# Patient Record
Sex: MALE | Race: ASIAN | Employment: PART TIME | URBAN - METROPOLITAN AREA
[De-identification: names, ages, dates, MRNs, and addresses within clinical notes are randomized per-mention and may not be internally consistent; named-entity substitution may affect disease eponyms.]

---

## 2018-03-25 ENCOUNTER — HOSPITAL ENCOUNTER (INPATIENT)
Age: 28
LOS: 9 days | Discharge: HOME | DRG: 329 | End: 2018-04-03
Attending: EMERGENCY MEDICINE | Admitting: INTERNAL MEDICINE
Payer: COMMERCIAL

## 2018-03-25 ENCOUNTER — APPOINTMENT (OUTPATIENT)
Dept: CT IMAGING | Age: 28
DRG: 329 | End: 2018-03-25
Attending: PHYSICIAN ASSISTANT
Payer: COMMERCIAL

## 2018-03-25 DIAGNOSIS — R16.0 LIVER MASSES: ICD-10-CM

## 2018-03-25 DIAGNOSIS — C18.9 COLON CANCER METASTASIZED TO LIVER (HCC): ICD-10-CM

## 2018-03-25 DIAGNOSIS — K63.89 COLONIC MASS: ICD-10-CM

## 2018-03-25 DIAGNOSIS — K57.32 DIVERTICULITIS OF LARGE INTESTINE WITHOUT PERFORATION OR ABSCESS WITHOUT BLEEDING: Primary | ICD-10-CM

## 2018-03-25 DIAGNOSIS — R65.10 SIRS (SYSTEMIC INFLAMMATORY RESPONSE SYNDROME) (HCC): ICD-10-CM

## 2018-03-25 DIAGNOSIS — C78.7 COLON CANCER METASTASIZED TO LIVER (HCC): ICD-10-CM

## 2018-03-25 DIAGNOSIS — J18.9 COMMUNITY ACQUIRED PNEUMONIA OF RIGHT LOWER LOBE OF LUNG: ICD-10-CM

## 2018-03-25 PROBLEM — R73.9 HYPERGLYCEMIA: Status: ACTIVE | Noted: 2018-03-25

## 2018-03-25 PROBLEM — K76.9 LESION OF LIVER: Status: ACTIVE | Noted: 2018-03-25

## 2018-03-25 PROBLEM — K57.92 ACUTE DIVERTICULITIS: Status: ACTIVE | Noted: 2018-03-25

## 2018-03-25 PROBLEM — I10 HYPERTENSION: Status: ACTIVE | Noted: 2018-03-25

## 2018-03-25 LAB
ALBUMIN SERPL-MCNC: 3.6 G/DL (ref 3.5–5)
ALBUMIN/GLOB SERPL: 0.7 {RATIO} (ref 1.1–2.2)
ALP SERPL-CCNC: 76 U/L (ref 45–117)
ALT SERPL-CCNC: 26 U/L (ref 12–78)
ANION GAP SERPL CALC-SCNC: 12 MMOL/L (ref 5–15)
APPEARANCE UR: ABNORMAL
AST SERPL-CCNC: 27 U/L (ref 15–37)
BACTERIA URNS QL MICRO: ABNORMAL /HPF
BASOPHILS # BLD: 0.1 K/UL (ref 0–0.1)
BASOPHILS NFR BLD: 0 % (ref 0–1)
BILIRUB SERPL-MCNC: 0.6 MG/DL (ref 0.2–1)
BILIRUB UR QL CFM: NEGATIVE
BUN SERPL-MCNC: 11 MG/DL (ref 6–20)
BUN/CREAT SERPL: 11 (ref 12–20)
CALCIUM SERPL-MCNC: 9.1 MG/DL (ref 8.5–10.1)
CHLORIDE SERPL-SCNC: 101 MMOL/L (ref 97–108)
CO2 SERPL-SCNC: 26 MMOL/L (ref 21–32)
COLOR UR: ABNORMAL
CREAT SERPL-MCNC: 0.97 MG/DL (ref 0.7–1.3)
DIFFERENTIAL METHOD BLD: ABNORMAL
EOSINOPHIL # BLD: 0.1 K/UL (ref 0–0.4)
EOSINOPHIL NFR BLD: 1 % (ref 0–7)
EPITH CASTS URNS QL MICRO: ABNORMAL /LPF
ERYTHROCYTE [DISTWIDTH] IN BLOOD BY AUTOMATED COUNT: 15 % (ref 11.5–14.5)
GLOBULIN SER CALC-MCNC: 5 G/DL (ref 2–4)
GLUCOSE SERPL-MCNC: 122 MG/DL (ref 65–100)
GLUCOSE UR STRIP.AUTO-MCNC: NEGATIVE MG/DL
HCT VFR BLD AUTO: 42.5 % (ref 36.6–50.3)
HGB BLD-MCNC: 13.9 G/DL (ref 12.1–17)
HGB UR QL STRIP: NEGATIVE
IMM GRANULOCYTES # BLD: 0.1 K/UL (ref 0–0.04)
IMM GRANULOCYTES NFR BLD AUTO: 1 % (ref 0–0.5)
KETONES UR QL STRIP.AUTO: ABNORMAL MG/DL
LACTATE SERPL-SCNC: 0.8 MMOL/L (ref 0.4–2)
LEUKOCYTE ESTERASE UR QL STRIP.AUTO: NEGATIVE
LIPASE SERPL-CCNC: 64 U/L (ref 73–393)
LYMPHOCYTES # BLD: 2.1 K/UL (ref 0.8–3.5)
LYMPHOCYTES NFR BLD: 11 % (ref 12–49)
MCH RBC QN AUTO: 23.1 PG (ref 26–34)
MCHC RBC AUTO-ENTMCNC: 32.7 G/DL (ref 30–36.5)
MCV RBC AUTO: 70.5 FL (ref 80–99)
MONOCYTES # BLD: 1.4 K/UL (ref 0–1)
MONOCYTES NFR BLD: 7 % (ref 5–13)
MUCOUS THREADS URNS QL MICRO: ABNORMAL /LPF
NEUTS SEG # BLD: 15.8 K/UL (ref 1.8–8)
NEUTS SEG NFR BLD: 81 % (ref 32–75)
NITRITE UR QL STRIP.AUTO: NEGATIVE
NRBC # BLD: 0 K/UL (ref 0–0.01)
NRBC BLD-RTO: 0 PER 100 WBC
PH UR STRIP: 5.5 [PH] (ref 5–8)
PLATELET # BLD AUTO: 401 K/UL (ref 150–400)
PMV BLD AUTO: 9.1 FL (ref 8.9–12.9)
POTASSIUM SERPL-SCNC: 4 MMOL/L (ref 3.5–5.1)
PROT SERPL-MCNC: 8.6 G/DL (ref 6.4–8.2)
PROT UR STRIP-MCNC: 30 MG/DL
RBC # BLD AUTO: 6.03 M/UL (ref 4.1–5.7)
RBC #/AREA URNS HPF: ABNORMAL /HPF (ref 0–5)
SODIUM SERPL-SCNC: 139 MMOL/L (ref 136–145)
SP GR UR REFRACTOMETRY: >1.03 (ref 1–1.03)
UROBILINOGEN UR QL STRIP.AUTO: 0.2 EU/DL (ref 0.2–1)
WBC # BLD AUTO: 19.5 K/UL (ref 4.1–11.1)
WBC URNS QL MICRO: ABNORMAL /HPF (ref 0–4)

## 2018-03-25 PROCEDURE — 96374 THER/PROPH/DIAG INJ IV PUSH: CPT

## 2018-03-25 PROCEDURE — 65270000029 HC RM PRIVATE

## 2018-03-25 PROCEDURE — 80053 COMPREHEN METABOLIC PANEL: CPT | Performed by: PHYSICIAN ASSISTANT

## 2018-03-25 PROCEDURE — 36415 COLL VENOUS BLD VENIPUNCTURE: CPT | Performed by: PHYSICIAN ASSISTANT

## 2018-03-25 PROCEDURE — 74011250637 HC RX REV CODE- 250/637: Performed by: INTERNAL MEDICINE

## 2018-03-25 PROCEDURE — 96375 TX/PRO/DX INJ NEW DRUG ADDON: CPT

## 2018-03-25 PROCEDURE — 74011636320 HC RX REV CODE- 636/320: Performed by: RADIOLOGY

## 2018-03-25 PROCEDURE — 81001 URINALYSIS AUTO W/SCOPE: CPT | Performed by: PHYSICIAN ASSISTANT

## 2018-03-25 PROCEDURE — 74011250636 HC RX REV CODE- 250/636: Performed by: PHYSICIAN ASSISTANT

## 2018-03-25 PROCEDURE — 83690 ASSAY OF LIPASE: CPT | Performed by: PHYSICIAN ASSISTANT

## 2018-03-25 PROCEDURE — 96361 HYDRATE IV INFUSION ADD-ON: CPT

## 2018-03-25 PROCEDURE — 99284 EMERGENCY DEPT VISIT MOD MDM: CPT

## 2018-03-25 PROCEDURE — 85025 COMPLETE CBC W/AUTO DIFF WBC: CPT | Performed by: PHYSICIAN ASSISTANT

## 2018-03-25 PROCEDURE — 74177 CT ABD & PELVIS W/CONTRAST: CPT

## 2018-03-25 PROCEDURE — 74011250636 HC RX REV CODE- 250/636: Performed by: INTERNAL MEDICINE

## 2018-03-25 PROCEDURE — 87633 RESP VIRUS 12-25 TARGETS: CPT | Performed by: INTERNAL MEDICINE

## 2018-03-25 PROCEDURE — 83605 ASSAY OF LACTIC ACID: CPT | Performed by: INTERNAL MEDICINE

## 2018-03-25 PROCEDURE — 74011000250 HC RX REV CODE- 250: Performed by: PHYSICIAN ASSISTANT

## 2018-03-25 PROCEDURE — 87040 BLOOD CULTURE FOR BACTERIA: CPT | Performed by: PHYSICIAN ASSISTANT

## 2018-03-25 RX ORDER — IPRATROPIUM BROMIDE AND ALBUTEROL SULFATE 2.5; .5 MG/3ML; MG/3ML
3 SOLUTION RESPIRATORY (INHALATION)
Status: DISCONTINUED | OUTPATIENT
Start: 2018-03-25 | End: 2018-04-03 | Stop reason: HOSPADM

## 2018-03-25 RX ORDER — BENZONATATE 100 MG/1
100 CAPSULE ORAL
Status: DISCONTINUED | OUTPATIENT
Start: 2018-03-25 | End: 2018-04-03 | Stop reason: HOSPADM

## 2018-03-25 RX ORDER — HYDROMORPHONE HYDROCHLORIDE 1 MG/ML
1 INJECTION, SOLUTION INTRAMUSCULAR; INTRAVENOUS; SUBCUTANEOUS
Status: DISCONTINUED | OUTPATIENT
Start: 2018-03-25 | End: 2018-03-29

## 2018-03-25 RX ORDER — ONDANSETRON 2 MG/ML
4 INJECTION INTRAMUSCULAR; INTRAVENOUS
Status: COMPLETED | OUTPATIENT
Start: 2018-03-25 | End: 2018-03-25

## 2018-03-25 RX ORDER — DOCUSATE SODIUM 100 MG/1
100 CAPSULE, LIQUID FILLED ORAL 2 TIMES DAILY
Status: DISCONTINUED | OUTPATIENT
Start: 2018-03-25 | End: 2018-04-03 | Stop reason: HOSPADM

## 2018-03-25 RX ORDER — DIPHENHYDRAMINE HYDROCHLORIDE 50 MG/ML
12.5 INJECTION, SOLUTION INTRAMUSCULAR; INTRAVENOUS
Status: DISCONTINUED | OUTPATIENT
Start: 2018-03-25 | End: 2018-04-03 | Stop reason: HOSPADM

## 2018-03-25 RX ORDER — METRONIDAZOLE 500 MG/100ML
500 INJECTION, SOLUTION INTRAVENOUS EVERY 12 HOURS
Status: DISCONTINUED | OUTPATIENT
Start: 2018-03-26 | End: 2018-03-27

## 2018-03-25 RX ORDER — LEVOFLOXACIN 5 MG/ML
750 INJECTION, SOLUTION INTRAVENOUS EVERY 24 HOURS
Status: DISCONTINUED | OUTPATIENT
Start: 2018-03-26 | End: 2018-03-27

## 2018-03-25 RX ORDER — LEVOFLOXACIN 5 MG/ML
750 INJECTION, SOLUTION INTRAVENOUS
Status: DISPENSED | OUTPATIENT
Start: 2018-03-25 | End: 2018-03-25

## 2018-03-25 RX ORDER — NALOXONE HYDROCHLORIDE 0.4 MG/ML
0.4 INJECTION, SOLUTION INTRAMUSCULAR; INTRAVENOUS; SUBCUTANEOUS AS NEEDED
Status: DISCONTINUED | OUTPATIENT
Start: 2018-03-25 | End: 2018-04-03 | Stop reason: HOSPADM

## 2018-03-25 RX ORDER — FENTANYL CITRATE 50 UG/ML
50 INJECTION, SOLUTION INTRAMUSCULAR; INTRAVENOUS
Status: COMPLETED | OUTPATIENT
Start: 2018-03-25 | End: 2018-03-25

## 2018-03-25 RX ORDER — IBUPROFEN 200 MG
200 TABLET ORAL
COMMUNITY
End: 2018-04-03

## 2018-03-25 RX ORDER — HYDROMORPHONE HYDROCHLORIDE 2 MG/ML
1 INJECTION, SOLUTION INTRAMUSCULAR; INTRAVENOUS; SUBCUTANEOUS
Status: COMPLETED | OUTPATIENT
Start: 2018-03-25 | End: 2018-03-25

## 2018-03-25 RX ORDER — SODIUM CHLORIDE 0.9 % (FLUSH) 0.9 %
5-10 SYRINGE (ML) INJECTION AS NEEDED
Status: DISCONTINUED | OUTPATIENT
Start: 2018-03-25 | End: 2018-04-03 | Stop reason: HOSPADM

## 2018-03-25 RX ORDER — GUAIFENESIN 600 MG/1
600 TABLET, EXTENDED RELEASE ORAL EVERY 12 HOURS
Status: DISCONTINUED | OUTPATIENT
Start: 2018-03-25 | End: 2018-04-03 | Stop reason: HOSPADM

## 2018-03-25 RX ORDER — SODIUM CHLORIDE 0.9 % (FLUSH) 0.9 %
5-10 SYRINGE (ML) INJECTION EVERY 8 HOURS
Status: DISCONTINUED | OUTPATIENT
Start: 2018-03-25 | End: 2018-04-03 | Stop reason: HOSPADM

## 2018-03-25 RX ORDER — HYDROCODONE BITARTRATE AND ACETAMINOPHEN 5; 325 MG/1; MG/1
1 TABLET ORAL
Status: DISCONTINUED | OUTPATIENT
Start: 2018-03-25 | End: 2018-03-29

## 2018-03-25 RX ORDER — ONDANSETRON 2 MG/ML
4 INJECTION INTRAMUSCULAR; INTRAVENOUS
Status: DISCONTINUED | OUTPATIENT
Start: 2018-03-25 | End: 2018-04-03 | Stop reason: HOSPADM

## 2018-03-25 RX ORDER — ACETAMINOPHEN 325 MG/1
650 TABLET ORAL
Status: DISCONTINUED | OUTPATIENT
Start: 2018-03-25 | End: 2018-04-03 | Stop reason: HOSPADM

## 2018-03-25 RX ORDER — ENOXAPARIN SODIUM 100 MG/ML
40 INJECTION SUBCUTANEOUS EVERY 24 HOURS
Status: DISCONTINUED | OUTPATIENT
Start: 2018-03-25 | End: 2018-03-27

## 2018-03-25 RX ORDER — SODIUM CHLORIDE 9 MG/ML
50 INJECTION, SOLUTION INTRAVENOUS CONTINUOUS
Status: DISCONTINUED | OUTPATIENT
Start: 2018-03-25 | End: 2018-04-01

## 2018-03-25 RX ORDER — METRONIDAZOLE 500 MG/100ML
500 INJECTION, SOLUTION INTRAVENOUS
Status: DISPENSED | OUTPATIENT
Start: 2018-03-25 | End: 2018-03-25

## 2018-03-25 RX ADMIN — ENOXAPARIN SODIUM 40 MG: 40 INJECTION SUBCUTANEOUS at 20:42

## 2018-03-25 RX ADMIN — ONDANSETRON 4 MG: 2 INJECTION INTRAMUSCULAR; INTRAVENOUS at 11:47

## 2018-03-25 RX ADMIN — HYDROMORPHONE HYDROCHLORIDE 1 MG: 1 INJECTION, SOLUTION INTRAMUSCULAR; INTRAVENOUS; SUBCUTANEOUS at 20:39

## 2018-03-25 RX ADMIN — HYDROMORPHONE HYDROCHLORIDE 1 MG: 1 INJECTION, SOLUTION INTRAMUSCULAR; INTRAVENOUS; SUBCUTANEOUS at 16:24

## 2018-03-25 RX ADMIN — SODIUM CHLORIDE 1000 ML: 900 INJECTION, SOLUTION INTRAVENOUS at 10:01

## 2018-03-25 RX ADMIN — SODIUM CHLORIDE 125 ML/HR: 900 INJECTION, SOLUTION INTRAVENOUS at 22:56

## 2018-03-25 RX ADMIN — HYDROMORPHONE HYDROCHLORIDE 1 MG: 2 INJECTION, SOLUTION INTRAMUSCULAR; INTRAVENOUS; SUBCUTANEOUS at 11:49

## 2018-03-25 RX ADMIN — GUAIFENESIN 600 MG: 600 TABLET, EXTENDED RELEASE ORAL at 20:43

## 2018-03-25 RX ADMIN — GUAIFENESIN 600 MG: 600 TABLET, EXTENDED RELEASE ORAL at 13:45

## 2018-03-25 RX ADMIN — FENTANYL CITRATE 50 MCG: 50 INJECTION, SOLUTION INTRAMUSCULAR; INTRAVENOUS at 09:56

## 2018-03-25 RX ADMIN — ONDANSETRON 4 MG: 2 INJECTION INTRAMUSCULAR; INTRAVENOUS at 09:57

## 2018-03-25 RX ADMIN — IOPAMIDOL 100 ML: 755 INJECTION, SOLUTION INTRAVENOUS at 10:32

## 2018-03-25 RX ADMIN — SODIUM CHLORIDE 125 ML/HR: 900 INJECTION, SOLUTION INTRAVENOUS at 13:45

## 2018-03-25 RX ADMIN — Medication 10 ML: at 20:43

## 2018-03-25 RX ADMIN — DOCUSATE SODIUM 100 MG: 100 CAPSULE, LIQUID FILLED ORAL at 18:45

## 2018-03-25 NOTE — H&P
Manuel Frazier [facility] [address], [place], [address] [facility]  [phone]    Admission History and Physical      NAME:  Curt Mas   :   1990   MRN:  [id number]     PCP:  None     Date/Time:  3/25/2018         Subjective:     CHIEF COMPLAINT: abd pain     HISTORY OF PRESENT ILLNESS:     The patient is a 31 yo hx of morbid obesity, presented w/ abd pain, acute diverticulitis, pneumonia. The patient stated that he has had URI type symptoms for one week, then developed acute LLQ pain 2 days ago. The pain is sharp, 10/10, but denied fevers, chills, nausea, vomiting, diarrhea. In the ED, temp was 98.5, WBC 19.5, Abd CT showed acute diverticulitis with RLL pneumonia. No Known Allergies    Prior to Admission medications    Medication Sig Start Date End Date Taking? Authorizing Provider   ibuprofen (MOTRIN) 200 mg tablet Take 200 mg by mouth every six (6) hours as needed for Pain. Yes Historical Provider       Past Medical History:   Diagnosis Date    Hypertension         No past surgical history on file.     Social History   Substance Use Topics    Smoking status: Former Smoker    Smokeless tobacco: Not on file    Alcohol use Yes        Family History   Problem Relation Age of Onset    Stroke Mother     Diabetes Mother    [facility] Stroke Father     Hypertension Father         Review of Systems:  (bold if positive, if negative)    Gen:  Eyes:  ENT:  CVS:  Pulm:  GI:  Abdominal pain  :    MS:  Skin:  Psych:  Endo:    Hem:  Renal:    Neuro:          Objective:      VITALS:    Vital signs reviewed; most recent are:    Visit Vitals    /88    Pulse (!) 134    Temp 98.5 °F (36.9 °C)    Resp 18    Ht 5' 11\" (1.803 m)    Wt 127 kg (280 lb)    SpO2 96%    BMI 39.05 kg/m2     SpO2 Readings from Last 6 Encounters:   18 96%   09/15/16 98%        No intake or output data in the 24 hours ending 18 1206     Exam:     Physical Exam:    Gen:  Well-developed, well-nourished, morbidly obese, mod distress  HEENT:  Pink conjunctivae, PERRL, hearing intact to voice, moist mucous membranes  Neck:  Supple, without masses, thyroid non-tender  Resp:  No accessory muscle use, clear breath sounds without wheezes rales or rhonchi  Card:  No murmurs, normal S1, S2 without thrills, bruits or peripheral edema  Abd:  Soft, severe LLQ pain, non-distended, normoactive bowel sounds are present, no palpable organomegaly and no detectable hernias  Lymph:  No cervical adenopathy  Musc:  No cyanosis or clubbing  Skin:  No rashes  Neuro:  Cranial nerves 3-12 are grossly intact, follows commands appropriately  Psych:  Alert with good insight. Oriented to person, place, and time    Labs:    Recent Labs      03/25/18   0907   WBC  19.5*   HGB  13.9   HCT  42.5   PLT  401*     Recent Labs      03/25/18   0907   NA  139   K  4.0   CL  101   CO2  26   GLU  122*   BUN  11   CREA  0.97   CA  9.1   ALB  3.6   TBILI  0.6   SGOT  27   ALT  26     No results found for: GLUCPOC  No results for input(s): PH, PCO2, PO2, HCO3, FIO2 in the last 72 hours. No results for input(s): INR in the last 72 hours. No lab exists for component: INREXT    Radiology and EKG reviewed:   abd CT reviewed    **Old Records reviewed in [facility]**       Assessment/Plan:       Principal Problem:     33 yo hx of morbid obesity, presented w/ abd pain, acute diverticulitis, pneumonia    1) Acute abd pain/Acute diverticulitis: seen on CT. No evidence of abscess. Will monitor blood Cx. Start NPO, IVF, IV Levaquin/flagyl. Consult GI    2) RLL Pneumonia: incidental findings on CT. Likely viral.  Will send sputum Cx, viral panel. Start O2, incentive spirometry, nebs prn. Already on IV Abx    3) SIRS (systemic inflammatory response syndrome): due to above issues, not septic. Will check Lactate    4) Lesion of liver: 2x1cm lesion seen on CT scan. Unclear significant.   Will defer to GI team    5) Hypertension: likely has underlying essential HTN, in combination of abd pain. Will monitor closely. Start norvasc if needed    6) Hyperglycemia: no hx of diabetes.   Will check A1C, lipids panel    Code: Full    Risk of deterioration: high      Total time spent with patient: [address] discussed with: Patient, family, nursing    Discussed:  Care Plan    Prophylaxis:  Lovenox    Probable Disposition:  Home w/Family           ___________________________________________________    Attending Physician: Jose Alberto Tran MD

## 2018-03-25 NOTE — IP AVS SNAPSHOT
[address] [facility] 
 
 
 [address] [place] [address] [address] [place] [address] 
[phone] Patient: Arcelia Wells 
MRN: [id number] :1990 About your hospitalization You were admitted on:  2018 You last received care in the:  [facility] 5M1 MED SURG 1 You were discharged on:  2018 Why you were hospitalized Your primary diagnosis was:  Acute Diverticulitis Your diagnoses also included:  Pneumonia, Sirs (Systemic Inflammatory Response Syndrome) (Hcc), Liver Masses, Hypertension, Hyperglycemia, Colonic Mass, Colon Cancer Metastasized To Liver (Hcc) Follow-up Information Follow up With Details Comments Contact Info Jaswinder Houston MD Go on 2018 appt at 2 pm to review test results and treatment plan [address] [place] [address] 
[phone] None   None (395) Patient stated that they have no PCP Dawna Crockett MD Go on 2018 hospital NEW pcp f/u appointment on  @ 2:05 p.m. Shima Buchanan Rodriguez [address] [place] [address] 
[phone] Your Scheduled Appointments 2018  2:00 PM EDT New Patient with Jaswinder Houston MD  
[facility] [address] [place] [address]) [address] [facility], [address] [facility] [address] [facility]  
[phone]   2:05 PM EDT New Patient with Dawna Crockett MD  
[address] [place] [address] [address] [place] [address])  
 Shima Buchanan Rodriguez 906. [address] [facility]  
[phone] Discharge Orders None A check susan indicates which time of day the medication should be taken. My Medications START taking these medications Instructions Each Dose to Equal  
 Morning Noon Evening Bedtime  
 levoFLOXacin 750 mg tablet Commonly known as:  Romayne Potters Your last dose was: Your next dose is: Take 1 Tab by mouth daily.   
 750 mg  
    
   
 metoprolol tartrate 25 mg tablet Commonly known as:  LOPRESSOR Your last dose was: Your next dose is: Take 1 Tab by mouth every twelve (12) hours. 25 mg  
    
   
   
   
  
 metroNIDAZOLE 500 mg tablet Commonly known as:  FLAGYL Your last dose was: Your next dose is: Take 1 Tab by mouth three (3) times daily for 5 days. 500 mg  
    
   
   
   
  
  
STOP taking these medications   
 ibuprofen 200 mg tablet Commonly known as:  MOTRIN Where to Get Your Medications Information on where to get these meds will be given to you by the nurse or doctor. ! Ask your nurse or doctor about these medications  
  levoFLOXacin 750 mg tablet  
 metoprolol tartrate 25 mg tablet  
 metroNIDAZOLE 500 mg tablet Discharge Instructions ACUTE DIAGNOSES: 
Acute diverticulitis 
anemia Colon cancer COLON CANCER 
 
CHRONIC MEDICAL DIAGNOSES: 
Problem List as of 4/2/2018  Date Reviewed: 4/2/2018 Codes Class Noted - Resolved Colon cancer metastasized to liver [facility]) ICD-10-CM: C18.9, C78.7 ICD-9-CM: 153.9, 197.7  3/28/2018 - Present Colonic mass ICD-10-CM: K63.9 ICD-9-CM: 569.89  3/27/2018 - Present * (Principal)Acute diverticulitis ICD-10-CM: P57.96 
ICD-9-CM: 562.11  3/25/2018 - Present Pneumonia ICD-10-CM: J18.9 ICD-9-CM: 397  3/25/2018 - Present SIRS (systemic inflammatory response syndrome) (HCC) ICD-10-CM: R65.10 ICD-9-CM: 995.90  3/25/2018 - Present Liver masses ICD-10-CM: R16.0 ICD-9-CM: 573.9  3/25/2018 - Present Hypertension ICD-10-CM: I10 
ICD-9-CM: 401.9  3/25/2018 - Present RESOLVED: Hyperglycemia ICD-10-CM: R73.9 ICD-9-CM: 790.29  3/25/2018 - 4/2/2018 DISCHARGE MEDICATIONS:  
  
 
 
· It is important that you take the medication exactly as they are prescribed. · Keep your medication in the bottles provided by the pharmacist and keep a list of the medication names, dosages, and times to be taken in your wallet. · Do not take other medications without consulting your doctor. DIET:  Regular Diet ACTIVITY: Activity as tolerated ADDITIONAL INFORMATION: If you experience any of the following symptoms then please call your primary care physician or return to the emergency room if you cannot get hold of your doctor: Fever, chills, nausea, vomiting, diarrhea, change in mentation, falling, bleeding, shortness of breath. FOLLOW UP CARE: 
Dr. Estrada  you are to call and set up an appointment to see them in 2 weeks. Follow-up with oncology  in 1 week Follow up with general surgery in one week Information obtained by : 
I understand that if any problems occur once I am at home I am to contact my physician. I understand and acknowledge receipt of the instructions indicated above. Physician's or R.N.'s Signature                                                                  Date/Time Patient or Representative Signature                                                          Date/Time [employer] Announcement We are excited to announce that we are making your provider's discharge notes available to you in [employer]. You will see these notes when they are completed and signed by the physician that discharged you from your recent hospital stay.   If you have any questions or concerns about any information you see in [employer], please call the Health Information Department where you were seen or reach out to your Primary Care Provider for more information about your plan of care. Introducing [facility]! [facility] introduces NXE patient portal. Now you can access parts of your medical record, email your doctor's office, and request medication refills online. 1. In your internet browser, go to [email or web address] 2. Click on the First Time User? Click Here link in the Sign In box. You will see the New Member Sign Up page. 3. Enter your NXE Access Code exactly as it appears below. You will not need to use this code after youve completed the sign-up process. If you do not sign up before the expiration date, you must request a new code. · NXE Access Code: [email or web address] Expires: 6/23/2018  9:24 AM 
 
4. Enter the last four digits of your Social Security Number (xxxx) and Date of Birth (mm/dd/yyyy) as indicated and click Submit. You will be taken to the next sign-up page. 5. Create a NXE ID. This will be your NXE login ID and cannot be changed, so think of one that is secure and easy to remember. 6. Create a NXE password. You can change your password at any time. 7. Enter your Password Reset Question and Answer. This can be used at a later time if you forget your password. 8. Enter your e-mail address. You will receive e-mail notification when new information is available in [address] 9. Click Sign Up. You can now view and download portions of your medical record. 10. Click the Download Summary menu link to download a portable copy of your medical information. If you have questions, please visit the Frequently Asked Questions section of the NXE website. Remember, NXE is NOT to be used for urgent needs. For medical emergencies, dial 911. Now available from your iPhone and Android! Introducing Cesar Castaneda As a [facility] patient, I wanted to make you aware of our electronic visit tool called Cesar Castaneda. [employer] allows you to connect within minutes with a medical provider 24 hours a day, seven days a week via a mobile device or tablet or logging into a secure website from your computer. You can access ishBowl from anywhere in the [place]. A virtual visit might be right for you when you have a simple condition and feel like you just dont want to get out of bed, or cant get away from work for an appointment, when your regular [employer] provider is not available (evenings, weekends or holidays), or when youre out of town and need minor care. Electronic visits cost only $49 and if the [employer] provider determines a prescription is needed to treat your condition, one can be electronically transmitted to a nearby pharmacy*. Please take a moment to enroll today if you have not already done so. The enrollment process is free and takes just a few minutes. To enroll, please download the AzureBooker norma to your tablet or phone, or visit [email or web address] to enroll on your computer. And, as an [address] [place] [address] patient with a [employer] account, the results of your visits will be scanned into your electronic medical record and your primary care provider will be able to view the scanned results. We urge you to continue to see your regular [employer] provider for your ongoing medical care. And while your primary care provider may not be the one available when you seek a [employer] virtual visit, the peace of mind you get from getting a real diagnosis real time can be priceless. For more information on [employer], view our Frequently Asked Questions (FAQs) at [email or web address]. Sincerely, 
 
Rajeev Bell MD 
Chief Medical Officer [employer] *:  certain medications cannot be prescribed via [employer] Providers Seen During Your Hospitalization Provider Specialty Primary office phone Lawrence Pineda MD Emergency Medicine [phone] Jasmina Rosado MD Internal Medicine [phone] Adonis Vincent MD Hospitalist [phone] Your Primary Care Physician (PCP) Primary Care Physician Office Phone Office Fax NONE ** None ** ** None ** You are allergic to the following No active allergies Recent Documentation Height Weight BMI Smoking Status 1.803 m 127 kg 39.05 kg/m2 Never Smoker Emergency Contacts Name Discharge Info Relation Home Work Mobile Yaritza Gonsales N/A  AT THIS TIME [6] Sister [23] [phone] Patient Belongings The following personal items are in your possession at time of discharge: 
  Dental Appliances: None  Visual Aid: None      Home Medications: None   Jewelry: None  Clothing: At bedside    Other Valuables: Cell Phone Please provide this summary of care documentation to your next provider. Signatures-by signing, you are acknowledging that this After Visit Summary has been reviewed with you and you have received a copy. Patient Signature:  ____________________________________________________________ Date:  ____________________________________________________________  
  
Laverne Brown Provider Signature:  ____________________________________________________________ Date:  ____________________________________________________________

## 2018-03-25 NOTE — ED PROVIDER NOTES
HPI Comments: 32 y.o. male with no significant past medical history who presents from home with chief complaint of abdominal pain. Per pt, he experienced onset of body aches, cough and nasal congestion on Wednesday (3/21/2018). The pt reports that these symptoms continued and on Friday he experienced new onset of LLQ pain. Since onset of the LLQ pain, the pt states that it has appeared to progressively worsen. Per pt, he has been taking ibuprofen and using Tiger Balm, without any significant relief. The pt makes it known that he has no hx of prior abd operations. Pt states he is unsure of fever at this time. Per pt, he last had a BM yesterday, which appeared normal. The pt denies fever, N/V/D, CP, blood or black stool, SOB, back pain, dizziness, headache and urinary symptoms. There are no other acute medical concerns at this time. PCP: None    Social Hx: Tobacco: denies  EtOH: social Illicit drug use: previous cocaine, last 9/2017      Note written by Blanca Heard, as dictated by Lori Pastrana PA-C 9:00 AM       The history is provided by the patient. No  was used. Past Medical History:   Diagnosis Date    Hypertension        No past surgical history on file. Family History:   Problem Relation Age of Onset    Stroke Mother     Diabetes Mother     Stroke Father     Hypertension Father        Social History     Social History    Marital status: SINGLE     Spouse name: N/A    Number of children: N/A    Years of education: N/A     Occupational History    Not on file. Social History Main Topics    Smoking status: Former Smoker    Smokeless tobacco: Not on file    Alcohol use Yes    Drug use: No    Sexual activity: Not on file     Other Topics Concern    Not on file     Social History Narrative         ALLERGIES: Review of patient's allergies indicates no known allergies. Review of Systems   Constitutional: Positive for chills. Negative for fever.    HENT: Positive for congestion. Negative for rhinorrhea, sneezing and sore throat. Eyes: Negative for redness and visual disturbance. Respiratory: Positive for cough. Negative for shortness of breath. Cardiovascular: Negative for chest pain and leg swelling. Gastrointestinal: Positive for abdominal pain (LLQ) and blood in stool. Negative for constipation, diarrhea, nausea and vomiting. Genitourinary: Negative for difficulty urinating, dysuria, flank pain, frequency and hematuria. Musculoskeletal: Negative for back pain, myalgias and neck stiffness. Skin: Negative for rash. Neurological: Negative for dizziness, syncope, weakness and headaches. Hematological: Negative for adenopathy. Vitals:    03/25/18 0839 03/25/18 0913 03/25/18 1045   BP: (!) 161/98  157/88   Pulse: (!) 134     Resp: 18     Temp: 98.5 °F (36.9 °C)     SpO2: 97% 99% 96%   Weight: 127 kg (280 lb)     Height: 5' 11\" (1.803 m)              Physical Exam   Constitutional: He is oriented to person, place, and time. He appears well-developed and well-nourished. No distress. Above average bmi male   HENT:   Head: Normocephalic and atraumatic. Right Ear: External ear normal.   Left Ear: External ear normal.   Mouth/Throat: Oropharynx is clear and moist.   Pale boggy nares with clear rhinorrhea + PND   Eyes: EOM are normal. Pupils are equal, round, and reactive to light. Neck: Neck supple. No JVD present. No tracheal deviation present. Cardiovascular: Regular rhythm, normal heart sounds and intact distal pulses. Exam reveals no gallop and no friction rub. No murmur heard. tachycardia   Pulmonary/Chest: Effort normal and breath sounds normal. No stridor. No respiratory distress. He has no wheezes. He has no rales. He exhibits no tenderness. Abdominal: Soft. Bowel sounds are normal. He exhibits no distension and no mass. There is tenderness. There is no rebound and no guarding.    Focal LLQ ttp without rebounding + guarding no CVAT   Musculoskeletal: Normal range of motion. He exhibits no edema, tenderness or deformity. Lymphadenopathy:     He has no cervical adenopathy. Neurological: He is alert and oriented to person, place, and time. No cranial nerve deficit. Coordination normal.   Skin: No rash noted. No erythema. No pallor. Psychiatric: He has a normal mood and affect. His behavior is normal.   Nursing note and vitals reviewed. MDM  Number of Diagnoses or Management Options  Community acquired pneumonia of right lower lobe of lung ([address]):   Diverticulitis of large intestine without perforation or abscess without bleeding:   SIRS (systemic inflammatory response syndrome) ([address]):      Amount and/or Complexity of Data Reviewed  Clinical lab tests: ordered and reviewed  Tests in the radiology section of CPT®: ordered and reviewed  Tests in the medicine section of CPT®: reviewed and ordered  Review and summarize past medical records: yes  Independent visualization of images, tracings, or specimens: yes    Patient Progress  Patient progress: stable        ED Course       Procedures  9:08 AM  Discussed with the patient the medical risks of cocaine usage and advised the patient of the benefits of the cessation of cocaine usage The patient verbalized their understanding. MICHELLE Dawson    9:08 AM  Discussed pt, sx, hx and current findings with Dr Elayne Rosales. He is in agreement with plan. Will get abd labs and ct abd pelvs  Cesia Ornelas PA-C    11:14 AM   Pt still with significant pain to LLQ, Diverticulitis on ct, tachycardia, and RLL pna. Will admit pt  Toyin Tracy. TEAGAN Ornelas    11:15 AM  Toyin Tracy. TEAGAN Ornelas spoke with Dr. Shanae Castro, Consult for Hospitalist. Discussed available diagnostic tests and clinical findings. He is in agreement with care plans as outlined.  He will admit pt  MICHELLE Dawson      LABS COMPLETED AND REVIEWED:  Recent Results (from the past 12 hour(s))   CBC WITH AUTOMATED DIFF    Collection Time: 03/25/18 9: 07 AM   Result Value Ref Range    WBC 19.5 (H) 4.1 - 11.1 K/uL    RBC 6.03 (H) 4.10 - 5.70 M/uL    HGB 13.9 12.1 - 17.0 g/dL    HCT 42.5 36.6 - 50.3 %    MCV 70.5 (L) 80.0 - 99.0 FL    MCH 23.1 (L) 26.0 - 34.0 PG    MCHC 32.7 30.0 - 36.5 g/dL    RDW 15.0 (H) 11.5 - 14.5 %    PLATELET 324 (H) 784 - 400 K/uL    MPV 9.1 8.9 - 12.9 FL    NRBC 0.0 0  WBC    ABSOLUTE NRBC 0.00 0.00 - 0.01 K/uL    NEUTROPHILS 81 (H) 32 - 75 %    LYMPHOCYTES 11 (L) 12 - 49 %    MONOCYTES 7 5 - 13 %    EOSINOPHILS 1 0 - 7 %    BASOPHILS 0 0 - 1 %    IMMATURE GRANULOCYTES 1 (H) 0.0 - 0.5 %    ABS. NEUTROPHILS 15.8 (H) 1.8 - 8.0 K/UL    ABS. LYMPHOCYTES 2.1 0.8 - 3.5 K/UL    ABS. MONOCYTES 1.4 (H) 0.0 - 1.0 K/UL    ABS. EOSINOPHILS 0.1 0.0 - 0.4 K/UL    ABS. BASOPHILS 0.1 0.0 - 0.1 K/UL    ABS. IMM. GRANS. 0.1 (H) 0.00 - 0.04 K/UL    DF AUTOMATED     METABOLIC PANEL, COMPREHENSIVE    Collection Time: 03/25/18  9:07 AM   Result Value Ref Range    Sodium 139 136 - 145 mmol/L    Potassium 4.0 3.5 - 5.1 mmol/L    Chloride 101 97 - 108 mmol/L    CO2 26 21 - 32 mmol/L    Anion gap 12 5 - 15 mmol/L    Glucose 122 (H) 65 - 100 mg/dL    BUN 11 6 - 20 MG/DL    Creatinine 0.97 0.70 - 1.30 MG/DL    BUN/Creatinine ratio 11 (L) 12 - 20      GFR est AA >60 >60 ml/min/1.73m2    GFR est non-AA >60 >60 ml/min/1.73m2    Calcium 9.1 8.5 - 10.1 MG/DL    Bilirubin, total 0.6 0.2 - 1.0 MG/DL    ALT (SGPT) 26 12 - 78 U/L    AST (SGOT) 27 15 - 37 U/L    Alk.  phosphatase 76 45 - 117 U/L    Protein, total 8.6 (H) 6.4 - 8.2 g/dL    Albumin 3.6 3.5 - 5.0 g/dL    Globulin 5.0 (H) 2.0 - 4.0 g/dL    A-G Ratio 0.7 (L) 1.1 - 2.2     LIPASE    Collection Time: 03/25/18  9:07 AM   Result Value Ref Range    Lipase 64 (L) 73 - 393 U/L   URINALYSIS W/MICROSCOPIC    Collection Time: 03/25/18  9:07 AM   Result Value Ref Range    Color DARK YELLOW      Appearance CLOUDY (A) CLEAR      Specific gravity >1.030 (H) 1.003 - 1.030    pH (UA) 5.5 5.0 - 8.0      Protein 30 (A) NEG mg/dL    Glucose NEGATIVE  NEG mg/dL    Ketone TRACE (A) NEG mg/dL    Blood NEGATIVE  NEG      Urobilinogen 0.2 0.2 - 1.0 EU/dL    Nitrites NEGATIVE  NEG      Leukocyte Esterase NEGATIVE  NEG      WBC 0-4 0 - 4 /hpf    RBC 0-5 0 - 5 /hpf    Epithelial cells FEW FEW /lpf    Bacteria 1+ (A) NEG /hpf    Mucus 3+ (A) NEG /lpf   BILIRUBIN, CONFIRM    Collection Time: 03/25/18  9:07 AM   Result Value Ref Range    Bilirubin UA, confirm NEGATIVE  NEG     LACTIC ACID    Collection Time: 03/25/18 11:23 AM   Result Value Ref Range    Lactic acid 0.8 0.4 - 2.0 MMOL/L       IMAGING COMPLETED AND REVIEWED:  The following have been ordered and reviewed:    Ct Abd Pelv W Cont    Result Date: 3/25/2018  EXAM:  CT ABD PELV W CONT INDICATION: llq pain COMPARISON: None CONTRAST:  100 mL of Isovue-370. TECHNIQUE: Following the uneventful intravenous administration of contrast, thin axial images were obtained through the abdomen and pelvis. Coronal and sagittal reconstructions were generated. Oral contrast was not administered. CT dose reduction was achieved through use of a standardized protocol tailored for this examination and automatic exposure control for dose modulation. FINDINGS: LUNG BASES: There is mild patchy right lower lobe airspace disease. INCIDENTALLY IMAGED HEART AND MEDIASTINUM: Unremarkable. LIVER: There is an oval hypodense lesion in the anterior right lobe of the liver, measuring 2 x 1 cm, nonspecific. There is no evidence of cirrhosis. GALLBLADDER: Unremarkable. SPLEEN: No mass. PANCREAS: No mass or ductal dilatation. ADRENALS: Unremarkable. KIDNEYS: No mass, calculus, or hydronephrosis. STOMACH: Unremarkable. SMALL BOWEL: No dilatation or wall thickening. COLON: There is acute colitis of the distal descending colon, likely due to diverticulitis. There is no peridiverticular fluid collection, and no evidence of bowel obstruction. APPENDIX: Unremarkable. PERITONEUM: No ascites or pneumoperitoneum.  RETROPERITONEUM: No lymphadenopathy or aortic aneurysm. REPRODUCTIVE ORGANS: The prostate is noted. There is trace fluid in the pelvis. URINARY BLADDER: No mass or calculus. BONES: No destructive bone lesion. ADDITIONAL COMMENTS: N/A     IMPRESSION: 1. Acute diverticulitis of the distal descending colon. No evidence of peridiverticular abscess or bowel obstruction. 2. Mild right lower lobe airspace disease. 3. Indeterminate 2 x 1 cm low-density liver lesion. MEDICATIONS GIVEN:  Medications   sodium chloride 0.9 % bolus infusion 1,000 mL (1,000 mL IntraVENous Continued On Admission 3/25/18 1059)   levoFLOXacin (LEVAQUIN) 750 mg in D5W IVPB (750 mg IntraVENous Continued On Admission 3/25/18 1210)   metroNIDAZOLE (FLAGYL) IVPB premix 500 mg (500 mg IntraVENous Continued On Admission 3/25/18 1140)   levoFLOXacin (LEVAQUIN) 750 mg in D5W IVPB (not administered)   metroNIDAZOLE (FLAGYL) IVPB premix 500 mg (not administered)   albuterol-ipratropium (DUO-NEB) 2.5 MG-0.5 MG/3 ML (not administered)   guaiFENesin ER (MUCINEX) tablet 600 mg (not administered)   benzonatate (TESSALON) capsule 100 mg (not administered)   fentaNYL citrate (PF) injection 50 mcg (50 mcg IntraVENous Given 3/25/18 0956)   ondansetron (ZOFRAN) injection 4 mg (4 mg IntraVENous Given 3/25/18 0957)   sodium chloride 0.9 % bolus infusion 1,000 mL (0 mL IntraVENous IV Completed 3/25/18 1210)   iopamidol (ISOVUE-370) 76 % injection 100 mL (100 mL IntraVENous Given 3/25/18 1032)   HYDROmorphone (PF) (DILAUDID) injection 1 mg (1 mg IntraVENous Given 3/25/18 1149)   ondansetron (ZOFRAN) injection 4 mg (4 mg IntraVENous Given 3/25/18 1147)         CLINICAL IMPRESSION:  1. Diverticulitis of large intestine without perforation or abscess without bleeding    2. Community acquired pneumonia of right lower lobe of lung ([address]    3. SIRS (systemic inflammatory response syndrome) (H[facility])          Plan  1.  Admission per Hospitalist      11:15 AM  The patient is being admitted to the hospital.  The results of their tests and reasons for their admission have been discussed with them and/or available family. The patient/family has conveyed agreement and understanding for the need to be admitted and for their admission diagnosis. Consultation has been made with the inpatient physician specialist for hospitalization.

## 2018-03-25 NOTE — ED NOTES
TRANSFER - OUT REPORT:    Verbal report given to Kelly (name) on [place]  being transferred to 5th floor (unit) for routine progression of care       Report consisted of patients Situation, Background, Assessment and   Recommendations(SBAR). Information from the following report(s) SBAR, ED Summary, [facility] and Recent Results was reviewed with the receiving nurse. Lines:   Peripheral IV 03/25/18 Left Antecubital (Active)   Site Assessment Clean, dry, & intact 3/25/2018  9:10 AM   Phlebitis Assessment 0 3/25/2018  9:10 AM   Infiltration Assessment 0 3/25/2018  9:10 AM   Dressing Status Clean, dry, & intact 3/25/2018  9:10 AM   Dressing Type Transparent 3/25/2018  9:10 AM   Hub Color/Line Status Pink;Flushed;Patent 3/25/2018  9:10 AM   Action Taken Blood drawn 3/25/2018  9:10 AM       Peripheral IV 03/25/18 Right Antecubital (Active)   Site Assessment Clean, dry, & intact 3/25/2018 11:38 AM   Phlebitis Assessment 0 3/25/2018 11:38 AM   Infiltration Assessment 0 3/25/2018 11:38 AM   Dressing Status Clean, dry, & intact 3/25/2018 11:38 AM   Dressing Type Transparent 3/25/2018 11:38 AM   Hub Color/Line Status Pink;Patent; Flushed 3/25/2018 11:38 AM   Action Taken Blood drawn 3/25/2018 11:38 AM        Opportunity for questions and clarification was provided.       Patient transported with:   [place]

## 2018-03-25 NOTE — ED TRIAGE NOTES
\"I had body aches and cough on Wednesday. On Friday I was feeling a little better but I got a sharp pain after I ate something. Saturday something triggered it and I couldn't barely walk. It's like a sharp twisting pain on my left side. \" Patient reports feeling like he needs to have a bowel movement but cannot. Denies fever.

## 2018-03-25 NOTE — IP AVS SNAPSHOT
[address] [id number] Patient: Saad Vasquez 
MRN: [id number] :1990 A check susan indicates which time of day the medication should be taken. My Medications START taking these medications Instructions Each Dose to Equal  
 Morning Noon Evening Bedtime  
 levoFLOXacin 750 mg tablet Commonly known as:  [place] Your last dose was: Your next dose is: Take 1 Tab by mouth daily. 750 mg  
    
   
   
   
  
 metoprolol tartrate 25 mg tablet Commonly known as:  LOPRESSOR Your last dose was: Your next dose is: Take 1 Tab by mouth every twelve (12) hours. 25 mg  
    
   
   
   
  
 metroNIDAZOLE 500 mg tablet Commonly known as:  FLAGYL Your last dose was: Your next dose is: Take 1 Tab by mouth three (3) times daily for 5 days. 500 mg  
    
   
   
   
  
  
STOP taking these medications   
 ibuprofen 200 mg tablet Commonly known as:  MOTRIN Where to Get Your Medications Information on where to get these meds will be given to you by the nurse or doctor. ! Ask your nurse or doctor about these medications  
  levoFLOXacin 750 mg tablet  
 metoprolol tartrate 25 mg tablet  
 metroNIDAZOLE 500 mg tablet

## 2018-03-25 NOTE — CONSULTS
Gastroenterology Consult     Referring Physician: CÉSAR Jung    Consult Date: 3/25/2018     Subjective:pain, nausea     Chief Complaint: pain, nausea    History of Present Illness: Laura Martinez is a 32 y.o. male who is seen in consultation for abd pain, nausea, abn CT. Acute onset with nausea; no fever, bleeding, hematemesis. Has leukocytosis, abn CT scan    Past Medical History:   Diagnosis Date    Hypertension      No past surgical history on file.    Family History   Problem Relation Age of Onset    Stroke Mother     Diabetes Mother     Stroke Father     Hypertension Father      Social History   Substance Use Topics    Smoking status: Former Smoker    Smokeless tobacco: Not on file    Alcohol use Yes      No Known Allergies  Current Facility-Administered Medications   Medication Dose Route Frequency    sodium chloride 0.9 % bolus infusion 1,000 mL  1,000 mL IntraVENous NOW    levoFLOXacin (LEVAQUIN) 750 mg in D5W IVPB  750 mg IntraVENous NOW    metroNIDAZOLE (FLAGYL) IVPB premix 500 mg  500 mg IntraVENous NOW    [START ON 3/26/2018] levoFLOXacin (LEVAQUIN) 750 mg in D5W IVPB  750 mg IntraVENous Q24H    [START ON 3/26/2018] metroNIDAZOLE (FLAGYL) IVPB premix 500 mg  500 mg IntraVENous Q12H    albuterol-ipratropium (DUO-NEB) 2.5 MG-0.5 MG/3 ML  3 mL Nebulization Q4H PRN    guaiFENesin ER (MUCINEX) tablet 600 mg  600 mg Oral Q12H    benzonatate (TESSALON) capsule 100 mg  100 mg Oral TID PRN    0.9% sodium chloride infusion  125 mL/hr IntraVENous CONTINUOUS    sodium chloride (NS) flush 5-10 mL  5-10 mL IntraVENous Q8H    sodium chloride (NS) flush 5-10 mL  5-10 mL IntraVENous PRN    acetaminophen (TYLENOL) tablet 650 mg  650 mg Oral Q4H PRN    HYDROcodone-acetaminophen (NORCO) 5-325 mg per tablet 1 Tab  1 Tab Oral Q4H PRN    HYDROmorphone (PF) (DILAUDID) injection 1 mg  1 mg IntraVENous Q4H PRN    naloxone (NARCAN) injection 0.4 mg  0.4 mg IntraVENous PRN    diphenhydrAMINE (BENADRYL) injection 12.5 mg  12.5 mg IntraVENous Q4H PRN    ondansetron (ZOFRAN) injection 4 mg  4 mg IntraVENous Q6H PRN    docusate sodium (COLACE) capsule 100 mg  100 mg Oral BID    enoxaparin (LOVENOX) injection 40 mg  40 mg SubCUTAneous Q24H        Review of Systems:  A detailed 10 organ review of systems is obtained with pertinent positives as listed in the History of Present Illness and Past Medical History. All others are negative. Objective:     Physical Exam:  Visit Vitals    /89 (BP 1 Location: Left arm, BP Patient Position: At rest)    Pulse (!) 115    Temp 98.5 °F (36.9 °C)    Resp 18    Ht 5' 11\" (1.803 m)    Wt 127 kg (280 lb)    SpO2 96%    BMI 39.05 kg/m2        Skin:  Extremities and face reveal no rashes. No beckwith erythema. No telangiectasias on the chest wall. HEENT: Sclerae anicteric. Extra-occular muscles are intact. No oral ulcers. No abnormal pigmentation of the lips. The neck is supple. Cardiovascular: Regular rate and rhythm. No murmurs, gallops, or rubs. Respiratory:  Comfortable breathing with no accessory muscle use. Clear breath sounds with no wheezes, rales, or rhonchi. GI:  Abdomen nondistended, soft, and focal marked LLQ tender. Normal active bowel sounds. No enlargement of the liver or spleen. No masses palpable. Musculoskeletal:  No pitting edema of the lower legs. Extremities have good range of motion. Neurological:  Gross memory appears intact. Patient is alert and oriented. Psychiatric:  Mood appears appropriate with judgement intact. Lymphatic:  No cervical or supraclavicular adenopathy.     Lab/Data Review:  CMP:   Lab Results   Component Value Date/Time     03/25/2018 09:07 AM    K 4.0 03/25/2018 09:07 AM     03/25/2018 09:07 AM    CO2 26 03/25/2018 09:07 AM    AGAP 12 03/25/2018 09:07 AM     (H) 03/25/2018 09:07 AM    BUN 11 03/25/2018 09:07 AM    CREA 0.97 03/25/2018 09:07 AM    GFRAA >60 03/25/2018 09:07 AM    GFRNA >60 03/25/2018 09:07 AM CA 9.1 03/25/2018 09:07 AM    ALB 3.6 03/25/2018 09:07 AM    TP 8.6 (H) 03/25/2018 09:07 AM    GLOB 5.0 (H) 03/25/2018 09:07 AM    AGRAT 0.7 (L) 03/25/2018 09:07 AM    SGOT 27 03/25/2018 09:07 AM    ALT 26 03/25/2018 09:07 AM     CBC:   Lab Results   Component Value Date/Time    WBC 19.5 (H) 03/25/2018 09:07 AM    HGB 13.9 03/25/2018 09:07 AM    HCT 42.5 03/25/2018 09:07 AM     (H) 03/25/2018 09:07 AM     CT scan:  LIVER: There is an oval hypodense lesion in the anterior right lobe of the  liver, measuring 2 x 1 cm, nonspecific. There is no evidence of cirrhosis. GALLBLADDER: Unremarkable. SPLEEN: No mass. PANCREAS: No mass or ductal dilatation. ADRENALS: Unremarkable. KIDNEYS: No mass, calculus, or hydronephrosis. STOMACH: Unremarkable. SMALL BOWEL: No dilatation or wall thickening. COLON: There is acute colitis of the distal descending colon, likely due to  diverticulitis. There is no peridiverticular fluid collection, and no evidence  of bowel obstruction. APPENDIX: Unremarkable. PERITONEUM: No ascites or pneumoperitoneum. RETROPERITONEUM: No lymphadenopathy or aortic aneurysm.       Assessment/Plan:     Principal Problem:    Acute diverticulitis (3/25/2018)    Active Problems:    Pneumonia (3/25/2018)      SIRS (systemic inflammatory response syndrome) ([address] (3/25/2018)      Lesion of liver (3/25/2018)      Hypertension (3/25/2018)      Hyperglycemia (3/25/2018)         Recommend: IV antibiotics until tenderness subsides with an additional two weeks oral therapy after discharge  Thanks

## 2018-03-25 NOTE — PROGRESS NOTES
[facility]: MED RECONCILIATION    Comments/Recommendations:   Patient stated that he does not take medications on a regular basis. He has tried OTC ibuprofen for abdominal pain, but it has not helped. Medications added:     · Ibuprofen    Medications removed:    · None    Medications adjusted:    · None    Information obtained from:  Patient    Significant PMH/Disease States:   No past medical history on file. Chief Complaint for this Admission:   Chief Complaint   Patient presents with    Abdominal Pain     Allergies: Review of patient's allergies indicates no known allergies. Prior to Admission Medications:   Prior to Admission Medications   Prescriptions Last Dose Informant Patient Reported? Taking?   ibuprofen (MOTRIN) 200 mg tablet   Yes Yes   Sig: Take 200 mg by mouth every six (6) hours as needed for Pain.       Facility-Administered Medications: None     Negrita Simon, PharmD, BCPS  Contact:

## 2018-03-26 ENCOUNTER — APPOINTMENT (OUTPATIENT)
Dept: ULTRASOUND IMAGING | Age: 28
DRG: 329 | End: 2018-03-26
Attending: INTERNAL MEDICINE
Payer: COMMERCIAL

## 2018-03-26 LAB
ALBUMIN SERPL-MCNC: 3 G/DL (ref 3.5–5)
ALBUMIN/GLOB SERPL: 0.8 {RATIO} (ref 1.1–2.2)
ALP SERPL-CCNC: 70 U/L (ref 45–117)
ALT SERPL-CCNC: 20 U/L (ref 12–78)
ANION GAP SERPL CALC-SCNC: 11 MMOL/L (ref 5–15)
AST SERPL-CCNC: 10 U/L (ref 15–37)
B PERT DNA SPEC QL NAA+PROBE: NOT DETECTED
BILIRUB DIRECT SERPL-MCNC: 0.2 MG/DL (ref 0–0.2)
BILIRUB SERPL-MCNC: 0.7 MG/DL (ref 0.2–1)
BUN SERPL-MCNC: 8 MG/DL (ref 6–20)
BUN/CREAT SERPL: 11 (ref 12–20)
C PNEUM DNA SPEC QL NAA+PROBE: NOT DETECTED
CALCIUM SERPL-MCNC: 8.4 MG/DL (ref 8.5–10.1)
CHLORIDE SERPL-SCNC: 102 MMOL/L (ref 97–108)
CHOLEST SERPL-MCNC: 167 MG/DL
CO2 SERPL-SCNC: 24 MMOL/L (ref 21–32)
CREAT SERPL-MCNC: 0.72 MG/DL (ref 0.7–1.3)
ERYTHROCYTE [DISTWIDTH] IN BLOOD BY AUTOMATED COUNT: 14.8 % (ref 11.5–14.5)
EST. AVERAGE GLUCOSE BLD GHB EST-MCNC: 108 MG/DL
FLUAV H1 2009 PAND RNA SPEC QL NAA+PROBE: NOT DETECTED
FLUAV H1 RNA SPEC QL NAA+PROBE: NOT DETECTED
FLUAV H3 RNA SPEC QL NAA+PROBE: NOT DETECTED
FLUAV SUBTYP SPEC NAA+PROBE: NOT DETECTED
FLUBV RNA SPEC QL NAA+PROBE: NOT DETECTED
GLOBULIN SER CALC-MCNC: 3.9 G/DL (ref 2–4)
GLUCOSE SERPL-MCNC: 99 MG/DL (ref 65–100)
HADV DNA SPEC QL NAA+PROBE: NOT DETECTED
HBA1C MFR BLD: 5.4 % (ref 4.2–6.3)
HCOV 229E RNA SPEC QL NAA+PROBE: NOT DETECTED
HCOV HKU1 RNA SPEC QL NAA+PROBE: NOT DETECTED
HCOV NL63 RNA SPEC QL NAA+PROBE: NOT DETECTED
HCOV OC43 RNA SPEC QL NAA+PROBE: NOT DETECTED
HCT VFR BLD AUTO: 37.4 % (ref 36.6–50.3)
HDLC SERPL-MCNC: 30 MG/DL
HDLC SERPL: 5.6 {RATIO} (ref 0–5)
HGB BLD-MCNC: 12 G/DL (ref 12.1–17)
HMPV RNA SPEC QL NAA+PROBE: NOT DETECTED
HPIV1 RNA SPEC QL NAA+PROBE: NOT DETECTED
HPIV2 RNA SPEC QL NAA+PROBE: NOT DETECTED
HPIV3 RNA SPEC QL NAA+PROBE: NOT DETECTED
HPIV4 RNA SPEC QL NAA+PROBE: NOT DETECTED
LACTATE SERPL-SCNC: 0.8 MMOL/L (ref 0.4–2)
LDLC SERPL CALC-MCNC: 115.8 MG/DL (ref 0–100)
LIPASE SERPL-CCNC: 60 U/L (ref 73–393)
LIPID PROFILE,FLP: ABNORMAL
M PNEUMO DNA SPEC QL NAA+PROBE: NOT DETECTED
MAGNESIUM SERPL-MCNC: 1.9 MG/DL (ref 1.6–2.4)
MCH RBC QN AUTO: 22.9 PG (ref 26–34)
MCHC RBC AUTO-ENTMCNC: 32.1 G/DL (ref 30–36.5)
MCV RBC AUTO: 71.2 FL (ref 80–99)
NRBC # BLD: 0 K/UL (ref 0–0.01)
NRBC BLD-RTO: 0 PER 100 WBC
PHOSPHATE SERPL-MCNC: 2.5 MG/DL (ref 2.6–4.7)
PLATELET # BLD AUTO: 309 K/UL (ref 150–400)
PMV BLD AUTO: 8.7 FL (ref 8.9–12.9)
POTASSIUM SERPL-SCNC: 3.9 MMOL/L (ref 3.5–5.1)
PROT SERPL-MCNC: 6.9 G/DL (ref 6.4–8.2)
RBC # BLD AUTO: 5.25 M/UL (ref 4.1–5.7)
RSV RNA SPEC QL NAA+PROBE: NOT DETECTED
RV+EV RNA SPEC QL NAA+PROBE: NOT DETECTED
SODIUM SERPL-SCNC: 137 MMOL/L (ref 136–145)
TRIGL SERPL-MCNC: 106 MG/DL (ref ?–150)
VLDLC SERPL CALC-MCNC: 21.2 MG/DL
WBC # BLD AUTO: 20.4 K/UL (ref 4.1–11.1)

## 2018-03-26 PROCEDURE — 74011000250 HC RX REV CODE- 250: Performed by: INTERNAL MEDICINE

## 2018-03-26 PROCEDURE — 74011250637 HC RX REV CODE- 250/637: Performed by: INTERNAL MEDICINE

## 2018-03-26 PROCEDURE — 74011250636 HC RX REV CODE- 250/636: Performed by: INTERNAL MEDICINE

## 2018-03-26 PROCEDURE — 85027 COMPLETE CBC AUTOMATED: CPT | Performed by: INTERNAL MEDICINE

## 2018-03-26 PROCEDURE — 83605 ASSAY OF LACTIC ACID: CPT | Performed by: INTERNAL MEDICINE

## 2018-03-26 PROCEDURE — 83036 HEMOGLOBIN GLYCOSYLATED A1C: CPT | Performed by: INTERNAL MEDICINE

## 2018-03-26 PROCEDURE — 65270000029 HC RM PRIVATE

## 2018-03-26 PROCEDURE — 80048 BASIC METABOLIC PNL TOTAL CA: CPT | Performed by: INTERNAL MEDICINE

## 2018-03-26 PROCEDURE — 36415 COLL VENOUS BLD VENIPUNCTURE: CPT | Performed by: INTERNAL MEDICINE

## 2018-03-26 PROCEDURE — 84100 ASSAY OF PHOSPHORUS: CPT | Performed by: INTERNAL MEDICINE

## 2018-03-26 PROCEDURE — 83690 ASSAY OF LIPASE: CPT | Performed by: INTERNAL MEDICINE

## 2018-03-26 PROCEDURE — 76700 US EXAM ABDOM COMPLETE: CPT

## 2018-03-26 PROCEDURE — 80076 HEPATIC FUNCTION PANEL: CPT | Performed by: INTERNAL MEDICINE

## 2018-03-26 PROCEDURE — 83735 ASSAY OF MAGNESIUM: CPT | Performed by: INTERNAL MEDICINE

## 2018-03-26 PROCEDURE — 80061 LIPID PANEL: CPT | Performed by: INTERNAL MEDICINE

## 2018-03-26 RX ORDER — MAGNESIUM CITRATE
296 SOLUTION, ORAL ORAL 2 TIMES DAILY
Status: COMPLETED | OUTPATIENT
Start: 2018-03-26 | End: 2018-03-26

## 2018-03-26 RX ADMIN — HYDROCODONE BITARTRATE AND ACETAMINOPHEN 1 TABLET: 5; 325 TABLET ORAL at 20:56

## 2018-03-26 RX ADMIN — SODIUM CHLORIDE: 900 INJECTION, SOLUTION INTRAVENOUS at 10:35

## 2018-03-26 RX ADMIN — POLYETHYLENE GLYCOL-3350 AND ELECTROLYTES 4000 ML: 236; 6.74; 5.86; 2.97; 22.74 POWDER, FOR SOLUTION ORAL at 12:46

## 2018-03-26 RX ADMIN — Medication 10 ML: at 06:18

## 2018-03-26 RX ADMIN — METRONIDAZOLE 500 MG: 500 INJECTION, SOLUTION INTRAVENOUS at 12:08

## 2018-03-26 RX ADMIN — LEVOFLOXACIN 750 MG: 5 INJECTION, SOLUTION INTRAVENOUS at 12:08

## 2018-03-26 RX ADMIN — DOCUSATE SODIUM 100 MG: 100 CAPSULE, LIQUID FILLED ORAL at 08:02

## 2018-03-26 RX ADMIN — SODIUM CHLORIDE 125 ML/HR: 900 INJECTION, SOLUTION INTRAVENOUS at 22:01

## 2018-03-26 RX ADMIN — GUAIFENESIN 600 MG: 600 TABLET, EXTENDED RELEASE ORAL at 08:02

## 2018-03-26 RX ADMIN — HYDROCODONE BITARTRATE AND ACETAMINOPHEN 1 TABLET: 5; 325 TABLET ORAL at 10:31

## 2018-03-26 RX ADMIN — HYDROMORPHONE HYDROCHLORIDE 1 MG: 1 INJECTION, SOLUTION INTRAMUSCULAR; INTRAVENOUS; SUBCUTANEOUS at 14:27

## 2018-03-26 RX ADMIN — MAGESIUM CITRATE 296 ML: 1.75 LIQUID ORAL at 12:46

## 2018-03-26 RX ADMIN — HYDROCODONE BITARTRATE AND ACETAMINOPHEN 1 TABLET: 5; 325 TABLET ORAL at 00:32

## 2018-03-26 RX ADMIN — METRONIDAZOLE 500 MG: 500 INJECTION, SOLUTION INTRAVENOUS at 00:22

## 2018-03-26 RX ADMIN — MAGESIUM CITRATE 296 ML: 1.75 LIQUID ORAL at 17:43

## 2018-03-26 RX ADMIN — DOCUSATE SODIUM 100 MG: 100 CAPSULE, LIQUID FILLED ORAL at 17:43

## 2018-03-26 RX ADMIN — HYDROMORPHONE HYDROCHLORIDE 1 MG: 1 INJECTION, SOLUTION INTRAMUSCULAR; INTRAVENOUS; SUBCUTANEOUS at 06:17

## 2018-03-26 RX ADMIN — SODIUM CHLORIDE 125 ML/HR: 900 INJECTION, SOLUTION INTRAVENOUS at 08:02

## 2018-03-26 RX ADMIN — GUAIFENESIN 600 MG: 600 TABLET, EXTENDED RELEASE ORAL at 20:56

## 2018-03-26 RX ADMIN — Medication 10 ML: at 20:58

## 2018-03-26 NOTE — PROGRESS NOTES
Bedside and Verbal shift change report given to ANKUSH Hunt (oncoming nurse) by Rody Gordon RN (offgoing nurse). Report included the following information SBAR, Kardex, Intake/Output, MAR and Recent Results.

## 2018-03-26 NOTE — PROGRESS NOTES
Bedside and Verbal shift change report given to Jyaleen Navarrete RN (oncoming nurse) by Mayo Sharpe RN (offgoing nurse). Report included the following information SBAR, Kardex, ED Summary, Intake/Output, MAR, Accordion and Recent Results.

## 2018-03-26 NOTE — PROGRESS NOTES
CM Note:  Met with pt for d/c planning. PTA pt was independent with ADL's, was driving and walked unaided. No DME at home; he has never had home health. His emergency contact is his sister, Nidhi Babcock ([phone]), who will drive him home at d/c or his cousin, ([phone]). Pt will apply for the Care Card. His employer is in the process of activating his insurance. No Rx coverage at this time. He gets his medications from [facility] on [address]. Will continue to follow. Pt was given a list of [employer].   ANKUSH Shelton    Care Management Interventions  PCP Verified by CM: No (Pt was given a list of [employer] providers.)  Palliative Care Criteria Met (RRAT>21 & CHF Dx)?: No  MyChart Signup: No  Discharge Durable Medical Equipment: No  Physical Therapy Consult: No  Occupational Therapy Consult: No  Speech Therapy Consult: No  Current Support Network: Relative's Home (Pt lives with his sister and family in a 1 story house with 4 entry steps.)  Confirm Follow Up Transport: Family (Pt's sister to drive him home at d/c.)  Plan discussed with Pt/Family/Caregiver: Yes  Discharge Location  Discharge Placement: Home with one level

## 2018-03-26 NOTE — PROGRESS NOTES
Gastrointestinal Progress Note    3/26/2018    Admit Date: 3/25/2018    Subjective:pain     New Complaints Today:  No: no vomiting, bleeding    Pain: Patient complains of abdominal pain yes. The pain is located in the LLQ. Current Facility-Administered Medications   Medication Dose Route Frequency    potassium phosphate 20 mmol in 0.9% sodium chloride 250 mL infusion   IntraVENous ONCE    magnesium citrate solution 296 mL  296 mL Oral BID    levoFLOXacin (LEVAQUIN) 750 mg in D5W IVPB  750 mg IntraVENous Q24H    metroNIDAZOLE (FLAGYL) IVPB premix 500 mg  500 mg IntraVENous Q12H    albuterol-ipratropium (DUO-NEB) 2.5 MG-0.5 MG/3 ML  3 mL Nebulization Q4H PRN    guaiFENesin ER (MUCINEX) tablet 600 mg  600 mg Oral Q12H    benzonatate (TESSALON) capsule 100 mg  100 mg Oral TID PRN    0.9% sodium chloride infusion  125 mL/hr IntraVENous CONTINUOUS    sodium chloride (NS) flush 5-10 mL  5-10 mL IntraVENous Q8H    sodium chloride (NS) flush 5-10 mL  5-10 mL IntraVENous PRN    acetaminophen (TYLENOL) tablet 650 mg  650 mg Oral Q4H PRN    HYDROcodone-acetaminophen (NORCO) 5-325 mg per tablet 1 Tab  1 Tab Oral Q4H PRN    HYDROmorphone (PF) (DILAUDID) injection 1 mg  1 mg IntraVENous Q4H PRN    naloxone (NARCAN) injection 0.4 mg  0.4 mg IntraVENous PRN    diphenhydrAMINE (BENADRYL) injection 12.5 mg  12.5 mg IntraVENous Q4H PRN    ondansetron (ZOFRAN) injection 4 mg  4 mg IntraVENous Q6H PRN    docusate sodium (COLACE) capsule 100 mg  100 mg Oral BID    enoxaparin (LOVENOX) injection 40 mg  40 mg SubCUTAneous Q24H        Objective:     Blood pressure 125/71, pulse (!) 102, temperature 98 °F (36.7 °C), resp. rate 18, height 5' 11\" (1.803 m), weight 127 kg (280 lb), SpO2 99 %.               EXAM:  GENERAL: alert, cooperative, no distress, HEART: regular rate and rhythm, LUNGS: chest clear, no wheezing, rales, normal symmetric air entry, ABDOMEN:  Bowel sounds are normal, liver is not enlarged, spleen is not enlarged and EXTREMITY: no edema      Data Review    Recent Results (from the past 24 hour(s))   RESPIRATORY PANEL,PCR,NASOPHARYNGEAL    Collection Time: 03/25/18  6:51 PM   Result Value Ref Range    Adenovirus NOT DETECTED NOTD      Coronavirus 229E NOT DETECTED NOTD      Coronavirus HKU1 NOT DETECTED NOTD      Coronavirus CVNL63 NOT DETECTED NOTD      Coronavirus OC43 NOT DETECTED NOTD      Metapneumovirus NOT DETECTED NOTD      Rhinovirus and Enterovirus NOT DETECTED NOTD      Influenza A NOT DETECTED NOTD      Influenza A, subtype H1 NOT DETECTED NOTD      Influenza A, subtype H3 NOT DETECTED NOTD      INFLUENZA A H1N1 PCR NOT DETECTED NOTD      Influenza B NOT DETECTED NOTD      Parainfluenza 1 NOT DETECTED NOTD      Parainfluenza 2 NOT DETECTED NOTD      Parainfluenza 3 NOT DETECTED NOTD      Parainfluenza virus 4 NOT DETECTED NOTD      RSV by PCR NOT DETECTED NOTD      Bordetella pertussis - PCR NOT DETECTED NOTD      Chlamydophila pneumoniae DNA, QL, PCR NOT DETECTED NOTD      Mycoplasma pneumoniae DNA, QL, PCR NOT DETECTED NOTD     METABOLIC PANEL, BASIC    Collection Time: 03/26/18  6:00 AM   Result Value Ref Range    Sodium 137 136 - 145 mmol/L    Potassium 3.9 3.5 - 5.1 mmol/L    Chloride 102 97 - 108 mmol/L    CO2 24 21 - 32 mmol/L    Anion gap 11 5 - 15 mmol/L    Glucose 99 65 - 100 mg/dL    BUN 8 6 - 20 MG/DL    Creatinine 0.72 0.70 - 1.30 MG/DL    BUN/Creatinine ratio 11 (L) 12 - 20      GFR est AA >60 >60 ml/min/1.73m2    GFR est non-AA >60 >60 ml/min/1.73m2    Calcium 8.4 (L) 8.5 - 10.1 MG/DL   LIPID PANEL    Collection Time: 03/26/18  6:00 AM   Result Value Ref Range    LIPID PROFILE          Cholesterol, total 167 <200 MG/DL    Triglyceride 106 <150 MG/DL    HDL Cholesterol 30 MG/DL    LDL, calculated 115.8 (H) 0 - 100 MG/DL    VLDL, calculated 21.2 MG/DL    CHOL/HDL Ratio 5.6 (H) 0 - 5.0     CBC W/O DIFF    Collection Time: 03/26/18  6:00 AM   Result Value Ref Range    WBC 20.4 (H) 4.1 - 11.1 K/uL    RBC 5.25 4. 10 - 5.70 M/uL    HGB 12.0 (L) 12.1 - 17.0 g/dL    HCT 37.4 36.6 - 50.3 %    MCV 71.2 (L) 80.0 - 99.0 FL    MCH 22.9 (L) 26.0 - 34.0 PG    MCHC 32.1 30.0 - 36.5 g/dL    RDW 14.8 (H) 11.5 - 14.5 %    PLATELET 452 540 - 881 K/uL    MPV 8.7 (L) 8.9 - 12.9 FL    NRBC 0.0 0  WBC    ABSOLUTE NRBC 0.00 0.00 - 0.01 K/uL   HEMOGLOBIN A1C WITH EAG    Collection Time: 03/26/18  6:00 AM   Result Value Ref Range    Hemoglobin A1c 5.4 4.2 - 6.3 %    Est. average glucose 108 mg/dL   MAGNESIUM    Collection Time: 03/26/18  6:00 AM   Result Value Ref Range    Magnesium 1.9 1.6 - 2.4 mg/dL   PHOSPHORUS    Collection Time: 03/26/18  6:00 AM   Result Value Ref Range    Phosphorus 2.5 (L) 2.6 - 4.7 MG/DL   HEPATIC FUNCTION PANEL    Collection Time: 03/26/18  6:00 AM   Result Value Ref Range    Protein, total 6.9 6.4 - 8.2 g/dL    Albumin 3.0 (L) 3.5 - 5.0 g/dL    Globulin 3.9 2.0 - 4.0 g/dL    A-G Ratio 0.8 (L) 1.1 - 2.2      Bilirubin, total 0.7 0.2 - 1.0 MG/DL    Bilirubin, direct 0.2 0.0 - 0.2 MG/DL    Alk. phosphatase 70 45 - 117 U/L    AST (SGOT) 10 (L) 15 - 37 U/L    ALT (SGPT) 20 12 - 78 U/L   LIPASE    Collection Time: 03/26/18  6:00 AM   Result Value Ref Range    Lipase 60 (L) 73 - 393 U/L   LACTIC ACID    Collection Time: 03/26/18  6:00 AM   Result Value Ref Range    Lactic acid 0.8 0.4 - 2.0 MMOL/L       Assessment:     Principal Problem:    Acute diverticulitis (3/25/2018)    Active Problems:    Pneumonia (3/25/2018)      SIRS (systemic inflammatory response syndrome) ([facility] [address] (3/25/2018)      Lesion of liver (3/25/2018)      Hypertension (3/25/2018)      Hyperglycemia (3/25/2018)        Plan:     1. I've discussed colonoscopy possible biopsy, polypectomy, cautery, injection, alternatives, complications including but not limited to pain, cardiopulmonary event, bleeding, perforation requiring additional blood transfusion or operative repair; all questions answered.

## 2018-03-26 NOTE — PROGRESS NOTES
Manuel Frazier [facility] [address]  [address] [facility], [address] [place] [address]  [phone]      Medical Progress Note      NAME: Curt Mas   :  1990  MRM:  [id number]    Date/Time: 3/26/2018         Subjective:     Chief Complaint:  Patient was seen and examined by me. Chart reviewed. Still with LLQ pain. No fevers       Objective:       Vitals:       Last 24hrs VS reviewed since prior progress note. Most recent are:    Visit Vitals    /71 (BP 1 Location: Left arm, BP Patient Position: At rest)    Pulse (!) 102    Temp 98 °F (36.7 °C)    Resp 18    Ht 5' 11\" (1.803 m)    Wt 127 kg (280 lb)    SpO2 99%    BMI 39.05 kg/m2     SpO2 Readings from Last 6 Encounters:   18 99%   09/15/16 98%        No intake or output data in the 24 hours ending 18 1021     Exam:     Physical Exam:    Gen:  Well-developed, well-nourished, morbidly obese, mild distress  HEENT:  Pink conjunctivae, PERRL, hearing intact to voice, moist mucous membranes  Neck:  Supple, without masses, thyroid non-tender  Resp:  No accessory muscle use, clear breath sounds without wheezes rales or rhonchi  Card:  No murmurs, normal S1, S2 without thrills, bruits or peripheral edema  Abd:  Soft, severe LLQ pain, non-distended, normoactive bowel sounds are present  Lymph:  No cervical adenopathy  Musc:  No cyanosis or clubbing  Skin:  No rashes  Neuro:  Cranial nerves 3-12 are grossly intact, follows commands appropriately  Psych:  Alert with good insight.   Oriented to person, place, and time    Medications Reviewed: (see below)    Lab Data Reviewed: (see below)    ______________________________________________________________________    Medications:     Current Facility-Administered Medications   Medication Dose Route Frequency    potassium phosphate 20 mmol in 0.9% sodium chloride 250 mL infusion   IntraVENous ONCE    levoFLOXacin (LEVAQUIN) 750 mg in D5W IVPB  750 mg IntraVENous Q24H    metroNIDAZOLE (FLAGYL) IVPB premix 500 mg  500 mg IntraVENous Q12H    albuterol-ipratropium (DUO-NEB) 2.5 MG-0.5 MG/3 ML  3 mL Nebulization Q4H PRN    guaiFENesin ER (MUCINEX) tablet 600 mg  600 mg Oral Q12H    benzonatate (TESSALON) capsule 100 mg  100 mg Oral TID PRN    0.9% sodium chloride infusion  125 mL/hr IntraVENous CONTINUOUS    sodium chloride (NS) flush 5-10 mL  5-10 mL IntraVENous Q8H    sodium chloride (NS) flush 5-10 mL  5-10 mL IntraVENous PRN    acetaminophen (TYLENOL) tablet 650 mg  650 mg Oral Q4H PRN    HYDROcodone-acetaminophen (NORCO) 5-325 mg per tablet 1 Tab  1 Tab Oral Q4H PRN    HYDROmorphone (PF) (DILAUDID) injection 1 mg  1 mg IntraVENous Q4H PRN    naloxone (NARCAN) injection 0.4 mg  0.4 mg IntraVENous PRN    diphenhydrAMINE (BENADRYL) injection 12.5 mg  12.5 mg IntraVENous Q4H PRN    ondansetron (ZOFRAN) injection 4 mg  4 mg IntraVENous Q6H PRN    docusate sodium (COLACE) capsule 100 mg  100 mg Oral BID    enoxaparin (LOVENOX) injection 40 mg  40 mg SubCUTAneous Q24H          Lab Review:     Recent Labs      03/26/18   0600  03/25/18   0907   WBC  20.4*  19.5*   HGB  12.0*  13.9   HCT  37.4  42.5   PLT  309  401*     Recent Labs      03/26/18   0600  03/25/18   0907   NA  137  139   K  3.9  4.0   CL  102  101   CO2  24  26   GLU  99  122*   BUN  8  11   CREA  0.72  0.97   CA  8.4*  9.1   MG  1.9   --    PHOS  2.5*   --    ALB  3.0*  3.6   TBILI  0.7  0.6   SGOT  10*  27   ALT  20  26     No results found for: GLUCPOC       Assessment / Plan:     33 yo hx of morbid obesity, presented w/ abd pain, acute diverticulitis, pneumonia     1) Acute abd pain/Acute diverticulitis: still with abd pain. Diverticulitis seen on CT. No evidence of abscess. Will monitor blood Cx. Cont NPO, IVF, IV Levaquin/flagyl. GI following     2) RLL Pneumonia: incidental findings on CT. Viral panel neg. Sputum Cx pending. Cont O2, incentive spirometry, nebs prn.   Already on IV Abx     3) SIRS (systemic inflammatory response syndrome): due to above issues, not septic. Improving     4) Lesion of liver: 2x1cm lesion seen on CT scan. Unclear significant. Will obtain a RUQ U/S to eval     5) Hypertension: likely has underlying essential HTN, in combination of abd pain. Will monitor closely     6) Hyperglycemia: no hx of diabetes.   A1C was 5.4%     Code: Full    Total time spent with patient: 30 [address] discussed with: Patient, nursing    Discussed:  Care Plan    Prophylaxis:  Lovenox    Disposition:  Home w/Family           ___________________________________________________    Attending Physician: Jasmina Rosado MD

## 2018-03-26 NOTE — PROGRESS NOTES
Nutrition Assessment:    RECOMMENDATIONS/INTERVENTION(S):   Advance diet when able to low fiber - monitor colonoscopy results for any additional needs  Monitor weight, PO tolerance, ab pain. Large stature may require double portions or added snacks. ASSESSMENT:   3/26: 32 yr old male admitted with abdominal pain. NPO now. Pt in considerable pain during visit, sharp stabbing, short lasting pain. Planned for scope tomorrow to determine diverticulitis or otherwise? Pt had hooter wings Friday night (last real meal) prior to pain starting. No weight changes recently. No n/v. No BM since 3/24. BG mildly elevated 122 mg/dL. Phos low, 2.5. Will continue to follow and make recs after colonoscopy. SUBJECTIVE/OBJECTIVE:   Diet Order: NPO  % Eaten:  No data found. Pertinent Medications: [x] Reviewed    Past Medical History:   Diagnosis Date    Hypertension         Chemistries:  Lab Results   Component Value Date/Time    Sodium 137 03/26/2018 06:00 AM    Potassium 3.9 03/26/2018 06:00 AM    Chloride 102 03/26/2018 06:00 AM    CO2 24 03/26/2018 06:00 AM    Anion gap 11 03/26/2018 06:00 AM    Glucose 99 03/26/2018 06:00 AM    BUN 8 03/26/2018 06:00 AM    Creatinine 0.72 03/26/2018 06:00 AM    BUN/Creatinine ratio 11 (L) 03/26/2018 06:00 AM    GFR est AA >60 03/26/2018 06:00 AM    GFR est non-AA >60 03/26/2018 06:00 AM    Calcium 8.4 (L) 03/26/2018 06:00 AM    AST (SGOT) 10 (L) 03/26/2018 06:00 AM    Alk. phosphatase 70 03/26/2018 06:00 AM    Protein, total 6.9 03/26/2018 06:00 AM    Albumin 3.0 (L) 03/26/2018 06:00 AM    Globulin 3.9 03/26/2018 06:00 AM    A-G Ratio 0.8 (L) 03/26/2018 06:00 AM    ALT (SGPT) 20 03/26/2018 06:00 AM      Anthropometrics: Height: 5' 11\" (180.3 cm) Weight: 127 kg (280 lb)    IBW (%IBW):   ( ) UBW (%UBW):   (  %)    BMI: Body mass index is 39.05 kg/(m^2).     This BMI is indicative of:     [] Underweight    [] Normal    [] Overweight    [x]  Obesity    []  Extreme Obesity (BMI>40)    Estimated Nutrition Needs (Based on): 2934 Kcals/day (BMR(2257x1.3)) , 102 g (-127g/day (0.8-1.0g/kg)) Protein  Carbohydrate: At Least 130 g/day  Fluids: 2900 mL/day    Last BM: 3/24   [x]Active     []Hyperactive  []Hypoactive       [] Absent   BS  Skin:    [x] Intact   [] Incision  [] Breakdown   [] DTI   [] Tears/Excoriation/Abrasion  []Edema [] Other: Wt Readings from Last 30 Encounters:   03/25/18 127 kg (280 lb)   09/15/16 131.5 kg (290 lb)      NUTRITION DIAGNOSES:   Problem:  Inadequate energy intake     Etiology: related to decreased ability to consume sufficient energy     Signs/Symptoms: as evidenced by NPO status, diverticulitis?  Scope planned 3/27      NUTRITION INTERVENTIONS:  Meals/Snacks: General/healthful diet                  fGOAL:   Diet will advance, pt will tolerate PO within 1-3 days    Cultural, Islam, or Ethnic Dietary Needs: None     LEARNING NEEDS (Diet, Food/Nutrient-Drug Interaction):    [x] None Identified   [] Identified and Education Provided/Documented   [] Identified and Pt declined/was not appropriate      [x] Interdisciplinary Care Plan Reviewed/Documented    [x] Discharge Needs:    TBD   [] No Nutrition Related Discharge Needs    NUTRITION RISK:   Pt Is At Nutrition Risk  [x]     No Nutrition Risk Identified  []       PT SEEN FOR:    [x]  MD Consult: []Calorie Count      []Diabetic Diet Education        []Diet Education     []Electrolyte Management     [x]General Nutrition Management and Supplements     []Management of Tube Feeding     []TPN Recommendations    []  RN Referral:  []MST score >=2     []Enteral/Parenteral Nutrition PTA     []Pregnant: Gestational DM or Multigestation                 [] Pressure Ulcer      []  Low BMI      []  Length of Stay       [] Dysphagia Diet     [] Ventilator      [] Follow-Up        Previous Recommendations:   [] Implemented          [] Not Implemented          [x] Not Applicable    Previous Goal:   [] Met              [] Progressing Towards Goal              [] Not Progressing Towards Goal   [x] Not Applicable              Marie Thomas RD  Pager: [phone]  Office: [phone]

## 2018-03-27 ENCOUNTER — ANESTHESIA EVENT (OUTPATIENT)
Dept: ENDOSCOPY | Age: 28
DRG: 329 | End: 2018-03-27
Payer: COMMERCIAL

## 2018-03-27 ENCOUNTER — APPOINTMENT (OUTPATIENT)
Dept: CT IMAGING | Age: 28
DRG: 329 | End: 2018-03-27
Attending: INTERNAL MEDICINE
Payer: COMMERCIAL

## 2018-03-27 ENCOUNTER — APPOINTMENT (OUTPATIENT)
Dept: ULTRASOUND IMAGING | Age: 28
DRG: 329 | End: 2018-03-27
Attending: INTERNAL MEDICINE
Payer: COMMERCIAL

## 2018-03-27 ENCOUNTER — ANESTHESIA (OUTPATIENT)
Dept: ENDOSCOPY | Age: 28
DRG: 329 | End: 2018-03-27
Payer: COMMERCIAL

## 2018-03-27 PROBLEM — K63.89 COLONIC MASS: Status: ACTIVE | Noted: 2018-03-27

## 2018-03-27 PROBLEM — R16.0 LIVER MASSES: Status: ACTIVE | Noted: 2018-03-25

## 2018-03-27 LAB
ANION GAP SERPL CALC-SCNC: 10 MMOL/L (ref 5–15)
BUN SERPL-MCNC: 6 MG/DL (ref 6–20)
BUN/CREAT SERPL: 8 (ref 12–20)
CALCIUM SERPL-MCNC: 8.6 MG/DL (ref 8.5–10.1)
CHLORIDE SERPL-SCNC: 103 MMOL/L (ref 97–108)
CO2 SERPL-SCNC: 25 MMOL/L (ref 21–32)
CREAT SERPL-MCNC: 0.79 MG/DL (ref 0.7–1.3)
ERYTHROCYTE [DISTWIDTH] IN BLOOD BY AUTOMATED COUNT: 14.9 % (ref 11.5–14.5)
GLUCOSE SERPL-MCNC: 86 MG/DL (ref 65–100)
HCT VFR BLD AUTO: 36.1 % (ref 36.6–50.3)
HGB BLD-MCNC: 11.6 G/DL (ref 12.1–17)
INR BLD: 1.2 (ref 0.9–1.2)
MAGNESIUM SERPL-MCNC: 2.3 MG/DL (ref 1.6–2.4)
MCH RBC QN AUTO: 22.9 PG (ref 26–34)
MCHC RBC AUTO-ENTMCNC: 32.1 G/DL (ref 30–36.5)
MCV RBC AUTO: 71.3 FL (ref 80–99)
NRBC # BLD: 0 K/UL (ref 0–0.01)
NRBC BLD-RTO: 0 PER 100 WBC
PHOSPHATE SERPL-MCNC: 2.4 MG/DL (ref 2.6–4.7)
PLATELET # BLD AUTO: 341 K/UL (ref 150–400)
PMV BLD AUTO: 9.2 FL (ref 8.9–12.9)
POTASSIUM SERPL-SCNC: 3.7 MMOL/L (ref 3.5–5.1)
RBC # BLD AUTO: 5.06 M/UL (ref 4.1–5.7)
SODIUM SERPL-SCNC: 138 MMOL/L (ref 136–145)
WBC # BLD AUTO: 21 K/UL (ref 4.1–11.1)

## 2018-03-27 PROCEDURE — 84100 ASSAY OF PHOSPHORUS: CPT | Performed by: INTERNAL MEDICINE

## 2018-03-27 PROCEDURE — 74011000250 HC RX REV CODE- 250: Performed by: RADIOLOGY

## 2018-03-27 PROCEDURE — 74011636320 HC RX REV CODE- 636/320: Performed by: RADIOLOGY

## 2018-03-27 PROCEDURE — 76060000031 HC ANESTHESIA FIRST 0.5 HR: Performed by: INTERNAL MEDICINE

## 2018-03-27 PROCEDURE — 76705 ECHO EXAM OF ABDOMEN: CPT

## 2018-03-27 PROCEDURE — 88341 IMHCHEM/IMCYTCHM EA ADD ANTB: CPT | Performed by: INTERNAL MEDICINE

## 2018-03-27 PROCEDURE — 77030009426 HC FCPS BIOP ENDOSC BSC -B: Performed by: INTERNAL MEDICINE

## 2018-03-27 PROCEDURE — 74011000250 HC RX REV CODE- 250

## 2018-03-27 PROCEDURE — 74011000258 HC RX REV CODE- 258: Performed by: INTERNAL MEDICINE

## 2018-03-27 PROCEDURE — 85027 COMPLETE CBC AUTOMATED: CPT | Performed by: INTERNAL MEDICINE

## 2018-03-27 PROCEDURE — 74011636320 HC RX REV CODE- 636/320

## 2018-03-27 PROCEDURE — 85610 PROTHROMBIN TIME: CPT

## 2018-03-27 PROCEDURE — 80048 BASIC METABOLIC PNL TOTAL CA: CPT | Performed by: INTERNAL MEDICINE

## 2018-03-27 PROCEDURE — 74170 CT ABD WO CNTRST FLWD CNTRST: CPT

## 2018-03-27 PROCEDURE — 74011250636 HC RX REV CODE- 250/636

## 2018-03-27 PROCEDURE — 74011250636 HC RX REV CODE- 250/636: Performed by: INTERNAL MEDICINE

## 2018-03-27 PROCEDURE — 36415 COLL VENOUS BLD VENIPUNCTURE: CPT | Performed by: INTERNAL MEDICINE

## 2018-03-27 PROCEDURE — 65270000029 HC RM PRIVATE

## 2018-03-27 PROCEDURE — 0FB03ZX EXCISION OF LIVER, PERCUTANEOUS APPROACH, DIAGNOSTIC: ICD-10-PCS | Performed by: RADIOLOGY

## 2018-03-27 PROCEDURE — 88173 CYTOPATH EVAL FNA REPORT: CPT | Performed by: INTERNAL MEDICINE

## 2018-03-27 PROCEDURE — 88307 TISSUE EXAM BY PATHOLOGIST: CPT | Performed by: INTERNAL MEDICINE

## 2018-03-27 PROCEDURE — 88342 IMHCHEM/IMCYTCHM 1ST ANTB: CPT | Performed by: INTERNAL MEDICINE

## 2018-03-27 PROCEDURE — 0DBM8ZX EXCISION OF DESCENDING COLON, VIA NATURAL OR ARTIFICIAL OPENING ENDOSCOPIC, DIAGNOSTIC: ICD-10-PCS | Performed by: INTERNAL MEDICINE

## 2018-03-27 PROCEDURE — 74011000272 HC RX REV CODE- 272: Performed by: RADIOLOGY

## 2018-03-27 PROCEDURE — 74011250636 HC RX REV CODE- 250/636: Performed by: RADIOLOGY

## 2018-03-27 PROCEDURE — 77030003503 HC NDL BIOP TISS BD -B

## 2018-03-27 PROCEDURE — 74011000250 HC RX REV CODE- 250: Performed by: INTERNAL MEDICINE

## 2018-03-27 PROCEDURE — 71260 CT THORAX DX C+: CPT

## 2018-03-27 PROCEDURE — 83735 ASSAY OF MAGNESIUM: CPT | Performed by: INTERNAL MEDICINE

## 2018-03-27 PROCEDURE — 76040000019: Performed by: INTERNAL MEDICINE

## 2018-03-27 PROCEDURE — 99152 MOD SED SAME PHYS/QHP 5/>YRS: CPT

## 2018-03-27 PROCEDURE — 74011250637 HC RX REV CODE- 250/637: Performed by: INTERNAL MEDICINE

## 2018-03-27 PROCEDURE — 77012 CT SCAN FOR NEEDLE BIOPSY: CPT

## 2018-03-27 PROCEDURE — 88305 TISSUE EXAM BY PATHOLOGIST: CPT | Performed by: INTERNAL MEDICINE

## 2018-03-27 PROCEDURE — 88333 PATH CONSLTJ SURG CYTO XM 1: CPT | Performed by: INTERNAL MEDICINE

## 2018-03-27 PROCEDURE — 99153 MOD SED SAME PHYS/QHP EA: CPT

## 2018-03-27 RX ORDER — LIDOCAINE HYDROCHLORIDE 10 MG/ML
INJECTION, SOLUTION EPIDURAL; INFILTRATION; INTRACAUDAL; PERINEURAL
Status: DISPENSED
Start: 2018-03-27 | End: 2018-03-28

## 2018-03-27 RX ORDER — MIDAZOLAM HYDROCHLORIDE 1 MG/ML
.25-5 INJECTION, SOLUTION INTRAMUSCULAR; INTRAVENOUS
Status: DISCONTINUED | OUTPATIENT
Start: 2018-03-27 | End: 2018-03-27 | Stop reason: HOSPADM

## 2018-03-27 RX ORDER — FENTANYL CITRATE 50 UG/ML
100 INJECTION, SOLUTION INTRAMUSCULAR; INTRAVENOUS
Status: DISPENSED | OUTPATIENT
Start: 2018-03-27 | End: 2018-03-27

## 2018-03-27 RX ORDER — LIDOCAINE HYDROCHLORIDE 10 MG/ML
10 INJECTION, SOLUTION EPIDURAL; INFILTRATION; INTRACAUDAL; PERINEURAL
Status: COMPLETED | OUTPATIENT
Start: 2018-03-27 | End: 2018-03-27

## 2018-03-27 RX ORDER — LIDOCAINE HYDROCHLORIDE 10 MG/ML
10 INJECTION INFILTRATION; PERINEURAL
Status: CANCELLED | OUTPATIENT
Start: 2018-03-27 | End: 2018-03-27

## 2018-03-27 RX ORDER — LIDOCAINE HYDROCHLORIDE 20 MG/ML
INJECTION, SOLUTION EPIDURAL; INFILTRATION; INTRACAUDAL; PERINEURAL AS NEEDED
Status: DISCONTINUED | OUTPATIENT
Start: 2018-03-27 | End: 2018-03-27 | Stop reason: HOSPADM

## 2018-03-27 RX ORDER — PROPOFOL 10 MG/ML
INJECTION, EMULSION INTRAVENOUS AS NEEDED
Status: DISCONTINUED | OUTPATIENT
Start: 2018-03-27 | End: 2018-03-27 | Stop reason: HOSPADM

## 2018-03-27 RX ORDER — FLUMAZENIL 0.1 MG/ML
0.2 INJECTION INTRAVENOUS
Status: DISCONTINUED | OUTPATIENT
Start: 2018-03-27 | End: 2018-03-27 | Stop reason: HOSPADM

## 2018-03-27 RX ORDER — MIDAZOLAM HYDROCHLORIDE 1 MG/ML
5 INJECTION, SOLUTION INTRAMUSCULAR; INTRAVENOUS
Status: DISPENSED | OUTPATIENT
Start: 2018-03-27 | End: 2018-03-27

## 2018-03-27 RX ORDER — DEXTROMETHORPHAN/PSEUDOEPHED 2.5-7.5/.8
1.2 DROPS ORAL
Status: DISCONTINUED | OUTPATIENT
Start: 2018-03-27 | End: 2018-03-27 | Stop reason: HOSPADM

## 2018-03-27 RX ORDER — ZOLPIDEM TARTRATE 5 MG/1
5 TABLET ORAL
Status: DISCONTINUED | OUTPATIENT
Start: 2018-03-27 | End: 2018-04-03 | Stop reason: HOSPADM

## 2018-03-27 RX ORDER — MIDAZOLAM HYDROCHLORIDE 1 MG/ML
5 INJECTION, SOLUTION INTRAMUSCULAR; INTRAVENOUS
Status: ACTIVE | OUTPATIENT
Start: 2018-03-27 | End: 2018-03-27

## 2018-03-27 RX ORDER — PROPOFOL 10 MG/ML
INJECTION, EMULSION INTRAVENOUS
Status: DISCONTINUED | OUTPATIENT
Start: 2018-03-27 | End: 2018-03-27 | Stop reason: HOSPADM

## 2018-03-27 RX ORDER — LIDOCAINE HYDROCHLORIDE 10 MG/ML
INJECTION, SOLUTION EPIDURAL; INFILTRATION; INTRACAUDAL; PERINEURAL
Status: COMPLETED
Start: 2018-03-27 | End: 2018-03-27

## 2018-03-27 RX ORDER — SODIUM CHLORIDE 9 MG/ML
50 INJECTION, SOLUTION INTRAVENOUS CONTINUOUS
Status: DISCONTINUED | OUTPATIENT
Start: 2018-03-27 | End: 2018-03-27

## 2018-03-27 RX ORDER — EPINEPHRINE 0.1 MG/ML
1 INJECTION INTRACARDIAC; INTRAVENOUS
Status: DISCONTINUED | OUTPATIENT
Start: 2018-03-27 | End: 2018-03-27 | Stop reason: HOSPADM

## 2018-03-27 RX ORDER — ATROPINE SULFATE 0.1 MG/ML
0.5 INJECTION INTRAVENOUS
Status: DISCONTINUED | OUTPATIENT
Start: 2018-03-27 | End: 2018-03-27 | Stop reason: HOSPADM

## 2018-03-27 RX ORDER — NALOXONE HYDROCHLORIDE 0.4 MG/ML
0.4 INJECTION, SOLUTION INTRAMUSCULAR; INTRAVENOUS; SUBCUTANEOUS
Status: DISCONTINUED | OUTPATIENT
Start: 2018-03-27 | End: 2018-03-27 | Stop reason: HOSPADM

## 2018-03-27 RX ORDER — FENTANYL CITRATE 50 UG/ML
100 INJECTION, SOLUTION INTRAMUSCULAR; INTRAVENOUS
Status: DISCONTINUED | OUTPATIENT
Start: 2018-03-27 | End: 2018-03-27 | Stop reason: HOSPADM

## 2018-03-27 RX ADMIN — Medication 10 ML: at 21:32

## 2018-03-27 RX ADMIN — MIDAZOLAM 1 MG: 1 INJECTION INTRAMUSCULAR; INTRAVENOUS at 13:22

## 2018-03-27 RX ADMIN — FENTANYL CITRATE 25 MCG: 50 INJECTION, SOLUTION INTRAMUSCULAR; INTRAVENOUS at 11:44

## 2018-03-27 RX ADMIN — MIDAZOLAM 1 MG: 1 INJECTION INTRAMUSCULAR; INTRAVENOUS at 11:44

## 2018-03-27 RX ADMIN — MIDAZOLAM HYDROCHLORIDE 1 MG: 1 INJECTION, SOLUTION INTRAMUSCULAR; INTRAVENOUS at 13:49

## 2018-03-27 RX ADMIN — IOPAMIDOL 44 ML: 755 INJECTION, SOLUTION INTRAVENOUS at 14:00

## 2018-03-27 RX ADMIN — LIDOCAINE HYDROCHLORIDE 10 ML: 10 INJECTION, SOLUTION EPIDURAL; INFILTRATION; INTRACAUDAL; PERINEURAL at 11:47

## 2018-03-27 RX ADMIN — FENTANYL CITRATE 25 MCG: 50 INJECTION, SOLUTION INTRAMUSCULAR; INTRAVENOUS at 11:34

## 2018-03-27 RX ADMIN — MIDAZOLAM 1 MG: 1 INJECTION INTRAMUSCULAR; INTRAVENOUS at 12:28

## 2018-03-27 RX ADMIN — SODIUM CHLORIDE 3.38 G: 900 INJECTION, SOLUTION INTRAVENOUS at 19:37

## 2018-03-27 RX ADMIN — ZOLPIDEM TARTRATE 5 MG: 5 TABLET ORAL at 23:34

## 2018-03-27 RX ADMIN — LIDOCAINE HYDROCHLORIDE 5 ML: 10 INJECTION, SOLUTION EPIDURAL; INFILTRATION; INTRACAUDAL; PERINEURAL at 14:00

## 2018-03-27 RX ADMIN — MIDAZOLAM 1 MG: 1 INJECTION INTRAMUSCULAR; INTRAVENOUS at 13:10

## 2018-03-27 RX ADMIN — GELATIN ABSORBABLE SPONGE 12-7 MM 1 EACH: 12-7 MISC at 13:47

## 2018-03-27 RX ADMIN — PROPOFOL 200 MG: 10 INJECTION, EMULSION INTRAVENOUS at 07:35

## 2018-03-27 RX ADMIN — FENTANYL CITRATE 25 MCG: 50 INJECTION, SOLUTION INTRAMUSCULAR; INTRAVENOUS at 13:22

## 2018-03-27 RX ADMIN — IOPAMIDOL 100 ML: 755 INJECTION, SOLUTION INTRAVENOUS at 14:00

## 2018-03-27 RX ADMIN — FENTANYL CITRATE 25 MCG: 50 INJECTION, SOLUTION INTRAMUSCULAR; INTRAVENOUS at 12:07

## 2018-03-27 RX ADMIN — LIDOCAINE HYDROCHLORIDE 100 MG: 20 INJECTION, SOLUTION EPIDURAL; INFILTRATION; INTRACAUDAL; PERINEURAL at 07:35

## 2018-03-27 RX ADMIN — MIDAZOLAM 1 MG: 1 INJECTION INTRAMUSCULAR; INTRAVENOUS at 13:05

## 2018-03-27 RX ADMIN — FENTANYL CITRATE 25 MCG: 50 INJECTION, SOLUTION INTRAMUSCULAR; INTRAVENOUS at 12:25

## 2018-03-27 RX ADMIN — DOCUSATE SODIUM 100 MG: 100 CAPSULE, LIQUID FILLED ORAL at 17:17

## 2018-03-27 RX ADMIN — PROPOFOL 150 MCG/KG/MIN: 10 INJECTION, EMULSION INTRAVENOUS at 07:34

## 2018-03-27 RX ADMIN — MIDAZOLAM 1 MG: 1 INJECTION INTRAMUSCULAR; INTRAVENOUS at 11:34

## 2018-03-27 RX ADMIN — MIDAZOLAM 1 MG: 1 INJECTION INTRAMUSCULAR; INTRAVENOUS at 13:42

## 2018-03-27 RX ADMIN — MIDAZOLAM HYDROCHLORIDE 1 MG: 1 INJECTION, SOLUTION INTRAMUSCULAR; INTRAVENOUS at 13:45

## 2018-03-27 RX ADMIN — METRONIDAZOLE 500 MG: 500 INJECTION, SOLUTION INTRAVENOUS at 00:09

## 2018-03-27 RX ADMIN — HYDROCODONE BITARTRATE AND ACETAMINOPHEN 1 TABLET: 5; 325 TABLET ORAL at 17:17

## 2018-03-27 RX ADMIN — GUAIFENESIN 600 MG: 600 TABLET, EXTENDED RELEASE ORAL at 23:34

## 2018-03-27 RX ADMIN — DIATRIZOATE MEGLUMINE AND DIATRIZOATE SODIUM 30 ML: 660; 100 LIQUID ORAL; RECTAL at 21:32

## 2018-03-27 RX ADMIN — FENTANYL CITRATE 25 MCG: 50 INJECTION, SOLUTION INTRAMUSCULAR; INTRAVENOUS at 13:10

## 2018-03-27 RX ADMIN — IOPAMIDOL 95 ML: 755 INJECTION, SOLUTION INTRAVENOUS at 23:19

## 2018-03-27 RX ADMIN — HYDROCODONE BITARTRATE AND ACETAMINOPHEN 1 TABLET: 5; 325 TABLET ORAL at 00:09

## 2018-03-27 RX ADMIN — SODIUM CHLORIDE 50 ML/HR: 900 INJECTION, SOLUTION INTRAVENOUS at 06:55

## 2018-03-27 RX ADMIN — FENTANYL CITRATE 25 MCG: 50 INJECTION, SOLUTION INTRAMUSCULAR; INTRAVENOUS at 13:05

## 2018-03-27 RX ADMIN — FENTANYL CITRATE 25 MCG: 50 INJECTION, SOLUTION INTRAMUSCULAR; INTRAVENOUS at 12:01

## 2018-03-27 NOTE — ANESTHESIA POSTPROCEDURE EVALUATION
Post-Anesthesia Evaluation and Assessment    Patient: Keely Wiggins MRN: [id number]  SSN: [id number]    YOB: 1990  Age: 32 y.o. Sex: male       Cardiovascular Function/Vital Signs  Visit Vitals    /73    Pulse (!) 108    Temp 36.8 °C (98.2 °F)    Resp 13    Ht 5' 11\" (1.803 m)    Wt 127 kg (280 lb)    SpO2 98%    BMI 39.05 kg/m2       Patient is status post MAC anesthesia for Procedure(s):  COLONOSCOPY  COLON BIOPSY. Nausea/Vomiting: None    Postoperative hydration reviewed and adequate. Pain:  Pain Scale 1: Numeric (0 - 10) (03/27/18 1832)  Pain Intensity 1: 2 (03/27/18 2984)   Managed    Neurological Status: At baseline    Mental Status and Level of Consciousness: Arousable    Pulmonary Status:   O2 Device: Room air (03/27/18 7883)   Adequate oxygenation and airway patent    Complications related to anesthesia: None    Post-anesthesia assessment completed.  No concerns    Signed By: Juan Phillips MD     March 27, 2018

## 2018-03-27 NOTE — PERIOP NOTES
Per patient's request his sister was called and informed of the results of his colonoscopy. Patient requested that she come to the hospital and that was relayed to her and she would be here within the hour.  Janki-patient's 5th floor nurse was called and updated of his request.

## 2018-03-27 NOTE — PROCEDURES
[address]  [phone]      2018    Colonoscopy Procedure Note  Susie Francois  :  1990  [facility] Medical Record Number: [id number]    Indications:     abnormal CT  PCP:  None  Anesthesia/Sedation: Conscious Sedation/Moderate Sedation  Endoscopist:  Dr. Bailee Aaron  Complications:  None  Estimate Blood Loss:  None    Permit:  The indications, risks, benefits and alternatives were reviewed with the patient or their decision maker who was provided an opportunity to ask questions and all questions were answered. The specific risks of colonoscopy with conscious sedation were reviewed, including but not limited to anesthetic complication, bleeding, adverse drug reaction, missed lesion, infection, IV site reactions, and intestinal perforation which would lead to the need for surgical repair. Alternatives to colonoscopy including radiographic imaging, observation without testing, or laboratory testing were reviewed including the limitations of those alternatives. After considering the options and having all their questions answered, the patient or their decision maker provided both verbal and written consent to proceed. Procedure in Detail:  After obtaining informed consent, positioning of the patient in the left lateral decubitus position, and conduction of a pre-procedure pause or \"time out\" the endoscope was introduced into the anus and advanced to the cecum, which was identified by the ileocecal valve and appendiceal orifice. The quality of the colonic preparation was satisfactory. A careful inspection was made as the colonoscope was withdrawn, findings and interventions are described below. Appendiceal orifice photographed    Findings:   Circumferential mass 40 cm, friable    Specimens:    biopsy mass lesion    Complications:   None; patient tolerated the procedure well.   Estimated blood loss: none    Impression:  colon cancer    Recommendations:      - CT guided liver biopsy    Thank you for entrusting me with this patient's care. Please do not hesitate to contact me with any questions or if I can be of assistance with any of your other patients' GI needs.     Signed By: Mc Deras MD                        March 27, 2018

## 2018-03-27 NOTE — PROGRESS NOTES
GI        Colon exam shows circumferential mass at 40 cm classic for cancer      Needs Ct guided liver biopsy to exclude metastasis

## 2018-03-27 NOTE — PROGRESS NOTES
1530: Patient up to floor. Requesting diet. Notified Dr. Kedar Tyson. Orders to defer to GI. Family requesting to speak to Dr. Kedar Tyson. Will be up to see them. Notified of blood pressure and high HR. Continue to monitor. 1600: Dr Machado January in to see patient. Patient can have clear liquids. Advance to full as tolerated. 1720: Patient requesting a shower and questions about fluids. Orders that patient can shower. Orders to continue fluids at 75mL/hr. Notified Dr. Kedar Tyson. Patient has no antibiotics. Orders to follow. 1800: Patient requesting something to sleep. Orders for ambien PRN. 1815: Spoke to CT. Patient needs to be NPO for 2 hours and then drink the contrast. Notify CT when patient drinks CT. Patient wants to eat dinner. Will notify RN when finish. Bedside shift change report given to [address] [facility],[address] (oncoming nurse) by Kevin Sue RN (offgoing nurse). Report included the following information SBAR, Kardex, Intake/Output, MAR and Recent Results.

## 2018-03-27 NOTE — ANESTHESIA PREPROCEDURE EVALUATION
Anesthetic History   No history of anesthetic complications            Review of Systems / Medical History  Patient summary reviewed, nursing notes reviewed and pertinent labs reviewed    Pulmonary  Within defined limits                 Neuro/Psych   Within defined limits           Cardiovascular  Within defined limits  Hypertension                   GI/Hepatic/Renal  Within defined limits              Endo/Other  Within defined limits      Obesity     Other Findings              Physical Exam    Airway  Mallampati: II  TM Distance: 4 - 6 cm  Neck ROM: normal range of motion   Mouth opening: Normal     Cardiovascular    Rhythm: regular  Rate: normal         Dental  No notable dental hx       Pulmonary  Breath sounds clear to auscultation               Abdominal         Other Findings            Anesthetic Plan    ASA: 2  Anesthesia type: MAC            Anesthetic plan and risks discussed with: Patient

## 2018-03-27 NOTE — PROGRESS NOTES
RAD Note:   0900Chart reviewed for U/S liver biopsy evaluation. Areas reviewed include, but are not limited to, patient's current and past H&P, Lab and Procedure Results, all notes, media records and medications. Spoke  to [facility] and patient to remain NPO for liver biopsy today 1100.   0945 Patient entered for transport to [facility] for assessment prior to liver biopsy. 1010 Patient arrived for procedure. INT POC taken per ED Sharon Ortez RN. Lovenox 40 mg 3/25/18. Baseline taken and entered. Complaints of left side lower abdominal pain 3/10 with movement only. S1S2, lungs clear diminished throughout. NPO verified, patient declines family at bedside. Consent obtained and Dr. Juan F Liao notified of readiness. Dr. Juan F Liao over to consent patient with review of risks and benefits. 1130 Patient taken to US and connected to the monitor and scanned per Bryan Linton, [address] Dr. Rogel Frame over to perform procedure. Time out 1146 Start time 1147 End time 1242. Patient placed on left side and the prone. Unable to obtain biopsy with ultrasound and modality changed to CT guidance. 1245 Patient taken to CT and placed on the table and connected back to the monitor. Patient scanned with contrast. Time 1309 Start time 1310 End time 1350. Total of Versed 9 mg and Fentanyl 200 mcg given with both procedures. Dressing to right abdomen C/D/I.  1400 Patient taken back to [facility] for recovery. Resting quietly and denies pain. Dressing C/D/I to right abdomen. Connected back to monitor and fluids provided. 1440 Report called to Jose Thurman with questions asked and answered. Patient taken to the floor via transport.

## 2018-03-27 NOTE — PERIOP NOTES
TRANSFER - OUT REPORT:    Verbal report given to [facility] on [address]  being transferred to  for routine post - op       Report consisted of patients Situation, Background, Assessment and   Recommendations(SBAR). Information from the following report(s) Procedure Summary, Intake/Output, MAR and Recent Results was reviewed with the receiving nurse. Lines:   Peripheral IV 03/25/18 Left Antecubital (Active)   Site Assessment Clean, dry, & intact 3/27/2018  6:00 AM   Phlebitis Assessment 0 3/27/2018  6:00 AM   Infiltration Assessment 0 3/27/2018  6:00 AM   Dressing Status Clean, dry, & intact 3/27/2018  6:00 AM   Dressing Type Transparent;Tape 3/27/2018  6:00 AM   Hub Color/Line Status Pink; Infusing; End cap changed; Flushed 3/27/2018  6:00 AM   Action Taken Open ports on tubing capped 3/27/2018  6:00 AM   Alcohol Cap Used Yes 3/27/2018  6:00 AM       Peripheral IV 03/25/18 Right Antecubital (Active)   Site Assessment Clean, dry, & intact 3/27/2018  4:01 AM   Phlebitis Assessment 0 3/27/2018  4:01 AM   Infiltration Assessment 0 3/27/2018  4:01 AM   Dressing Status Clean, dry, & intact 3/27/2018  4:01 AM   Dressing Type Transparent 3/27/2018  4:01 AM   Hub Color/Line Status Pink 3/27/2018  4:01 AM   Action Taken Open ports on tubing capped 3/26/2018  3:36 AM   Alcohol Cap Used Yes 3/27/2018  4:01 AM        Opportunity for questions and clarification was provided.       Patient transported with:  Transport Tech and chart

## 2018-03-27 NOTE — CONSULTS
Surgery Consult    Subjective:      Serg Fine is a 32 y.o. male admitted 3/25 for presumed diverticulitis as seen on CT. However he underwent colonoscopy yesterday and was found to have circumferential mass of the descending colon concerning for neoplasm. Additionally there is a hepatic lesion concerning for met. Pt started feeling poorly about a week ago with URI symptoms and then developed lower abdominal pain about 2 days prior to admission. At time of consult final pathology is pending on colonoscopy biopsy as well as liver biopsy performed today. Past Medical History:   Diagnosis Date    Hypertension      History reviewed. No pertinent surgical history.    Family History   Problem Relation Age of Onset    Stroke Mother     Diabetes Mother     Stroke Father     Hypertension Father      Social History     Social History    Marital status: SINGLE     Spouse name: N/A    Number of children: N/A    Years of education: N/A     Social History Main Topics    Smoking status: Never Smoker    Smokeless tobacco: Never Used    Alcohol use Yes      Comment: socially    Drug use: Yes     Special: Marijuana    Sexual activity: Not Asked     Other Topics Concern    None     Social History Narrative      Current Facility-Administered Medications   Medication Dose Route Frequency    lidocaine (PF) (XYLOCAINE) 10 mg/mL (1 %) injection        albuterol-ipratropium (DUO-NEB) 2.5 MG-0.5 MG/3 ML  3 mL Nebulization Q4H PRN    guaiFENesin ER (MUCINEX) tablet 600 mg  600 mg Oral Q12H    benzonatate (TESSALON) capsule 100 mg  100 mg Oral TID PRN    0.9% sodium chloride infusion  125 mL/hr IntraVENous CONTINUOUS    sodium chloride (NS) flush 5-10 mL  5-10 mL IntraVENous Q8H    sodium chloride (NS) flush 5-10 mL  5-10 mL IntraVENous PRN    acetaminophen (TYLENOL) tablet 650 mg  650 mg Oral Q4H PRN    HYDROcodone-acetaminophen (NORCO) 5-325 mg per tablet 1 Tab  1 Tab Oral Q4H PRN    HYDROmorphone (PF) (DILAUDID) injection 1 mg  1 mg IntraVENous Q4H PRN    naloxone (NARCAN) injection 0.4 mg  0.4 mg IntraVENous PRN    diphenhydrAMINE (BENADRYL) injection 12.5 mg  12.5 mg IntraVENous Q4H PRN    ondansetron (ZOFRAN) injection 4 mg  4 mg IntraVENous Q6H PRN    docusate sodium (COLACE) capsule 100 mg  100 mg Oral BID      No Known Allergies    Review of Systems:REVIEW OF SYSTEMS:     []     Unable to obtain  ROS due to  []    mental status change  []    sedated   []    intubated   []    Total of 12 systems reviewed as follows:    Constitutional: neg for fevers, chills, weight loss, malaise  Eyes: negative for blurry vision  Ears, nose, mouth, throat, and face: negative for sore throat  Respiratory: negative for SOB  Cardiovascular: negative for CP  Gastrointestinal: +Abdominal pain, negative for nausea, vomiting, diarrhea, constipation, melena, hematochezia  Genitourinary: negative for dysuria  Integument/breast: neg for skin rash  Hematologic/lymphatic: neg for bruising  Musculoskeletal: negative for muscle aches  Neurological: no dizziness or h/a    Objective:      Patient Vitals for the past 8 hrs:   BP Temp Pulse Resp SpO2   03/27/18 1502 (!) 167/105 98.6 °F (37 °C) (!) 108 18 98 %   03/27/18 1430 (!) 155/96 - (!) 104 19 96 %   03/27/18 1415 (!) 152/104 - (!) 102 21 97 %   03/27/18 1405 158/85 - (!) 102 19 95 %   03/27/18 1351 (!) 155/108 - (!) 106 21 98 %   03/27/18 1348 (!) 154/93 - (!) 107 - 98 %   03/27/18 1343 (!) 158/99 - (!) 101 (!) 31 98 %   03/27/18 1339 (!) 144/92 - (!) 102 14 98 %   03/27/18 1335 (!) 153/98 - (!) 103 - 97 %   03/27/18 1330 (!) 150/94 - (!) 105 - 98 %   03/27/18 1326 (!) 141/101 - (!) 102 19 96 %   03/27/18 1321 (!) 152/93 - (!) 102 20 98 %   03/27/18 1317 (!) 172/138 - 100 14 99 %   03/27/18 1311 (!) 151/95 - (!) 106 - 97 %   03/27/18 1306 150/75 - (!) 105 22 100 %   03/27/18 1302 159/88 - (!) 106 - -   03/27/18 1257 143/83 - (!) 105 - 100 %   03/27/18 1253 142/90 - (!) 107 24 98 %   03/27/18 1240 (!) 164/94 - (!) 115 26 98 %   18 1236 (!) 159/100 - (!) 113 23 92 %   18 1231 (!) 136/95 - 98 22 93 %   18 1225 (!) 154/95 - (!) 101 21 98 %   18 1222 (!) 149/108 - (!) 109 21 97 %   18 1219 (!) 149/108 - (!) 103 21 97 %   18 1215 (!) 146/97 - (!) 106 23 97 %   18 1210 124/69 - 96 22 96 %   18 1205 (!) 147/105 - 83 22 98 %   18 1201 (!) 146/98 - (!) 106 23 98 %   18 1157 139/90 - (!) 108 25 98 %   18 1154 (!) 144/96 - (!) 109 22 98 %   18 1150 (!) 152/96 - (!) 103 23 97 %   18 1145 147/81 - (!) 109 23 96 %   18 1140 (!) 149/107 - (!) 108 18 98 %   18 1135 (!) 144/99 - (!) 107 15 97 %   18 1132 (!) 145/96 - (!) 114 15 98 %   18 1059 (!) 138/100 - (!) 107 21 96 %       Temp (24hrs), Av.3 °F (36.8 °C), Min:97.8 °F (36.6 °C), Max:98.9 °F (37.2 °C)      Physical Exam:  General:  Alert, cooperative, no distress, appears stated age. Eyes:  Conjunctivae/corneas clear. Nose: Nares normal. Septum midline   Mouth/Throat: Lips, mucosa, and tongue normal.    Neck: Supple, symmetrical, trachea midline   Lungs:   Clear to auscultation bilaterally. Heart:  Regular rate and rhythm   Abdomen:   Soft, LLQ tender, non-distended. Extremities: Extremities normal, atraumatic, no cyanosis or edema.    Skin: Skin color, texture, turgor normal. No rashes or lesions   Neuro: Alert, oriented, speech clear     Labs: Recent Labs      18   0233   WBC  21.0*   HGB  11.6*   HCT  36.1*   PLT  341     Recent Labs      18   0233  18   0600   NA  138  137   K  3.7  3.9   CL  103  102   CO2  25  24   GLU  86  99   BUN  6  8   CREA  0.79  0.72   CA  8.6  8.4*   MG  2.3  1.9   PHOS  2.4*  2.5*   ALB   --   3.0*   TBILI   --   0.7   SGOT   --   10*   ALT   --   20     Recent Labs      18   1044   INR  1.2*         Assessment and Plan:     Descending colon mass    Patient with abdominal pain, found to have colon mass consistent with neoplasm as well as suspicious hepatic lesion on CT and multiple lesions on US concerning for mets. We will await final biopsy results but anticipate colon resection this week, likely Thursday. OK for clear liquids today and may advance to full liquids as tolerated. Obtain CEA and chest CT. Patient was evaluated with Dr. Graceann Jeans and plan discussed with him and his family.       Signed By: MICHELLE Anton     March 27, 2018

## 2018-03-27 NOTE — PERIOP NOTES
Patient tolerated procedure without problems. Abdomen soft and patient arousable and voices no complaints Report received from CRNA, see anesthesia note. Patient transported to endoscopy recovery area and verbal bedside report given to [address] [place] [address]

## 2018-03-27 NOTE — CONSULTS
[facility] [place] [address] [place]: [phone]  F: [phone]      Reason for Visit:   Leonidas Ellis is a 32 y.o. male who is seen in consultation at the request of Dr. Corinne Leys for evaluation of colon mass with liver mets. History of Present Illness:     Mr Gabby Jackson was admitted on 3/25/2018 from the ED when he presented with c/o left sided abd pain described as twisting x 2 days and URI symptoms x 1 week. .  CT scan suggestive of acute diverticulitis. WBC 19.5 Therefore he was admitted for further eval and management. Mr Gabby Jackson reports had \"flu\" last week associated cold symptoms with chills; missed 3 days of work; then Saturday began with  stomach pain; had difficulty passing stool and gas; described as \"twisting\" pain. Denies cough, SOB or N/V. Smoking hx: no cigarettes; smokes marijuana twice weekly  ETOH use: social; 1 beer a month. Denies family hx of cancer  Father passed at age 29 from stroke and PNA; had hx DM  Mother passed at age 52 from stroke, aneurysm, and had HTN  Sister alive an healthy  Single, No children  Currently works 2 jobs: [employer] full time and part time at [employer], aunt, girlfriend and numerous friends at bedside. Past Medical History:   Diagnosis Date    Hypertension       History reviewed. No pertinent surgical history.    Social History   Substance Use Topics    Smoking status: Never Smoker    Smokeless tobacco: Never Used    Alcohol use Yes      Comment: socially      Family History   Problem Relation Age of Onset    Stroke Mother     Diabetes Mother     Stroke Father     Hypertension Father      Current Facility-Administered Medications   Medication Dose Route Frequency    lidocaine (PF) (XYLOCAINE) 10 mg/mL (1 %) injection        zolpidem (AMBIEN) tablet 5 mg  5 mg Oral QHS PRN    diatrizoate meglumine-d.sodium (MD-GASTROVIEW,GASTROGRAFIN) 66-10 % contrast solution 30 mL  30 mL Oral RAD ONCE    albuterol-ipratropium (DUO-NEB) 2.5 MG-0.5 MG/3 ML  3 mL Nebulization Q4H PRN    guaiFENesin ER (MUCINEX) tablet 600 mg  600 mg Oral Q12H    benzonatate (TESSALON) capsule 100 mg  100 mg Oral TID PRN    0.9% sodium chloride infusion  75 mL/hr IntraVENous CONTINUOUS    sodium chloride (NS) flush 5-10 mL  5-10 mL IntraVENous Q8H    sodium chloride (NS) flush 5-10 mL  5-10 mL IntraVENous PRN    acetaminophen (TYLENOL) tablet 650 mg  650 mg Oral Q4H PRN    HYDROcodone-acetaminophen (NORCO) 5-325 mg per tablet 1 Tab  1 Tab Oral Q4H PRN    HYDROmorphone (PF) (DILAUDID) injection 1 mg  1 mg IntraVENous Q4H PRN    naloxone (NARCAN) injection 0.4 mg  0.4 mg IntraVENous PRN    diphenhydrAMINE (BENADRYL) injection 12.5 mg  12.5 mg IntraVENous Q4H PRN    ondansetron (ZOFRAN) injection 4 mg  4 mg IntraVENous Q6H PRN    docusate sodium (COLACE) capsule 100 mg  100 mg Oral BID      No Known Allergies     Review of Systems: A complete review of systems was obtained, negative except as described above. Physical Exam:     Visit Vitals    BP (!) 167/105 (BP 1 Location: Left arm, BP Patient Position: At rest)    Pulse (!) 108    Temp 98.6 °F (37 °C)    Resp 18    Ht 5' 11\" (1.803 m)    Wt 280 lb (127 kg)    SpO2 98%    BMI 39.05 kg/m2     ECOG PS: 0  General: obese; No acute distress  Eyes: PERRLA, anicteric sclerae  HENT: Atraumatic with normal appearance of ears and nose; OP clear  Neck: Supple; no thyromegaly   Lymphatic: No cervical, supraclavicular, or axillary adenopathy  Respiratory: CTAB, normal respiratory effort  CV: tachy rate, regular rhythm, no murmurs, no peripheral edema  GI: Soft, nontender, nondistended, no masses, no hepatomegaly, no splenomegaly  MS:  Digits without clubbing or cyanosis. Skin: No rashes, ecchymoses, or petechiae. Normal temperature, turgor, and texture.   Neuro/Psych: Alert, oriented, appropriate affect, normal judgment/insight      Results:     Lab Results Component Value Date/Time    WBC 21.0 (H) 03/27/2018 02:33 AM    HGB 11.6 (L) 03/27/2018 02:33 AM    HCT 36.1 (L) 03/27/2018 02:33 AM    PLATELET 635 58/88/0606 02:33 AM    MCV 71.3 (L) 03/27/2018 02:33 AM    ABS. NEUTROPHILS 15.8 (H) 03/25/2018 09:07 AM     Lab Results   Component Value Date/Time    Sodium 138 03/27/2018 02:33 AM    Potassium 3.7 03/27/2018 02:33 AM    Chloride 103 03/27/2018 02:33 AM    CO2 25 03/27/2018 02:33 AM    Glucose 86 03/27/2018 02:33 AM    BUN 6 03/27/2018 02:33 AM    Creatinine 0.79 03/27/2018 02:33 AM    GFR est AA >60 03/27/2018 02:33 AM    GFR est non-AA >60 03/27/2018 02:33 AM    Calcium 8.6 03/27/2018 02:33 AM     Lab Results   Component Value Date/Time    Bilirubin, total 0.7 03/26/2018 06:00 AM    ALT (SGPT) 20 03/26/2018 06:00 AM    AST (SGOT) 10 (L) 03/26/2018 06:00 AM    Alk. phosphatase 70 03/26/2018 06:00 AM    Protein, total 6.9 03/26/2018 06:00 AM    Albumin 3.0 (L) 03/26/2018 06:00 AM    Globulin 3.9 03/26/2018 06:00 AM       Lab Results   Component Value Date/Time    Lipase 60 (L) 03/26/2018 06:00 AM     Lab Results   Component Value Date/Time    INR (POC) 1.2 (H) 03/27/2018 10:44 AM     3/25/2018 CT ABD PELV W CONT  IMPRESSION:     1. Acute diverticulitis of the distal descending colon. No evidence of  peridiverticular abscess or bowel obstruction. 2. Mild right lower lobe airspace disease. 3. Indeterminate 2 x 1 cm low-density liver lesion. 3/26/2018 US ABD     IMPRESSION:     Scattered hypoechoic foci in the hepatic parenchyma. These are not characterized  as hemangiomas. Delineation likely will require tissue sampling. Tissue sampling should only be pursued utilizing ultrasonographic guidance.     Findings consistent with hepatic steatosis.     Otherwise unremarkable study. 3/27/2018 US ABD  Intrahepatic hypoechoic foci are consistent with metastatic disease.     3/27/2018 CT BX LIVER: path pending    3/27/2018 Colonoscopy /Dr Vernon Camera  Findings: Circumferential mass 40 cm, friable  Specimens:    biopsy mass lesion      Assessment and Recommendations:   1. Colon and liver mass   Concerning for colon cancer with possible liver metastases. CT with a single liver lesion, but ultrasound concerning for multiple lesions. I will get a CT Chest and triphase CT liver to further evaluate. Check CEA. Await pathology results from colon biopsy and liver biopsy (3/27). Surgery following. 2. Anemia, microcytosis  Check iron studies. 3. PNA  Noted on CT scan  Resp  Panel negative  Received abx, but no longer ordered? Defer to hospitalist.    4. Leukocytosis  Secondary to infection vs inflammation    5. Pain  Currently controlled  Continue Prn medications      Patient seen in conjunction with Marcos Gillespie NP.       Signed By: Lina Lopez MD

## 2018-03-27 NOTE — PROGRESS NOTES
GI Note    Pt requested update for his family and girlfriend. Both are at bedside with patient. Discussed findings on colonoscopy and strong suspicion for colon cancer. Waiting on biopsy results  Need to obtain liver biopsy for staging    Pending results can determine next steps. All questions answered. Patient's sister would like to receive updates if she is not in room when biopsy results given.     Leanne العراقي PA-C  03/27/18  9:42 AM

## 2018-03-27 NOTE — PERIOP NOTES
Marely Dwyer  1990  [id number]    Situation:    Scheduled Procedure: Procedure(s):  COLONOSCOPY  Verbal report received from: Leroy Beltre RN  Preoperative diagnosis: abnormal CT    Background:    Procedure: Procedure(s):  COLONOSCOPY  Physician performing procedure; Dr. Perla Johns RN    NPO Status/Last PO Intake: 12 midnight    Pregnancy Test:Not applicable If yes, result: none    Is the patient taking Blood Thinners: NO If yes, list:  and last taken   Is the patient diabetic:no       If yes, what was the last BS:    Time taken? Anything given? no           Does the patient have a Pacemaker/Defibrillator in place?: no   Does the patient need antibiotics before/during/after procedure: no   If the patient is having a colon, How much prep was drank? all   What were the Colon prep results? Liquid clear   Does the patient have SCD in place:no   Is patient on CONTACT precautions:no        If yes, what kind of CONTACT precautions:     Assessment:  Are the vital signs stable prior to patient coming to ENDO?  yes  Is the patient alert/oriented and able to sign consent for the procedures:yes  How does the patient's abdomen feel prior to coming to ENDO? round and soft yes   Does the patient have a patient IV in place?  yes     Recommendation:  Family or Friend present no not at this time, family to be in this am    Permission to share finding with Family or Friend yes

## 2018-03-27 NOTE — PROGRESS NOTES
[facility]  [address] [facility], [address] [place] [address]  [phone]      Medical Progress Note      NAME: Lea Nguyễn   :  1990  MRM:  [id number]    Date/Time: 3/27/2018         Subjective:     Chief Complaint:  Patient was seen and examined by me. Chart reviewed. Patient is depressed/tearful. Spoke with family at bedside       Objective:       Vitals:       Last 24hrs VS reviewed since prior progress note. Most recent are:    Visit Vitals    /83 (BP 1 Location: Right arm, BP Patient Position: Supine)    Pulse (!) 105    Temp 98.2 °F (36.8 °C)    Resp 24    Ht 5' 11\" (1.803 m)    Wt 127 kg (280 lb)    SpO2 100%    BMI 39.05 kg/m2     SpO2 Readings from Last 6 Encounters:   18 100%   09/15/16 98%    O2 Flow Rate (L/min): 2 l/min       Intake/Output Summary (Last 24 hours) at 18 1301  Last data filed at 18 0752   Gross per 24 hour   Intake              250 ml   Output                0 ml   Net              250 ml        Exam:     Physical Exam:    Gen:  Well-developed, well-nourished, morbidly obese, NAD  HEENT:  Pink conjunctivae, PERRL, hearing intact to voice, moist mucous membranes  Neck:  Supple, without masses, thyroid non-tender  Resp:  No accessory muscle use, clear breath sounds without wheezes rales or rhonchi  Card:  No murmurs, normal S1, S2 without thrills, bruits or peripheral edema  Abd:  Soft, severe LLQ pain, non-distended, normoactive bowel sounds are present  Lymph:  No cervical adenopathy  Musc:  No cyanosis or clubbing  Skin:  No rashes  Neuro:  Cranial nerves 3-12 are grossly intact, follows commands appropriately  Psych:  Alert with good insight.   Oriented to person, place, and time    Medications Reviewed: (see below)    Lab Data Reviewed: (see below)    ______________________________________________________________________    Medications:     Current Facility-Administered Medications   Medication Dose Route Frequency    gelatin adsorbable (GELFOAM) 12-7 mm sponge 1 Each  1 Each Topical RAD ONCE    iopamidol (ISOVUE-370) 76 % injection 100 mL  100 mL IntraVENous RAD ONCE    lidocaine (XYLOCAINE) 10 mg/mL (1 %) injection 10 mL  10 mL IntraDERMal RAD ONCE    albuterol-ipratropium (DUO-NEB) 2.5 MG-0.5 MG/3 ML  3 mL Nebulization Q4H PRN    guaiFENesin ER (MUCINEX) tablet 600 mg  600 mg Oral Q12H    benzonatate (TESSALON) capsule 100 mg  100 mg Oral TID PRN    0.9% sodium chloride infusion  125 mL/hr IntraVENous CONTINUOUS    sodium chloride (NS) flush 5-10 mL  5-10 mL IntraVENous Q8H    sodium chloride (NS) flush 5-10 mL  5-10 mL IntraVENous PRN    acetaminophen (TYLENOL) tablet 650 mg  650 mg Oral Q4H PRN    HYDROcodone-acetaminophen (NORCO) 5-325 mg per tablet 1 Tab  1 Tab Oral Q4H PRN    HYDROmorphone (PF) (DILAUDID) injection 1 mg  1 mg IntraVENous Q4H PRN    naloxone (NARCAN) injection 0.4 mg  0.4 mg IntraVENous PRN    diphenhydrAMINE (BENADRYL) injection 12.5 mg  12.5 mg IntraVENous Q4H PRN    ondansetron (ZOFRAN) injection 4 mg  4 mg IntraVENous Q6H PRN    docusate sodium (COLACE) capsule 100 mg  100 mg Oral BID          Lab Review:     Recent Labs      03/27/18   0233  03/26/18   0600  03/25/18   0907   WBC  21.0*  20.4*  19.5*   HGB  11.6*  12.0*  13.9   HCT  36.1*  37.4  42.5   PLT  341  309  401*     Recent Labs      03/27/18   1044  03/27/18   0233  03/26/18   0600  03/25/18   0907   NA   --   138  137  139   K   --   3.7  3.9  4.0   CL   --   103  102  101   CO2   --   25  24  26   GLU   --   86  99  122*   BUN   --   6  8  11   CREA   --   0.79  0.72  0.97   CA   --   8.6  8.4*  9.1   MG   --   2.3  1.9   --    PHOS   --   2.4*  2.5*   --    ALB   --    --   3.0*  3.6   TBILI   --    --   0.7  0.6   SGOT   --    --   10*  27   ALT   --    --   20  26   INR  1.2*   --    --    --      No results found for: GLUCPOC       Assessment / Plan:     33 yo hx of morbid obesity, presented w/ abd pain, acute diverticulitis, pneumonia. Found to have new colonic mass     1) Colonic mass: seen on colonoscopy, likely malignant. Biopsy results pending. GI following. Will also consult Gen surg, Oncology    2) Lesion of liver: 2x1cm lesion seen on CT scan. Concerning for metastatic dz. Awaiting CT-guided bx    3) Acute abd pain/Acute diverticulitis: still with abd pain. Diverticulitis seen on CT likely related to colonic mass. No evidence of abscess. Will monitor blood Cx. Cont NPO, IVF, IV Levaquin/flagyl     4) RLL Pneumonia: incidental findings on CT. Viral panel neg. Sputum Cx pending. Cont O2, incentive spirometry, nebs prn. Already on IV Abx     5) SIRS (systemic inflammatory response syndrome): due to above issues, not septic. Improving     6) Hypertension: likely has underlying essential HTN, in combination of abd pain. Will monitor closely     7) Hyperglycemia: no hx of diabetes.   A1C was 5.4%     Code: Full    Total time spent with patient: [address] [facility] discussed with: Patient, nursing, family, oncology    Discussed:  Care Plan    Prophylaxis:  Lovenox    Disposition:  Home w/Family           ___________________________________________________    Attending Physician: Rigoberto Ramirez MD

## 2018-03-28 ENCOUNTER — ANESTHESIA EVENT (OUTPATIENT)
Dept: SURGERY | Age: 28
DRG: 329 | End: 2018-03-28
Payer: COMMERCIAL

## 2018-03-28 PROBLEM — C18.9 COLON CANCER METASTASIZED TO LIVER (HCC): Status: ACTIVE | Noted: 2018-03-28

## 2018-03-28 PROBLEM — C78.7 COLON CANCER METASTASIZED TO LIVER (HCC): Status: ACTIVE | Noted: 2018-03-28

## 2018-03-28 LAB
ANION GAP SERPL CALC-SCNC: 10 MMOL/L (ref 5–15)
BUN SERPL-MCNC: 6 MG/DL (ref 6–20)
BUN/CREAT SERPL: 8 (ref 12–20)
CALCIUM SERPL-MCNC: 8.9 MG/DL (ref 8.5–10.1)
CEA SERPL-MCNC: 10.4 NG/ML
CHLORIDE SERPL-SCNC: 104 MMOL/L (ref 97–108)
CO2 SERPL-SCNC: 26 MMOL/L (ref 21–32)
CREAT SERPL-MCNC: 0.73 MG/DL (ref 0.7–1.3)
ERYTHROCYTE [DISTWIDTH] IN BLOOD BY AUTOMATED COUNT: 15 % (ref 11.5–14.5)
FERRITIN SERPL-MCNC: 191 NG/ML (ref 26–388)
GLUCOSE SERPL-MCNC: 91 MG/DL (ref 65–100)
HCT VFR BLD AUTO: 37.3 % (ref 36.6–50.3)
HGB BLD-MCNC: 12.1 G/DL (ref 12.1–17)
IRON SATN MFR SERPL: 13 % (ref 20–50)
IRON SERPL-MCNC: 28 UG/DL (ref 35–150)
MAGNESIUM SERPL-MCNC: 2.3 MG/DL (ref 1.6–2.4)
MCH RBC QN AUTO: 23 PG (ref 26–34)
MCHC RBC AUTO-ENTMCNC: 32.4 G/DL (ref 30–36.5)
MCV RBC AUTO: 70.9 FL (ref 80–99)
NRBC # BLD: 0 K/UL (ref 0–0.01)
NRBC BLD-RTO: 0 PER 100 WBC
PHOSPHATE SERPL-MCNC: 3.7 MG/DL (ref 2.6–4.7)
PLATELET # BLD AUTO: 386 K/UL (ref 150–400)
PMV BLD AUTO: 8.9 FL (ref 8.9–12.9)
POTASSIUM SERPL-SCNC: 3.9 MMOL/L (ref 3.5–5.1)
RBC # BLD AUTO: 5.26 M/UL (ref 4.1–5.7)
SODIUM SERPL-SCNC: 140 MMOL/L (ref 136–145)
TIBC SERPL-MCNC: 208 UG/DL (ref 250–450)
WBC # BLD AUTO: 14.7 K/UL (ref 4.1–11.1)

## 2018-03-28 PROCEDURE — 85027 COMPLETE CBC AUTOMATED: CPT | Performed by: INTERNAL MEDICINE

## 2018-03-28 PROCEDURE — 36415 COLL VENOUS BLD VENIPUNCTURE: CPT | Performed by: INTERNAL MEDICINE

## 2018-03-28 PROCEDURE — 80048 BASIC METABOLIC PNL TOTAL CA: CPT | Performed by: INTERNAL MEDICINE

## 2018-03-28 PROCEDURE — 82378 CARCINOEMBRYONIC ANTIGEN: CPT | Performed by: NURSE PRACTITIONER

## 2018-03-28 PROCEDURE — 74011250637 HC RX REV CODE- 250/637: Performed by: INTERNAL MEDICINE

## 2018-03-28 PROCEDURE — 83735 ASSAY OF MAGNESIUM: CPT | Performed by: INTERNAL MEDICINE

## 2018-03-28 PROCEDURE — 65270000029 HC RM PRIVATE

## 2018-03-28 PROCEDURE — 74011250636 HC RX REV CODE- 250/636: Performed by: INTERNAL MEDICINE

## 2018-03-28 PROCEDURE — 83540 ASSAY OF IRON: CPT | Performed by: NURSE PRACTITIONER

## 2018-03-28 PROCEDURE — 84100 ASSAY OF PHOSPHORUS: CPT | Performed by: INTERNAL MEDICINE

## 2018-03-28 PROCEDURE — 85025 COMPLETE CBC W/AUTO DIFF WBC: CPT | Performed by: INTERNAL MEDICINE

## 2018-03-28 PROCEDURE — 82728 ASSAY OF FERRITIN: CPT | Performed by: NURSE PRACTITIONER

## 2018-03-28 PROCEDURE — 74011000258 HC RX REV CODE- 258: Performed by: INTERNAL MEDICINE

## 2018-03-28 RX ORDER — SIMETHICONE 80 MG
80 TABLET,CHEWABLE ORAL
Status: DISCONTINUED | OUTPATIENT
Start: 2018-03-28 | End: 2018-04-03 | Stop reason: HOSPADM

## 2018-03-28 RX ADMIN — ZOLPIDEM TARTRATE 5 MG: 5 TABLET ORAL at 23:50

## 2018-03-28 RX ADMIN — DOCUSATE SODIUM 100 MG: 100 CAPSULE, LIQUID FILLED ORAL at 09:44

## 2018-03-28 RX ADMIN — Medication 10 ML: at 03:30

## 2018-03-28 RX ADMIN — ACETAMINOPHEN 650 MG: 325 TABLET ORAL at 21:28

## 2018-03-28 RX ADMIN — GUAIFENESIN 600 MG: 600 TABLET, EXTENDED RELEASE ORAL at 21:29

## 2018-03-28 RX ADMIN — SIMETHICONE CHEW TAB 80 MG 80 MG: 80 TABLET ORAL at 21:28

## 2018-03-28 RX ADMIN — SODIUM CHLORIDE 3.38 G: 900 INJECTION, SOLUTION INTRAVENOUS at 03:30

## 2018-03-28 RX ADMIN — SODIUM CHLORIDE 3.38 G: 900 INJECTION, SOLUTION INTRAVENOUS at 09:44

## 2018-03-28 RX ADMIN — SODIUM CHLORIDE 3.38 G: 900 INJECTION, SOLUTION INTRAVENOUS at 17:34

## 2018-03-28 RX ADMIN — GUAIFENESIN 600 MG: 600 TABLET, EXTENDED RELEASE ORAL at 09:44

## 2018-03-28 NOTE — PROGRESS NOTES
Cancer [place] at [facility]  [address] [facility], [address] [facility] [address] [facility] [place]: [phone]  F: [phone]      Reason for Visit:   Curt Mas is a 32 y.o. male who is seen in consultation at the request of Dr. Mai Duarte for evaluation of colon mass with liver mets. History of Present Illness:     Mr Severiano Salazar was admitted on 3/25/2018 from the ED when he presented with c/o left sided abd pain described as twisting x 2 days and URI symptoms x 1 week. .  CT scan suggestive of acute diverticulitis. WBC 19.5 Therefore he was admitted for further eval and management. Mr Severiano Salazar reports had \"flu\" last week associated cold symptoms with chills; missed 3 days of work; then Saturday began with  stomach pain; had difficulty passing stool and gas; described as \"twisting\" pain. Denies cough, SOB or N/V. Smoking hx: no cigarettes; smokes marijuana twice weekly  ETOH use: social; 1 beer a month. Denies family hx of cancer  Father passed at age 29 from stroke and PNA; had hx DM  Mother passed at age 52 from stroke, aneurysm, and had HTN  Sister alive an healthy  Single, No children  Currently works 2 jobs: [employer] full time and part time at [employer], aunt, girlfriend and numerous friends at bedside. Interval History:     Waiting to get in the shower; teary at points during conversation; would like to see his nieces and nephews and his dog. Denies any pain. Denies any SOB. Numerous friends and extended family at bedside. Called and spoke with Francis Treviño and Dr Betzy Falcon after oking with patient to do so. Reviewed current path results and plan for surgery tomorrow.        Past Medical History:   Diagnosis Date    Hypertension       Past Surgical History:   Procedure Laterality Date    COLONOSCOPY N/A 3/27/2018    COLONOSCOPY performed by Hugo Abbasi MD at [facility] ENDOSCOPY      Social History   Substance Use Topics    Smoking status: Never Smoker    Smokeless tobacco: Never Used   [address] [facility] [place] Alcohol use Yes      Comment: socially      Family History   Problem Relation Age of Onset    Stroke Mother     Diabetes Mother     Stroke Father     Hypertension Father      Current Facility-Administered Medications   Medication Dose Route Frequency    zolpidem (AMBIEN) tablet 5 mg  5 mg Oral QHS PRN    piperacillin-tazobactam (ZOSYN) 3.375 g in 0.9% sodium chloride (MBP/ADV) 100 mL ADV  3.375 g IntraVENous Q8H    albuterol-ipratropium (DUO-NEB) 2.5 MG-0.5 MG/3 ML  3 mL Nebulization Q4H PRN    guaiFENesin ER (MUCINEX) tablet 600 mg  600 mg Oral Q12H    benzonatate (TESSALON) capsule 100 mg  100 mg Oral TID PRN    0.9% sodium chloride infusion  75 mL/hr IntraVENous CONTINUOUS    sodium chloride (NS) flush 5-10 mL  5-10 mL IntraVENous Q8H    sodium chloride (NS) flush 5-10 mL  5-10 mL IntraVENous PRN    acetaminophen (TYLENOL) tablet 650 mg  650 mg Oral Q4H PRN    HYDROcodone-acetaminophen (NORCO) 5-325 mg per tablet 1 Tab  1 Tab Oral Q4H PRN    HYDROmorphone (PF) (DILAUDID) injection 1 mg  1 mg IntraVENous Q4H PRN    naloxone (NARCAN) injection 0.4 mg  0.4 mg IntraVENous PRN    diphenhydrAMINE (BENADRYL) injection 12.5 mg  12.5 mg IntraVENous Q4H PRN    ondansetron (ZOFRAN) injection 4 mg  4 mg IntraVENous Q6H PRN    docusate sodium (COLACE) capsule 100 mg  100 mg Oral BID      No Known Allergies     Review of Systems: A complete review of systems was obtained, negative except as described above. Physical Exam:     Visit Vitals    /80 (BP 1 Location: Right arm, BP Patient Position: At rest)    Pulse 97    Temp 98.8 °F (37.1 °C)    Resp 18    Ht 5' 11\" (1.803 m)    Wt 127 kg (280 lb)    SpO2 97%    BMI 39.05 kg/m2     ECOG PS: 0  General: obese;   No acute distress  Eyes: PERRLA, anicteric sclerae  HENT: Atraumatic with normal appearance of ears and nose; OP clear  Neck: Supple; no thyromegaly   Lymphatic: No cervical, supraclavicular, or axillary adenopathy  Respiratory: CTAB, normal respiratory effort  CV: tachy rate, regular rhythm, no murmurs, no peripheral edema  GI: Soft, nontender, nondistended, no masses, no hepatomegaly, no splenomegaly  MS:  Digits without clubbing or cyanosis. Skin: No rashes, ecchymoses, or petechiae. Normal temperature, turgor, and texture. Neuro/Psych: Alert, oriented, appropriate affect, normal judgment/insight      Results:     Lab Results   Component Value Date/Time    WBC 14.7 (H) 03/28/2018 05:49 AM    HGB 12.1 03/28/2018 05:49 AM    HCT 37.3 03/28/2018 05:49 AM    PLATELET 633 27/95/7616 05:49 AM    MCV 70.9 (L) 03/28/2018 05:49 AM    ABS. NEUTROPHILS 15.8 (H) 03/25/2018 09:07 AM     Lab Results   Component Value Date/Time    Sodium 140 03/28/2018 05:49 AM    Potassium 3.9 03/28/2018 05:49 AM    Chloride 104 03/28/2018 05:49 AM    CO2 26 03/28/2018 05:49 AM    Glucose 91 03/28/2018 05:49 AM    BUN 6 03/28/2018 05:49 AM    Creatinine 0.73 03/28/2018 05:49 AM    GFR est AA >60 03/28/2018 05:49 AM    GFR est non-AA >60 03/28/2018 05:49 AM    Calcium 8.9 03/28/2018 05:49 AM     Lab Results   Component Value Date/Time    Bilirubin, total 0.7 03/26/2018 06:00 AM    ALT (SGPT) 20 03/26/2018 06:00 AM    AST (SGOT) 10 (L) 03/26/2018 06:00 AM    Alk. phosphatase 70 03/26/2018 06:00 AM    Protein, total 6.9 03/26/2018 06:00 AM    Albumin 3.0 (L) 03/26/2018 06:00 AM    Globulin 3.9 03/26/2018 06:00 AM       Lab Results   Component Value Date/Time    Iron % saturation 13 (L) 03/28/2018 05:49 AM    TIBC 208 (L) 03/28/2018 05:49 AM    Ferritin 191 03/28/2018 05:49 AM    Lipase 60 (L) 03/26/2018 06:00 AM     Lab Results   Component Value Date/Time    INR (POC) 1.2 (H) 03/27/2018 10:44 AM     3/28/2018 CEA  10.4    3/27/2018 Surgical path  lg bowel descending bx; adenocarcinoma      3/37/2018 liver bx Path  1: Liver, Fine Needle Aspiration   CYTOLOGIC INTERPRETATION:   Liver lesion, biopsy:   Metastatic adenocarcinoma consistent with colorectal primary (see Comment). General Categorization   Positive for malignancy. 3/25/2018 CT ABD PELV W CONT  IMPRESSION:     1. Acute diverticulitis of the distal descending colon. No evidence of  peridiverticular abscess or bowel obstruction. 2. Mild right lower lobe airspace disease. 3. Indeterminate 2 x 1 cm low-density liver lesion. 3/26/2018 US ABD     IMPRESSION:     Scattered hypoechoic foci in the hepatic parenchyma. These are not characterized  as hemangiomas. Delineation likely will require tissue sampling. Tissue sampling should only be pursued utilizing ultrasonographic guidance.     Findings consistent with hepatic steatosis.     Otherwise unremarkable study. 3/27/2018 US ABD  Intrahepatic hypoechoic foci are consistent with metastatic disease. 3/27/2018 CT BX LIVER: path pending    3/27/2018 Colonoscopy /Dr Lizzy Cordoba  Findings:   Circumferential mass 40 cm, friable  Specimens:    biopsy mass lesion      33/27/2018 CT CHEST ABD   IMPRESSION:       LIVER: Within segment 8 of the liver there is a 1.9 x 11 mm lesion. It  demonstrates no arterial enhancement and measures 30 Hounsfield units. Its  margins are indistinct. Possible second lesion within segment 7 of the liver  measuring 20 x 15 mm possible 6 mm lesion segment 6 of the liver as well as 16  mm lesion in segment 5 of the liver. 1. No evidence of metastatic disease to the chest  2. Multiple ill-defined hepatic lesions. These do not represent simple cyst.  Previous biopsy has been performed pathology is pending     Assessment and Recommendations:     1. Colon cancer Stage IV  With metastatic disease to the liver   CEA. 10.5  General surgery following: plan for surgery tomorrow (3/29)  for lap left hemicolectomy to be followed by chemotherapy and then eval by surgery regarding possible liver surgery  Will need port placed at some point for chemo.  ]    2. Anemia, microcytosis  May benefit from iron at some point; will defer until after surgery.      3. PNA  Noted on CT scan  Resp  Panel negative  Abx per hospitalist.     4. Leukocytosis  Secondary to infection vs inflammation    5.  Pain  Currently controlled  Continue Prn medications      Plan reviewed with Dr Jaylin Vang By: Yumi Ortiz NP

## 2018-03-28 NOTE — PROGRESS NOTES
Spiritual Care Assessment/Progress Note  [address]      NAME: Tony Mancilla      MRN: [id number]  AGE: 32 y.o. SEX: male  Religion Affiliation: No preference   Language: English     3/28/2018     Total Time (in minutes): 13     Spiritual Assessment begun in [facility] 5M1 MED SURG 1 through conversation with:         [x]Patient        [x] Family    [] Friend(s)        Reason for Consult: Request by staff     Spiritual beliefs: (Please include comment if needed)     [] Involved in a deanna tradition/spiritual practice:     [] Supported by a deanna community:      [] Claims no spiritual orientation:      [] Seeking spiritual identity:           [x] Adheres to an individual form of spirituality:      [] Not able to assess:                     Identified resources for coping:      [] Prayer                  [] Devotional reading               [] Music                  [] Guided Imagery     [x] Family/friends                 [] Pet visits     [] Other:        Interventions offered during this visit: (See comments for more details)    Patient Interventions: Affirmation of emotions/emotional suffering, Iconic (affirming the presence of God/Higher Power), Initial/Spiritual assessment, patient floor, Normalization of emotional/spiritual concerns     Family/Friend(s):  Affirmation of emotions/emotional suffering, Affirmation of deanna, Iconic (affirming the presence of God/Higher Power)     Plan of Care:     [] Discuss Spiritual/Cultural needs    [] Support AMD and/or advance care planning process      [] Support grieving process   [] Coordinate Rites/Rituals    [] Coordination with community clergy   [] No spiritual needs identified at this time   [] Detailed Plan of Care below (See Comments)  [] Make referral to Music Therapy  [] Make referral to Pet Therapy     [] Make referral to Addiction services  [] Make referral to [facility]  [] Make referral to Spiritual Care Partner  [] No future visits requested Comments: responded to spiritual care consult order for a  visit. Mr. Melissa Porter girlfriend and his sister were in the room. They all shared there would be many more coming this evening as they have contacted their family to come by. Mr. Heike Grady shared later, he would talk with his mom who has . He believes in a higher power though was not specific on his beliefs. His sister also indicate many would be praying for him. Provided supportive spiritual presence and advised of  availability.   Visited by: Sarika Domingo [address] [facility] [address] ([id number])

## 2018-03-28 NOTE — PROGRESS NOTES
Gastrointestinal Progress Note    3/28/2018    Admit Date: 3/25/2018    Subjective:     New Complaints Today: No new complaints. Experiences abdominal pain with cough or movement only. Denies nausea or vomiting. Loose bowel movement this morning. Tolerating clear liquids. Bleeding:  None      CT chest with contrast  1. No evidence of metastatic disease to the chest  2. Multiple ill-defined hepatic lesions. These do not represent simple cyst. Previous biopsy has been performed pathology is pending. Current Facility-Administered Medications   Medication Dose Route Frequency    zolpidem (AMBIEN) tablet 5 mg  5 mg Oral QHS PRN    piperacillin-tazobactam (ZOSYN) 3.375 g in 0.9% sodium chloride (MBP/ADV) 100 mL ADV  3.375 g IntraVENous Q8H    albuterol-ipratropium (DUO-NEB) 2.5 MG-0.5 MG/3 ML  3 mL Nebulization Q4H PRN    guaiFENesin ER (MUCINEX) tablet 600 mg  600 mg Oral Q12H    benzonatate (TESSALON) capsule 100 mg  100 mg Oral TID PRN    0.9% sodium chloride infusion  75 mL/hr IntraVENous CONTINUOUS    sodium chloride (NS) flush 5-10 mL  5-10 mL IntraVENous Q8H    sodium chloride (NS) flush 5-10 mL  5-10 mL IntraVENous PRN    acetaminophen (TYLENOL) tablet 650 mg  650 mg Oral Q4H PRN    HYDROcodone-acetaminophen (NORCO) 5-325 mg per tablet 1 Tab  1 Tab Oral Q4H PRN    HYDROmorphone (PF) (DILAUDID) injection 1 mg  1 mg IntraVENous Q4H PRN    naloxone (NARCAN) injection 0.4 mg  0.4 mg IntraVENous PRN    diphenhydrAMINE (BENADRYL) injection 12.5 mg  12.5 mg IntraVENous Q4H PRN    ondansetron (ZOFRAN) injection 4 mg  4 mg IntraVENous Q6H PRN    docusate sodium (COLACE) capsule 100 mg  100 mg Oral BID        Objective:     Blood pressure 121/71, pulse 95, temperature 98.4 °F (36.9 °C), resp.  rate 18, height 5' 11\" (1.803 m), weight 127 kg (280 lb), SpO2 95 %.         03/26 1901 - 03/28 0700  In: 250 [I.V.:250]  Out: -     EXAM:  GENERAL: alert, cooperative, no distress, HEART: regular rate and rhythm, S1, S2 normal, no murmur, click, rub or gallop, LUNGS: chest clear, no wheezing, rales, normal symmetric air entry, ABDOMEN:  Soft, nondistended, tender to light palpation in LLQ EXTREMITY: extremities normal, atraumatic, no cyanosis or edema      Data Review    Recent Results (from the past 24 hour(s))   POC INR    Collection Time: 03/27/18 10:44 AM   Result Value Ref Range    INR (POC) 1.2 (H) <1.2     CBC W/O DIFF    Collection Time: 03/28/18  5:49 AM   Result Value Ref Range    WBC 14.7 (H) 4.1 - 11.1 K/uL    RBC 5.26 4.10 - 5.70 M/uL    HGB 12.1 12.1 - 17.0 g/dL    HCT 37.3 36.6 - 50.3 %    MCV 70.9 (L) 80.0 - 99.0 FL    MCH 23.0 (L) 26.0 - 34.0 PG    MCHC 32.4 30.0 - 36.5 g/dL    RDW 15.0 (H) 11.5 - 14.5 %    PLATELET 566 379 - 675 K/uL    MPV 8.9 8.9 - 12.9 FL    NRBC 0.0 0  WBC    ABSOLUTE NRBC 0.00 0.00 - 1.70 K/uL   METABOLIC PANEL, BASIC    Collection Time: 03/28/18  5:49 AM   Result Value Ref Range    Sodium 140 136 - 145 mmol/L    Potassium 3.9 3.5 - 5.1 mmol/L    Chloride 104 97 - 108 mmol/L    CO2 26 21 - 32 mmol/L    Anion gap 10 5 - 15 mmol/L    Glucose 91 65 - 100 mg/dL    BUN 6 6 - 20 MG/DL    Creatinine 0.73 0.70 - 1.30 MG/DL    BUN/Creatinine ratio 8 (L) 12 - 20      GFR est AA >60 >60 ml/min/1.73m2    GFR est non-AA >60 >60 ml/min/1.73m2    Calcium 8.9 8.5 - 10.1 MG/DL   PHOSPHORUS    Collection Time: 03/28/18  5:49 AM   Result Value Ref Range    Phosphorus 3.7 2.6 - 4.7 MG/DL   MAGNESIUM    Collection Time: 03/28/18  5:49 AM   Result Value Ref Range    Magnesium 2.3 1.6 - 2.4 mg/dL   IRON PROFILE    Collection Time: 03/28/18  5:49 AM   Result Value Ref Range    Iron 28 (L) 35 - 150 ug/dL    TIBC 208 (L) 250 - 450 ug/dL    Iron % saturation 13 (L) 20 - 50 %   FERRITIN    Collection Time: 03/28/18  5:49 AM   Result Value Ref Range    Ferritin 191 26 - 388 NG/ML       Assessment:     Principal Problem:    Acute diverticulitis (3/25/2018)    Active Problems:    Pneumonia (3/25/2018) SIRS (systemic inflammatory response syndrome) (HCC) (3/25/2018)      Liver masses (3/25/2018)      Hypertension (3/25/2018)      Hyperglycemia (3/25/2018)      Colonic mass (3/27/2018)        Plan:     1. Awaiting biopsy results  2. Continue IV antibiotics  3.   Surgery and oncology on board    Richardson Kehr  03/28/18  8:39 AM    I have interviewed and examined patient with addendum to note above and formulation care plan    Enio Leyva M.D.

## 2018-03-28 NOTE — PROGRESS NOTES
Bedside and Verbal shift change report given to [employer] (oncoming nurse) by Tristan Palacios (offgoing nurse). Report included the following information SBAR, Kardex, Intake/Output, MAR and Recent Results.

## 2018-03-28 NOTE — PROGRESS NOTES
[address] [place] [address], [address] [place] [address]  [phone]      Medical Progress Note      NAME: Sharmila Magaña   :  1990  MRM:  [id number]    Date/Time: 3/28/2018  8:02 AM       Assessment and Plan:   1. Colonic mass/ lesion of liver: s/o colonoscopy. likely malignant. Biopsy of the mass and liver results are pending. Evaluated by gen surgery and oncology      2. Acute diverticulitis/ abdominal pain: No evidence of abscess. Continue zosyn.       3. RLL Pneumonia: incidental findings on CT.  Viral panel neg. Already on IV Abx      4. SIRS (systemic inflammatory response syndrome): due to above issues, not septic. Improving      5. Hypertension: likely has underlying essential HTN, in combination of abd pain.  Will monitor closely                     Subjective:     Chief Complaint:  Follow up of pt who was admitted with diverticulitis and found to have colonic mass. Denies abdominal pain     ROS:  (bold if positive, if negative)      Tolerating PT  Tolerating Diet        Objective:     Last 24hrs VS reviewed since prior progress note.  Most recent are:    Visit Vitals    /71 (BP 1 Location: Right arm, BP Patient Position: At rest)    Pulse 95    Temp 98.4 °F (36.9 °C)    Resp 18    Ht 5' 11\" (1.803 m)    Wt 127 kg (280 lb)    SpO2 95%    BMI 39.05 kg/m2     SpO2 Readings from Last 6 Encounters:   18 95%   09/15/16 98%    O2 Flow Rate (L/min): 2 l/min   No intake or output data in the 24 hours ending 18 0802     Physical Exam:    Gen:  Well-developed, well-nourished, in no acute distress  HEENT:  Pink conjunctivae, PERRL, hearing intact to voice, moist mucous membranes  Neck:  Supple, without masses, thyroid non-tender  Resp:  No accessory muscle use, clear breath sounds without wheezes rales or rhonchi  Card:  No murmurs, normal S1, S2 without thrills, bruits or peripheral edema  Abd:   Tenderness on lower abdominal area, non-distended, normoactive bowel sounds are present, no palpable organomegaly and no detectable hernias  Lymph:  No cervical or inguinal adenopathy  Musc:  No cyanosis or clubbing  Skin:  No rashes or ulcers, skin turgor is good  Neuro:  Cranial nerves are grossly intact, no focal motor weakness, follows commands appropriately  Psych:  Good insight, oriented to person, place and time, alert  __________________________________________________________________  Medications Reviewed: (see below)  Medications:     Current Facility-Administered Medications   Medication Dose Route Frequency    zolpidem (AMBIEN) tablet 5 mg  5 mg Oral QHS PRN    piperacillin-tazobactam (ZOSYN) 3.375 g in 0.9% sodium chloride (MBP/ADV) 100 mL ADV  3.375 g IntraVENous Q8H    albuterol-ipratropium (DUO-NEB) 2.5 MG-0.5 MG/3 ML  3 mL Nebulization Q4H PRN    guaiFENesin ER (MUCINEX) tablet 600 mg  600 mg Oral Q12H    benzonatate (TESSALON) capsule 100 mg  100 mg Oral TID PRN    0.9% sodium chloride infusion  75 mL/hr IntraVENous CONTINUOUS    sodium chloride (NS) flush 5-10 mL  5-10 mL IntraVENous Q8H    sodium chloride (NS) flush 5-10 mL  5-10 mL IntraVENous PRN    acetaminophen (TYLENOL) tablet 650 mg  650 mg Oral Q4H PRN    HYDROcodone-acetaminophen (NORCO) 5-325 mg per tablet 1 Tab  1 Tab Oral Q4H PRN    HYDROmorphone (PF) (DILAUDID) injection 1 mg  1 mg IntraVENous Q4H PRN    naloxone (NARCAN) injection 0.4 mg  0.4 mg IntraVENous PRN    diphenhydrAMINE (BENADRYL) injection 12.5 mg  12.5 mg IntraVENous Q4H PRN    ondansetron (ZOFRAN) injection 4 mg  4 mg IntraVENous Q6H PRN    docusate sodium (COLACE) capsule 100 mg  100 mg Oral BID        Lab Data Reviewed: (see below)  Lab Review:     Recent Labs      03/28/18   0549  03/27/18   0233  03/26/18   0600   WBC  14.7*  21.0*  20.4*   HGB  12.1  11.6*  12.0*   HCT  37.3  36.1*  37.4   PLT  386  341  309     Recent Labs      03/28/18   0549  03/27/18   1044  03/27/18   0233  03/26/18   0600 03/25/18   0907   NA  140   --   138  137  139   K  3.9   --   3.7  3.9  4.0   CL  104   --   103  102  101   CO2  26   --   25  24  26   GLU  91   --   86  99  122*   BUN  6   --   6  8  11   CREA  0.73   --   0.79  0.72  0.97   CA  8.9   --   8.6  8.4*  9.1   MG  2.3   --   2.3  1.9   --    PHOS  3.7   --   2.4*  2.5*   --    ALB   --    --    --   3.0*  3.6   TBILI   --    --    --   0.7  0.6   SGOT   --    --    --   10*  27   ALT   --    --    --   20  26   INR   --   1.2*   --    --    --      No results found for: GLUCPOC  No results for input(s): PH, PCO2, PO2, HCO3, FIO2 in the last 72 hours.   Recent Labs      03/27/18   1044   INR  1.2*     All Micro Results     Procedure Component Value Units Date/Time    CULTURE, BLOOD [[id number]] Collected:  03/25/18 1123    Order Status:  Completed Specimen:  Blood from Blood Updated:  03/28/18 0755     Special Requests: NO SPECIAL REQUESTS        Culture result: NO GROWTH 3 DAYS       CULTURE, BLOOD [[id number]] Collected:  03/25/18 1123    Order Status:  Completed Specimen:  Blood from Blood Updated:  03/28/18 0755     Special Requests: NO SPECIAL REQUESTS        Culture result: NO GROWTH 3 DAYS       RESPIRATORY PANEL,PCR,NASOPHARYNGEAL [[id number]] Collected:  03/25/18 2661    Order Status:  Completed Specimen:  Nasopharyngeal Updated:  03/26/18 0048     Adenovirus NOT DETECTED        Coronavirus 229E NOT DETECTED        Coronavirus HKU1 NOT DETECTED        Coronavirus CVNL63 NOT DETECTED        Coronavirus OC43 NOT DETECTED        Metapneumovirus NOT DETECTED        Rhinovirus and Enterovirus NOT DETECTED        Influenza A NOT DETECTED        Influenza A, subtype H1 NOT DETECTED        Influenza A, subtype H3 NOT DETECTED        INFLUENZA A H1N1 PCR NOT DETECTED        Influenza B NOT DETECTED        Parainfluenza 1 NOT DETECTED        Parainfluenza 2 NOT DETECTED        Parainfluenza 3 NOT DETECTED        Parainfluenza virus 4 NOT DETECTED        RSV by PCR NOT DETECTED        Bordetella pertussis - PCR NOT DETECTED        Chlamydophila pneumoniae DNA, QL, PCR NOT DETECTED        Mycoplasma pneumoniae DNA, QL, PCR NOT DETECTED       CULTURE, RESPIRATORY/SPUTUM/BRONCH Linde Hamman [[id number]] Collected:  03/25/18 1145    Order Status:  Canceled Specimen:  Sputum from Sputum           I have reviewed notes of prior 24hr. Other pertinent lab:       Total time spent with patient: [place] [address] discussed with: Patient, Nursing Staff and >50% of time spent in counseling and coordination of care    Discussed:  Care Plan    Prophylaxis:  SCD's    Disposition:  Home w/Family           ___________________________________________________    Attending Physician: Susan Wong MD

## 2018-03-28 NOTE — PROGRESS NOTES
General Surgery Daily Progress Note    Patient: Leatha Rivera MRN: [id number]  SSN: [id number]    YOB: 1990  Age: 32 y.o. Sex: male      Admit Date: 3/25/2018    Subjective:   Denies abdominal pain, tolerating clear liquids.      Current Facility-Administered Medications   Medication Dose Route Frequency    zolpidem (AMBIEN) tablet 5 mg  5 mg Oral QHS PRN    piperacillin-tazobactam (ZOSYN) 3.375 g in 0.9% sodium chloride (MBP/ADV) 100 mL ADV  3.375 g IntraVENous Q8H    albuterol-ipratropium (DUO-NEB) 2.5 MG-0.5 MG/3 ML  3 mL Nebulization Q4H PRN    guaiFENesin ER (MUCINEX) tablet 600 mg  600 mg Oral Q12H    benzonatate (TESSALON) capsule 100 mg  100 mg Oral TID PRN    0.9% sodium chloride infusion  75 mL/hr IntraVENous CONTINUOUS    sodium chloride (NS) flush 5-10 mL  5-10 mL IntraVENous Q8H    sodium chloride (NS) flush 5-10 mL  5-10 mL IntraVENous PRN    acetaminophen (TYLENOL) tablet 650 mg  650 mg Oral Q4H PRN    HYDROcodone-acetaminophen (NORCO) 5-325 mg per tablet 1 Tab  1 Tab Oral Q4H PRN    HYDROmorphone (PF) (DILAUDID) injection 1 mg  1 mg IntraVENous Q4H PRN    naloxone (NARCAN) injection 0.4 mg  0.4 mg IntraVENous PRN    diphenhydrAMINE (BENADRYL) injection 12.5 mg  12.5 mg IntraVENous Q4H PRN    ondansetron (ZOFRAN) injection 4 mg  4 mg IntraVENous Q6H PRN    docusate sodium (COLACE) capsule 100 mg  100 mg Oral BID        Objective:      03/26 1901 - 03/28 0700  In: 250 [I.V.:250]  Out: -   Patient Vitals for the past 8 hrs:   BP Temp Pulse Resp SpO2   03/28/18 0727 121/71 98.4 °F (36.9 °C) 95 18 95 %   03/28/18 0653 (!) 147/93 98.4 °F (36.9 °C) 94 18 96 %       Physical Exam:  General: Alert, cooperative, NAD  Lungs: Unlabored  Heart:  Regular rate and  rhythm  Abdomen: Soft, mild LLQ tenderness, non-distended  Extremities: Warm, moves all, no edema  Skin:  Warm and dry, no rash    Labs: Recent Labs      03/28/18   0549   WBC  14.7*   HGB  12.1   HCT  37.3   PLT  386     Recent Labs      03/28/18   0549   03/26/18   0600   NA  140   < >  137   K  3.9   < >  3.9   CL  104   < >  102   CO2  26   < >  24   GLU  91   < >  99   BUN  6   < >  8   CREA  0.73   < >  0.72   CA  8.9   < >  8.4*   MG  2.3   < >  1.9   PHOS  3.7   < >  2.5*   ALB   --    --   3.0*   TBILI   --    --   0.7   SGOT   --    --   10*   ALT   --    --   20    < > = values in this interval not displayed. Assessment / Plan:   · Descending colon mass  · Liver lesions  · Presumed metastatic colon adenocarcinoma. Final pathology pending  · Imaging shows multiple liver lesions making liver resection not feasible at this time. Tentative plan for lap left hemicolectomy tomorrow. · Full liquid diet today. NPO after midnight.

## 2018-03-28 NOTE — PROGRESS NOTES
1646: Pt off floor to 4th floor [place]ona w/ visitors. Has been cleared by Angie Mac MD.  6719: Bedside and Verbal shift change report given to [address] [place] (oncoming nurse) by Mare Galindo RN (offgoing nurse). Report included the following information SBAR, Kardex, Intake/Output and Recent Results.

## 2018-03-28 NOTE — PROGRESS NOTES
Problem: Falls - Risk of  Goal: *Absence of Falls  Document Rhett Fall Risk and appropriate interventions in the flowsheet.    Outcome: Progressing Towards Goal  Fall Risk Interventions:            Medication Interventions: Patient to call before getting OOB, Teach patient to arise slowly

## 2018-03-29 ENCOUNTER — ANESTHESIA (OUTPATIENT)
Dept: SURGERY | Age: 28
DRG: 329 | End: 2018-03-29
Payer: COMMERCIAL

## 2018-03-29 LAB
BASOPHILS # BLD: 0.1 K/UL (ref 0–0.1)
BASOPHILS NFR BLD: 1 % (ref 0–1)
DIFFERENTIAL METHOD BLD: ABNORMAL
EOSINOPHIL # BLD: 0.5 K/UL (ref 0–0.4)
EOSINOPHIL NFR BLD: 5 % (ref 0–7)
ERYTHROCYTE [DISTWIDTH] IN BLOOD BY AUTOMATED COUNT: 14.8 % (ref 11.5–14.5)
HCT VFR BLD AUTO: 35.4 % (ref 36.6–50.3)
HGB BLD-MCNC: 11.1 G/DL (ref 12.1–17)
IMM GRANULOCYTES # BLD: 0.1 K/UL (ref 0–0.04)
IMM GRANULOCYTES NFR BLD AUTO: 1 % (ref 0–0.5)
LYMPHOCYTES # BLD: 1.9 K/UL (ref 0.8–3.5)
LYMPHOCYTES NFR BLD: 18 % (ref 12–49)
MCH RBC QN AUTO: 22.7 PG (ref 26–34)
MCHC RBC AUTO-ENTMCNC: 31.4 G/DL (ref 30–36.5)
MCV RBC AUTO: 72.4 FL (ref 80–99)
MONOCYTES # BLD: 0.6 K/UL (ref 0–1)
MONOCYTES NFR BLD: 6 % (ref 5–13)
NEUTS SEG # BLD: 7.5 K/UL (ref 1.8–8)
NEUTS SEG NFR BLD: 69 % (ref 32–75)
NRBC # BLD: 0 K/UL (ref 0–0.01)
NRBC BLD-RTO: 0 PER 100 WBC
PLATELET # BLD AUTO: 316 K/UL (ref 150–400)
RBC # BLD AUTO: 4.89 M/UL (ref 4.1–5.7)
RBC MORPH BLD: ABNORMAL
WBC # BLD AUTO: 10.7 K/UL (ref 4.1–11.1)

## 2018-03-29 PROCEDURE — 74011250637 HC RX REV CODE- 250/637: Performed by: INTERNAL MEDICINE

## 2018-03-29 PROCEDURE — 77030035051: Performed by: SURGERY

## 2018-03-29 PROCEDURE — 77030018673: Performed by: SURGERY

## 2018-03-29 PROCEDURE — 74011000258 HC RX REV CODE- 258: Performed by: INTERNAL MEDICINE

## 2018-03-29 PROCEDURE — 77030034850: Performed by: SURGERY

## 2018-03-29 PROCEDURE — 77030012405 HC DRN WND ADLR -A: Performed by: SURGERY

## 2018-03-29 PROCEDURE — 77030035048 HC TRCR ENDOSC OPTCL COVD -B: Performed by: SURGERY

## 2018-03-29 PROCEDURE — 88309 TISSUE EXAM BY PATHOLOGIST: CPT | Performed by: SURGERY

## 2018-03-29 PROCEDURE — 77030002986 HC SUT PROL J&J -A: Performed by: SURGERY

## 2018-03-29 PROCEDURE — 77030018836 HC SOL IRR NACL ICUM -A: Performed by: SURGERY

## 2018-03-29 PROCEDURE — 74011250636 HC RX REV CODE- 250/636: Performed by: INTERNAL MEDICINE

## 2018-03-29 PROCEDURE — 77030032490 HC SLV COMPR SCD KNE COVD -B: Performed by: SURGERY

## 2018-03-29 PROCEDURE — C9290 INJ, BUPIVACAINE LIPOSOME: HCPCS | Performed by: SURGERY

## 2018-03-29 PROCEDURE — 74011000250 HC RX REV CODE- 250: Performed by: ANESTHESIOLOGY

## 2018-03-29 PROCEDURE — 77030027138 HC INCENT SPIROMETER -A

## 2018-03-29 PROCEDURE — 76210000017 HC OR PH I REC 1.5 TO 2 HR: Performed by: SURGERY

## 2018-03-29 PROCEDURE — 74011250636 HC RX REV CODE- 250/636: Performed by: SURGERY

## 2018-03-29 PROCEDURE — 77030008771 HC TU NG SALEM SUMP -A: Performed by: ANESTHESIOLOGY

## 2018-03-29 PROCEDURE — 65270000029 HC RM PRIVATE

## 2018-03-29 PROCEDURE — 77030020747 HC TU INSUF ENDOSC TELE -A: Performed by: SURGERY

## 2018-03-29 PROCEDURE — 77030002933 HC SUT MCRYL J&J -A: Performed by: SURGERY

## 2018-03-29 PROCEDURE — 88342 IMHCHEM/IMCYTCHM 1ST ANTB: CPT | Performed by: SURGERY

## 2018-03-29 PROCEDURE — 74011250636 HC RX REV CODE- 250/636

## 2018-03-29 PROCEDURE — 77030020061 HC IV BLD WRMR ADMIN SET 3M -B: Performed by: ANESTHESIOLOGY

## 2018-03-29 PROCEDURE — 77030028402 HC SYS LAPSC TISS RETRV AMR -B: Performed by: SURGERY

## 2018-03-29 PROCEDURE — 74011000258 HC RX REV CODE- 258: Performed by: SURGERY

## 2018-03-29 PROCEDURE — 77030011640 HC PAD GRND REM COVD -A: Performed by: SURGERY

## 2018-03-29 PROCEDURE — 74011250636 HC RX REV CODE- 250/636: Performed by: PHYSICIAN ASSISTANT

## 2018-03-29 PROCEDURE — 76060000038 HC ANESTHESIA 3.5 TO 4 HR: Performed by: SURGERY

## 2018-03-29 PROCEDURE — 77030002966 HC SUT PDS J&J -A: Performed by: SURGERY

## 2018-03-29 PROCEDURE — 77030002996 HC SUT SLK J&J -A: Performed by: SURGERY

## 2018-03-29 PROCEDURE — 77030031139 HC SUT VCRL2 J&J -A: Performed by: SURGERY

## 2018-03-29 PROCEDURE — 77030008756 HC TU IRR SUC STRY -B: Performed by: SURGERY

## 2018-03-29 PROCEDURE — 74011000250 HC RX REV CODE- 250: Performed by: SURGERY

## 2018-03-29 PROCEDURE — 77030022703 HC LIGASURE  BLNT LAPSCP SEAL COVD -E: Performed by: SURGERY

## 2018-03-29 PROCEDURE — 77030009527 HC GEL PRT SYS AMR -E: Performed by: SURGERY

## 2018-03-29 PROCEDURE — 0DTG4ZZ RESECTION OF LEFT LARGE INTESTINE, PERCUTANEOUS ENDOSCOPIC APPROACH: ICD-10-PCS | Performed by: SURGERY

## 2018-03-29 PROCEDURE — 77030037366 HC STPLR ENDO TRI-STPLR COVD -C: Performed by: SURGERY

## 2018-03-29 PROCEDURE — 74011000250 HC RX REV CODE- 250

## 2018-03-29 PROCEDURE — 77030016151 HC PROTCTR LNS DFOG COVD -B: Performed by: SURGERY

## 2018-03-29 PROCEDURE — 77030035045 HC TRCR ENDOSC VRSPRT BLDLSS COVD -B: Performed by: SURGERY

## 2018-03-29 PROCEDURE — 77030010286 HC STPLR ENDOSC COVD -D: Performed by: SURGERY

## 2018-03-29 PROCEDURE — 77030020782 HC GWN BAIR PAWS FLX 3M -B

## 2018-03-29 PROCEDURE — 76010000134 HC OR TIME 3.5 TO 4 HR: Performed by: SURGERY

## 2018-03-29 PROCEDURE — 77030026438 HC STYL ET INTUB CARD -A: Performed by: ANESTHESIOLOGY

## 2018-03-29 PROCEDURE — 77030019908 HC STETH ESOPH SIMS -A: Performed by: ANESTHESIOLOGY

## 2018-03-29 PROCEDURE — 77030013079 HC BLNKT BAIR HGGR 3M -A: Performed by: ANESTHESIOLOGY

## 2018-03-29 PROCEDURE — 77030008684 HC TU ET CUF COVD -B: Performed by: ANESTHESIOLOGY

## 2018-03-29 RX ORDER — HYDROMORPHONE HCL IN 0.9% NACL 15 MG/30ML
PATIENT CONTROLLED ANALGESIA VIAL INTRAVENOUS
Status: DISCONTINUED | OUTPATIENT
Start: 2018-03-29 | End: 2018-03-30

## 2018-03-29 RX ORDER — ENOXAPARIN SODIUM 100 MG/ML
40 INJECTION SUBCUTANEOUS EVERY 24 HOURS
Status: DISCONTINUED | OUTPATIENT
Start: 2018-03-30 | End: 2018-04-03 | Stop reason: HOSPADM

## 2018-03-29 RX ORDER — MIDAZOLAM HYDROCHLORIDE 1 MG/ML
INJECTION, SOLUTION INTRAMUSCULAR; INTRAVENOUS AS NEEDED
Status: DISCONTINUED | OUTPATIENT
Start: 2018-03-29 | End: 2018-03-29 | Stop reason: HOSPADM

## 2018-03-29 RX ORDER — SODIUM CHLORIDE, SODIUM LACTATE, POTASSIUM CHLORIDE, CALCIUM CHLORIDE 600; 310; 30; 20 MG/100ML; MG/100ML; MG/100ML; MG/100ML
INJECTION, SOLUTION INTRAVENOUS
Status: DISCONTINUED | OUTPATIENT
Start: 2018-03-29 | End: 2018-03-29 | Stop reason: HOSPADM

## 2018-03-29 RX ORDER — SUCCINYLCHOLINE CHLORIDE 20 MG/ML
INJECTION INTRAMUSCULAR; INTRAVENOUS AS NEEDED
Status: DISCONTINUED | OUTPATIENT
Start: 2018-03-29 | End: 2018-03-29 | Stop reason: HOSPADM

## 2018-03-29 RX ORDER — PROPOFOL 10 MG/ML
INJECTION, EMULSION INTRAVENOUS AS NEEDED
Status: DISCONTINUED | OUTPATIENT
Start: 2018-03-29 | End: 2018-03-29 | Stop reason: HOSPADM

## 2018-03-29 RX ORDER — GLYCOPYRROLATE 0.2 MG/ML
INJECTION INTRAMUSCULAR; INTRAVENOUS AS NEEDED
Status: DISCONTINUED | OUTPATIENT
Start: 2018-03-29 | End: 2018-03-29 | Stop reason: HOSPADM

## 2018-03-29 RX ORDER — HYDROMORPHONE HYDROCHLORIDE 2 MG/ML
INJECTION, SOLUTION INTRAMUSCULAR; INTRAVENOUS; SUBCUTANEOUS AS NEEDED
Status: DISCONTINUED | OUTPATIENT
Start: 2018-03-29 | End: 2018-03-29 | Stop reason: HOSPADM

## 2018-03-29 RX ORDER — LABETALOL HYDROCHLORIDE 5 MG/ML
10 INJECTION, SOLUTION INTRAVENOUS
Status: DISCONTINUED | OUTPATIENT
Start: 2018-03-29 | End: 2018-03-29 | Stop reason: HOSPADM

## 2018-03-29 RX ORDER — LIDOCAINE HYDROCHLORIDE 20 MG/ML
INJECTION, SOLUTION EPIDURAL; INFILTRATION; INTRACAUDAL; PERINEURAL AS NEEDED
Status: DISCONTINUED | OUTPATIENT
Start: 2018-03-29 | End: 2018-03-29 | Stop reason: HOSPADM

## 2018-03-29 RX ORDER — HYDROMORPHONE HYDROCHLORIDE 2 MG/ML
0.5 INJECTION, SOLUTION INTRAMUSCULAR; INTRAVENOUS; SUBCUTANEOUS
Status: DISCONTINUED | OUTPATIENT
Start: 2018-03-29 | End: 2018-04-01

## 2018-03-29 RX ORDER — NEOSTIGMINE METHYLSULFATE 1 MG/ML
INJECTION INTRAVENOUS AS NEEDED
Status: DISCONTINUED | OUTPATIENT
Start: 2018-03-29 | End: 2018-03-29 | Stop reason: HOSPADM

## 2018-03-29 RX ORDER — FENTANYL CITRATE 50 UG/ML
INJECTION, SOLUTION INTRAMUSCULAR; INTRAVENOUS AS NEEDED
Status: DISCONTINUED | OUTPATIENT
Start: 2018-03-29 | End: 2018-03-29 | Stop reason: HOSPADM

## 2018-03-29 RX ORDER — DEXAMETHASONE SODIUM PHOSPHATE 4 MG/ML
INJECTION, SOLUTION INTRA-ARTICULAR; INTRALESIONAL; INTRAMUSCULAR; INTRAVENOUS; SOFT TISSUE AS NEEDED
Status: DISCONTINUED | OUTPATIENT
Start: 2018-03-29 | End: 2018-03-29 | Stop reason: HOSPADM

## 2018-03-29 RX ORDER — ROCURONIUM BROMIDE 10 MG/ML
INJECTION, SOLUTION INTRAVENOUS AS NEEDED
Status: DISCONTINUED | OUTPATIENT
Start: 2018-03-29 | End: 2018-03-29 | Stop reason: HOSPADM

## 2018-03-29 RX ORDER — KETOROLAC TROMETHAMINE 30 MG/ML
INJECTION, SOLUTION INTRAMUSCULAR; INTRAVENOUS AS NEEDED
Status: DISCONTINUED | OUTPATIENT
Start: 2018-03-29 | End: 2018-03-29 | Stop reason: HOSPADM

## 2018-03-29 RX ORDER — ONDANSETRON 2 MG/ML
INJECTION INTRAMUSCULAR; INTRAVENOUS AS NEEDED
Status: DISCONTINUED | OUTPATIENT
Start: 2018-03-29 | End: 2018-03-29 | Stop reason: HOSPADM

## 2018-03-29 RX ADMIN — NEOSTIGMINE METHYLSULFATE 3 MG: 1 INJECTION INTRAVENOUS at 13:50

## 2018-03-29 RX ADMIN — SUCCINYLCHOLINE CHLORIDE 160 MG: 20 INJECTION INTRAMUSCULAR; INTRAVENOUS at 10:45

## 2018-03-29 RX ADMIN — FENTANYL CITRATE 200 MCG: 50 INJECTION, SOLUTION INTRAMUSCULAR; INTRAVENOUS at 10:45

## 2018-03-29 RX ADMIN — FENTANYL CITRATE 50 MCG: 50 INJECTION, SOLUTION INTRAMUSCULAR; INTRAVENOUS at 10:37

## 2018-03-29 RX ADMIN — ROCURONIUM BROMIDE 45 MG: 10 INJECTION, SOLUTION INTRAVENOUS at 10:57

## 2018-03-29 RX ADMIN — SODIUM CHLORIDE, SODIUM LACTATE, POTASSIUM CHLORIDE, CALCIUM CHLORIDE: 600; 310; 30; 20 INJECTION, SOLUTION INTRAVENOUS at 10:50

## 2018-03-29 RX ADMIN — KETOROLAC TROMETHAMINE 30 MG: 30 INJECTION, SOLUTION INTRAMUSCULAR; INTRAVENOUS at 13:47

## 2018-03-29 RX ADMIN — GUAIFENESIN 600 MG: 600 TABLET, EXTENDED RELEASE ORAL at 21:12

## 2018-03-29 RX ADMIN — SODIUM CHLORIDE 3.38 G: 900 INJECTION, SOLUTION INTRAVENOUS at 02:05

## 2018-03-29 RX ADMIN — SODIUM CHLORIDE 75 ML/HR: 900 INJECTION, SOLUTION INTRAVENOUS at 05:12

## 2018-03-29 RX ADMIN — LABETALOL HYDROCHLORIDE 10 MG: 5 INJECTION INTRAVENOUS at 15:50

## 2018-03-29 RX ADMIN — LABETALOL HYDROCHLORIDE 10 MG: 5 INJECTION INTRAVENOUS at 15:22

## 2018-03-29 RX ADMIN — PROPOFOL 200 MG: 10 INJECTION, EMULSION INTRAVENOUS at 10:45

## 2018-03-29 RX ADMIN — ROCURONIUM BROMIDE 5 MG: 10 INJECTION, SOLUTION INTRAVENOUS at 10:45

## 2018-03-29 RX ADMIN — SODIUM CHLORIDE, SODIUM LACTATE, POTASSIUM CHLORIDE, CALCIUM CHLORIDE: 600; 310; 30; 20 INJECTION, SOLUTION INTRAVENOUS at 10:37

## 2018-03-29 RX ADMIN — Medication: at 15:44

## 2018-03-29 RX ADMIN — SODIUM CHLORIDE 3.38 G: 900 INJECTION, SOLUTION INTRAVENOUS at 09:10

## 2018-03-29 RX ADMIN — DEXAMETHASONE SODIUM PHOSPHATE 8 MG: 4 INJECTION, SOLUTION INTRA-ARTICULAR; INTRALESIONAL; INTRAMUSCULAR; INTRAVENOUS; SOFT TISSUE at 11:31

## 2018-03-29 RX ADMIN — LIDOCAINE HYDROCHLORIDE 40 MG: 20 INJECTION, SOLUTION EPIDURAL; INFILTRATION; INTRACAUDAL; PERINEURAL at 10:45

## 2018-03-29 RX ADMIN — SODIUM CHLORIDE 75 ML/HR: 900 INJECTION, SOLUTION INTRAVENOUS at 15:30

## 2018-03-29 RX ADMIN — ONDANSETRON 4 MG: 2 INJECTION INTRAMUSCULAR; INTRAVENOUS at 13:29

## 2018-03-29 RX ADMIN — ROCURONIUM BROMIDE 20 MG: 10 INJECTION, SOLUTION INTRAVENOUS at 11:37

## 2018-03-29 RX ADMIN — ROCURONIUM BROMIDE 20 MG: 10 INJECTION, SOLUTION INTRAVENOUS at 12:00

## 2018-03-29 RX ADMIN — SODIUM CHLORIDE 3.38 G: 900 INJECTION, SOLUTION INTRAVENOUS at 17:35

## 2018-03-29 RX ADMIN — Medication 5 ML: at 22:00

## 2018-03-29 RX ADMIN — DOCUSATE SODIUM 100 MG: 100 CAPSULE, LIQUID FILLED ORAL at 18:00

## 2018-03-29 RX ADMIN — ROCURONIUM BROMIDE 10 MG: 10 INJECTION, SOLUTION INTRAVENOUS at 11:51

## 2018-03-29 RX ADMIN — Medication 10 ML: at 16:40

## 2018-03-29 RX ADMIN — ACETAMINOPHEN 650 MG: 325 TABLET ORAL at 21:12

## 2018-03-29 RX ADMIN — GLYCOPYRROLATE 0.3 MG: 0.2 INJECTION INTRAMUSCULAR; INTRAVENOUS at 13:50

## 2018-03-29 RX ADMIN — FENTANYL CITRATE 100 MCG: 50 INJECTION, SOLUTION INTRAMUSCULAR; INTRAVENOUS at 12:18

## 2018-03-29 RX ADMIN — HYDROMORPHONE HYDROCHLORIDE 2 MG: 2 INJECTION, SOLUTION INTRAMUSCULAR; INTRAVENOUS; SUBCUTANEOUS at 11:42

## 2018-03-29 RX ADMIN — ROCURONIUM BROMIDE 20 MG: 10 INJECTION, SOLUTION INTRAVENOUS at 12:45

## 2018-03-29 RX ADMIN — Medication 10 ML: at 05:12

## 2018-03-29 RX ADMIN — MIDAZOLAM HYDROCHLORIDE 2 MG: 1 INJECTION, SOLUTION INTRAMUSCULAR; INTRAVENOUS at 10:37

## 2018-03-29 NOTE — PERIOP NOTES
TRANSFER - OUT REPORT:    Verbal report given to Dina Mendez on [place]  being transferred to  for routine post - op       Report consisted of patients Situation, Background, Assessment and   Recommendations(SBAR). Information from the following report(s) SBAR and MAR was reviewed with the receiving nurse. Opportunity for questions and clarification was provided.       Patient transported with:   Registered Nurse

## 2018-03-29 NOTE — PROGRESS NOTES
Nutrition Assessment:    RECOMMENDATIONS/INTERVENTION(S):   Advance diet per GI - depending on surgical results- ostomy? Monitor weight, PO tolerance, ab pain. Add ONS when able/appropriate for wound healing while diet doesn't meet needs. ASSESSMENT:   3/29: F/U. Scope 3/27 findings - colonic mass, liver mets. Surgical removal today - 3/29- bx removal. Pt in surgery for resection, mass removal. Monitor if ostomy done. GI lite diet eventually as able. Add ONS as able. Last BM 3/28. Hypoactive. 3/26: 32 yr old male admitted with abdominal pain. NPO now. Pt in considerable pain during visit, sharp stabbing, short lasting pain. Planned for scope tomorrow to determine diverticulitis or otherwise? Pt had hooter wings Friday night (last real meal) prior to pain starting. No weight changes recently. No n/v. No BM since 3/24. BG mildly elevated 122 mg/dL. Phos low, 2.5. Will continue to follow and make recs after colonoscopy. SUBJECTIVE/OBJECTIVE:   Diet Order: NPO  % Eaten:  No data found. Pertinent Medications: [x] Reviewed    Past Medical History:   Diagnosis Date    Hypertension         Chemistries:  Lab Results   Component Value Date/Time    Sodium 140 03/28/2018 05:49 AM    Potassium 3.9 03/28/2018 05:49 AM    Chloride 104 03/28/2018 05:49 AM    CO2 26 03/28/2018 05:49 AM    Anion gap 10 03/28/2018 05:49 AM    Glucose 91 03/28/2018 05:49 AM    BUN 6 03/28/2018 05:49 AM    Creatinine 0.73 03/28/2018 05:49 AM    BUN/Creatinine ratio 8 (L) 03/28/2018 05:49 AM    GFR est AA >60 03/28/2018 05:49 AM    GFR est non-AA >60 03/28/2018 05:49 AM    Calcium 8.9 03/28/2018 05:49 AM    AST (SGOT) 10 (L) 03/26/2018 06:00 AM    Alk.  phosphatase 70 03/26/2018 06:00 AM    Protein, total 6.9 03/26/2018 06:00 AM    Albumin 3.0 (L) 03/26/2018 06:00 AM    Globulin 3.9 03/26/2018 06:00 AM    A-G Ratio 0.8 (L) 03/26/2018 06:00 AM    ALT (SGPT) 20 03/26/2018 06:00 AM      Anthropometrics: Height: 5' 11\" (180.3 cm) Weight: 127 kg (279 lb 15.8 oz)    IBW (%IBW):   ( ) UBW (%UBW):   (  %)    BMI: Body mass index is 39.05 kg/(m^2). This BMI is indicative of:     [] Underweight    [] Normal    [] Overweight    [x]  Obesity    []  Extreme Obesity (BMI>40)    Estimated Nutrition Needs (Based on): 2934 Kcals/day (BMR(2257x1.3)) , 102 g (-127g/day (0.8-1.0g/kg)) Protein  Carbohydrate: At Least 130 g/day  Fluids: 2900 mL/day    Last BM: 3/28   []Active     []Hyperactive  [x]Hypoactive       [] Absent   BS  Skin:    [x] Intact   [] Incision  [] Breakdown   [] DTI   [] Tears/Excoriation/Abrasion  []Edema [] Other:    Wt Readings from Last 30 Encounters:   03/29/18 127 kg (279 lb 15.8 oz)   09/15/16 131.5 kg (290 lb)      NUTRITION DIAGNOSES:   Problem:  Altered GI function     Etiology: related to alteration in GI structure/function     Signs/Symptoms: as evidenced by GI surgery, resection- intestinal mass      NUTRITION INTERVENTIONS:  Meals/Snacks: General/healthful diet                  fGOAL:   Pt will tolerate Clear liquid diet within 2-4 days    Cultural, Quaker, or Ethnic Dietary Needs: None     LEARNING NEEDS (Diet, Food/Nutrient-Drug Interaction):    [x] None Identified   [] Identified and Education Provided/Documented   [] Identified and Pt declined/was not appropriate      [x] Interdisciplinary Care Plan Reviewed/Documented    [x] Discharge Needs:    TBD   [] No Nutrition Related Discharge Needs    NUTRITION RISK:   Pt Is At Nutrition Risk  [x]     No Nutrition Risk Identified  []       PT SEEN FOR:    []  MD Consult: []Calorie Count      []Diabetic Diet Education        []Diet Education     []Electrolyte Management     []General Nutrition Management and Supplements     []Management of Tube Feeding     []TPN Recommendations    []  RN Referral:  []MST score >=2     []Enteral/Parenteral Nutrition PTA     []Pregnant: Gestational DM or Multigestation                 [] Pressure Ulcer      []  Low BMI      []  Length of Stay       [] Dysphagia Diet     [] Ventilator      [x] Follow-Up        Previous Recommendations:   [] Implemented          [] Not Implemented          [] Not Applicable    Previous Goal:   [] Met              [] Progressing Towards Goal              [] Not Progressing Towards Goal   [] Not Applicable              Ely Schulz, [address] [place] [address]  Pager: [phone]  Office: [phone]

## 2018-03-29 NOTE — ANESTHESIA PREPROCEDURE EVALUATION
Anesthetic History   No history of anesthetic complications            Review of Systems / Medical History  Patient summary reviewed, nursing notes reviewed and pertinent labs reviewed    Pulmonary      Recent URI  Sleep apnea: No treatment  Pneumonia (during this admission)         Neuro/Psych   Within defined limits           Cardiovascular    Hypertension (elevated this admission, not on meds)              Exercise tolerance: >4 METS     GI/Hepatic/Renal  Within defined limits              Endo/Other        Obesity     Other Findings   Comments: Left colon mass with spots on liver           Physical Exam    Airway  Mallampati: II  TM Distance: 4 - 6 cm  Neck ROM: normal range of motion   Mouth opening: Normal     Cardiovascular    Rhythm: regular  Rate: normal         Dental  No notable dental hx       Pulmonary  Breath sounds clear to auscultation               Abdominal         Other Findings            Anesthetic Plan    ASA: 2  Anesthesia type: general          Induction: Intravenous  Anesthetic plan and risks discussed with: Patient

## 2018-03-29 NOTE — ANESTHESIA POSTPROCEDURE EVALUATION
Post-Anesthesia Evaluation and Assessment    Patient: Rhea Mcarthur MRN: [id number]  SSN: [id number]    YOB: 1990  Age: 32 y.o. Sex: male       Cardiovascular Function/Vital Signs  Visit Vitals    BP (!) 165/97    Pulse 94    Temp 37 °C (98.6 °F)    Resp 17    Ht 5' 11\" (1.803 m)    Wt 127 kg (279 lb 15.8 oz)    SpO2 98%    BMI 39.05 kg/m2       Patient is status post general anesthesia for Procedure(s):  LAPAROSCOPIC LEFT ARIEL-COLECTOMY;MOBILIZATION OF SPLENIC FLEXURE. Nausea/Vomiting: None    Postoperative hydration reviewed and adequate. Pain:  Pain Scale 1: Numeric (0 - 10) (03/29/18 1550)  Pain Intensity 1: 4 (03/29/18 1550)   Managed    Neurological Status:   Neuro (WDL): Exceptions to WDL (03/29/18 1445)  Neuro  Neurologic State: Drowsy; Eyes open to stimulus (03/29/18 1445)  Orientation Level: Oriented to person (03/29/18 1445)  Cognition: Follows commands (03/29/18 1445)  Speech: Clear (03/29/18 1445)  LUE Motor Response: Purposeful;Spontaneous  (03/29/18 1445)  LLE Motor Response: Purposeful;Spontaneous  (03/29/18 1445)  RUE Motor Response: Purposeful;Spontaneous  (03/29/18 1445)  RLE Motor Response: Purposeful;Spontaneous  (03/29/18 1445)   At baseline    Mental Status and Level of Consciousness: Alert and oriented     Pulmonary Status:   O2 Device: Nasal cannula (03/29/18 1505)   Adequate oxygenation and airway patent    Complications related to anesthesia: None    Post-anesthesia assessment completed.  No concerns    Signed By: Bernie Hu DO     March 29, 2018

## 2018-03-29 NOTE — PROGRESS NOTES
[facility]  [address] [facility], [place], [address] [place] [address]  [phone]      Medical Progress Note      NAME: Luis F Bell   :  1990  MRM:  [id number]    Date/Time: 3/29/2018  8:02 AM       Assessment and Plan:   1. Colon cancer stage IV mets to the liver: Biopsy of the mass and liver showed adenocarcinoma. S/p left hemicolectomy. Appreciated gen surgery and oncology evaluation.      2. Acute diverticulitis/ abdominal pain: No evidence of abscess. On zosyn.       3. RLL Pneumonia: incidental findings on CT.  Viral panel neg. Already on IV Abx      4. SIRS (systemic inflammatory response syndrome): due to above issues, not septic. Improving      5. Hypertension: likely has underlying essential HTN, in combination of abd pain.  Will monitor closely       discussed with family               Subjective:     Chief Complaint:  Follow up of pt who was admitted with diverticulitis and found to have colonic mass. Just came out of OR. No events. ROS:  (bold if positive, if negative)      Tolerating PT  Tolerating Diet        Objective:     Last 24hrs VS reviewed since prior progress note.  Most recent are:    Visit Vitals    BP (!) 153/96 (BP 1 Location: Right arm, BP Patient Position: At rest)    Pulse (!) 102    Temp 98.6 °F (37 °C)    Resp 20    Ht 5' 11\" (1.803 m)    Wt 127 kg (279 lb 15.8 oz)    SpO2 97%    BMI 39.05 kg/m2     SpO2 Readings from Last 6 Encounters:   18 97%   09/15/16 98%    O2 Flow Rate (L/min): 2 l/min   No intake or output data in the 24 hours ending 18 1339     Physical Exam:    Gen:  Well-developed, well-nourished, in no acute distress  HEENT:  Pink conjunctivae, PERRL, hearing intact to voice, moist mucous membranes  Neck:  Supple, without masses, thyroid non-tender  Resp:  No accessory muscle use, clear breath sounds without wheezes rales or rhonchi  Card:  No murmurs, normal S1, S2 without thrills, bruits or peripheral edema  Abd: Tenderness on lower abdominal area, non-distended, normoactive bowel sounds are present, no palpable organomegaly and no detectable hernias  Lymph:  No cervical or inguinal adenopathy  Musc:  No cyanosis or clubbing  Skin:  No rashes or ulcers, skin turgor is good  Neuro:  Cranial nerves are grossly intact, no focal motor weakness, follows commands appropriately  Psych:  Good insight, oriented to person, place and time, alert  __________________________________________________________________  Medications Reviewed: (see below)  Medications:     Current Facility-Administered Medications   Medication Dose Route Frequency    bupivacaine (PF) 0.5 % (5 mg/mL) 30 mL, bupivacaine liposome (PF) susp 266 mg in 0.9% sodium chloride 20 mL solution    PRN    simethicone (MYLICON) tablet 80 mg  80 mg Oral QID PRN    zolpidem (AMBIEN) tablet 5 mg  5 mg Oral QHS PRN    piperacillin-tazobactam (ZOSYN) 3.375 g in 0.9% sodium chloride (MBP/ADV) 100 mL ADV  3.375 g IntraVENous Q8H    albuterol-ipratropium (DUO-NEB) 2.5 MG-0.5 MG/3 ML  3 mL Nebulization Q4H PRN    guaiFENesin ER (MUCINEX) tablet 600 mg  600 mg Oral Q12H    benzonatate (TESSALON) capsule 100 mg  100 mg Oral TID PRN    0.9% sodium chloride infusion  75 mL/hr IntraVENous CONTINUOUS    sodium chloride (NS) flush 5-10 mL  5-10 mL IntraVENous Q8H    sodium chloride (NS) flush 5-10 mL  5-10 mL IntraVENous PRN    acetaminophen (TYLENOL) tablet 650 mg  650 mg Oral Q4H PRN    HYDROcodone-acetaminophen (NORCO) 5-325 mg per tablet 1 Tab  1 Tab Oral Q4H PRN    HYDROmorphone (PF) (DILAUDID) injection 1 mg  1 mg IntraVENous Q4H PRN    naloxone (NARCAN) injection 0.4 mg  0.4 mg IntraVENous PRN    diphenhydrAMINE (BENADRYL) injection 12.5 mg  12.5 mg IntraVENous Q4H PRN    ondansetron (ZOFRAN) injection 4 mg  4 mg IntraVENous Q6H PRN    docusate sodium (COLACE) capsule 100 mg  100 mg Oral BID     Facility-Administered Medications Ordered in Other Encounters Medication Dose Route Frequency    rocuronium (ZEMURON) injection   IntraVENous PRN    midazolam (VERSED) injection   IntraVENous PRN    fentaNYL citrate (PF) injection    PRN    lidocaine (PF) (XYLOCAINE) 20 mg/mL (2 %) injection   IntraVENous PRN    propofol (DIPRIVAN) 10 mg/mL injection   IntraVENous PRN    succinylcholine (ANECTINE) injection   IntraVENous PRN    dexamethasone (DECADRON) 4 mg/mL injection   IntraVENous PRN    lactated Ringers infusion   IntraVENous CONTINUOUS    lactated Ringers infusion   IntraVENous CONTINUOUS    HYDROmorphone (PF) (DILAUDID) injection    PRN    ondansetron (ZOFRAN) injection    PRN        Lab Data Reviewed: (see below)  Lab Review:     Recent Labs      03/28/18   2355  03/28/18   0549  03/27/18   0233   WBC  10.7  14.7*  21.0*   HGB  11.1*  12.1  11.6*   HCT  35.4*  37.3  36.1*   PLT  316  386  341     Recent Labs      03/28/18   0549  03/27/18   1044  03/27/18   0233   NA  140   --   138   K  3.9   --   3.7   CL  104   --   103   CO2  26   --   25   GLU  91   --   86   BUN  6   --   6   CREA  0.73   --   0.79   CA  8.9   --   8.6   MG  2.3   --   2.3   PHOS  3.7   --   2.4*   INR   --   1.2*   --      No results found for: GLUCPOC  No results for input(s): PH, PCO2, PO2, HCO3, FIO2 in the last 72 hours.   Recent Labs      03/27/18   1044   INR  1.2*     All Micro Results     Procedure Component Value Units Date/Time    CULTURE, BLOOD [[id number]] Collected:  03/25/18 1123    Order Status:  Completed Specimen:  Blood from Blood Updated:  03/29/18 0718     Special Requests: NO SPECIAL REQUESTS        Culture result: NO GROWTH 4 DAYS       CULTURE, BLOOD [[id number]] Collected:  03/25/18 1123    Order Status:  Completed Specimen:  Blood from Blood Updated:  03/29/18 0718     Special Requests: NO SPECIAL REQUESTS        Culture result: NO GROWTH 4 DAYS       RESPIRATORY Bea Killings [[id number]] Collected:  03/25/18 3411    Order Status:  Completed Specimen: Nasopharyngeal Updated:  03/26/18 0048     Adenovirus NOT DETECTED        Coronavirus 229E NOT DETECTED        Coronavirus HKU1 NOT DETECTED        Coronavirus CVNL63 NOT DETECTED        Coronavirus OC43 NOT DETECTED        Metapneumovirus NOT DETECTED        Rhinovirus and Enterovirus NOT DETECTED        Influenza A NOT DETECTED        Influenza A, subtype H1 NOT DETECTED        Influenza A, subtype H3 NOT DETECTED        INFLUENZA A H1N1 PCR NOT DETECTED        Influenza B NOT DETECTED        Parainfluenza 1 NOT DETECTED        Parainfluenza 2 NOT DETECTED        Parainfluenza 3 NOT DETECTED        Parainfluenza virus 4 NOT DETECTED        RSV by PCR NOT DETECTED        Bordetella pertussis - PCR NOT DETECTED        Chlamydophila pneumoniae DNA, QL, PCR NOT DETECTED        Mycoplasma pneumoniae DNA, QL, PCR NOT DETECTED       CULTURE, RESPIRATORY/SPUTUM/BRONCH [facility] [[id number]] Collected:  03/25/18 1145    Order Status:  Canceled Specimen:  Sputum from Sputum           I have reviewed notes of prior 24hr. Other pertinent lab:       Total time spent with patient: [place] [address] discussed with: Patient, Nursing Staff and >50% of time spent in counseling and coordination of care    Discussed:  Care Plan    Prophylaxis:  SCD's    Disposition:  Home w/Family           ___________________________________________________    Attending Physician: Marilyn Johnson MD

## 2018-03-29 NOTE — PROGRESS NOTES
Bedside shift change report given to Tigist Vines (oncoming nurse) by Lety Pak RN (offgoing nurse). Report included the following information SBAR, Kardex, Intake/Output, MAR and Recent Results.

## 2018-03-29 NOTE — PROGRESS NOTES
Bedside and Verbal shift change report given to Jose Alberto Swain RN (oncoming nurse) by Ronny Dean RN (offgoing nurse). Report included the following information SBAR, Kardex, MAR, Accordion and Recent Results.

## 2018-03-29 NOTE — PROGRESS NOTES
[facility] [place] [facility] [place] [address] [facility] [address] [facility]: [phone]  F: [phone]      Reason for Visit:   Susie Francois is a 32 y.o. male who is seen in consultation at the request of Dr. Jason Wylie for evaluation of colon mass with liver mets. History of Present Illness:     Mr Nanci Marquis was admitted on 3/25/2018 from the ED when he presented with c/o left sided abd pain described as twisting x 2 days and URI symptoms x 1 week. .  CT scan suggestive of acute diverticulitis. WBC 19.5 Therefore he was admitted for further eval and management. Mr Nanci Marquis reports had \"flu\" last week associated cold symptoms with chills; missed 3 days of work; then Saturday began with  stomach pain; had difficulty passing stool and gas; described as \"twisting\" pain. Denies cough, SOB or N/V. Smoking hx: no cigarettes; smokes marijuana twice weekly  ETOH use: social; 1 beer a month. Denies family hx of cancer  Father passed at age 29 from stroke and PNA; had hx DM  Mother passed at age 52 from stroke, aneurysm, and had HTN  Sister alive an healthy  Single, No children  Currently works 2 jobs: [employer] full time and part time at [employer], aunt, girlfriend and numerous friends at bedside. Interval History: On his way to surgery; teary. Numerous friends and extended family present.          Current Facility-Administered Medications   Medication Dose Route Frequency    bupivacaine (PF) 0.5 % (5 mg/mL) 30 mL, bupivacaine liposome (PF) susp 266 mg in 0.9% sodium chloride 20 mL solution    PRN    simethicone (MYLICON) tablet 80 mg  80 mg Oral QID PRN    zolpidem (AMBIEN) tablet 5 mg  5 mg Oral QHS PRN    piperacillin-tazobactam (ZOSYN) 3.375 g in 0.9% sodium chloride (MBP/ADV) 100 mL ADV  3.375 g IntraVENous Q8H    albuterol-ipratropium (DUO-NEB) 2.5 MG-0.5 MG/3 ML  3 mL Nebulization Q4H PRN    guaiFENesin ER (MUCINEX) tablet 600 mg  600 mg Oral Q12H    benzonatate (TESSALON) capsule 100 mg  100 mg Oral TID PRN    0.9% sodium chloride infusion  75 mL/hr IntraVENous CONTINUOUS    sodium chloride (NS) flush 5-10 mL  5-10 mL IntraVENous Q8H    sodium chloride (NS) flush 5-10 mL  5-10 mL IntraVENous PRN    acetaminophen (TYLENOL) tablet 650 mg  650 mg Oral Q4H PRN    HYDROcodone-acetaminophen (NORCO) 5-325 mg per tablet 1 Tab  1 Tab Oral Q4H PRN    HYDROmorphone (PF) (DILAUDID) injection 1 mg  1 mg IntraVENous Q4H PRN    naloxone (NARCAN) injection 0.4 mg  0.4 mg IntraVENous PRN    diphenhydrAMINE (BENADRYL) injection 12.5 mg  12.5 mg IntraVENous Q4H PRN    ondansetron (ZOFRAN) injection 4 mg  4 mg IntraVENous Q6H PRN    docusate sodium (COLACE) capsule 100 mg  100 mg Oral BID     Facility-Administered Medications Ordered in Other Encounters   Medication Dose Route Frequency    rocuronium (ZEMURON) injection   IntraVENous PRN    midazolam (VERSED) injection   IntraVENous PRN    fentaNYL citrate (PF) injection    PRN    lidocaine (PF) (XYLOCAINE) 20 mg/mL (2 %) injection   IntraVENous PRN    propofol (DIPRIVAN) 10 mg/mL injection   IntraVENous PRN    succinylcholine (ANECTINE) injection   IntraVENous PRN    dexamethasone (DECADRON) 4 mg/mL injection   IntraVENous PRN    lactated Ringers infusion   IntraVENous CONTINUOUS    lactated Ringers infusion   IntraVENous CONTINUOUS    HYDROmorphone (PF) (DILAUDID) injection    PRN      No Known Allergies     Review of Systems: A complete review of systems was obtained, negative except as described above. Physical Exam:     Visit Vitals    BP (!) 153/96 (BP 1 Location: Right arm, BP Patient Position: At rest)    Pulse (!) 102    Temp 98.6 °F (37 °C)    Resp 20    Ht 5' 11\" (1.803 m)    Wt 127 kg (279 lb 15.8 oz)    SpO2 97%    BMI 39.05 kg/m2     ECOG PS: 0  General: obese;   No acute distress  Eyes: anicteric sclerae  HENT: Atraumatic with normal appearance of ears and nose; OP clear  Neck: Supple; no thyromegaly   Respiratory:  normal respiratory effort  CV: tachy rate, regular rhythm, no murmurs, no peripheral edema  GI: Soft, nontender, nondistended, no masses, no hepatomegaly, no splenomegaly  MS:  Digits without clubbing or cyanosis. Skin: No rashes, ecchymoses, or petechiae. Normal temperature, turgor, and texture. Neuro/Psych: Alert, oriented, appropriate affect, normal judgment/insight      Results:     Lab Results   Component Value Date/Time    WBC 10.7 03/28/2018 11:55 PM    HGB 11.1 (L) 03/28/2018 11:55 PM    HCT 35.4 (L) 03/28/2018 11:55 PM    PLATELET 556 54/83/7940 11:55 PM    MCV 72.4 (L) 03/28/2018 11:55 PM    ABS. NEUTROPHILS 7.5 03/28/2018 11:55 PM     Lab Results   Component Value Date/Time    Sodium 140 03/28/2018 05:49 AM    Potassium 3.9 03/28/2018 05:49 AM    Chloride 104 03/28/2018 05:49 AM    CO2 26 03/28/2018 05:49 AM    Glucose 91 03/28/2018 05:49 AM    BUN 6 03/28/2018 05:49 AM    Creatinine 0.73 03/28/2018 05:49 AM    GFR est AA >60 03/28/2018 05:49 AM    GFR est non-AA >60 03/28/2018 05:49 AM    Calcium 8.9 03/28/2018 05:49 AM     Lab Results   Component Value Date/Time    Bilirubin, total 0.7 03/26/2018 06:00 AM    ALT (SGPT) 20 03/26/2018 06:00 AM    AST (SGOT) 10 (L) 03/26/2018 06:00 AM    Alk.  phosphatase 70 03/26/2018 06:00 AM    Protein, total 6.9 03/26/2018 06:00 AM    Albumin 3.0 (L) 03/26/2018 06:00 AM    Globulin 3.9 03/26/2018 06:00 AM       Lab Results   Component Value Date/Time    Iron % saturation 13 (L) 03/28/2018 05:49 AM    TIBC 208 (L) 03/28/2018 05:49 AM    Ferritin 191 03/28/2018 05:49 AM    Lipase 60 (L) 03/26/2018 06:00 AM     Lab Results   Component Value Date/Time    INR (POC) 1.2 (H) 03/27/2018 10:44 AM     3/28/2018 CEA  10.4    3/27/2018 Surgical path  lg bowel descending bx; adenocarcinoma      3/37/2018 liver bx Path  1: Liver, Fine Needle Aspiration   CYTOLOGIC INTERPRETATION:   Liver lesion, biopsy:   Metastatic adenocarcinoma consistent with colorectal primary (see Comment). General Categorization   Positive for malignancy. 3/25/2018 CT ABD PELV W CONT  IMPRESSION:     1. Acute diverticulitis of the distal descending colon. No evidence of  peridiverticular abscess or bowel obstruction. 2. Mild right lower lobe airspace disease. 3. Indeterminate 2 x 1 cm low-density liver lesion. 3/26/2018 US ABD     IMPRESSION:     Scattered hypoechoic foci in the hepatic parenchyma. These are not characterized  as hemangiomas. Delineation likely will require tissue sampling. Tissue sampling should only be pursued utilizing ultrasonographic guidance.     Findings consistent with hepatic steatosis.     Otherwise unremarkable study. 3/27/2018 US ABD  Intrahepatic hypoechoic foci are consistent with metastatic disease. 3/27/2018 CT BX LIVER: path pending    3/27/2018 Colonoscopy /Dr Rhodes  Findings:   Circumferential mass 40 cm, friable  Specimens:    biopsy mass lesion      33/27/2018 CT CHEST ABD   IMPRESSION:       LIVER: Within segment 8 of the liver there is a 1.9 x 11 mm lesion. It  demonstrates no arterial enhancement and measures 30 Hounsfield units. Its  margins are indistinct. Possible second lesion within segment 7 of the liver  measuring 20 x 15 mm possible 6 mm lesion segment 6 of the liver as well as 16  mm lesion in segment 5 of the liver. 1. No evidence of metastatic disease to the chest  2. Multiple ill-defined hepatic lesions. These do not represent simple cyst.  Previous biopsy has been performed pathology is pending     Assessment and Recommendations:     1. Colon cancer Stage IV  With metastatic disease to the liver   CEA. 10.5  General surgery following:  (3/29)  lap left hemicolectomy to be followed by chemotherapy and then eval by surgery regarding possible liver surgery  Will need port placed at some point for chemo. Follow up appt made to review all path and treatment plan. Placed in discharge summary.      2. Anemia, microcytosis  May benefit from iron at some point; will defer until after surgery. 3. PNA  Noted on CT scan  Resp  Panel negative  Abx per hospitalist.     4. Leukocytosis  Secondary to infection vs inflammation    5. Pain  Currently controlled  Continue Prn medications    6. Sperm banking and genetic testing  Will discuss with patient         Plan reviewed with Dr Parekh      Signed By: Alphonsus Prader, NP

## 2018-03-29 NOTE — PROGRESS NOTES
Problem: Falls - Risk of  Goal: *Absence of Falls  Document Rhett Fall Risk and appropriate interventions in the flowsheet.    Outcome: Progressing Towards Goal  Fall Risk Interventions:            Medication Interventions: Patient to call before getting OOB, Teach patient to arise slowly, Assess postural VS orthostatic hypotension

## 2018-03-30 LAB
ANION GAP SERPL CALC-SCNC: 8 MMOL/L (ref 5–15)
BASOPHILS # BLD: 0 K/UL (ref 0–0.1)
BASOPHILS NFR BLD: 0 % (ref 0–1)
BUN SERPL-MCNC: 7 MG/DL (ref 6–20)
BUN/CREAT SERPL: 10 (ref 12–20)
CALCIUM SERPL-MCNC: 8.6 MG/DL (ref 8.5–10.1)
CHLORIDE SERPL-SCNC: 105 MMOL/L (ref 97–108)
CO2 SERPL-SCNC: 26 MMOL/L (ref 21–32)
CREAT SERPL-MCNC: 0.71 MG/DL (ref 0.7–1.3)
DIFFERENTIAL METHOD BLD: ABNORMAL
EOSINOPHIL # BLD: 0 K/UL (ref 0–0.4)
EOSINOPHIL NFR BLD: 0 % (ref 0–7)
ERYTHROCYTE [DISTWIDTH] IN BLOOD BY AUTOMATED COUNT: 14.9 % (ref 11.5–14.5)
GLUCOSE SERPL-MCNC: 92 MG/DL (ref 65–100)
HCT VFR BLD AUTO: 37.2 % (ref 36.6–50.3)
HGB BLD-MCNC: 12 G/DL (ref 12.1–17)
IMM GRANULOCYTES # BLD: 0.2 K/UL (ref 0–0.04)
IMM GRANULOCYTES NFR BLD AUTO: 1 % (ref 0–0.5)
LYMPHOCYTES # BLD: 1.6 K/UL (ref 0.8–3.5)
LYMPHOCYTES NFR BLD: 9 % (ref 12–49)
MAGNESIUM SERPL-MCNC: 2.1 MG/DL (ref 1.6–2.4)
MCH RBC QN AUTO: 23 PG (ref 26–34)
MCHC RBC AUTO-ENTMCNC: 32.3 G/DL (ref 30–36.5)
MCV RBC AUTO: 71.3 FL (ref 80–99)
MONOCYTES # BLD: 0.9 K/UL (ref 0–1)
MONOCYTES NFR BLD: 5 % (ref 5–13)
NEUTS SEG # BLD: 15.5 K/UL (ref 1.8–8)
NEUTS SEG NFR BLD: 85 % (ref 32–75)
NRBC # BLD: 0 K/UL (ref 0–0.01)
NRBC BLD-RTO: 0 PER 100 WBC
PLATELET # BLD AUTO: 434 K/UL (ref 150–400)
PMV BLD AUTO: 8.8 FL (ref 8.9–12.9)
POTASSIUM SERPL-SCNC: 3.9 MMOL/L (ref 3.5–5.1)
RBC # BLD AUTO: 5.22 M/UL (ref 4.1–5.7)
SODIUM SERPL-SCNC: 139 MMOL/L (ref 136–145)
WBC # BLD AUTO: 18.3 K/UL (ref 4.1–11.1)

## 2018-03-30 PROCEDURE — C1788 PORT, INDWELLING, IMP: HCPCS

## 2018-03-30 PROCEDURE — 74011250637 HC RX REV CODE- 250/637: Performed by: INTERNAL MEDICINE

## 2018-03-30 PROCEDURE — 85025 COMPLETE CBC W/AUTO DIFF WBC: CPT | Performed by: PHYSICIAN ASSISTANT

## 2018-03-30 PROCEDURE — 65270000029 HC RM PRIVATE

## 2018-03-30 PROCEDURE — 74011000258 HC RX REV CODE- 258: Performed by: INTERNAL MEDICINE

## 2018-03-30 PROCEDURE — 74011250636 HC RX REV CODE- 250/636: Performed by: PHYSICIAN ASSISTANT

## 2018-03-30 PROCEDURE — 36415 COLL VENOUS BLD VENIPUNCTURE: CPT | Performed by: PHYSICIAN ASSISTANT

## 2018-03-30 PROCEDURE — 74011250636 HC RX REV CODE- 250/636: Performed by: SURGERY

## 2018-03-30 PROCEDURE — 83735 ASSAY OF MAGNESIUM: CPT | Performed by: PHYSICIAN ASSISTANT

## 2018-03-30 PROCEDURE — 74011250636 HC RX REV CODE- 250/636: Performed by: INTERNAL MEDICINE

## 2018-03-30 PROCEDURE — 80048 BASIC METABOLIC PNL TOTAL CA: CPT | Performed by: PHYSICIAN ASSISTANT

## 2018-03-30 PROCEDURE — 77010033678 HC OXYGEN DAILY

## 2018-03-30 RX ORDER — HYDROMORPHONE HCL IN 0.9% NACL 15 MG/30ML
PATIENT CONTROLLED ANALGESIA VIAL INTRAVENOUS CONTINUOUS
Status: DISCONTINUED | OUTPATIENT
Start: 2018-03-30 | End: 2018-04-01

## 2018-03-30 RX ORDER — KETOROLAC TROMETHAMINE 30 MG/ML
30 INJECTION, SOLUTION INTRAMUSCULAR; INTRAVENOUS EVERY 6 HOURS
Status: DISCONTINUED | OUTPATIENT
Start: 2018-03-30 | End: 2018-04-03 | Stop reason: HOSPADM

## 2018-03-30 RX ORDER — METOPROLOL TARTRATE 25 MG/1
12.5 TABLET, FILM COATED ORAL EVERY 12 HOURS
Status: DISCONTINUED | OUTPATIENT
Start: 2018-03-30 | End: 2018-04-01

## 2018-03-30 RX ADMIN — KETOROLAC TROMETHAMINE 30 MG: 30 INJECTION, SOLUTION INTRAMUSCULAR; INTRAVENOUS at 13:45

## 2018-03-30 RX ADMIN — KETOROLAC TROMETHAMINE 30 MG: 30 INJECTION, SOLUTION INTRAMUSCULAR; INTRAVENOUS at 07:58

## 2018-03-30 RX ADMIN — Medication 5 ML: at 22:00

## 2018-03-30 RX ADMIN — GUAIFENESIN 600 MG: 600 TABLET, EXTENDED RELEASE ORAL at 08:00

## 2018-03-30 RX ADMIN — ENOXAPARIN SODIUM 40 MG: 40 INJECTION SUBCUTANEOUS at 07:58

## 2018-03-30 RX ADMIN — DOCUSATE SODIUM 100 MG: 100 CAPSULE, LIQUID FILLED ORAL at 08:00

## 2018-03-30 RX ADMIN — METOPROLOL TARTRATE 12.5 MG: 25 TABLET ORAL at 22:06

## 2018-03-30 RX ADMIN — Medication 5 ML: at 06:00

## 2018-03-30 RX ADMIN — SODIUM CHLORIDE 3.38 G: 900 INJECTION, SOLUTION INTRAVENOUS at 17:31

## 2018-03-30 RX ADMIN — SODIUM CHLORIDE 50 ML/HR: 900 INJECTION, SOLUTION INTRAVENOUS at 22:17

## 2018-03-30 RX ADMIN — Medication: at 19:41

## 2018-03-30 RX ADMIN — SODIUM CHLORIDE 3.38 G: 900 INJECTION, SOLUTION INTRAVENOUS at 09:16

## 2018-03-30 RX ADMIN — SODIUM CHLORIDE 3.38 G: 900 INJECTION, SOLUTION INTRAVENOUS at 02:46

## 2018-03-30 RX ADMIN — DOCUSATE SODIUM 100 MG: 100 CAPSULE, LIQUID FILLED ORAL at 17:30

## 2018-03-30 RX ADMIN — Medication 10 ML: at 13:45

## 2018-03-30 RX ADMIN — KETOROLAC TROMETHAMINE 30 MG: 30 INJECTION, SOLUTION INTRAMUSCULAR; INTRAVENOUS at 22:06

## 2018-03-30 RX ADMIN — SODIUM CHLORIDE 75 ML/HR: 900 INJECTION, SOLUTION INTRAVENOUS at 05:47

## 2018-03-30 RX ADMIN — GUAIFENESIN 600 MG: 600 TABLET, EXTENDED RELEASE ORAL at 22:06

## 2018-03-30 RX ADMIN — METOPROLOL TARTRATE 12.5 MG: 25 TABLET ORAL at 14:42

## 2018-03-30 NOTE — PROGRESS NOTES
Bedside shift change report given to Samy Marcano 67 (oncoming nurse) by Lety Pak RN (offgoing nurse). Report included the following information SBAR, Kardex, Intake/Output, MAR and Recent Results.

## 2018-03-30 NOTE — PROGRESS NOTES
[facility] [place] at [place] [address]  [address] [facility], [address] [facility] [address] [facility]  [place]: [phone]  F: [phone]      Reason for Visit:   Meera Lay is a 32 y.o. male who is seen in consultation at the request of Dr. Aviva Higgins for evaluation of colon mass with liver mets. History of Present Illness:     Mr Jacklyn Zhang was admitted on 3/25/2018 from the ED when he presented with c/o left sided abd pain described as twisting x 2 days and URI symptoms x 1 week. .  CT scan suggestive of acute diverticulitis. WBC 19.5 Therefore he was admitted for further eval and management. Mr Jacklyn Zhang reports had \"flu\" last week associated cold symptoms with chills; missed 3 days of work; then Saturday began with  stomach pain; had difficulty passing stool and gas; described as \"twisting\" pain. Denies cough, SOB or N/V. Smoking hx: no cigarettes; smokes marijuana twice weekly  ETOH use: social; 1 beer a month. Denies family hx of cancer  Father passed at age 29 from stroke and PNA; had hx DM  Mother passed at age 52 from stroke, aneurysm, and had HTN  Sister alive an healthy  Single, No children  Currently works 2 jobs: [employer] full time and part time at [employer], aunt, girlfriend and numerous friends at bedside. Interval History:     No complaints; Has been up in the hallway walking; tolerating liquid diet. No gas yet. Denies N/V. Denies SOB. Girlfriend and sister at bedside.           Current Facility-Administered Medications   Medication Dose Route Frequency    ketorolac (TORADOL) injection 30 mg  30 mg IntraVENous Q6H    HYDROmorphone (PF) 15 mg/30 ml (DILAUDID) PCA   IntraVENous CONTINUOUS    metoprolol tartrate (LOPRESSOR) tablet 12.5 mg  12.5 mg Oral Q12H    HYDROmorphone (PF) (DILAUDID) injection 0.5 mg  0.5 mg IntraVENous Q4H PRN    enoxaparin (LOVENOX) injection 40 mg  40 mg SubCUTAneous Q24H    simethicone (MYLICON) tablet 80 mg  80 mg Oral QID PRN    zolpidem (AMBIEN) tablet 5 mg  5 mg Oral QHS PRN    piperacillin-tazobactam (ZOSYN) 3.375 g in 0.9% sodium chloride (MBP/ADV) 100 mL ADV  3.375 g IntraVENous Q8H    albuterol-ipratropium (DUO-NEB) 2.5 MG-0.5 MG/3 ML  3 mL Nebulization Q4H PRN    guaiFENesin ER (MUCINEX) tablet 600 mg  600 mg Oral Q12H    benzonatate (TESSALON) capsule 100 mg  100 mg Oral TID PRN    0.9% sodium chloride infusion  50 mL/hr IntraVENous CONTINUOUS    sodium chloride (NS) flush 5-10 mL  5-10 mL IntraVENous Q8H    sodium chloride (NS) flush 5-10 mL  5-10 mL IntraVENous PRN    acetaminophen (TYLENOL) tablet 650 mg  650 mg Oral Q4H PRN    naloxone (NARCAN) injection 0.4 mg  0.4 mg IntraVENous PRN    diphenhydrAMINE (BENADRYL) injection 12.5 mg  12.5 mg IntraVENous Q4H PRN    ondansetron (ZOFRAN) injection 4 mg  4 mg IntraVENous Q6H PRN    docusate sodium (COLACE) capsule 100 mg  100 mg Oral BID      No Known Allergies     Review of Systems: A complete review of systems was obtained, negative except as described above. Physical Exam:     Visit Vitals    BP (!) 137/91 (BP 1 Location: Right arm, BP Patient Position: At rest)    Pulse (!) 105    Temp 97.9 °F (36.6 °C)    Resp 20    Ht 5' 11\" (1.803 m)    Wt 127 kg (279 lb 15.8 oz)    SpO2 96%    BMI 39.05 kg/m2     ECOG PS: 0  General: obese; No acute distress  Eyes: anicteric sclerae  HENT: Atraumatic with normal appearance of ears and nose; OP clear  Neck: Supple; no thyromegaly   Respiratory:  normal respiratory effort  CV: tachy rate, regular rhythm, no murmurs, no peripheral edema  GI: PENNY drain; Soft, nontender, nondistended, no masses, no hepatomegaly, no splenomegaly  MS:  Digits without clubbing or cyanosis. Skin: No rashes, ecchymoses, or petechiae. Normal temperature, turgor, and texture.   Neuro/Psych: Alert, oriented, appropriate affect, normal judgment/insight      Results:     Lab Results   Component Value Date/Time    WBC 18.3 (H) 03/30/2018 04:04 AM    HGB 12.0 (L) 03/30/2018 04:04 AM    HCT 37.2 03/30/2018 04:04 AM    PLATELET 587 (H) 93/36/7677 04:04 AM    MCV 71.3 (L) 03/30/2018 04:04 AM    ABS. NEUTROPHILS 15.5 (H) 03/30/2018 04:04 AM     Lab Results   Component Value Date/Time    Sodium 139 03/30/2018 04:04 AM    Potassium 3.9 03/30/2018 04:04 AM    Chloride 105 03/30/2018 04:04 AM    CO2 26 03/30/2018 04:04 AM    Glucose 92 03/30/2018 04:04 AM    BUN 7 03/30/2018 04:04 AM    Creatinine 0.71 03/30/2018 04:04 AM    GFR est AA >60 03/30/2018 04:04 AM    GFR est non-AA >60 03/30/2018 04:04 AM    Calcium 8.6 03/30/2018 04:04 AM     Lab Results   Component Value Date/Time    Bilirubin, total 0.7 03/26/2018 06:00 AM    ALT (SGPT) 20 03/26/2018 06:00 AM    AST (SGOT) 10 (L) 03/26/2018 06:00 AM    Alk. phosphatase 70 03/26/2018 06:00 AM    Protein, total 6.9 03/26/2018 06:00 AM    Albumin 3.0 (L) 03/26/2018 06:00 AM    Globulin 3.9 03/26/2018 06:00 AM       Lab Results   Component Value Date/Time    Iron % saturation 13 (L) 03/28/2018 05:49 AM    TIBC 208 (L) 03/28/2018 05:49 AM    Ferritin 191 03/28/2018 05:49 AM    Lipase 60 (L) 03/26/2018 06:00 AM     Lab Results   Component Value Date/Time    INR (POC) 1.2 (H) 03/27/2018 10:44 AM     3/28/2018 CEA  10.4    3/27/2018 Surgical path  lg bowel descending bx; adenocarcinoma      3/37/2018 liver bx Path  1: Liver, Fine Needle Aspiration   CYTOLOGIC INTERPRETATION:   Liver lesion, biopsy:   Metastatic adenocarcinoma consistent with colorectal primary (see Comment). General Categorization   Positive for malignancy. 3/25/2018 CT ABD PELV W CONT  IMPRESSION:     1. Acute diverticulitis of the distal descending colon. No evidence of  peridiverticular abscess or bowel obstruction. 2. Mild right lower lobe airspace disease. 3. Indeterminate 2 x 1 cm low-density liver lesion. 3/26/2018 US ABD     IMPRESSION:     Scattered hypoechoic foci in the hepatic parenchyma. These are not characterized  as hemangiomas.  Delineation likely will require tissue sampling. Tissue sampling should only be pursued utilizing ultrasonographic guidance.     Findings consistent with hepatic steatosis.     Otherwise unremarkable study. 3/27/2018 US ABD  Intrahepatic hypoechoic foci are consistent with metastatic disease. 3/27/2018 CT BX LIVER: path pending    3/27/2018 Colonoscopy /Dr Kai Lucas  Findings:   Circumferential mass 40 cm, friable  Specimens:    biopsy mass lesion      33/27/2018 CT CHEST ABD   IMPRESSION:       LIVER: Within segment 8 of the liver there is a 1.9 x 11 mm lesion. It  demonstrates no arterial enhancement and measures 30 Hounsfield units. Its  margins are indistinct. Possible second lesion within segment 7 of the liver  measuring 20 x 15 mm possible 6 mm lesion segment 6 of the liver as well as 16  mm lesion in segment 5 of the liver. 1. No evidence of metastatic disease to the chest  2. Multiple ill-defined hepatic lesions. These do not represent simple cyst.  Previous biopsy has been performed pathology is pending     Assessment and Recommendations:     1. Colon cancer Stage IV  With metastatic disease to the liver   CEA. 10.5  General surgery following: (3/29) lap left hemicolectomy; mobilization of splenic flexure; and drainage of pericolonic abscess;  to be followed by chemotherapy and then eval by surgery regarding possible liver surgery    Planning on port placement on Monday; port education done with pt, sister and significant other. Follow up appt made to review all path and treatment plan. Placed in discharge summary. 2. Anemia, microcytosis  Hgb stable  May benefit from iron at some point; will defer until after surgery. 3. PNA  Noted on CT scan  Resp  Panel negative  Abx per hospitalist.     4. Leukocytosis  Secondary to infection vs inflammation    5. Pain  Currently controlled: PCA in use    6. Sperm banking and genetic testing  Will discuss with patient         Plan reviewed with Dr Mozella Sandifer By: Angela Burk Schoeneweis, NP

## 2018-03-30 NOTE — PROGRESS NOTES
CM Note:  Met with pt who was concerned about finances and hospital bill. He is to apply for the Care Card. I spoke with Lewis Blizzard at [facility]. She will meet with family to see if he has started receiving insurance benefits through work. He thinks he may be d/c'd in the next few days.    ANKUSH Shelton

## 2018-03-30 NOTE — PROGRESS NOTES
General Surgery Daily Progress Note    Patient: Hunter Robert MRN: [id number]  SSN: [id number]    YOB: 1990  Age: 32 y.o. Sex: male      Admit Date: 3/25/2018    Subjective:   Pain controlled, tolerating clears. No BM or flatus.      Current Facility-Administered Medications   Medication Dose Route Frequency    ketorolac (TORADOL) injection 30 mg  30 mg IntraVENous Q6H    HYDROmorphone (PF) 15 mg/30 ml (DILAUDID) PCA   IntraVENous CONTINUOUS    HYDROmorphone (PF) (DILAUDID) injection 0.5 mg  0.5 mg IntraVENous Q4H PRN    enoxaparin (LOVENOX) injection 40 mg  40 mg SubCUTAneous Q24H    simethicone (MYLICON) tablet 80 mg  80 mg Oral QID PRN    zolpidem (AMBIEN) tablet 5 mg  5 mg Oral QHS PRN    piperacillin-tazobactam (ZOSYN) 3.375 g in 0.9% sodium chloride (MBP/ADV) 100 mL ADV  3.375 g IntraVENous Q8H    albuterol-ipratropium (DUO-NEB) 2.5 MG-0.5 MG/3 ML  3 mL Nebulization Q4H PRN    guaiFENesin ER (MUCINEX) tablet 600 mg  600 mg Oral Q12H    benzonatate (TESSALON) capsule 100 mg  100 mg Oral TID PRN    0.9% sodium chloride infusion  50 mL/hr IntraVENous CONTINUOUS    sodium chloride (NS) flush 5-10 mL  5-10 mL IntraVENous Q8H    sodium chloride (NS) flush 5-10 mL  5-10 mL IntraVENous PRN    acetaminophen (TYLENOL) tablet 650 mg  650 mg Oral Q4H PRN    naloxone (NARCAN) injection 0.4 mg  0.4 mg IntraVENous PRN    diphenhydrAMINE (BENADRYL) injection 12.5 mg  12.5 mg IntraVENous Q4H PRN    ondansetron (ZOFRAN) injection 4 mg  4 mg IntraVENous Q6H PRN    docusate sodium (COLACE) capsule 100 mg  100 mg Oral BID        Objective:   03/30 0701 - 03/30 1900  In: -   Out: 20 [Drains:20]  03/28 1901 - 03/30 0700  In: 3965 [P.O.:390; I.V.:2000]  Out: 4353 [Urine:2419; Drains:95]  Patient Vitals for the past 8 hrs:   BP Temp Pulse Resp SpO2   03/30/18 0729 (!) 146/98 98.3 °F (36.8 °C) 94 18 94 %   03/30/18 0331 (!) 144/95 98.4 °F (36.9 °C) 93 17 96 %   03/30/18 0245 - - 87 - -       Physical Exam:  General: Alert, cooperative, NAD  Lungs: Unlabored  Heart:  Regular rate and  rhythm  Abdomen: Soft, + BS, mild distention, appropriately tender, incisions c/d/i, PENNY SS  Extremities: Warm, moves all, no edema  Skin:  Warm and dry, no rash    Labs:   Recent Labs      03/30/18   0404   WBC  18.3*   HGB  12.0*   HCT  37.2   PLT  434*     Recent Labs      03/30/18   0404  03/28/18   0549   NA  139  140   K  3.9  3.9   CL  105  104   CO2  26  26   GLU  92  91   BUN  7  6   CREA  0.71  0.73   CA  8.6  8.9   MG  2.1  2.3   PHOS   --   3.7       Assessment / Plan:   · Colon cancer with hepatic metastasis  · POD#1 lap left hemicolectomy, mobilization splenic flexure  · PCA  · CLD  · Continue ABX to POD4  · Ambulate in halls  · Encourage IS  · Will plan for port placement before discharge

## 2018-03-30 NOTE — OP NOTES
OPERATIVE NOTE    Date of Procedure: 3/29/2018   Preoperative Diagnosis: Descending colon adenocarcinoma with perforation, metastatic to liver  Postoperative Diagnosis: Same  Procedure(s):  LAPAROSCOPIC MOBILIZATION OF SPLENIC FLEXURE  LAPAROSCOPIC DRAINAGE PERICOLONIC ABSCESS  LAPAROSCOPIC LEFT ARIEL-COLECTOMY WITH EN BLOC RESECTION OF ABDOMINAL WALL  Surgeon(s) and Role:     * Garald Lesches, MD - Primary         Assistant Staff: None      Surgical Staff:  Circ-1: Kevin Doe RN  Circ-Relief: Laurel Fernando RN  Scrub Tech-1: Madai Sieving  Surg Asst-1: Jose Casper  Event Time In   Incision Start 1123   Incision Close 1401     Anesthesia: General   Estimated Blood Loss: Min  Specimens:   ID Type Source Tests Collected by Time Destination   1 : decending colon and anastomosis rings Preservative Colon, Descending  Garald Lesches, MD 3/29/2018 1244 Pathology      Findings: Perforated descending colon cancer, eroding into left abdominal peritoneal sidewall  Complications: none  Implants: * No implants in log *    INDICATION:  Perforated descending colon cancer with metastasis. Procedure:  After consent was obtained, the patient was  brought to the OR and placed supine on the operating table. After undergoing successful general endotracheal anesthesia, ureteral stents and petersen was placed by Dr. María Perdomo. Please see his dictation for full details. The abdomen was then prepped and draped in sterile fashion with chlorhexidine. The patients perineum was prepped with Betadine. 0.5% marcaine plain and exparel was used for pre-incision analgesia. Abdomen was entered via 5mm vertical supra-umbilical incision under direct endoscopic vision. Subsequent ports were placed as follows:  12mm port in right lower abdomen in mid-clavicular line, 5mm port in right upper abdomen in mid-clavicular line and 5mm subxiphoid midline port.     Initial evaluation of the peritoneal surfaces reveal no gross evidence of carcinomatosis. Liver surface was grossly unremarkable. After rotation of Mr. Gonsales into trendelenburg position airplaned to the right, his colon mass was found to be adherant to the left pelvic sidewaill. Gentle sweeps of the nezhat catheter against adjacent colon epiploica revealed pus that was quickly evacuated. Descending colon was tightly affixed to the pelvic sidewall. No specimen was taken for microbiology. An enbloc circular 4 cm resection of peritoneum and transversalis was taken back to uninvolved internal abdominal oblique. Splenic flexure was mobilized first for tension-free anastomosis. The midpoint of the gastrocolic ligament was transected, allowing entry into the lesser sac. The stomach was reflected cephalad so as to expose the posterior gastric wall and body of pancreas. Transverse colon was safely retracted caudally without tearing of the splenic capsule. The spleen and colon were no longer attached and  transverse colon was easily mobilized to the midline of the peritoneal cavity. Marginal artery was clearly preserved prior to left colic. Left colic was left intact. I was able to bring the upper portion of descending colon in end-to-end fashion to the upper rectum without tensio    Next, I opened the white line of toldt at pelvic brim heading towards the flexure, gently rotating descending colon and mesentery towards midline away from Gerota's fascia. The left ureter was identified at its usual position across the pelvic brim and protected. High ligation of ROMELIA was performed and taken with 45mm tan load stapler, distal to the bifurcation to avoid disturbing hypogastrics. Intervening mesentery was taken working proximally with Ligasure energy. I stapled across a soft portion of proximal rectum with the KRYSTA-60mm purple loadstapler, approximately 6 cm distal from colon mass. Anastomotic site was chosen 6 cm proximal of colon mass where colotomy was made with ligasure. Pneumoperitoneum was paused and extraction site was prepared through a separate left lower quadrant transverse incision and wound protector. Anvil was placed intra-abdominally and [place] hat placed on wound protector to resume pneumoperitoneum. Anvil was placed across colotomy and the plastic introducer pierced midpoint of taenia at anastomosis site. 60mm purple load stapler was used to exclude colotomy distal to the anvil. Specimen was bagged and passed off for pathology. Pneumoperitoneum was re-established. End-to-end stapled anastomosis was created unremarkably. Underwater insufflation air test was satisfactory. The abdomen was irrigated with several aliquots of saline and omentum was brought to midline over bowel. 15F PENNY was left adjacent to anastomosis, exiting skin at left upper abdomen port site. 12mm port site was closed with transfascial 0 vicryl suture. Closing tray was brought in and all gowns and gloves were exchanged. Left lower quadrant fascia was closed with 0 PDS suture in two layers. Skin was closed at all sites using 4-0 interrupted sutures. Dressings were placed. The patient was awakened and transferred to PACU in satisfactory condition. Owusu catheter will stay until the morning. The patient tolerated the procedure well. I went to discuss the case with his family in the waiting area.      Bruce Katz MD

## 2018-03-30 NOTE — BRIEF OP NOTE
BRIEF OPERATIVE NOTE    Date of Procedure: 3/29/2018   Preoperative Diagnosis: Descending colon adenocarcinoma with perforation, metastatic to liver  Postoperative Diagnosis: Same  Procedure(s):  LAPAROSCOPIC MOBILIZATION OF SPLENIC FLEXURE  LAPAROSCOPIC DRAINAGE PERICOLONIC ABSCESS  LAPAROSCOPIC LEFT ARIEL-COLECTOMY WITH EN BLOC RESECTION OF ABDOMINAL WALL  Surgeon(s) and Role:     * Manford Krabbe, MD - Primary         Assistant Staff: None      Surgical Staff:  Circ-1: Almer Canavan, RN  Circ-Relief: Ray Espinoza RN  Scrub Tech-1: Kayden Almaraz  Surg Asst-1: Jose Jessica  Event Time In   Incision Start 1123   Incision Close 1401     Anesthesia: General   Estimated Blood Loss: Min  Specimens:   ID Type Source Tests Collected by Time Destination   1 : decending colon and anastomosis rings Preservative Colon, Descending  Manford Krabbe, MD 3/29/2018 1244 Pathology      Findings: Perforated descending colon cancer, eroding into left abdominal peritoneal sidewall  Complications: none  Implants: * No implants in log *

## 2018-03-30 NOTE — PROGRESS NOTES
[facility]  [address]  [phone]      Medical Progress Note      NAME: Jeanette Zavala   :  1990  MRM:  [id number]    Date/Time: 3/30/2018  8:02 AM       Assessment and Plan:   1. Colon cancer stage IV mets to the liver: Biopsy of the mass and liver showed adenocarcinoma. S/p left hemicolectomy on 3/29. Appreciated gen surgery and oncology evaluation.      2. Acute diverticulitis/ abdominal pain: s/p percutaneous drainage of pericolonic abscess. On zosyn.       3. RLL Pneumonia: incidental findings on CT.  Viral panel neg. Already on IV Abx      4. SIRS (systemic inflammatory response syndrome): due to above issues, not septic. Improving      5. Hypertension: likely has underlying essential HTN, in combination of abd pain.  Will monitor closely                     Subjective:     Chief Complaint:  Follow up of pt who was admitted with diverticulitis and found to have colonic mass. Abdominal pain after colectomy. ROS:  (bold if positive, if negative)      Tolerating PT  Tolerating Diet        Objective:     Last 24hrs VS reviewed since prior progress note.  Most recent are:    Visit Vitals    BP (!) 146/98 (BP 1 Location: Right arm, BP Patient Position: At rest)    Pulse 94    Temp 98.3 °F (36.8 °C)    Resp 18    Ht 5' 11\" (1.803 m)    Wt 127 kg (279 lb 15.8 oz)    SpO2 94%    BMI 39.05 kg/m2     SpO2 Readings from Last 6 Encounters:   18 94%   09/15/16 98%    O2 Flow Rate (L/min): 2 l/min       Intake/Output Summary (Last 24 hours) at 18 0914  Last data filed at 18 0550   Gross per 24 hour   Intake             2390 ml   Output             2614 ml   Net             -224 ml        Physical Exam:    Gen:  Well-developed, well-nourished, in no acute distress  HEENT:  Pink conjunctivae, PERRL, hearing intact to voice, moist mucous membranes  Neck:  Supple, without masses, thyroid non-tender  Resp:  No accessory muscle use, clear breath sounds without wheezes rales or rhonchi  Card:  No murmurs, normal S1, S2 without thrills, bruits or peripheral edema  Abd:   Tenderness on lower abdominal area, non-distended, normoactive bowel sounds are present, no palpable organomegaly and no detectable hernias  Lymph:  No cervical or inguinal adenopathy  Musc:  No cyanosis or clubbing  Skin:  No rashes or ulcers, skin turgor is good  Neuro:  Cranial nerves are grossly intact, no focal motor weakness, follows commands appropriately  Psych:  Good insight, oriented to person, place and time, alert  __________________________________________________________________  Medications Reviewed: (see below)  Medications:     Current Facility-Administered Medications   Medication Dose Route Frequency    ketorolac (TORADOL) injection 30 mg  30 mg IntraVENous Q6H    HYDROmorphone (PF) 15 mg/30 ml (DILAUDID) PCA   IntraVENous CONTINUOUS    HYDROmorphone (PF) (DILAUDID) injection 0.5 mg  0.5 mg IntraVENous Q4H PRN    enoxaparin (LOVENOX) injection 40 mg  40 mg SubCUTAneous Q24H    simethicone (MYLICON) tablet 80 mg  80 mg Oral QID PRN    zolpidem (AMBIEN) tablet 5 mg  5 mg Oral QHS PRN    piperacillin-tazobactam (ZOSYN) 3.375 g in 0.9% sodium chloride (MBP/ADV) 100 mL ADV  3.375 g IntraVENous Q8H    albuterol-ipratropium (DUO-NEB) 2.5 MG-0.5 MG/3 ML  3 mL Nebulization Q4H PRN    guaiFENesin ER (MUCINEX) tablet 600 mg  600 mg Oral Q12H    benzonatate (TESSALON) capsule 100 mg  100 mg Oral TID PRN    0.9% sodium chloride infusion  50 mL/hr IntraVENous CONTINUOUS    sodium chloride (NS) flush 5-10 mL  5-10 mL IntraVENous Q8H    sodium chloride (NS) flush 5-10 mL  5-10 mL IntraVENous PRN    acetaminophen (TYLENOL) tablet 650 mg  650 mg Oral Q4H PRN    naloxone (NARCAN) injection 0.4 mg  0.4 mg IntraVENous PRN    diphenhydrAMINE (BENADRYL) injection 12.5 mg  12.5 mg IntraVENous Q4H PRN    ondansetron (ZOFRAN) injection 4 mg  4 mg IntraVENous Q6H PRN    docusate sodium (COLACE) capsule 100 mg  100 mg Oral BID        Lab Data Reviewed: (see below)  Lab Review:     Recent Labs      03/30/18   0404  03/28/18   2355  03/28/18   0549   WBC  18.3*  10.7  14.7*   HGB  12.0*  11.1*  12.1   HCT  37.2  35.4*  37.3   PLT  434*  316  386     Recent Labs      03/30/18   0404  03/28/18   0549  03/27/18   1044   NA  139  140   --    K  3.9  3.9   --    CL  105  104   --    CO2  26  26   --    GLU  92  91   --    BUN  7  6   --    CREA  0.71  0.73   --    CA  8.6  8.9   --    MG  2.1  2.3   --    PHOS   --   3.7   --    INR   --    --   1.2*     No results found for: GLUCPOC  No results for input(s): PH, PCO2, PO2, HCO3, FIO2 in the last 72 hours.   Recent Labs      03/27/18   1044   INR  1.2*     All Micro Results     Procedure Component Value Units Date/Time    CULTURE, BLOOD [[id number]] Collected:  03/25/18 1123    Order Status:  Completed Specimen:  Blood from Blood Updated:  03/30/18 0832     Special Requests: NO SPECIAL REQUESTS        Culture result: NO GROWTH 5 DAYS       CULTURE, BLOOD [[id number]] Collected:  03/25/18 1123    Order Status:  Completed Specimen:  Blood from Blood Updated:  03/30/18 0832     Special Requests: NO SPECIAL REQUESTS        Culture result: NO GROWTH 5 DAYS       RESPIRATORY PANEL,PCR,NASOPHARYNGEAL [[id number]] Collected:  03/25/18 1851    Order Status:  Completed Specimen:  Nasopharyngeal Updated:  03/26/18 0048     Adenovirus NOT DETECTED        Coronavirus 229E NOT DETECTED        Coronavirus HKU1 NOT DETECTED        Coronavirus CVNL63 NOT DETECTED        Coronavirus OC43 NOT DETECTED        Metapneumovirus NOT DETECTED        Rhinovirus and Enterovirus NOT DETECTED        Influenza A NOT DETECTED        Influenza A, subtype H1 NOT DETECTED        Influenza A, subtype H3 NOT DETECTED        INFLUENZA A H1N1 PCR NOT DETECTED        Influenza B NOT DETECTED        Parainfluenza 1 NOT DETECTED        Parainfluenza 2 NOT DETECTED Parainfluenza 3 NOT DETECTED        Parainfluenza virus 4 NOT DETECTED        RSV by PCR NOT DETECTED        Bordetella pertussis - PCR NOT DETECTED        Chlamydophila pneumoniae DNA, QL, PCR NOT DETECTED        Mycoplasma pneumoniae DNA, QL, PCR NOT DETECTED       CULTURE, RESPIRATORY/SPUTUM/BRONCH Becca Fus [[id number]] Collected:  03/25/18 1145    Order Status:  Canceled Specimen:  Sputum from Sputum           I have reviewed notes of prior 24hr. Other pertinent lab:       Total time spent with patient: [place] 59 discussed with: Patient, Nursing Staff and >50% of time spent in counseling and coordination of care    Discussed:  Care Plan    Prophylaxis:  SCD's    Disposition:  Home w/Family           ___________________________________________________    Attending Physician: Rosalia Guevara MD

## 2018-03-30 NOTE — PROGRESS NOTES
Problem: Falls - Risk of  Goal: *Absence of Falls  Document Rhett Fall Risk and appropriate interventions in the flowsheet.     Outcome: Progressing Towards Goal  Fall Risk Interventions:            Medication Interventions: Evaluate medications/consider consulting pharmacy, Patient to call before getting OOB, Teach patient to arise slowly

## 2018-03-30 NOTE — PROGRESS NOTES
Problem: Falls - Risk of  Goal: *Absence of Falls  Document Rhett Fall Risk and appropriate interventions in the flowsheet.     Outcome: Progressing Towards Goal  Fall Risk Interventions:            Medication Interventions: Patient to call before getting OOB, Teach patient to arise slowly    Elimination Interventions: Call light in reach, Patient to call for help with toileting needs, Toileting schedule/hourly rounds

## 2018-03-31 LAB
ANION GAP SERPL CALC-SCNC: 7 MMOL/L (ref 5–15)
BACTERIA SPEC CULT: NORMAL
BACTERIA SPEC CULT: NORMAL
BASOPHILS # BLD: 0.1 K/UL (ref 0–0.1)
BASOPHILS NFR BLD: 0 % (ref 0–1)
BUN SERPL-MCNC: 9 MG/DL (ref 6–20)
BUN/CREAT SERPL: 11 (ref 12–20)
CALCIUM SERPL-MCNC: 8.4 MG/DL (ref 8.5–10.1)
CHLORIDE SERPL-SCNC: 103 MMOL/L (ref 97–108)
CO2 SERPL-SCNC: 27 MMOL/L (ref 21–32)
CREAT SERPL-MCNC: 0.79 MG/DL (ref 0.7–1.3)
DIFFERENTIAL METHOD BLD: ABNORMAL
EOSINOPHIL # BLD: 0.7 K/UL (ref 0–0.4)
EOSINOPHIL NFR BLD: 4 % (ref 0–7)
ERYTHROCYTE [DISTWIDTH] IN BLOOD BY AUTOMATED COUNT: 15 % (ref 11.5–14.5)
GLUCOSE SERPL-MCNC: 83 MG/DL (ref 65–100)
HCT VFR BLD AUTO: 37.1 % (ref 36.6–50.3)
HGB BLD-MCNC: 11.8 G/DL (ref 12.1–17)
IMM GRANULOCYTES # BLD: 0.2 K/UL (ref 0–0.04)
IMM GRANULOCYTES NFR BLD AUTO: 1 % (ref 0–0.5)
LYMPHOCYTES # BLD: 2.3 K/UL (ref 0.8–3.5)
LYMPHOCYTES NFR BLD: 13 % (ref 12–49)
MAGNESIUM SERPL-MCNC: 2 MG/DL (ref 1.6–2.4)
MCH RBC QN AUTO: 22.7 PG (ref 26–34)
MCHC RBC AUTO-ENTMCNC: 31.8 G/DL (ref 30–36.5)
MCV RBC AUTO: 71.3 FL (ref 80–99)
MONOCYTES # BLD: 0.8 K/UL (ref 0–1)
MONOCYTES NFR BLD: 4 % (ref 5–13)
NEUTS SEG # BLD: 13.6 K/UL (ref 1.8–8)
NEUTS SEG NFR BLD: 77 % (ref 32–75)
NRBC # BLD: 0 K/UL (ref 0–0.01)
NRBC BLD-RTO: 0 PER 100 WBC
PLATELET # BLD AUTO: 442 K/UL (ref 150–400)
PMV BLD AUTO: 8.7 FL (ref 8.9–12.9)
POTASSIUM SERPL-SCNC: 3.9 MMOL/L (ref 3.5–5.1)
RBC # BLD AUTO: 5.2 M/UL (ref 4.1–5.7)
SERVICE CMNT-IMP: NORMAL
SERVICE CMNT-IMP: NORMAL
SODIUM SERPL-SCNC: 137 MMOL/L (ref 136–145)
WBC # BLD AUTO: 17.5 K/UL (ref 4.1–11.1)

## 2018-03-31 PROCEDURE — 74011250636 HC RX REV CODE- 250/636: Performed by: INTERNAL MEDICINE

## 2018-03-31 PROCEDURE — 80048 BASIC METABOLIC PNL TOTAL CA: CPT | Performed by: PHYSICIAN ASSISTANT

## 2018-03-31 PROCEDURE — 65270000029 HC RM PRIVATE

## 2018-03-31 PROCEDURE — 74011250637 HC RX REV CODE- 250/637: Performed by: INTERNAL MEDICINE

## 2018-03-31 PROCEDURE — 74011000258 HC RX REV CODE- 258: Performed by: INTERNAL MEDICINE

## 2018-03-31 PROCEDURE — 85025 COMPLETE CBC W/AUTO DIFF WBC: CPT | Performed by: INTERNAL MEDICINE

## 2018-03-31 PROCEDURE — 74011250636 HC RX REV CODE- 250/636: Performed by: PHYSICIAN ASSISTANT

## 2018-03-31 PROCEDURE — 74011250636 HC RX REV CODE- 250/636: Performed by: SURGERY

## 2018-03-31 PROCEDURE — 36415 COLL VENOUS BLD VENIPUNCTURE: CPT | Performed by: INTERNAL MEDICINE

## 2018-03-31 PROCEDURE — 83735 ASSAY OF MAGNESIUM: CPT | Performed by: PHYSICIAN ASSISTANT

## 2018-03-31 RX ADMIN — KETOROLAC TROMETHAMINE 30 MG: 30 INJECTION, SOLUTION INTRAMUSCULAR; INTRAVENOUS at 17:16

## 2018-03-31 RX ADMIN — Medication 5 ML: at 06:00

## 2018-03-31 RX ADMIN — SODIUM CHLORIDE 3.38 G: 900 INJECTION, SOLUTION INTRAVENOUS at 02:30

## 2018-03-31 RX ADMIN — KETOROLAC TROMETHAMINE 30 MG: 30 INJECTION, SOLUTION INTRAMUSCULAR; INTRAVENOUS at 09:54

## 2018-03-31 RX ADMIN — SIMETHICONE CHEW TAB 80 MG 80 MG: 80 TABLET ORAL at 12:40

## 2018-03-31 RX ADMIN — DOCUSATE SODIUM 100 MG: 100 CAPSULE, LIQUID FILLED ORAL at 08:45

## 2018-03-31 RX ADMIN — SODIUM CHLORIDE 3.38 G: 900 INJECTION, SOLUTION INTRAVENOUS at 09:55

## 2018-03-31 RX ADMIN — SODIUM CHLORIDE 50 ML/HR: 900 INJECTION, SOLUTION INTRAVENOUS at 20:52

## 2018-03-31 RX ADMIN — SODIUM CHLORIDE 3.38 G: 900 INJECTION, SOLUTION INTRAVENOUS at 17:16

## 2018-03-31 RX ADMIN — Medication: at 23:28

## 2018-03-31 RX ADMIN — KETOROLAC TROMETHAMINE 30 MG: 30 INJECTION, SOLUTION INTRAMUSCULAR; INTRAVENOUS at 03:59

## 2018-03-31 RX ADMIN — DOCUSATE SODIUM 100 MG: 100 CAPSULE, LIQUID FILLED ORAL at 17:16

## 2018-03-31 RX ADMIN — ENOXAPARIN SODIUM 40 MG: 40 INJECTION SUBCUTANEOUS at 08:45

## 2018-03-31 RX ADMIN — Medication 5 ML: at 22:00

## 2018-03-31 RX ADMIN — SODIUM CHLORIDE 50 ML/HR: 900 INJECTION, SOLUTION INTRAVENOUS at 23:14

## 2018-03-31 RX ADMIN — GUAIFENESIN 600 MG: 600 TABLET, EXTENDED RELEASE ORAL at 08:45

## 2018-03-31 RX ADMIN — METOPROLOL TARTRATE 12.5 MG: 25 TABLET ORAL at 08:45

## 2018-03-31 RX ADMIN — METOPROLOL TARTRATE 12.5 MG: 25 TABLET ORAL at 20:53

## 2018-03-31 RX ADMIN — GUAIFENESIN 600 MG: 600 TABLET, EXTENDED RELEASE ORAL at 20:53

## 2018-03-31 RX ADMIN — Medication: at 08:01

## 2018-03-31 RX ADMIN — KETOROLAC TROMETHAMINE 30 MG: 30 INJECTION, SOLUTION INTRAMUSCULAR; INTRAVENOUS at 22:56

## 2018-03-31 NOTE — PROGRESS NOTES
Surgery    Doing well, no flatus or BM yet. Tolerated clears. Scheduled for port Monday. AF, VSS  Abdomen soft, appropriately ttp  Incisions dressed. Plan: await further return of bowel function before advancing diet. Port placement Monday. Continue other management.

## 2018-03-31 NOTE — PROGRESS NOTES
Problem: Falls - Risk of  Goal: *Absence of Falls  Document Rhett Fall Risk and appropriate interventions in the flowsheet.     Outcome: Progressing Towards Goal  Fall Risk Interventions:            Medication Interventions: Teach patient to arise slowly    Elimination Interventions: Call light in reach, Patient to call for help with toileting needs, Toileting schedule/hourly rounds

## 2018-03-31 NOTE — PROGRESS NOTES
Problem: Falls - Risk of  Goal: *Absence of Falls  Document Rhett Fall Risk and appropriate interventions in the flowsheet.     Outcome: Progressing Towards Goal  Fall Risk Interventions:            Medication Interventions: Patient to call before getting OOB, Teach patient to arise slowly, Utilize gait belt for transfers/ambulation    Elimination Interventions: Call light in reach, Urinal in reach

## 2018-03-31 NOTE — PROGRESS NOTES
[facility]  [address], [place], [address] [facility]  [phone]      Medical Progress Note      NAME: Mac Murphy   :  1990  MRM:  [id number]    Date/Time: 3/31/2018  8:02 AM       Assessment and Plan:   1. Colon cancer stage IV mets to the liver: Biopsy of the mass and liver showed adenocarcinoma. S/p left hemicolectomy on 3/29. Appreciated gen surgery and oncology evaluation. Plan for chemo therapy as outpatient. Port to be placed on Monday.       2. Acute diverticulitis/ abdominal pain: s/p percutaneous drainage of pericolonic abscess. On zosyn.       3. RLL Pneumonia: incidental findings on CT.  Viral panel neg. Already on IV Abx      4. SIRS (systemic inflammatory response syndrome): due to above issues, not septic. Improving      5. Hypertension: likely has underlying essential HTN, in combination of abd pain. Started on metoprolol    6.  ? FAVIO/ obesity. Pt might have undiagnosed FAVIO ( has snoring and his SAO2 went into the 80s during sleep).               Subjective:     Chief Complaint:  Follow up of pt who was admitted with diverticulitis and found to have colonic mass. Feels better      ROS:  (bold if positive, if negative)      Tolerating PT  Tolerating Diet        Objective:     Last 24hrs VS reviewed since prior progress note.  Most recent are:    Visit Vitals    /89 (BP 1 Location: Right arm, BP Patient Position: At rest)    Pulse (!) 103    Temp 98.8 °F (37.1 °C)    Resp 18    Ht 5' 11\" (1.803 m)    Wt 127 kg (279 lb 15.8 oz)    SpO2 98%    BMI 39.05 kg/m2     SpO2 Readings from Last 6 Encounters:   18 98%   09/15/16 98%    O2 Flow Rate (L/min): 2 l/min       Intake/Output Summary (Last 24 hours) at 18 0816  Last data filed at 18   Gross per 24 hour   Intake              240 ml   Output              560 ml   Net             -320 ml        Physical Exam:    Gen:  Well-developed, well-nourished, in no acute distress  HEENT:  Pink conjunctivae, PERRL, hearing intact to voice, moist mucous membranes  Neck:  Supple, without masses, thyroid non-tender  Resp:  No accessory muscle use, clear breath sounds without wheezes rales or rhonchi  Card:  No murmurs, normal S1, S2 without thrills, bruits or peripheral edema  Abd:   Tenderness on lower abdominal area, non-distended, normoactive bowel sounds are present, no palpable organomegaly and no detectable hernias  Lymph:  No cervical or inguinal adenopathy  Musc:  No cyanosis or clubbing  Skin:  No rashes or ulcers, skin turgor is good  Neuro:  Cranial nerves are grossly intact, no focal motor weakness, follows commands appropriately  Psych:  Good insight, oriented to person, place and time, alert  __________________________________________________________________  Medications Reviewed: (see below)  Medications:     Current Facility-Administered Medications   Medication Dose Route Frequency    ketorolac (TORADOL) injection 30 mg  30 mg IntraVENous Q6H    HYDROmorphone (PF) 15 mg/30 ml (DILAUDID) PCA   IntraVENous CONTINUOUS    metoprolol tartrate (LOPRESSOR) tablet 12.5 mg  12.5 mg Oral Q12H    HYDROmorphone (PF) (DILAUDID) injection 0.5 mg  0.5 mg IntraVENous Q4H PRN    enoxaparin (LOVENOX) injection 40 mg  40 mg SubCUTAneous Q24H    simethicone (MYLICON) tablet 80 mg  80 mg Oral QID PRN    zolpidem (AMBIEN) tablet 5 mg  5 mg Oral QHS PRN    piperacillin-tazobactam (ZOSYN) 3.375 g in 0.9% sodium chloride (MBP/ADV) 100 mL ADV  3.375 g IntraVENous Q8H    albuterol-ipratropium (DUO-NEB) 2.5 MG-0.5 MG/3 ML  3 mL Nebulization Q4H PRN    guaiFENesin ER (MUCINEX) tablet 600 mg  600 mg Oral Q12H    benzonatate (TESSALON) capsule 100 mg  100 mg Oral TID PRN    0.9% sodium chloride infusion  50 mL/hr IntraVENous CONTINUOUS    sodium chloride (NS) flush 5-10 mL  5-10 mL IntraVENous Q8H    sodium chloride (NS) flush 5-10 mL  5-10 mL IntraVENous PRN    acetaminophen (TYLENOL) tablet 650 mg  650 mg Oral Q4H PRN    naloxone (NARCAN) injection 0.4 mg  0.4 mg IntraVENous PRN    diphenhydrAMINE (BENADRYL) injection 12.5 mg  12.5 mg IntraVENous Q4H PRN    ondansetron (ZOFRAN) injection 4 mg  4 mg IntraVENous Q6H PRN    docusate sodium (COLACE) capsule 100 mg  100 mg Oral BID        Lab Data Reviewed: (see below)  Lab Review:     Recent Labs      03/31/18   0232  03/30/18   0404  03/28/18   2355   WBC  17.5*  18.3*  10.7   HGB  11.8*  12.0*  11.1*   HCT  37.1  37.2  35.4*   PLT  442*  434*  316     Recent Labs      03/31/18   0232 03/30/18   0404   NA  137  139   K  3.9  3.9   CL  103  105   CO2  27  26   GLU  83  92   BUN  9  7   CREA  0.79  0.71   CA  8.4*  8.6   MG  2.0  2.1     No results found for: GLUCPOC  No results for input(s): PH, PCO2, PO2, HCO3, FIO2 in the last 72 hours. No results for input(s): INR in the last 72 hours.     No lab exists for component: Nelsy RADER  All Micro Results     Procedure Component Value Units Date/Time    CULTURE, BLOOD [[id number]] Collected:  03/25/18 1123    Order Status:  Completed Specimen:  Blood from Blood Updated:  03/30/18 0832     Special Requests: NO SPECIAL REQUESTS        Culture result: NO GROWTH 5 DAYS       CULTURE, BLOOD [[id number]] Collected:  03/25/18 1123    Order Status:  Completed Specimen:  Blood from Blood Updated:  03/30/18 0832     Special Requests: NO SPECIAL REQUESTS        Culture result: NO GROWTH 5 DAYS       RESPIRATORY PANEL,PCR,NASOPHARYNGEAL [[id number]] Collected:  03/25/18 1851    Order Status:  Completed Specimen:  Nasopharyngeal Updated:  03/26/18 0048     Adenovirus NOT DETECTED        Coronavirus 229E NOT DETECTED        Coronavirus HKU1 NOT DETECTED        Coronavirus CVNL63 NOT DETECTED        Coronavirus OC43 NOT DETECTED        Metapneumovirus NOT DETECTED        Rhinovirus and Enterovirus NOT DETECTED        Influenza A NOT DETECTED        Influenza A, subtype H1 NOT DETECTED        Influenza A, subtype H3 NOT DETECTED        INFLUENZA A H1N1 PCR NOT DETECTED        Influenza B NOT DETECTED        Parainfluenza 1 NOT DETECTED        Parainfluenza 2 NOT DETECTED        Parainfluenza 3 NOT DETECTED        Parainfluenza virus 4 NOT DETECTED        RSV by PCR NOT DETECTED        Bordetella pertussis - PCR NOT DETECTED        Chlamydophila pneumoniae DNA, QL, PCR NOT DETECTED        Mycoplasma pneumoniae DNA, QL, PCR NOT DETECTED       CULTURE, RESPIRATORY/SPUTUM/BRONCH Jerod Bertrand [[id number]] Collected:  03/25/18 1145    Order Status:  Canceled Specimen:  Sputum from Sputum           I have reviewed notes of prior 24hr. Other pertinent lab:       Total time spent with patient: [place] 59 discussed with: Patient, Nursing Staff and >50% of time spent in counseling and coordination of care    Discussed:  Care Plan    Prophylaxis:  SCD's    Disposition:  Home w/Family           ___________________________________________________    Attending Physician: Nery Kaur MD

## 2018-04-01 ENCOUNTER — ANESTHESIA EVENT (OUTPATIENT)
Dept: SURGERY | Age: 28
DRG: 329 | End: 2018-04-01
Payer: COMMERCIAL

## 2018-04-01 LAB
BASOPHILS # BLD: 0.1 K/UL (ref 0–0.1)
BASOPHILS NFR BLD: 0 % (ref 0–1)
DIFFERENTIAL METHOD BLD: ABNORMAL
EOSINOPHIL # BLD: 0.8 K/UL (ref 0–0.4)
EOSINOPHIL NFR BLD: 5 % (ref 0–7)
ERYTHROCYTE [DISTWIDTH] IN BLOOD BY AUTOMATED COUNT: 14.9 % (ref 11.5–14.5)
HCT VFR BLD AUTO: 34.9 % (ref 36.6–50.3)
HGB BLD-MCNC: 11.2 G/DL (ref 12.1–17)
IMM GRANULOCYTES # BLD: 0.3 K/UL (ref 0–0.04)
IMM GRANULOCYTES NFR BLD AUTO: 2 % (ref 0–0.5)
LYMPHOCYTES # BLD: 2.1 K/UL (ref 0.8–3.5)
LYMPHOCYTES NFR BLD: 13 % (ref 12–49)
MCH RBC QN AUTO: 22.9 PG (ref 26–34)
MCHC RBC AUTO-ENTMCNC: 32.1 G/DL (ref 30–36.5)
MCV RBC AUTO: 71.2 FL (ref 80–99)
MONOCYTES # BLD: 0.7 K/UL (ref 0–1)
MONOCYTES NFR BLD: 4 % (ref 5–13)
NEUTS SEG # BLD: 12.4 K/UL (ref 1.8–8)
NEUTS SEG NFR BLD: 77 % (ref 32–75)
NRBC # BLD: 0 K/UL (ref 0–0.01)
NRBC BLD-RTO: 0 PER 100 WBC
PLATELET # BLD AUTO: 413 K/UL (ref 150–400)
PMV BLD AUTO: 8.6 FL (ref 8.9–12.9)
RBC # BLD AUTO: 4.9 M/UL (ref 4.1–5.7)
WBC # BLD AUTO: 16.2 K/UL (ref 4.1–11.1)

## 2018-04-01 PROCEDURE — 36415 COLL VENOUS BLD VENIPUNCTURE: CPT | Performed by: INTERNAL MEDICINE

## 2018-04-01 PROCEDURE — 85025 COMPLETE CBC W/AUTO DIFF WBC: CPT | Performed by: INTERNAL MEDICINE

## 2018-04-01 PROCEDURE — 74011250636 HC RX REV CODE- 250/636: Performed by: PHYSICIAN ASSISTANT

## 2018-04-01 PROCEDURE — 74011250636 HC RX REV CODE- 250/636: Performed by: INTERNAL MEDICINE

## 2018-04-01 PROCEDURE — 74011250637 HC RX REV CODE- 250/637: Performed by: INTERNAL MEDICINE

## 2018-04-01 PROCEDURE — 65270000029 HC RM PRIVATE

## 2018-04-01 PROCEDURE — 74011250636 HC RX REV CODE- 250/636: Performed by: SURGERY

## 2018-04-01 PROCEDURE — 74011000258 HC RX REV CODE- 258: Performed by: INTERNAL MEDICINE

## 2018-04-01 RX ORDER — OXYCODONE AND ACETAMINOPHEN 5; 325 MG/1; MG/1
1-2 TABLET ORAL
Status: DISCONTINUED | OUTPATIENT
Start: 2018-04-01 | End: 2018-04-03 | Stop reason: HOSPADM

## 2018-04-01 RX ORDER — HYDROMORPHONE HYDROCHLORIDE 2 MG/ML
2 INJECTION, SOLUTION INTRAMUSCULAR; INTRAVENOUS; SUBCUTANEOUS
Status: DISCONTINUED | OUTPATIENT
Start: 2018-04-01 | End: 2018-04-03 | Stop reason: HOSPADM

## 2018-04-01 RX ORDER — METOPROLOL TARTRATE 25 MG/1
25 TABLET, FILM COATED ORAL EVERY 12 HOURS
Status: DISCONTINUED | OUTPATIENT
Start: 2018-04-01 | End: 2018-04-03 | Stop reason: HOSPADM

## 2018-04-01 RX ADMIN — SODIUM CHLORIDE 3.38 G: 900 INJECTION, SOLUTION INTRAVENOUS at 17:52

## 2018-04-01 RX ADMIN — ENOXAPARIN SODIUM 40 MG: 40 INJECTION SUBCUTANEOUS at 08:55

## 2018-04-01 RX ADMIN — SODIUM CHLORIDE 3.38 G: 900 INJECTION, SOLUTION INTRAVENOUS at 10:24

## 2018-04-01 RX ADMIN — ZOLPIDEM TARTRATE 5 MG: 5 TABLET ORAL at 21:23

## 2018-04-01 RX ADMIN — METOPROLOL TARTRATE 25 MG: 25 TABLET ORAL at 08:55

## 2018-04-01 RX ADMIN — KETOROLAC TROMETHAMINE 30 MG: 30 INJECTION, SOLUTION INTRAMUSCULAR; INTRAVENOUS at 10:24

## 2018-04-01 RX ADMIN — SODIUM CHLORIDE 3.38 G: 900 INJECTION, SOLUTION INTRAVENOUS at 02:47

## 2018-04-01 RX ADMIN — KETOROLAC TROMETHAMINE 30 MG: 30 INJECTION, SOLUTION INTRAMUSCULAR; INTRAVENOUS at 04:36

## 2018-04-01 RX ADMIN — METOPROLOL TARTRATE 25 MG: 25 TABLET ORAL at 21:26

## 2018-04-01 RX ADMIN — DOCUSATE SODIUM 100 MG: 100 CAPSULE, LIQUID FILLED ORAL at 08:55

## 2018-04-01 RX ADMIN — GUAIFENESIN 600 MG: 600 TABLET, EXTENDED RELEASE ORAL at 20:47

## 2018-04-01 RX ADMIN — GUAIFENESIN 600 MG: 600 TABLET, EXTENDED RELEASE ORAL at 08:55

## 2018-04-01 RX ADMIN — KETOROLAC TROMETHAMINE 30 MG: 30 INJECTION, SOLUTION INTRAMUSCULAR; INTRAVENOUS at 20:50

## 2018-04-01 RX ADMIN — Medication: at 08:18

## 2018-04-01 RX ADMIN — Medication 5 ML: at 06:00

## 2018-04-01 RX ADMIN — KETOROLAC TROMETHAMINE 30 MG: 30 INJECTION, SOLUTION INTRAMUSCULAR; INTRAVENOUS at 15:20

## 2018-04-01 RX ADMIN — DOCUSATE SODIUM 100 MG: 100 CAPSULE, LIQUID FILLED ORAL at 17:52

## 2018-04-01 RX ADMIN — Medication 10 ML: at 14:00

## 2018-04-01 NOTE — PROGRESS NOTES
Problem: Falls - Risk of  Goal: *Absence of Falls  Document Rhett Fall Risk and appropriate interventions in the flowsheet.     Outcome: Progressing Towards Goal  Fall Risk Interventions:            Medication Interventions: Teach patient to arise slowly    Elimination Interventions: Call light in reach, Urinal in reach

## 2018-04-01 NOTE — PROGRESS NOTES
Manuel Frazier ana Paxton 77  [address] [facility], [address] [place] [address]  [phone]      Medical Progress Note      NAME: Mac Murphy   :  1990  MRM:  [id number]    Date/Time: 2018  8:02 AM       Assessment and Plan:   1. Colon cancer stage IV mets to the liver: Biopsy of the mass and liver showed adenocarcinoma. S/p left hemicolectomy on 3/29. Appreciated gen surgery and oncology evaluation. Plan for chemo therapy as outpatient. Port to be placed on Monday.       2. Acute diverticulitis/ abdominal pain: s/p percutaneous drainage of pericolonic abscess. On zosyn.       3. RLL Pneumonia: incidental findings on CT.  Viral panel neg. Already on IV Abx      4. SIRS (systemic inflammatory response syndrome): due to above issues, not septic. Improving      5. Hypertension: likely has underlying essential HTN, in combination of abd pain. Started on metoprolol    6.  ? FAVIO/ obesity. Pt might have undiagnosed FAVIO ( has snoring and his SAO2 went into the 80s during sleep). Advised to have sleep study as outpatient.                 Subjective:     Chief Complaint:  Follow up of pt who was admitted with diverticulitis and found to have colonic mass. Mild pain around incision site. ROS:  (bold if positive, if negative)    Abd Pain  Tolerating PT  Tolerating Diet        Objective:     Last 24hrs VS reviewed since prior progress note.  Most recent are:    Visit Vitals    /82 (BP 1 Location: Right arm, BP Patient Position: At rest)    Pulse 97    Temp 97.8 °F (36.6 °C)    Resp 20    Ht 5' 11\" (1.803 m)    Wt 127 kg (279 lb 15.8 oz)    SpO2 97%    BMI 39.05 kg/m2     SpO2 Readings from Last 6 Encounters:   18 97%   09/15/16 98%    O2 Flow Rate (L/min): 2 l/min       Intake/Output Summary (Last 24 hours) at 18 0838  Last data filed at 18 0300   Gross per 24 hour   Intake              240 ml   Output              960 ml   Net             -720 ml        Physical Exam:    Gen:  Well-developed, well-nourished, in no acute distress  HEENT:  Pink conjunctivae, PERRL, hearing intact to voice, moist mucous membranes  Neck:  Supple, without masses, thyroid non-tender  Resp:  No accessory muscle use, clear breath sounds without wheezes rales or rhonchi  Card:  No murmurs, normal S1, S2 without thrills, bruits or peripheral edema  Abd:   Tenderness on lower abdominal area, non-distended, normoactive bowel sounds are present, no palpable organomegaly and no detectable hernias  Lymph:  No cervical or inguinal adenopathy  Musc:  No cyanosis or clubbing  Skin:  No rashes or ulcers, skin turgor is good  Neuro:  Cranial nerves are grossly intact, no focal motor weakness, follows commands appropriately  Psych:  Good insight, oriented to person, place and time, alert  __________________________________________________________________  Medications Reviewed: (see below)  Medications:     Current Facility-Administered Medications   Medication Dose Route Frequency    metoprolol tartrate (LOPRESSOR) tablet 25 mg  25 mg Oral Q12H    ketorolac (TORADOL) injection 30 mg  30 mg IntraVENous Q6H    HYDROmorphone (PF) 15 mg/30 ml (DILAUDID) PCA   IntraVENous CONTINUOUS    HYDROmorphone (PF) (DILAUDID) injection 0.5 mg  0.5 mg IntraVENous Q4H PRN    enoxaparin (LOVENOX) injection 40 mg  40 mg SubCUTAneous Q24H    simethicone (MYLICON) tablet 80 mg  80 mg Oral QID PRN    zolpidem (AMBIEN) tablet 5 mg  5 mg Oral QHS PRN    piperacillin-tazobactam (ZOSYN) 3.375 g in 0.9% sodium chloride (MBP/ADV) 100 mL ADV  3.375 g IntraVENous Q8H    albuterol-ipratropium (DUO-NEB) 2.5 MG-0.5 MG/3 ML  3 mL Nebulization Q4H PRN    guaiFENesin ER (MUCINEX) tablet 600 mg  600 mg Oral Q12H    benzonatate (TESSALON) capsule 100 mg  100 mg Oral TID PRN    0.9% sodium chloride infusion  50 mL/hr IntraVENous CONTINUOUS    sodium chloride (NS) flush 5-10 mL  5-10 mL IntraVENous Q8H    sodium chloride (NS) flush 5-10 mL  5-10 mL IntraVENous PRN    acetaminophen (TYLENOL) tablet 650 mg  650 mg Oral Q4H PRN    naloxone (NARCAN) injection 0.4 mg  0.4 mg IntraVENous PRN    diphenhydrAMINE (BENADRYL) injection 12.5 mg  12.5 mg IntraVENous Q4H PRN    ondansetron (ZOFRAN) injection 4 mg  4 mg IntraVENous Q6H PRN    docusate sodium (COLACE) capsule 100 mg  100 mg Oral BID        Lab Data Reviewed: (see below)  Lab Review:     Recent Labs      04/01/18   0245  03/31/18   0232  03/30/18   0404   WBC  16.2*  17.5*  18.3*   HGB  11.2*  11.8*  12.0*   HCT  34.9*  37.1  37.2   PLT  413*  442*  434*     Recent Labs      03/31/18   0232  03/30/18   0404   NA  137  139   K  3.9  3.9   CL  103  105   CO2  27  26   GLU  83  92   BUN  9  7   CREA  0.79  0.71   CA  8.4*  8.6   MG  2.0  2.1     No results found for: GLUCPOC  No results for input(s): PH, PCO2, PO2, HCO3, FIO2 in the last 72 hours. No results for input(s): INR in the last 72 hours.     No lab exists for component: INREXT, INREXT  All Micro Results     Procedure Component Value Units Date/Time    CULTURE, BLOOD [[id number]] Collected:  03/25/18 1123    Order Status:  Completed Specimen:  Blood from Blood Updated:  03/31/18 0919     Special Requests: NO SPECIAL REQUESTS        Culture result: NO GROWTH 6 DAYS       CULTURE, BLOOD [[id number]] Collected:  03/25/18 1123    Order Status:  Completed Specimen:  Blood from Blood Updated:  03/31/18 0919     Special Requests: NO SPECIAL REQUESTS        Culture result: NO GROWTH 6 DAYS       RESPIRATORY PANEL,PCR,NASOPHARYNGEAL [[id number]] Collected:  03/25/18 1481    Order Status:  Completed Specimen:  Nasopharyngeal Updated:  03/26/18 0048     Adenovirus NOT DETECTED        Coronavirus 229E NOT DETECTED        Coronavirus HKU1 NOT DETECTED        Coronavirus CVNL63 NOT DETECTED        Coronavirus OC43 NOT DETECTED        Metapneumovirus NOT DETECTED        Rhinovirus and Enterovirus NOT DETECTED        Influenza A NOT DETECTED Influenza A, subtype H1 NOT DETECTED        Influenza A, subtype H3 NOT DETECTED        INFLUENZA A H1N1 PCR NOT DETECTED        Influenza B NOT DETECTED        Parainfluenza 1 NOT DETECTED        Parainfluenza 2 NOT DETECTED        Parainfluenza 3 NOT DETECTED        Parainfluenza virus 4 NOT DETECTED        RSV by PCR NOT DETECTED        Bordetella pertussis - PCR NOT DETECTED        Chlamydophila pneumoniae DNA, QL, PCR NOT DETECTED        Mycoplasma pneumoniae DNA, QL, PCR NOT DETECTED       CULTURE, RESPIRATORY/SPUTUM/BRONCH Walt Barnett [[id number]] Collected:  03/25/18 1145    Order Status:  Canceled Specimen:  Sputum from Sputum           I have reviewed notes of prior 24hr. Other pertinent lab:       Total time spent with patient: [place] [address] discussed with: Patient, Nursing Staff and >50% of time spent in counseling and coordination of care    Discussed:  Care Plan    Prophylaxis:  SCD's    Disposition:  Home w/Family           ___________________________________________________    Attending Physician: Cintia Rodriguez MD

## 2018-04-01 NOTE — PROGRESS NOTES
Minimal pain, tolerating diet, no f/c, no N&V  Afeb, VSS  abd soft and minimally tender  Wounds clean  PENNY serosang  S/p L colectomy  Looks good  For port in AM  NPO after MN

## 2018-04-02 ENCOUNTER — APPOINTMENT (OUTPATIENT)
Dept: GENERAL RADIOLOGY | Age: 28
DRG: 329 | End: 2018-04-02
Attending: SURGERY
Payer: COMMERCIAL

## 2018-04-02 ENCOUNTER — ANESTHESIA (OUTPATIENT)
Dept: SURGERY | Age: 28
DRG: 329 | End: 2018-04-02
Payer: COMMERCIAL

## 2018-04-02 PROBLEM — R73.9 HYPERGLYCEMIA: Status: RESOLVED | Noted: 2018-03-25 | Resolved: 2018-04-02

## 2018-04-02 PROCEDURE — 74011250636 HC RX REV CODE- 250/636: Performed by: INTERNAL MEDICINE

## 2018-04-02 PROCEDURE — 76210000016 HC OR PH I REC 1 TO 1.5 HR: Performed by: SURGERY

## 2018-04-02 PROCEDURE — 77030020782 HC GWN BAIR PAWS FLX 3M -B

## 2018-04-02 PROCEDURE — 77030002986 HC SUT PROL J&J -A: Performed by: SURGERY

## 2018-04-02 PROCEDURE — 74011250637 HC RX REV CODE- 250/637: Performed by: INTERNAL MEDICINE

## 2018-04-02 PROCEDURE — 74011000258 HC RX REV CODE- 258: Performed by: INTERNAL MEDICINE

## 2018-04-02 PROCEDURE — 74011250636 HC RX REV CODE- 250/636

## 2018-04-02 PROCEDURE — C1788 PORT, INDWELLING, IMP: HCPCS | Performed by: SURGERY

## 2018-04-02 PROCEDURE — 76060000033 HC ANESTHESIA 1 TO 1.5 HR: Performed by: SURGERY

## 2018-04-02 PROCEDURE — 77030002996 HC SUT SLK J&J -A: Performed by: SURGERY

## 2018-04-02 PROCEDURE — 76010000149 HC OR TIME 1 TO 1.5 HR: Performed by: SURGERY

## 2018-04-02 PROCEDURE — 65660000000 HC RM CCU STEPDOWN

## 2018-04-02 PROCEDURE — 71045 X-RAY EXAM CHEST 1 VIEW: CPT

## 2018-04-02 PROCEDURE — 74011250636 HC RX REV CODE- 250/636: Performed by: PHYSICIAN ASSISTANT

## 2018-04-02 PROCEDURE — 77030002933 HC SUT MCRYL J&J -A: Performed by: SURGERY

## 2018-04-02 PROCEDURE — 77030018836 HC SOL IRR NACL ICUM -A: Performed by: SURGERY

## 2018-04-02 PROCEDURE — 77030031139 HC SUT VCRL2 J&J -A: Performed by: SURGERY

## 2018-04-02 PROCEDURE — 0JH60WZ INSERTION OF TOTALLY IMPLANTABLE VASCULAR ACCESS DEVICE INTO CHEST SUBCUTANEOUS TISSUE AND FASCIA, OPEN APPROACH: ICD-10-PCS | Performed by: SURGERY

## 2018-04-02 PROCEDURE — 74011000250 HC RX REV CODE- 250: Performed by: SURGERY

## 2018-04-02 PROCEDURE — 74011000250 HC RX REV CODE- 250: Performed by: INTERNAL MEDICINE

## 2018-04-02 PROCEDURE — 76000 FLUOROSCOPY <1 HR PHYS/QHP: CPT

## 2018-04-02 PROCEDURE — 74011250636 HC RX REV CODE- 250/636: Performed by: SURGERY

## 2018-04-02 DEVICE — X-PORT ISP M.R.I. IMPLANTABLE PORT WITH ATTACHABLE 8F CHRONOFLEX OPEN-ENDED SINGLE-LUMEN VENOUS CATHETER INTERMEDIATE KIT
Type: IMPLANTABLE DEVICE | Site: SUBCLAVIAN | Status: FUNCTIONAL
Brand: X-PORT ISP M.R.I., CHRONOFLEX

## 2018-04-02 RX ORDER — FENTANYL CITRATE 50 UG/ML
INJECTION, SOLUTION INTRAMUSCULAR; INTRAVENOUS AS NEEDED
Status: DISCONTINUED | OUTPATIENT
Start: 2018-04-02 | End: 2018-04-02 | Stop reason: HOSPADM

## 2018-04-02 RX ORDER — METOPROLOL TARTRATE 25 MG/1
25 TABLET, FILM COATED ORAL EVERY 12 HOURS
Qty: 60 TAB | Refills: 1 | Status: SHIPPED | OUTPATIENT
Start: 2018-04-02 | End: 2018-05-29 | Stop reason: SDUPTHER

## 2018-04-02 RX ORDER — SODIUM CHLORIDE, SODIUM LACTATE, POTASSIUM CHLORIDE, CALCIUM CHLORIDE 600; 310; 30; 20 MG/100ML; MG/100ML; MG/100ML; MG/100ML
INJECTION, SOLUTION INTRAVENOUS
Status: DISCONTINUED | OUTPATIENT
Start: 2018-04-02 | End: 2018-04-02 | Stop reason: HOSPADM

## 2018-04-02 RX ORDER — PROPOFOL 10 MG/ML
INJECTION, EMULSION INTRAVENOUS
Status: DISCONTINUED | OUTPATIENT
Start: 2018-04-02 | End: 2018-04-02 | Stop reason: HOSPADM

## 2018-04-02 RX ORDER — MIDAZOLAM HYDROCHLORIDE 1 MG/ML
INJECTION, SOLUTION INTRAMUSCULAR; INTRAVENOUS AS NEEDED
Status: DISCONTINUED | OUTPATIENT
Start: 2018-04-02 | End: 2018-04-02 | Stop reason: HOSPADM

## 2018-04-02 RX ORDER — METRONIDAZOLE 500 MG/1
500 TABLET ORAL 3 TIMES DAILY
Qty: 15 TAB | Refills: 0 | Status: SHIPPED | OUTPATIENT
Start: 2018-04-02 | End: 2018-04-07

## 2018-04-02 RX ORDER — LABETALOL HYDROCHLORIDE 5 MG/ML
20 INJECTION, SOLUTION INTRAVENOUS
Status: DISCONTINUED | OUTPATIENT
Start: 2018-04-02 | End: 2018-04-03 | Stop reason: HOSPADM

## 2018-04-02 RX ORDER — LEVOFLOXACIN 750 MG/1
750 TABLET ORAL DAILY
Qty: 5 TAB | Refills: 0 | Status: SHIPPED | OUTPATIENT
Start: 2018-04-02 | End: 2018-04-11

## 2018-04-02 RX ADMIN — BENZONATATE 100 MG: 100 CAPSULE ORAL at 13:38

## 2018-04-02 RX ADMIN — MIDAZOLAM HYDROCHLORIDE 5 MG: 1 INJECTION, SOLUTION INTRAMUSCULAR; INTRAVENOUS at 18:06

## 2018-04-02 RX ADMIN — ENOXAPARIN SODIUM 40 MG: 40 INJECTION SUBCUTANEOUS at 09:00

## 2018-04-02 RX ADMIN — SODIUM CHLORIDE 3.38 G: 900 INJECTION, SOLUTION INTRAVENOUS at 02:17

## 2018-04-02 RX ADMIN — KETOROLAC TROMETHAMINE 30 MG: 30 INJECTION, SOLUTION INTRAMUSCULAR; INTRAVENOUS at 09:27

## 2018-04-02 RX ADMIN — SODIUM CHLORIDE 3.38 G: 900 INJECTION, SOLUTION INTRAVENOUS at 17:00

## 2018-04-02 RX ADMIN — PROPOFOL 75 MCG/KG/MIN: 10 INJECTION, EMULSION INTRAVENOUS at 18:14

## 2018-04-02 RX ADMIN — DOCUSATE SODIUM 100 MG: 100 CAPSULE, LIQUID FILLED ORAL at 09:00

## 2018-04-02 RX ADMIN — GUAIFENESIN 600 MG: 600 TABLET, EXTENDED RELEASE ORAL at 09:00

## 2018-04-02 RX ADMIN — SODIUM CHLORIDE 3.38 G: 900 INJECTION, SOLUTION INTRAVENOUS at 09:36

## 2018-04-02 RX ADMIN — KETOROLAC TROMETHAMINE 30 MG: 30 INJECTION, SOLUTION INTRAMUSCULAR; INTRAVENOUS at 03:39

## 2018-04-02 RX ADMIN — METOPROLOL TARTRATE 25 MG: 25 TABLET ORAL at 20:59

## 2018-04-02 RX ADMIN — FENTANYL CITRATE 50 MCG: 50 INJECTION, SOLUTION INTRAMUSCULAR; INTRAVENOUS at 18:18

## 2018-04-02 RX ADMIN — KETOROLAC TROMETHAMINE 30 MG: 30 INJECTION, SOLUTION INTRAMUSCULAR; INTRAVENOUS at 15:24

## 2018-04-02 RX ADMIN — METOPROLOL TARTRATE 25 MG: 25 TABLET ORAL at 09:00

## 2018-04-02 RX ADMIN — Medication 10 ML: at 22:00

## 2018-04-02 RX ADMIN — Medication 10 ML: at 13:38

## 2018-04-02 RX ADMIN — LABETALOL HYDROCHLORIDE 20 MG: 5 INJECTION INTRAVENOUS at 22:31

## 2018-04-02 RX ADMIN — SODIUM CHLORIDE, SODIUM LACTATE, POTASSIUM CHLORIDE, CALCIUM CHLORIDE: 600; 310; 30; 20 INJECTION, SOLUTION INTRAVENOUS at 18:01

## 2018-04-02 RX ADMIN — DOCUSATE SODIUM 100 MG: 100 CAPSULE, LIQUID FILLED ORAL at 17:00

## 2018-04-02 RX ADMIN — FENTANYL CITRATE 50 MCG: 50 INJECTION, SOLUTION INTRAMUSCULAR; INTRAVENOUS at 18:09

## 2018-04-02 NOTE — PROGRESS NOTES
Pharmacist Discharge Medication Reconciliation    Discharge Provider:  Dr. Juju Bean       Discharge Medications:      My Medications        START taking these medications         Instructions Each Dose to Equal   Morning Noon Evening Bedtime      levoFLOXacin 750 mg tablet   Commonly known as:  LEVAQUIN       Your last dose was: Your next dose is: Take 1 Tab by mouth daily. 750 mg                        metoprolol tartrate 25 mg tablet   Commonly known as:  LOPRESSOR       Your last dose was: Your next dose is: Take 1 Tab by mouth every twelve (12) hours. 25 mg                        metroNIDAZOLE 500 mg tablet   Commonly known as:  FLAGYL       Your last dose was: Your next dose is: Take 1 Tab by mouth three (3) times daily for 5 days. 500 mg                              STOP taking these medications              ibuprofen 200 mg tablet   Commonly known as:  MOTRIN                    Where to Get Your Medications        Information on where to get these meds will be given to you by the nurse or doctor. !  Ask your nurse or doctor about these medications     levoFLOXacin 750 mg tablet    metoprolol tartrate 25 mg tablet    metroNIDAZOLE 500 mg tablet               The patient's chart, MAR, and AVS were reviewed by   Chani Abel, PharmD  Contact: [phone]

## 2018-04-02 NOTE — PROGRESS NOTES
04/02/18 3:42 PM  APA has seen patient and patient in process of completing Care Card paperwork. PCP appointment scheduled at [facility] for 4/19/18; already added to AVS.  CM explained to patient purpose of staying with [facility] network with Care Card, etc.  Verbalized understanding. 04/02/18 1:21 PM  Plan for discharge home today following port placement this evening. Pt noted that family, most likely his sister, will drive home at discharge. Patient noted he had no issues with transportation home at discharge, cab voucher left in event that family could not drive home tonight.   MASOOD Reyes

## 2018-04-02 NOTE — OP NOTES
OPERATIVE NOTE    Date of Procedure: 4/2/2018   Preoperative Diagnosis: COLON CANCER  Postoperative Diagnosis: COLON CANCER    Procedure(s): INFUSAPORT INSERTION  Surgeon(s) and Role:     * Mary Mccoy MD - Primary         Assistant Staff: None      Surgical Staff:  Circ-1: Maegan Valencia RN  Scrub Tech-Relief: Ming Delgadillo. Esme  Scrub RN-1: Noah Hatch RN  Surg Asst-1: Jose Kemp  Radiology Technologist: Winter Anders, RT, R  Event Time In   Incision Start 1818   Incision Close 1901     Anesthesia: MAC   Estimated Blood Loss: Min  Specimens: * No specimens in log *   Findings: Port 22 cm tubing; 17cm at subcutaneous subclavian insertion site   Complications: none  Implants:     Implant Name Type Inv. Item Serial No.  Lot No. LRB No. Used Action   PORT SL LP OPN END SLIM 8FR --  - SN/A   PORT SL LP OPN END SLIM 8FR --  N/A [employer] [id number] 1 Implanted     CXR to follow procedure    Indication:  See history and physical.     Procedure: Site of surgery and procedure was verified and marked in pre-operative holding and again in the operating room. Preoperative antibiotics 2g Ancef IV was given 30 minutes prior to skin incision. Sequential compression devices were placed and turned on. Patient then placed supine and monitored anesthesia was instituted sucessfully. Both arms was then tucked and the right chest was prepped and draped in usual fashion. A sterile ultrasound probe was then used to identify the right cephalic vein in it's usual position in the groove. At the site of potential catheter placement, the diameter measured ~0.5cm and was located ~0.6cm from dermis. The right cephalic vein had grossly normal compression and no evidence of thrombus. Pre-incision local analgesia with 1% lidocaine, 0.25% marcaine with epinephrine and bicarbonate mixture was injected subcutaneously.  A 5cm incision was made over the deltapectoral groove and carried down to the fat pad with electrocautery. The right subclavian vein was accessed with a large bore needle and flexible tipped wire advanced. A dilator was then introduced and 15cm of indwelling 8F catheter was then advanced with no resistance. Fluroscopy was then brought in to confirm distal catheter position at the sinoatrial junction. The total catheter length was confirmed at 22cm. The mediport was then flushed with heparinized saline and secured to the distal end of the indwelling catheter. A distal incision subcutaneous pocket was created with blunt dissection overlying the pectoral fascia to house the port. The port was then secured to the pectoral fascia with two 2-0 Prolene sutures. Fluroscopy confirmed the final position of the port and catheter. At the time of the imaging, I didn't see any evidence of pneumothorax with several runs. Hemostasis was satisfactory. Heparinized saline was instilled with a Samuels needle to confirm adequate flush and withdraw from the catheter. 10cc fresh heparinized saline was then instilled in the port tubing and mediport. Sponge, needle and instrument counts were correct. The port pocket and deep tissues were closed with 3-0 Vicryl interrupted sutures. The subdermal layer was then closed with 4-0 Monocryl, and skin apposed with Steristrips. The dressed wound was covered with tegaderm dressings. The patient awoke from anesthesia in satisfactory condition. I discussed the initial findings of the case with family in the waiting area. CXR will be done postoperatively to confirm placement.         Ajit Perez MD

## 2018-04-02 NOTE — PROGRESS NOTES
Pt ambulated in hallway. Tolerated well. Pt is not complaining of any pain. At [address], very little serosanguinous fluid was in his PENNY drain. RN recharged PENNY drain. Lap sites look clean, dry, and intact. 2315  Bedside shift change report given to Leroy Beltre RN (oncoming nurse) by Pato Bhatt RN (offgoing nurse). Report given with SBAR, MAR, Recent Results, Vital Signs, and plan of care. Pt is alert and oriented x 4 . Call bell within reach of patient. Safety/fall precautions in place. Girlfriend is at bedside.

## 2018-04-02 NOTE — PERIOP NOTES
Patient's surgery time changed to 1730. Patient wants to eat. Dr. Isabella Nagel states patient may have breakfast, needs to be NPO after 11AM.  He may continue to have sips of water until 1400. Report given to ANKUSH Puente.

## 2018-04-02 NOTE — PROGRESS NOTES
General Surgery Daily Progress Note    Patient: Marely Dwyer MRN: [id number]  SSN: [id number]    YOB: 1990  Age: 32 y.o. Sex: male      Admit Date: 3/25/2018    Subjective:   Pain controlled, tolerating diet, + BM and flatus.      Current Facility-Administered Medications   Medication Dose Route Frequency    metoprolol tartrate (LOPRESSOR) tablet 25 mg  25 mg Oral Q12H    oxyCODONE-acetaminophen (PERCOCET) 5-325 mg per tablet 1-2 Tab  1-2 Tab Oral Q6H PRN    HYDROmorphone (PF) (DILAUDID) injection 2 mg  2 mg IntraVENous Q4H PRN    ketorolac (TORADOL) injection 30 mg  30 mg IntraVENous Q6H    enoxaparin (LOVENOX) injection 40 mg  40 mg SubCUTAneous Q24H    simethicone (MYLICON) tablet 80 mg  80 mg Oral QID PRN    zolpidem (AMBIEN) tablet 5 mg  5 mg Oral QHS PRN    piperacillin-tazobactam (ZOSYN) 3.375 g in 0.9% sodium chloride (MBP/ADV) 100 mL ADV  3.375 g IntraVENous Q8H    albuterol-ipratropium (DUO-NEB) 2.5 MG-0.5 MG/3 ML  3 mL Nebulization Q4H PRN    guaiFENesin ER (MUCINEX) tablet 600 mg  600 mg Oral Q12H    benzonatate (TESSALON) capsule 100 mg  100 mg Oral TID PRN    sodium chloride (NS) flush 5-10 mL  5-10 mL IntraVENous Q8H    sodium chloride (NS) flush 5-10 mL  5-10 mL IntraVENous PRN    acetaminophen (TYLENOL) tablet 650 mg  650 mg Oral Q4H PRN    naloxone (NARCAN) injection 0.4 mg  0.4 mg IntraVENous PRN    diphenhydrAMINE (BENADRYL) injection 12.5 mg  12.5 mg IntraVENous Q4H PRN    ondansetron (ZOFRAN) injection 4 mg  4 mg IntraVENous Q6H PRN    docusate sodium (COLACE) capsule 100 mg  100 mg Oral BID        Objective:   04/02 0701 - 04/02 1900  In: -   Out: 25 [Drains:25]  03/31 1901 - 04/02 0700  In: 1000 [P.O.:1000]  Out: 900 [Urine:850; Drains:50]  Patient Vitals for the past 8 hrs:   BP Temp Pulse Resp SpO2   04/02/18 0732 119/79 98.5 °F (36.9 °C) 91 20 95 %   04/02/18 0504 139/87 98.4 °F (36.9 °C) 88 20 97 %       Physical Exam:  General: Alert, cooperative, NAD  Lungs: Unlabored  Heart:  Regular rate and  rhythm  Abdomen: Soft, + BS, non-distention, appropriately tender, incisions c/d/i, PENNY SS  Extremities: Warm, moves all, no edema  Skin:  Warm and dry, no rash    Labs:   Recent Labs      04/01/18   0245   WBC  16.2*   HGB  11.2*   HCT  34.9*   PLT  413*     Recent Labs      03/31/18   0232   NA  137   K  3.9   CL  103   CO2  27   GLU  83   BUN  9   CREA  0.79   CA  8.4*   MG  2.0       Assessment / Plan:   · Colon cancer with hepatic metastasis  · POD#4 lap left hemicolectomy, mobilization splenic flexure  · PO pain meds  · Tolerating diet  · NPO after breakfast for port placement this evening  · Anticipate d/c tomorrow. Remove PENNY.

## 2018-04-02 NOTE — PROGRESS NOTES
[address] 32 April 2, 2018       RE: Marely Dwyer      To Whom It May Concern,    This is to certify that Marely Dwyer was hospitalized at [address] [place] [address] from 3/25/2018 to 4/2/2018 and may may return to work on 04/09/18. Please feel free to contact my office if you have any questions or concerns. Thank you for your assistance in this matter.       Sincerely,  Montserrat Gomez MD  [phone]

## 2018-04-02 NOTE — DISCHARGE SUMMARY
Hospitalist Discharge Summary     Patient ID:    Sharmila Magaña  [id number]  32 y.o.  1990    Admit date: 3/25/2018    Discharge date and time: 4/2/2018    Admission Diagnoses: Acute diverticulitis  anemia  Colon cancer  COLON CANCER    Chronic Diagnoses:    Problem List as of 4/2/2018  Date Reviewed: 4/2/2018          Codes Class Noted - Resolved    Colon cancer metastasized to liver [facility]) ICD-10-CM: C18.9, C78.7  ICD-9-CM: 153.9, 197.7  3/28/2018 - Present        Colonic mass ICD-10-CM: K63.9  ICD-9-CM: 569.89  3/27/2018 - Present        * (Principal)Acute diverticulitis ICD-10-CM: K57.92  ICD-9-CM: 562.11  3/25/2018 - Present        Pneumonia ICD-10-CM: J18.9  ICD-9-CM: 315  3/25/2018 - Present        SIRS (systemic inflammatory response syndrome) [facility] ICD-10-CM: R65.10  ICD-9-CM: 995.90  3/25/2018 - Present        Liver masses ICD-10-CM: R16.0  ICD-9-CM: 573.9  3/25/2018 - Present        Hypertension ICD-10-CM: I10  ICD-9-CM: 401.9  3/25/2018 - Present        RESOLVED: Hyperglycemia ICD-10-CM: R73.9  ICD-9-CM: 790.29  3/25/2018 - 4/2/2018              Discharge Medications:   Current Discharge Medication List      START taking these medications    Details   metoprolol tartrate (LOPRESSOR) 25 mg tablet Take 1 Tab by mouth every twelve (12) hours. Qty: 60 Tab, Refills: 1      levoFLOXacin (LEVAQUIN) 750 mg tablet Take 1 Tab by mouth daily. Qty: 5 Tab, Refills: 0      metroNIDAZOLE (FLAGYL) 500 mg tablet Take 1 Tab by mouth three (3) times daily for 5 days. Qty: 15 Tab, Refills: 0         STOP taking these medications       ibuprofen (MOTRIN) 200 mg tablet Comments:   Reason for Stopping: Follow up Care:    1. surgery 1-2 weeks  2. oncology in one week    Diet:  Regular Diet    Disposition:  Home. Advanced Directive:    Discharge Exam:  See today's note.     CONSULTATIONS: GI, Hematology/Oncology and General Surgery    Significant Diagnostic Studies:   Recent Labs      04/01/18   0245  03/31/18 0232   WBC  16.2*  17.5*   HGB  11.2*  11.8*   HCT  34.9*  37.1   PLT  413*  442*     Recent Labs      03/31/18   0232   NA  137   K  3.9   CL  103   CO2  27   BUN  9   CREA  0.79   GLU  83   CA  8.4*   MG  2.0     No results for input(s): SGOT, GPT, ALT, AP, TBIL, TBILI, TP, ALB, GLOB, GGT, AML, LPSE in the last 72 hours. No lab exists for component: AMYP, HLPSE  No results for input(s): INR, PTP, APTT in the last 72 hours. No lab exists for component: INREXT   No results for input(s): FE, TIBC, PSAT, FERR in the last 72 hours. No results for input(s): PH, PCO2, PO2 in the last 72 hours. No results for input(s): CPK, CKMB in the last 72 hours. No lab exists for component: TROPONINI  No results found for: [place]lin 57:   1. Colon cancer stage IV mets to the liver: Biopsy of the mass and liver showed adenocarcinoma. S/p left hemicolectomy on 3/29. Appreciated gen surgery and oncology evaluation. Plan for chemo therapy as outpatient. Port to be placed on 4/2.      2. Acute diverticulitis/ abdominal pain: s/p percutaneous drainage of pericolonic abscess. On zosyn as in patient and will discharged on levaquin and flagyl for 5 more days       3. RLL Pneumonia: incidental findings on CT.  Viral panel neg. Already on Abx      4. SIRS (systemic inflammatory response syndrome): due to above issues, not septic. Improving      5. Hypertension: likely has underlying essential HTN, in combination of abd pain. Started on metoprolol     6.  ? FAVIO/ obesity. Pt might have undiagnosed FAVIO ( has snoring and his SAO2 went into the 80s during sleep). Advised to have sleep study as outpatient.      Discharged in improved condition       Signed:  Susan Wong MD  4/2/2018  11:56 AM

## 2018-04-02 NOTE — BRIEF OP NOTE
BRIEF OPERATIVE NOTE    Date of Procedure: 4/2/2018   Preoperative Diagnosis: COLON CANCER  Postoperative Diagnosis: COLON CANCER    Procedure(s): INFUSAPORT INSERTION  Surgeon(s) and Role:     * Reji Hurley MD - Primary         Assistant Staff: None      Surgical Staff:  Circ-1: Cherri Dean RN  Scrub Tech-Relief: Lavell Hinton. Esme  Scrub RN-1: Evan Lundy RN  Surg Asst-1: Jose Redmond  Radiology Technologist: Jonathon Lou, RT, R  Event Time In   Incision Start 1818   Incision Close 1901     Anesthesia: MAC   Estimated Blood Loss: Min  Specimens: * No specimens in log *   Findings: Port 22 cm tubing; 17cm at subcutaneous subclavian insertion site   Complications: none  Implants:   Implant Name Type Inv.  Item Serial No.  Lot No. LRB No. Used Action   PORT SL LP OPN END SLIM 8FR --  - SN/A   PORT SL LP OPN END SLIM 8FR --  N/A [employer] [id number] Implanted     CXR to follow procedure

## 2018-04-02 NOTE — PERIOP NOTES
TRANSFER - IN REPORT:    Verbal report received from ANKUSH Puente on [place]  being received from 18 for routine post - op      Report consisted of patients Situation, Background, Assessment and   Recommendations(SBAR). Information from the following report(s) SBAR, OR Summary, Intake/Output, MAR, Recent Results and Cardiac Rhythm NSR was reviewed with the receiving nurse. Opportunity for questions and clarification was provided. Assessment completed upon patients arrival to unit and care assumed.

## 2018-04-02 NOTE — PROGRESS NOTES
[facility] PCP Glen Santoyo appointment scheduled with Resident Helene Castle on Thursday April 19, 2018 @ 2:05 p.m.  Added to AVS. Casimiro Molina CM Specialist

## 2018-04-02 NOTE — PROGRESS NOTES
Manuel Frazier [facility] 79  [address] [facility] [place], [address] [place] [address]  [phone]      Medical Progress Note      NAME: Leonidas Ellis   :  1990  MRM:  [id number]    Date/Time: 2018  8:02 AM       Assessment and Plan:   1. Colon cancer stage IV mets to the liver: Biopsy of the mass and liver showed adenocarcinoma. S/p left hemicolectomy on 3/29. Appreciated gen surgery and oncology evaluation. Plan for chemo therapy as outpatient. Port to be placed today      2. Acute diverticulitis/ abdominal pain: s/p percutaneous drainage of pericolonic abscess. On zosyn.       3. RLL Pneumonia: incidental findings on CT.  Viral panel neg. Already on IV Abx      4. SIRS (systemic inflammatory response syndrome): due to above issues, not septic. Improving      5. Hypertension: likely has underlying essential HTN, in combination of abd pain. Started on metoprolol    6.  ? FAVIO/ obesity. Pt might have undiagnosed FAVIO ( has snoring and his SAO2 went into the 80s during sleep). Advised to have sleep study as outpatient.                 Subjective:     Chief Complaint:  Follow up of pt who was admitted with diverticulitis and found to have colonic mass. Mild pain around incision site. ROS:  (bold if positive, if negative)    Abd Pain  Tolerating PT  Tolerating Diet        Objective:     Last 24hrs VS reviewed since prior progress note.  Most recent are:    Visit Vitals    /79 (BP 1 Location: Right arm, BP Patient Position: At rest)    Pulse 91    Temp 98.5 °F (36.9 °C)    Resp 20    Ht 5' 11\" (1.803 m)    Wt 127 kg (279 lb 15.8 oz)    SpO2 95%    BMI 39.05 kg/m2     SpO2 Readings from Last 6 Encounters:   18 95%   09/15/16 98%    O2 Flow Rate (L/min): 0 l/min       Intake/Output Summary (Last 24 hours) at 18 0855  Last data filed at 18 0217   Gross per 24 hour   Intake              440 ml   Output              250 ml   Net              190 ml        Physical Exam:    Gen: Well-developed, well-nourished, in no acute distress  HEENT:  Pink conjunctivae, PERRL, hearing intact to voice, moist mucous membranes  Neck:  Supple, without masses, thyroid non-tender  Resp:  No accessory muscle use, clear breath sounds without wheezes rales or rhonchi  Card:  No murmurs, normal S1, S2 without thrills, bruits or peripheral edema  Abd:   Tenderness on lower abdominal area, non-distended, normoactive bowel sounds are present, no palpable organomegaly and no detectable hernias  Lymph:  No cervical or inguinal adenopathy  Musc:  No cyanosis or clubbing  Skin:  No rashes or ulcers, skin turgor is good  Neuro:  Cranial nerves are grossly intact, no focal motor weakness, follows commands appropriately  Psych:  Good insight, oriented to person, place and time, alert  __________________________________________________________________  Medications Reviewed: (see below)  Medications:     Current Facility-Administered Medications   Medication Dose Route Frequency    metoprolol tartrate (LOPRESSOR) tablet 25 mg  25 mg Oral Q12H    oxyCODONE-acetaminophen (PERCOCET) 5-325 mg per tablet 1-2 Tab  1-2 Tab Oral Q6H PRN    HYDROmorphone (PF) (DILAUDID) injection 2 mg  2 mg IntraVENous Q4H PRN    ketorolac (TORADOL) injection 30 mg  30 mg IntraVENous Q6H    enoxaparin (LOVENOX) injection 40 mg  40 mg SubCUTAneous Q24H    simethicone (MYLICON) tablet 80 mg  80 mg Oral QID PRN    zolpidem (AMBIEN) tablet 5 mg  5 mg Oral QHS PRN    piperacillin-tazobactam (ZOSYN) 3.375 g in 0.9% sodium chloride (MBP/ADV) 100 mL ADV  3.375 g IntraVENous Q8H    albuterol-ipratropium (DUO-NEB) 2.5 MG-0.5 MG/3 ML  3 mL Nebulization Q4H PRN    guaiFENesin ER (MUCINEX) tablet 600 mg  600 mg Oral Q12H    benzonatate (TESSALON) capsule 100 mg  100 mg Oral TID PRN    sodium chloride (NS) flush 5-10 mL  5-10 mL IntraVENous Q8H    sodium chloride (NS) flush 5-10 mL  5-10 mL IntraVENous PRN    acetaminophen (TYLENOL) tablet 650 mg 650 mg Oral Q4H PRN    naloxone (NARCAN) injection 0.4 mg  0.4 mg IntraVENous PRN    diphenhydrAMINE (BENADRYL) injection 12.5 mg  12.5 mg IntraVENous Q4H PRN    ondansetron (ZOFRAN) injection 4 mg  4 mg IntraVENous Q6H PRN    docusate sodium (COLACE) capsule 100 mg  100 mg Oral BID        Lab Data Reviewed: (see below)  Lab Review:     Recent Labs      04/01/18   0245  03/31/18   0232   WBC  16.2*  17.5*   HGB  11.2*  11.8*   HCT  34.9*  37.1   PLT  413*  442*     Recent Labs      03/31/18   0232   NA  137   K  3.9   CL  103   CO2  27   GLU  83   BUN  9   CREA  0.79   CA  8.4*   MG  2.0     No results found for: GLUCPOC  No results for input(s): PH, PCO2, PO2, HCO3, FIO2 in the last 72 hours. No results for input(s): INR in the last 72 hours.     No lab exists for component: INREXT, INREXT  All Micro Results     Procedure Component Value Units Date/Time    CULTURE, BLOOD [[id number]] Collected:  03/25/18 1123    Order Status:  Completed Specimen:  Blood from Blood Updated:  03/31/18 0919     Special Requests: NO SPECIAL REQUESTS        Culture result: NO GROWTH 6 DAYS       CULTURE, BLOOD [[id number]] Collected:  03/25/18 1123    Order Status:  Completed Specimen:  Blood from Blood Updated:  03/31/18 0919     Special Requests: NO SPECIAL REQUESTS        Culture result: NO GROWTH 6 DAYS       RESPIRATORY PANEL,PCR,NASOPHARYNGEAL [[id number]] Collected:  03/25/18 1851    Order Status:  Completed Specimen:  Nasopharyngeal Updated:  03/26/18 0048     Adenovirus NOT DETECTED        Coronavirus 229E NOT DETECTED        Coronavirus HKU1 NOT DETECTED        Coronavirus CVNL63 NOT DETECTED        Coronavirus OC43 NOT DETECTED        Metapneumovirus NOT DETECTED        Rhinovirus and Enterovirus NOT DETECTED        Influenza A NOT DETECTED        Influenza A, subtype H1 NOT DETECTED        Influenza A, subtype H3 NOT DETECTED        INFLUENZA A H1N1 PCR NOT DETECTED        Influenza B NOT DETECTED        Parainfluenza 1 NOT DETECTED        Parainfluenza 2 NOT DETECTED        Parainfluenza 3 NOT DETECTED        Parainfluenza virus 4 NOT DETECTED        RSV by PCR NOT DETECTED        Bordetella pertussis - PCR NOT DETECTED        Chlamydophila pneumoniae DNA, QL, PCR NOT DETECTED        Mycoplasma pneumoniae DNA, QL, PCR NOT DETECTED       CULTURE, RESPIRATORY/SPUTUM/BRONCH Loreto Lopez [[id number]] Collected:  03/25/18 1145    Order Status:  Canceled Specimen:  Sputum from Sputum           I have reviewed notes of prior 24hr. Other pertinent lab:       Total time spent with patient: [place] discussed with: Patient, Nursing Staff and >50% of time spent in counseling and coordination of care    Discussed:  Care Plan    Prophylaxis:  SCD's    Disposition:  Home w/Family           ___________________________________________________    Attending Physician: Jeremy Ramos MD

## 2018-04-02 NOTE — PROGRESS NOTES
Bedside and Verbal shift change report given to ayush cornell  (oncoming nurse) by Lexi Singh  (offgoing nurse). Report included the following information SBAR and Kardex.

## 2018-04-02 NOTE — DISCHARGE INSTRUCTIONS
ACUTE DIAGNOSES:  Acute diverticulitis  anemia  Colon cancer  COLON CANCER    CHRONIC MEDICAL DIAGNOSES:  Problem List as of 4/2/2018  Date Reviewed: 4/2/2018          Codes Class Noted - Resolved    Colon cancer metastasized to liver [facility]) ICD-10-CM: C18.9, C78.7  ICD-9-CM: 153.9, 197.7  3/28/2018 - Present        Colonic mass ICD-10-CM: K63.9  ICD-9-CM: 569.89  3/27/2018 - Present        * (Principal)Acute diverticulitis ICD-10-CM: K57.92  ICD-9-CM: 562.11  3/25/2018 - Present        Pneumonia ICD-10-CM: J18.9  ICD-9-CM: 082  3/25/2018 - Present        SIRS (systemic inflammatory response syndrome) ([facility] ICD-10-CM: R65.10  ICD-9-CM: 995.90  3/25/2018 - Present        Liver masses ICD-10-CM: R16.0  ICD-9-CM: 573.9  3/25/2018 - Present        Hypertension ICD-10-CM: I10  ICD-9-CM: 401.9  3/25/2018 - Present        RESOLVED: Hyperglycemia ICD-10-CM: R73.9  ICD-9-CM: 790.29  3/25/2018 - 4/2/2018              DISCHARGE MEDICATIONS:          · It is important that you take the medication exactly as they are prescribed. · Keep your medication in the bottles provided by the pharmacist and keep a list of the medication names, dosages, and times to be taken in your wallet. · Do not take other medications without consulting your doctor. DIET:  Regular Diet    ACTIVITY: Activity as tolerated    ADDITIONAL INFORMATION: If you experience any of the following symptoms then please call your primary care physician or return to the emergency room if you cannot get hold of your doctor: Fever, chills, nausea, vomiting, diarrhea, change in mentation, falling, bleeding, shortness of breath. FOLLOW UP CARE:  Dr. Estrada  you are to call and set up an appointment to see them in 2 weeks. Follow-up with oncology  in 1 week    Follow up with general surgery in one week       Information obtained by :  I understand that if any problems occur once I am at home I am to contact my physician.     I understand and acknowledge receipt of the instructions indicated above.                                                                                                                                            Physician's or R.N.'s Signature                                                                  Date/Time                                                                                                                                              Patient or Representative Signature                                                          Date/Time

## 2018-04-02 NOTE — ANESTHESIA PREPROCEDURE EVALUATION
Anesthetic History   No history of anesthetic complications            Review of Systems / Medical History  Patient summary reviewed, nursing notes reviewed and pertinent labs reviewed    Pulmonary  Within defined limits                 Neuro/Psych   Within defined limits           Cardiovascular  Within defined limits  Hypertension              Exercise tolerance: >4 METS     GI/Hepatic/Renal  Within defined limits         Liver disease     Endo/Other  Within defined limits      Morbid obesity and cancer     Other Findings   Comments: Metastatic colon ca w/liver mets           Physical Exam    Airway  Mallampati: II    Neck ROM: normal range of motion   Mouth opening: Normal     Cardiovascular  Regular rate and rhythm,  S1 and S2 normal,  no murmur, click, rub, or gallop  Rhythm: regular  Rate: normal         Dental  No notable dental hx       Pulmonary  Breath sounds clear to auscultation               Abdominal  GI exam deferred       Other Findings            Anesthetic Plan    ASA: 3  Anesthesia type: MAC          Induction: Intravenous  Anesthetic plan and risks discussed with: Patient

## 2018-04-03 VITALS
HEART RATE: 93 BPM | SYSTOLIC BLOOD PRESSURE: 152 MMHG | BODY MASS INDEX: 39.2 KG/M2 | RESPIRATION RATE: 20 BRPM | DIASTOLIC BLOOD PRESSURE: 88 MMHG | HEIGHT: 71 IN | OXYGEN SATURATION: 97 % | WEIGHT: 279.98 LBS | TEMPERATURE: 98.7 F

## 2018-04-03 PROCEDURE — 74011250636 HC RX REV CODE- 250/636: Performed by: SURGERY

## 2018-04-03 RX ADMIN — LABETALOL HYDROCHLORIDE 20 MG: 5 INJECTION INTRAVENOUS at 02:01

## 2018-04-03 RX ADMIN — KETOROLAC TROMETHAMINE 30 MG: 30 INJECTION, SOLUTION INTRAMUSCULAR; INTRAVENOUS at 00:08

## 2018-04-03 NOTE — PROGRESS NOTES
0250: Patient's BP is now within a normal range and he is still adamant to be discharged now. 2150: Called and spoke to Dr. Marija Martinez regarding patients elevated BP. He ordered remote telemetry and labetalol IV. I explained to him that the patient is adamant on going home tonight. He stated that if patient's BP goes down, he may still be discharged tonight. 2102: Patient is back from PACU and ready for discharge. BP is elevated higher than usual; administered scheduled metoprolol and will recheck BP in 30 minutes.       Patient Vitals for the past 4 hrs:   Temp Pulse Resp BP SpO2   04/02/18 2250 - 91 - - -   04/02/18 2248 - 90 - (!) 154/99 -   04/02/18 2243 - 88 - (!) 156/104 -   04/02/18 2236 - 91 - (!) 150/100 -   04/02/18 2148 - - - (!) 180/110 -   04/02/18 2049 98.7 °F (37.1 °C) 100 20 (!) 188/113 97 %   04/02/18 1950 - 97 20 (!) 149/92 95 %   04/02/18 1949 98.2 °F (36.8 °C) 94 19 (!) 156/99 100 %   04/02/18 1945 - 99 24 (!) 156/99 97 %   04/02/18 1940 - 95 17 (!) 146/94 97 %   04/02/18 1935 - 93 10 142/88 96 %   04/02/18 1930 - 95 10 (!) 143/92 97 %   04/02/18 1925 - 97 17 148/89 96 %   04/02/18 1920 - 97 11 (!) 138/91 96 %   04/02/18 1915 98.4 °F (36.9 °C) 94 16 (!) 143/93 97 %   04/02/18 1913 98.4 °F (36.9 °C) 97 14 (!) 150/99 96 %

## 2018-04-03 NOTE — ANESTHESIA POSTPROCEDURE EVALUATION
Post-Anesthesia Evaluation and Assessment    Patient: Karmen Driscoll MRN: [id number]  SSN: [id number]    YOB: 1990  Age: 32 y.o. Sex: male       Cardiovascular Function/Vital Signs  Visit Vitals    BP (!) 149/92    Pulse 97    Temp 36.8 °C (98.2 °F)    Resp 20    Ht 5' 11\" (1.803 m)    Wt 127 kg (279 lb 15.8 oz)    SpO2 95%    BMI 39.05 kg/m2       Patient is status post MAC anesthesia for Procedure(s): INFUSAPORT INSERTION. Nausea/Vomiting: None    Postoperative hydration reviewed and adequate. Pain:  Pain Scale 1: Numeric (0 - 10) (04/02/18 1949)  Pain Intensity 1: 0 (04/02/18 1949)   Managed    Neurological Status:   Neuro (WDL): Within Defined Limits (04/02/18 1949)  Neuro  Neurologic State: Alert;Eyes open spontaneously (04/02/18 1949)  Orientation Level: Oriented X4 (04/02/18 1949)  Cognition: Follows commands (04/02/18 1949)  Speech: Clear (04/02/18 1949)  LUE Motor Response: Purposeful (04/02/18 1949)  LLE Motor Response: Purposeful (04/02/18 1949)  RUE Motor Response: Purposeful (04/02/18 1949)  RLE Motor Response: Purposeful (04/02/18 1949)   At baseline    Mental Status and Level of Consciousness: Arousable    Pulmonary Status:   O2 Device: Room air (04/02/18 1949)   Adequate oxygenation and airway patent    Complications related to anesthesia: None    Post-anesthesia assessment completed.  No concerns    Signed By: Hannah Poole MD     April 2, 2018

## 2018-04-03 NOTE — PERIOP NOTES
TRANSFER - OUT REPORT:    Verbal report given to ANKUSH Reyes(name) on [place]  being transferred to  for routine post - op       Report consisted of patients Situation, Background, Assessment and   Recommendations(SBAR). Information from the following report(s) SBAR, OR Summary, Procedure Summary, Intake/Output, MAR and Recent Results was reviewed with the receiving nurse. Lines:   Peripheral IV 03/29/18 Left;Upper Arm (Active)   Site Assessment Clean, dry, & intact 4/2/2018  2:50 PM   Phlebitis Assessment 0 4/2/2018  2:50 PM   Infiltration Assessment 0 4/2/2018  2:50 PM   Dressing Status Clean, dry, & intact 4/2/2018  2:50 PM   Dressing Type Transparent 4/2/2018  2:50 PM   Hub Color/Line Status Flushed;Pink;Capped 4/2/2018  2:50 PM   Action Taken Open ports on tubing capped 4/1/2018  8:18 AM   Alcohol Cap Used Yes 4/2/2018  2:50 PM       Peripheral IV 03/29/18 Left Hand (Active)   Site Assessment Clean, dry, & intact 4/2/2018  2:50 PM   Phlebitis Assessment 0 4/2/2018  2:50 PM   Infiltration Assessment 0 4/2/2018  2:50 PM   Dressing Status Clean, dry, & intact; Occlusive 4/2/2018  2:50 PM   Dressing Type Transparent 4/2/2018  2:50 PM   Hub Color/Line Status Green;Patent; Flushed 4/2/2018  2:50 PM   Action Taken Open ports on tubing capped 4/1/2018  8:18 AM   Alcohol Cap Used Yes 4/2/2018  2:50 PM        Opportunity for questions and clarification was provided.       Patient transported with:   Registered Nurse    RECEIVING NURSE ANKUSH REYES INFORMED OF POST OP CHECK XRAY REPORT RE Max Vaz[facility] [id number]

## 2018-04-09 ENCOUNTER — TELEPHONE (OUTPATIENT)
Dept: ONCOLOGY | Age: 28
End: 2018-04-09

## 2018-04-09 NOTE — TELEPHONE ENCOUNTER
[address] [place] [address]  at [place]  [phone]    04/09/18-Per  request MMR testing on his path specimen [id number]. Fax confirmation delivery successful to [facility]. Renetta Vargas she confirmed request was received and is being processed. 04/10/18- Caleb Simple from health [facility] called to report they have already ran MMR on specimen [id number] and [id number] these results are in [address] [place] [address].  informed.

## 2018-04-11 ENCOUNTER — DOCUMENTATION ONLY (OUTPATIENT)
Dept: ONCOLOGY | Age: 28
End: 2018-04-11

## 2018-04-11 ENCOUNTER — OFFICE VISIT (OUTPATIENT)
Dept: ONCOLOGY | Age: 28
End: 2018-04-11

## 2018-04-11 VITALS
BODY MASS INDEX: 37.38 KG/M2 | HEIGHT: 71 IN | DIASTOLIC BLOOD PRESSURE: 104 MMHG | SYSTOLIC BLOOD PRESSURE: 149 MMHG | TEMPERATURE: 97.7 F | WEIGHT: 267 LBS | HEART RATE: 98 BPM | RESPIRATION RATE: 20 BRPM | OXYGEN SATURATION: 98 %

## 2018-04-11 DIAGNOSIS — C78.7 COLON CANCER METASTASIZED TO LIVER (HCC): Primary | ICD-10-CM

## 2018-04-11 DIAGNOSIS — C18.9 COLON CANCER METASTASIZED TO LIVER (HCC): Primary | ICD-10-CM

## 2018-04-11 RX ORDER — LIDOCAINE AND PRILOCAINE 25; 25 MG/G; MG/G
CREAM TOPICAL AS NEEDED
Qty: 30 G | Refills: 0 | Status: SHIPPED | OUTPATIENT
Start: 2018-04-11 | End: 2018-10-09 | Stop reason: SDUPTHER

## 2018-04-11 RX ORDER — ONDANSETRON HYDROCHLORIDE 8 MG/1
8 TABLET, FILM COATED ORAL
Qty: 45 TAB | Refills: 5 | Status: SHIPPED | OUTPATIENT
Start: 2018-04-11 | End: 2020-01-01

## 2018-04-11 RX ORDER — PROCHLORPERAZINE MALEATE 10 MG
10 TABLET ORAL
Qty: 50 TAB | Refills: 5 | Status: SHIPPED | OUTPATIENT
Start: 2018-04-11 | End: 2020-01-01

## 2018-04-11 RX ORDER — DEXAMETHASONE 4 MG/1
TABLET ORAL
Qty: 50 TAB | Refills: 0 | Status: SHIPPED | OUTPATIENT
Start: 2018-04-11 | End: 2018-05-19 | Stop reason: SDUPTHER

## 2018-04-11 NOTE — PATIENT INSTRUCTIONS
Common Side Effects of Chemotherapy  Decreased Blood Counts Your blood counts can decrease temporarily due to chemotherapy, they will recover over time. This is an expected side effect that your Doctor will be monitoring.  - If you experience fevers (temperature >100.4°F), bleeding or unexplained bruising, please call the office right away   Risk of Infection Your white blood cells can decrease temporarily due to chemotherapy and can put you at higher risk of infection. Washing hands frequently with soap and avoiding sick contacts can reduce your risk of infection.  - If you experience fevers (temperature >100.4°F), shaking chills, or any signs of infection, please call the office immediately   Anemia Chemotherapy can cause your red blood cells to temporarily decrease; this is an expected side effect that your Doctor will be monitoring.  - You may experience fatigue if this occurs, please notify the office if you experience bleeding, shortness of breath with minimal exertion or at rest, rapid heartbeat, or feeling as though you may lose consciousness. Hair Loss Chemotherapy can affect your hair follicles and cause you to lose hair. This can occur on your scalp hair but also all over your body including eyebrows and eye lashes   Nausea  You have been prescribed nausea medication to take if needed. Please follow the directions given to you by your Doctor. - Please call the office if the medications you have been given are not relieving nausea. Vomiting Make sure you are taking anti-nausea medication as prescribed. Eating small amounts of bland foods frequently can help. - Please call the office right away if you are vomiting more than 4 times per day or are unable to keep down food or fluids   Diarrhea Eating small amounts of bland foods frequently can help, increase your fluid intake. It is usually ok to take Imodium for diarrhea.   - Please call the office right away if you experience more than 4 episodes of watery diarrhea or if you are feeling dehydrated. Female patients of childbearing age need to avoid pregnancy during chemotherapy. You can reach Medical Oncology at [facility] with further questions or concerns at: [phone].  - Calls during normal business hours will reach our office.  - Calls after hours or on the weekend will reach an answering service and the on-call Oncologist will return your call.

## 2018-04-11 NOTE — MR AVS SNAPSHOT
[address] 
 
 
 [address] [facility], [address] 1007 [facility] 
[phone] Patient: Shyam Carrillo 
MRN: [id number] :1990 Visit Information Date & Time Provider Department Dept. Phone Encounter #  
 2018  2:00 PM Jackie Lynn MD 41 [facility] access [facility] at [facility] [phone] Your Appointments 2018  2:05 PM  
New Patient with Abdoul Myers MD  
[address]) Appt Note: new pt est pcp , tb 18  
 [address] 1007 [facility]  
[phone]  
  
   
 [address] [place] [facility] 99 [id number] Upcoming Health Maintenance Date Due Pneumococcal 19-64 Highest Risk (1 of 3 - PCV13) 2007 DTaP/Tdap/Td series (1 - Tdap) 2009 Influenza Age 5 to Adult 2017 Allergies as of 2018  Review Complete On: 2018 By: Jc Wharton LPN No Known Allergies Current Immunizations  Never Reviewed No immunizations on file. Not reviewed this visit You Were Diagnosed With   
  
 Codes Comments Colon cancer metastasized to liver [facility])    -  Primary ICD-10-CM: C18.9, C78.7 ICD-9-CM: 153.9, 197.7 Vitals BP Pulse Temp Resp Height(growth percentile) Weight(growth percentile) (!) 149/104 (BP 1 Location: Left arm, BP Patient Position: Sitting) 98 97.7 °F (36.5 °C) (Oral) 20 5' 11\" (1.803 m) 267 lb (121.1 kg) SpO2 BMI Smoking Status 98% 37.24 kg/m2 Never Smoker Vitals History BMI and BSA Data Body Mass Index Body Surface Area  
 37.24 kg/m 2 2.46 m 2 Preferred Pharmacy Pharmacy Name Phone [facility] [address] [facility], [address] [place] [address] [phone] Your Updated Medication List  
  
   
This list is accurate as of 18  4:16 PM.  Always use your most recent med list.  
  
  
  
  
 dexamethasone 4 mg tablet Commonly known as:  DECADRON Take 8mg (two tabs) daily in the morning for two days after chemotherapy (while pump is infusing). lidocaine-prilocaine topical cream  
Commonly known as:  EMLA Apply  to affected area as needed for Pain (Apply 30-60 min. prior to having your port accessed). metoprolol tartrate 25 mg tablet Commonly known as:  LOPRESSOR Take 1 Tab by mouth every twelve (12) hours. ondansetron hcl 8 mg tablet Commonly known as:  Delbra Castor Take 1 Tab by mouth every eight (8) hours as needed for Nausea. prochlorperazine 10 mg tablet Commonly known as:  COMPAZINE Take 1 Tab by mouth every six (6) hours as needed for Nausea. Prescriptions Sent to Pharmacy Refills  
 prochlorperazine (COMPAZINE) 10 mg tablet 5 Sig: Take 1 Tab by mouth every six (6) hours as needed for Nausea. Class: Normal  
 Pharmacy: [address] Ph #: [phone] Route: Oral  
 dexamethasone (DECADRON) 4 mg tablet 0 Sig: Take 8mg (two tabs) daily in the morning for two days after chemotherapy (while pump is infusing). Class: Normal  
 Pharmacy: [address] [place] [address] Ph #: [phone]  
 lidocaine-prilocaine (EMLA) topical cream 0 Sig: Apply  to affected area as needed for Pain (Apply 30-60 min. prior to having your port accessed). Class: Normal  
 Pharmacy: [address] Ph #: [phone] Route: Topical  
 ondansetron hcl (ZOFRAN) 8 mg tablet 5 Sig: Take 1 Tab by mouth every eight (8) hours as needed for Nausea. Class: Normal  
 Pharmacy: [address] Ph #: [phone] Route: Oral  
  
Patient Instructions Common Side Effects of Chemotherapy Decreased Blood Counts Your blood counts can decrease temporarily due to chemotherapy, they will recover over time. This is an expected side effect that your Doctor will be monitoring. 
- If you experience fevers (temperature >100.4°F), bleeding or unexplained bruising, please call the office right away Risk of Infection Your white blood cells can decrease temporarily due to chemotherapy and can put you at higher risk of infection. Washing hands frequently with soap and avoiding sick contacts can reduce your risk of infection. 
- If you experience fevers (temperature >100.4°F), shaking chills, or any signs of infection, please call the office immediately Anemia Chemotherapy can cause your red blood cells to temporarily decrease; this is an expected side effect that your Doctor will be monitoring. 
- You may experience fatigue if this occurs, please notify the office if you experience bleeding, shortness of breath with minimal exertion or at rest, rapid heartbeat, or feeling as though you may lose consciousness. Hair Loss Chemotherapy can affect your hair follicles and cause you to lose hair. This can occur on your scalp hair but also all over your body including eyebrows and eye lashes Nausea  You have been prescribed nausea medication to take if needed. Please follow the directions given to you by your Doctor. - Please call the office if the medications you have been given are not relieving nausea. Vomiting Make sure you are taking anti-nausea medication as prescribed. Eating small amounts of bland foods frequently can help. - Please call the office right away if you are vomiting more than 4 times per day or are unable to keep down food or fluids Diarrhea Eating small amounts of bland foods frequently can help, increase your fluid intake. It is usually ok to take Imodium for diarrhea. - Please call the office right away if you experience more than 4 episodes of watery diarrhea or if you are feeling dehydrated. Female patients of childbearing age need to avoid pregnancy during chemotherapy. You can reach Medical Oncology at [facility] with further questions or concerns at: [phone]. 
- Calls during normal business hours will reach our office. 
- Calls after hours or on the weekend will reach an answering service and the on-call Oncologist will return your call. Introducing [facility]! Dear Monique Terjo: 
Thank you for requesting a [facility] account. Our records indicate that you already have an active [facility] account. You can access your account anytime at [email or web address] [email or web address] Did you know that you can access your hospital and ER discharge instructions at any time in [facility]? You can also review all of your test results from your hospital stay or ER visit. Additional Information If you have questions, please visit the Frequently Asked Questions section of the [facility] website at [email or web address] Remember, [facility] is NOT to be used for urgent needs. For medical emergencies, dial 911. Now available from your iPhone and Android! Please provide this summary of care documentation to your next provider. Your primary care clinician is listed as NONE. If you have any questions after today's visit, please call [phone].

## 2018-04-11 NOTE — PROGRESS NOTES
[facility] [facility] [place] [address] [facility] [address] 1007 [facility]  W: [phone]  F: [phone]     Reason for Visit:   Maximo Simeon is a 32 y.o. male who is seen for follow up of metastatic colon cancer. Treatment History:   · CT A/P 3/25/2018: Indeterminate 2 x 1 cm low-density liver lesion. · CT C/A/P with triphase liver 3/27/2018: No evidence of metastatic disease to the chest. Multiple ill-defined hepatic lesions. These do not represent simple cyst.  · CT guided liver biopsy 3/27/2018: metastatic adenocarcinoma, KRAS wild type, NRAS wild type  · Colectomy by Dr. Rachael Linares 3/29/2018: Adenocarcinoma, moderately differentiated. Negative margins. 3/16 nodes involved. pMMR  · Stage IV (pT3 pN1b pM1) Colon Cancer    History of Present Illness:   He presents today for hospital follow up. He has a port placed prior to discharge. He reports that he is recovering well from surgery. Some mild post-operative pain, not needing any medications for this. Moving bowels well. Eating well, good appetite with the help of marijuana edibles. Energy fair. No fevers. Past Medical History:   Diagnosis Date    Cancer [facility]) 03/2018    colon    Hypertension       Past Surgical History:   Procedure Laterality Date    COLONOSCOPY N/A 3/27/2018    COLONOSCOPY performed by Angie Clark MD at [facility] ENDOSCOPY      Social History   Substance Use Topics    Smoking status: Never Smoker    Smokeless tobacco: Never Used    Alcohol use Yes      Comment: socially      Family History   Problem Relation Age of Onset   Shanna Gunning Stroke Mother     Diabetes Mother     Stroke Father     Hypertension Father      Current Outpatient Prescriptions   Medication Sig    prochlorperazine (COMPAZINE) 10 mg tablet Take 1 Tab by mouth every six (6) hours as needed for Nausea.  dexamethasone (DECADRON) 4 mg tablet Take 8mg (two tabs) daily in the morning for two days after chemotherapy (while pump is infusing).  lidocaine-prilocaine (EMLA) topical cream Apply  to affected area as needed for Pain (Apply 30-60 min. prior to having your port accessed).  ondansetron hcl (ZOFRAN) 8 mg tablet Take 1 Tab by mouth every eight (8) hours as needed for Nausea.  metoprolol tartrate (LOPRESSOR) 25 mg tablet Take 1 Tab by mouth every twelve (12) hours. No current facility-administered medications for this visit. No Known Allergies     Review of Systems: A complete review of systems was obtained, negative except as described above. Physical Exam:     Visit Vitals    BP (!) 149/104 (BP 1 Location: Left arm, BP Patient Position: Sitting)    Pulse 98    Temp 97.7 °F (36.5 °C) (Oral)    Resp 20    Ht 5' 11\" (1.803 m)    Wt 267 lb (121.1 kg)    SpO2 98%    BMI 37.24 kg/m2     ECOG PS: 0  General: No distress  Eyes: PERRLA, anicteric sclerae  HENT: Atraumatic, OP clear  Neck: Supple  Lymphatic: No cervical, supraclavicular, or inguinal adenopathy  Respiratory: CTAB, normal respiratory effort  CV: Normal rate, regular rhythm, no murmurs, no peripheral edema  GI: Soft, nontender, nondistended, no masses, no hepatomegaly, no splenomegaly  MS: Normal gait and station. Digits without clubbing or cyanosis. Skin: No rashes, ecchymoses, or petechiae. Normal temperature, turgor, and texture. Psych: Alert, oriented, appropriate affect, normal judgment/insight    Results:     Lab Results   Component Value Date/Time    WBC 16.2 (H) 04/01/2018 02:45 AM    HGB 11.2 (L) 04/01/2018 02:45 AM    HCT 34.9 (L) 04/01/2018 02:45 AM    PLATELET 927 (H) 88/88/6728 02:45 AM    MCV 71.2 (L) 04/01/2018 02:45 AM    ABS.  NEUTROPHILS 12.4 (H) 04/01/2018 02:45 AM     Lab Results   Component Value Date/Time    Sodium 137 03/31/2018 02:32 AM    Potassium 3.9 03/31/2018 02:32 AM    Chloride 103 03/31/2018 02:32 AM    CO2 27 03/31/2018 02:32 AM    Glucose 83 03/31/2018 02:32 AM    BUN 9 03/31/2018 02:32 AM    Creatinine 0.79 03/31/2018 02:32 AM GFR est AA >60 03/31/2018 02:32 AM    GFR est non-AA >60 03/31/2018 02:32 AM    Calcium 8.4 (L) 03/31/2018 02:32 AM     Lab Results   Component Value Date/Time    Bilirubin, total 0.7 03/26/2018 06:00 AM    ALT (SGPT) 20 03/26/2018 06:00 AM    AST (SGOT) 10 (L) 03/26/2018 06:00 AM    Alk. phosphatase 70 03/26/2018 06:00 AM    Protein, total 6.9 03/26/2018 06:00 AM    Albumin 3.0 (L) 03/26/2018 06:00 AM    Globulin 3.9 03/26/2018 06:00 AM       Records reviewed and summarized above. Pathology report(s) reviewed above. Radiology report(s) reviewed above. Assessment:   1) Metastatic Colon Cancer  Stage IV, pMMR, KRAS/NRAS wild type  He has metastatic disease within his liver. His primary tumor has been resected. I have discussed with Dr. Lieutenant Alegria (general surgery), and he plans to review images with Dr. Marlo Carey (surgical oncology) to determine if the liver lesions are resectable, or perhaps amenable to ablation. If they are not, then his cancer is not curable and management will be with palliative intent. My recommendation is to start chemotherapy with FOLFOX in the next 1-2 weeks. I will introduce Bevacizumab once he has recovered further from recent surgery. We will restage after 6 cycles. We discussed the risks and benefits of FOLFOX chemotherapy, including potential side effects. These include but are not limited to fatigue, nausea vomiting, diarrhea, neuropathy, taste changes, esophageal spasm, cold intolerance, allergic reactions, alopecia, mucositis, myelosuppression, risk for infection, infertility and rarely, death. Rarely, a patient may have a condition where they do not metabolize fluorouracil appropriately (called DPD deficiency),  and they may have excessive toxicity. The patient has consented to beginning therapy. He would like to wait and start in two weeks. We will plan to discuss his case at our next tumor board.     2) Anemia  Mild, hopefully will improve now that his primary has been removed. Monitor. 3) Risk of infertility  We reviewed that chemotherapy can potentially cause infertility. I offered referral for cryopreservation, and he was interested. Information provided for him to review. 4) Genetic risk  We discussed referral for genetic testing. pMMR argues against Hogan Syndrome, and no family history, but his young age warrants testing in my opinion. We will discuss further at his next visit. Plan:     · Follow up with surgery next week  · Proceed in about 2 weeks with C#1 of mFOLFOX6 (oxaliplatin 85 mg/m2, leucovorin 400 mg/m2, fluorouracil 400 mg/m2, and a 46 hour infusion of fluorouracil 2400 mg/m2 given every 2 weeks). · Labs: CBC, BMP prior to each treatment, Hepatic function panel every 4 weeks  · Prophylactic antiemetics: Palonosetron and dexamethasone on the day of each chemotherapy infusion, with dexamethasone 8mg PO daily at home on days 2 and 3  · PRN antiemetics: Prochlorperazine and Ondansetron  · EMLA cream for port  · Information provided regarding sperm cryopreservation  ·  to meet with patient today, provide resources and counseling  · Return to clinic on the day of therapy    >50% of this 60 minute visit was spent on counseling/coordination of care regarding the above diagnoses.         Signed By: Marcus Zambrano MD

## 2018-04-12 ENCOUNTER — TELEPHONE (OUTPATIENT)
Dept: ONCOLOGY | Age: 28
End: 2018-04-12

## 2018-04-12 NOTE — PROGRESS NOTES
Oncology Navigator  Psychosocial Assessment    Reason for Assessment:    []Depression  []Anxiety  []Caregiver [place]  []Maladaptive Coping with Serious Illness   [x]Other: colon cancer    Sources of Information:    [x]Patient  [x]Family  [x]Staff  [x]Medical Record    Advance Care Planning: Pt tells me he is working with an  to assist with ACP. Encouraged him to bring copy of AMD when completed to be scanned into chart.    Advance Care Planning 4/2/2018   Patient's Healthcare Decision Maker is: Legal Next of Kin   Confirm Advance Directive None       Mental Status:    [x]Alert  []Lethargic  []Unresponsive  Oriented to:  [x]Person  [x]Place  [x]Time  [x]Situation      Barriers to Learning:    []Language  []Developmental  []Cognitive  []Altered Mental Status  []Visual/Hearing Impairment  []Unable to Read/Write  []Motivational   [x]No Barriers Identified  []Other:    Relationship Status:  []Single  []  [x]Significant Other/Life Partner  []  []  []      Living Circumstances:  []Lives Alone  []Family/Significant Other in Household  []Roommates  []Children in the Home  []Paid Caregivers  []Assisted Living Facility/Group Home  []Skilled [address]  []Homeless  []Incarcerated  []Environmental/Care Concerns  []Other:    Support System:    [x]Strong  []Fair  []Limited    Financial/Legal Concerns:    []Uninsured  []Limited Income/Resources  []Non-Citizen  [x]No Concerns Identified  []Financial POA:    []Other:    Yazdanism/Spiritual/Existential:  []Strong Sense of Spirituality  []Involved in [employer]  []Request  Visit  []Expressing Gracie Mota  [x]No Concerns Identified    Coping with Illness:         Patient: Family/Caregiver:   Understanding and Acceptance of Illness/Prognosis  [x] [x]   Strong Sense of Resilience [] []   Self Reflection [] []   Engaged Support System [x] []   Does not Readily Discuss Illness [x] []   Denial of Terminal Status [] [] Anger [] []   Depression [] []   Anxiety/Fear [] []   Bargaining [] []   Recent Diagnosis/Prognosis [x] []   Difficulties with Body Image [] []   Loss of Identity [] []   Excessive Substance Use [] []   Mental Health History [] []   Enmeshed Relationships [] []   History of Loss [] []   Anticipatory Grief [] [x]   Concern for Complicated Grief [x] [x]   Suicidal Ideation or Plan [] []   Unable to assess [] []                  Narrative: Met with pt, his girlfriend, and sister to introduce social work navigator role and supports. Pt presents as quiet yet engaged. Answers questions appropriately but doesn't elaborate. Pt's gf and sister have several questions regarding resources and support. Answered questions about disability and FMLA. Pt will review his employer benefits. Provided information about fertility/cyroperservation. Discussed emotional supports and pt declined counseling referral at this time. Took pt/family on tour of [employer]. Discussed the Care Card as pt expressed financial concern. Encouraged pt to called with questions or barriers and provided contact information. Referrals:     I. Transportation    Medicaid ([employer]) []   ACS Road to Recovery []                                    Regional organization  []                                      Financial Assistance/Medication Access    Patient assistance program (Care Card) [x]   Co-pay assistance  []                                    Leukemia & Lymphoma Society []   [address]  []   Patient [address] []   [facility]  []     Emotional support    Peer support group []   Local counseling []                                    Online support group []   Coordination of psychiatry consult []     Goals/Plan:   1. Follow-up re: fertility referral   2. Card Card application  3.  Continue to assess emotional well-being    -MASOOD Murrell

## 2018-04-13 ENCOUNTER — OFFICE VISIT (OUTPATIENT)
Dept: FAMILY MEDICINE CLINIC | Age: 28
End: 2018-04-13

## 2018-04-13 VITALS
SYSTOLIC BLOOD PRESSURE: 136 MMHG | BODY MASS INDEX: 36.96 KG/M2 | WEIGHT: 264 LBS | DIASTOLIC BLOOD PRESSURE: 91 MMHG | HEIGHT: 71 IN | HEART RATE: 93 BPM | OXYGEN SATURATION: 97 % | RESPIRATION RATE: 16 BRPM | TEMPERATURE: 96.8 F

## 2018-04-13 DIAGNOSIS — Z23 NEEDS FLU SHOT: ICD-10-CM

## 2018-04-13 DIAGNOSIS — F43.20 ADJUSTMENT DISORDER, UNSPECIFIED TYPE: ICD-10-CM

## 2018-04-13 DIAGNOSIS — C78.7 COLON CANCER METASTASIZED TO LIVER (HCC): ICD-10-CM

## 2018-04-13 DIAGNOSIS — D84.9 IMMUNOSUPPRESSED STATUS (HCC): ICD-10-CM

## 2018-04-13 DIAGNOSIS — Z76.89 ESTABLISHING CARE WITH NEW DOCTOR, ENCOUNTER FOR: ICD-10-CM

## 2018-04-13 DIAGNOSIS — C18.9 COLON CANCER METASTASIZED TO LIVER (HCC): ICD-10-CM

## 2018-04-13 DIAGNOSIS — I10 HTN, GOAL BELOW 140/90: Primary | ICD-10-CM

## 2018-04-13 DIAGNOSIS — Z23 ENCOUNTER FOR IMMUNIZATION: ICD-10-CM

## 2018-04-13 NOTE — PATIENT INSTRUCTIONS
Pneumococcal Conjugate Vaccine (PCV13): What You Need to Know  Why get vaccinated? Vaccination can protect both children and adults from pneumococcal disease. Pneumococcal disease is caused by bacteria that can spread from person to person through close contact. It can cause ear infections, and it can also lead to more serious infections of the:  · Lungs (pneumonia). · Blood (bacteremia). · Covering of the brain and spinal cord (meningitis). Pneumococcal pneumonia is most common among adults. Pneumococcal meningitis can cause deafness and brain damage, and it kills about 1 child in 10 who get it. Anyone can get pneumococcal disease, but children under 3years of age and adults 72 years and older, people with certain medical conditions, and cigarette smokers are at the highest risk. Before there was a vaccine, the [facility] saw the following in children under 5 each year from pneumococcal disease:  · More than 700 cases of meningitis  · About 13,000 blood infections  · About 5 million ear infections  · About 200 deaths  Since the vaccine became available, severe pneumococcal disease in these children has fallen by 88%. About 18,000 older adults die of pneumococcal disease each year in the [place]. Treatment of pneumococcal infections with penicillin and other drugs is not as effective as it used to be, because some strains of the disease have become resistant to these drugs. This makes prevention of the disease through vaccination even more important. PCV13 vaccine  Pneumococcal conjugate vaccine (called PCV13) protects against 13 types of pneumococcal bacteria. PCV13 is routinely given to children at 2, 4, 6, and 1515 months of age. It is also recommended for children and adults 3to 59years of age with certain health conditions, and for all adults 72years of age and older. Your doctor can give you details.   Some people should not get this vaccine  Anyone who has ever had a life-threatening allergic reaction to a dose of this vaccine, to an earlier pneumococcal vaccine called PCV7, or to any vaccine containing diphtheria toxoid (for example, DTaP), should not get PCV13. Anyone with a severe allergy to any component of PCV13 should not get the vaccine. Tell your doctor if the person being vaccinated has any severe allergies. If the person scheduled for vaccination is not feeling well, your healthcare provider might decide to reschedule the shot on another day. Risks of a vaccine reaction  With any medicine, including vaccines, there is a chance of reactions. These are usually mild and go away on their own, but serious reactions are also possible. Problems reported following PCV13 varied by age and dose in the series. The most common problems reported among children were:  · About half became drowsy after the shot, had a temporary loss of appetite, or had redness or tenderness where the shot was given. · About 1 out of 3 had swelling where the shot was given. · About 1 out of 3 had a mild fever, and about 1 in 20 had a fever over 102.2°F.  · Up to about 8 out of 10 became fussy or irritable. Adults have reported pain, redness, and swelling where the shot was given; also mild fever, fatigue, headache, chills, or muscle pain. Teresia Ian children who get PCV13 along with inactivated flu vaccine at the same time may be at increased risk for seizures caused by fever. Ask your doctor for more information. Problems that could happen after any vaccine:  · People sometimes faint after a medical procedure, including vaccination. Sitting or lying down for about 15 minutes can help prevent fainting and the injuries caused by a fall. Tell your doctor if you feel dizzy or have vision changes or ringing in the ears. · Some older children and adults get severe pain in the shoulder and have difficulty moving the arm where a shot was given. This happens very rarely.   · Any medication can cause a severe allergic reaction. Such reactions from a vaccine are very rare, estimated at about 1 in a million doses, and would happen within a few minutes to a few hours after the vaccination. As with any medicine, there is a very small chance of a vaccine causing a serious injury or death. The safety of vaccines is always being monitored. For more information, visit: www.cdc.gov/vaccinesafety. What if there is a serious reaction? What should I look for? · Look for anything that concerns you, such as signs of a severe allergic reaction, very high fever, or unusual behavior. Signs of a severe allergic reaction can include hives, swelling of the face and throat, difficulty breathing, a fast heartbeat, dizziness, and weakness, usually within a few minutes to a few hours after the vaccination. What should I do? · If you think it is a severe allergic reaction or other emergency that can't wait, call 911 or get the person to the nearest hospital. Otherwise, call your doctor. · Reactions should be reported to the Vaccine Adverse Event Reporting System (VAERS). Your doctor should file this report, or you can do it yourself through the VAERS website at www.vaers. [facility].gov, or by calling 4-505.491.4966. VAERS does not give medical advice. The National Vaccine Injury Compensation Program  The National Vaccine Injury Compensation Program (VICP) is a federal program that was created to compensate people who may have been injured by certain vaccines. Persons who believe they may have been injured by a vaccine can learn about the program and about filing a claim by calling 3-313.585.6766 or visiting the [facility] [employer] website at www.[facility].gov/vaccinecompensation. There is a time limit to file a claim for compensation. How can I learn more? · Ask your healthcare provider. He or she can give you the vaccine package insert or suggest other sources of information. · Call your local or state health department.   · Contact the Centers for Disease Control and Prevention (CDC):  ¨ Call 0-438.590.4187 (1-800-CDC-INFO) or  ¨ Visit CDC's website at www.cdc.gov/vaccines  Vaccine Information Statement  PCV13 Vaccine  11/5/2015  42 [id number]  Department of Health and Human Services  Centers for Disease Control and Prevention  Many Vaccine Information Statements are available in Khmer and other languages. See www.immunize.org/vis. Muchas hojas de información sobre vacunas están disponibles en español y en otros idiomas. Visite www.immunize.org/vis. Care instructions adapted under license by [employer] (which disclaims liability or warranty for this information).  If you have questions about a medical condition or this instruction, always ask your healthcare professional. [address] any warranty or liability for your use of this information.

## 2018-04-13 NOTE — PROGRESS NOTES
HPI     Chief Complaint   Patient presents with   [facility].     He is a 32 y.o. male with PMH HTN, colon cancer stage 4 s/p surgery about to start radiation who presents to establish PCP. HTN  - Currenlty on Metoprolol Tartrate 25mg BID  - /91 states it gets elevated when he sees the doctor  - Takes BP twice daily at home, typically well controlled    Colon Cancer  - Diagnosed 3/27/18, went to hospital for abd pain and was diagnosed with colon cancer, mets to the liver   - Starting chemo 4/24/18  - Followed by Dr. Kings Dacosta and Dr. [employer]  - Endorses using [facility] and [place] for help with appetite and nausea  - Denies abdominal pan, melena, diarrhea, constipation    Social Hx: Has not had Flu or PNA shot    Review of Systems   Constitutional: Positive for appetite change. Negative for chills and fever. Respiratory: Negative for chest tightness and shortness of breath. Cardiovascular: Negative for chest pain and palpitations. Gastrointestinal: Positive for nausea. Negative for abdominal pain, blood in stool, constipation, diarrhea and vomiting. Skin: Negative for color change. Allergic/Immunologic: Positive for immunocompromised state. Hematological: Negative for adenopathy. Psychiatric/Behavioral: Negative for self-injury and suicidal ideas. The patient is nervous/anxious. Reviewed PmHx, RxHx, FmHx, SocHx, AllgHx and updated and dated in the chart. Physical Exam:  Visit Vitals    BP (!) 136/91    Pulse 93    Temp 96.8 °F (36 °C) (Oral)    Resp 16    Ht 5' 11\" (1.803 m)    Wt 264 lb (119.7 kg)    SpO2 97%    BMI 36.82 kg/m2     Physical Exam   Constitutional: He appears well-developed and well-nourished. No distress. Eyes: Right eye exhibits no discharge. Left eye exhibits no discharge. No scleral icterus. Neck: Neck supple. Cardiovascular: Normal rate, regular rhythm and normal heart sounds. Exam reveals no gallop and no friction rub.     No murmur heard.  Pulmonary/Chest: Effort normal and breath sounds normal. No respiratory distress. He has no wheezes. He has no rales. Abdominal: Soft. Bowel sounds are normal. He exhibits no distension and no mass. There is no tenderness. There is no rebound and no guarding. No hernia. Laparoscopic incisions well healed   Musculoskeletal: He exhibits no edema or tenderness. Neurological: He is alert. He exhibits normal muscle tone. Skin: Skin is warm and dry. No rash noted. He is not diaphoretic. No pallor. Psychiatric: He has a normal mood and affect. His behavior is normal. Judgment and thought content normal.        New onset depression:   9 Sx checklist. \"over the last 2 weeks how often have you had or been\"  (Answers:none=0, several days a week=1, more than half of the days=2, nearly every day=3)       Little pleasure in doing things? 1   Feeling down or depressed? 1   Trouble sleeping? 1   No energy or fatigue? 1   Poor appetite or overeating? 1   Feeling you have let others down or you are a failure? 1   Trouble concentrating? 1   Moving slowly or fidgety all the time? 0   Thinking you would be better off dead or thoughts of hurting yourself? 0      Total: 7    How difficult has this made it for you to do your work, get along with others or take care of things at work? Somewhat difficult    (5-14 suspicious, >15 warrants medication and /or counseling)    Over the last 2 weeks how often have you been bothered by the following problems? 0 - not at all, 1 - several days, 2 - more then half the days, 3 nearly every day    1. Feeling nervous, anxious or on edge? 1  2. Not being able to stop or control worrying? 0  3. Worrying too much about different things? 0  4. Trouble relaxing? 0  5. Being so restless that it is hard to sit still? 0  6. Becoming easily annoying or irritable? 0  7. Feeling afraid as if something awful might happen?  1    Total score = 2      No results found for this or any previous visit (from the past 12 hour(s)). Assessment / Plan     Stage 4 Colon Cancer  - Personally reviewed medical records and admission for cancer diagnosis  - Patient to follow up with Dr. Janay Pierce and start chemo as instructed   - Flu and PCV13 today in prep for patient soon to be immunosupressed    HTN  - DBP mildly elevated at 91, patient appears anxious at todays appt  - Will recommend keeping close eye on BP, if elevated more then 2x weekly would consider adjusting meds    Adjustment Disorder  - PHQ9 7, GAD7 2  - Likely 2/2 new diagnosis of metastatic colon cancer  - Patient endorses feeling overwhelmed with diagnosis  - Counseling services offered to patient who is not interested at this time  - Will closely follow     Encounter for Immunization  - Flu and Rx given for PCV13 today as patient will be undergoing chemotherapy soon  - Needs PPSV23 in 1 year    Follow up in 1 month to discuss ongoing chemotherapy and reevaluate HTN. I have discussed the diagnosis with the patient and the intended plan as seen in the above orders. The patient has received an after-visit summary and questions were answered concerning future plans. I have discussed medication side effects and warnings with the patient as well.     Patient discussed with Dr. Jacque Weber (Attending)    Ros Payne DO  Family Medicine Resident

## 2018-04-13 NOTE — MR AVS SNAPSHOT
[address] 
[phone] Patient: Maximo Simeon 
MRN: [id number] :1990 Visit Information Date & Time Provider Department Dept. Phone Encounter #  
 2018  9:45 AM DO Kia Alicea [phone] [id number] Your Appointments 2018  9:15 AM  
ESTABLISHED PATIENT with [facility] at [address] [place] [address] [facility]) Appt Note: OPIC then, Otis/Raddin, FOLFOX #1.  
 [facility], [address] [place] [id number]  
[phone]  
  
   
 [address] [place] [address], [address] [place] [address]  
  
    
 2018  2:00 PM  
ROUTINE CARE with DO Kia Alicea ([facility]) Appt Note: depression [address]  
[phone]  
  
   
 [address] [address] Upcoming Health Maintenance Date Due Pneumococcal 19-64 Highest Risk (1 of 3 - PCV13) 2007 DTaP/Tdap/Td series (1 - Tdap) 2009 Influenza Age 5 to Adult 2017 Allergies as of 2018  Review Complete On: 2018 By: Maria Alejandra Yeung LPN No Known Allergies Current Immunizations  Never Reviewed Name Date Influenza Vaccine (Quad) PF  Incomplete Not reviewed this visit You Were Diagnosed With   
  
 Codes Comments Encounter for immunization    -  Primary ICD-10-CM: U13 ICD-9-CM: V03.89 Vitals BP Pulse Temp Resp Height(growth percentile) Weight(growth percentile) (!) 136/91 93 96.8 °F (36 °C) (Oral) 16 5' 11\" (1.803 m) 264 lb (119.7 kg) SpO2 BMI Smoking Status 97% 36.82 kg/m2 Never Smoker Vitals History BMI and BSA Data Body Mass Index Body Surface Area  
 36.82 kg/m 2 2.45 m 2 Preferred Pharmacy [facility] Name Phone [facility]  [address] [phone] Your Updated Medication List  
  
   
This list is accurate as of 18 11:08 AM.  Always use your most recent med list.  
  
  
  
  
 dexamethasone 4 mg tablet Commonly known as:  DECADRON Take 8mg (two tabs) daily in the morning for two days after chemotherapy (while pump is infusing). lidocaine-prilocaine topical cream  
Commonly known as:  EMLA Apply  to affected area as needed for Pain (Apply 30-60 min. prior to having your port accessed). metoprolol tartrate 25 mg tablet Commonly known as:  LOPRESSOR Take 1 Tab by mouth every twelve (12) hours. ondansetron hcl 8 mg tablet Commonly known as:  Brooke Dakins Take 1 Tab by mouth every eight (8) hours as needed for Nausea. pneumococcal 13 tomasa conj dip 0.5 mL Syrg injection Commonly known as:  PREVNAR-13  
0.5 mL by IntraMUSCular route once for 1 dose. prochlorperazine 10 mg tablet Commonly known as:  COMPAZINE Take 1 Tab by mouth every six (6) hours as needed for Nausea. Prescriptions Sent to Pharmacy Refills  
 pneumococcal 13 tomasa conj dip (PREVNAR-13) 0.5 mL syrg injection 0 Si.5 mL by IntraMUSCular route once for 1 dose. Class: Normal  
 Pharmacy: [facility] [address] [place] [address] Ph #: [phone] Route: IntraMUSCular We Performed the Following INFLUENZA VIRUS VAC QUAD,SPLIT,PRESV FREE SYRINGE IM [id number] CPT(R)] OR IMMUNIZ ADMIN,1 SINGLE/COMB VAC/TOXOID [id number] CPT(R)] To-Do List   
 2018  9:00 AM  
  Appointment with [address] at [place] [address] ([phone])  
  
 2018 4:00 PM  
  Appointment with [address] at [place] [address] ([phone])  
  
 2018 9:00 AM  
  Appointment with [address] at [place] [address] ([phone])  
  
 05/10/2018 4:00 PM  
 Appointment with [address] at [place] [address] ([phone])  
  
 05/22/2018 9:00 AM  
  Appointment with [address] at [place] [address] ([phone])  
  
 05/24/2018 4:00 PM  
  Appointment with [address] at [place] [address] ([phone]) Patient Instructions Pneumococcal Conjugate Vaccine (PCV13): What You Need to Know Why get vaccinated? Vaccination can protect both children and adults from pneumococcal disease. Pneumococcal disease is caused by bacteria that can spread from person to person through close contact. It can cause ear infections, and it can also lead to more serious infections of the: 
· Lungs (pneumonia). · Blood (bacteremia). · Covering of the brain and spinal cord (meningitis). Pneumococcal pneumonia is most common among adults. Pneumococcal meningitis can cause deafness and brain damage, and it kills about 1 child in 10 who get it. Anyone can get pneumococcal disease, but children under 3years of age and adults 72 years and older, people with certain medical conditions, and cigarette smokers are at the highest risk. Before there was a vaccine, the [facility] saw the following in children under 5 each year from pneumococcal disease: · More than 700 cases of meningitis · About 13,000 blood infections · About 5 million ear infections · About 200 deaths Since the vaccine became available, severe pneumococcal disease in these children has fallen by 88%. About 18,000 older adults die of pneumococcal disease each year in the [place]. Treatment of pneumococcal infections with penicillin and other drugs is not as effective as it used to be, because some strains of the disease have become resistant to these drugs. This makes prevention of the disease through vaccination even more important. PCV13 vaccine Pneumococcal conjugate vaccine (called PCV13) protects against 13 types of pneumococcal bacteria. PCV13 is routinely given to children at 2, 4, 6, and 1515 months of age. It is also recommended for children and adults 3to 59years of age with certain health conditions, and for all adults 72years of age and older. Your doctor can give you details. Some people should not get this vaccine Anyone who has ever had a life-threatening allergic reaction to a dose of this vaccine, to an earlier pneumococcal vaccine called PCV7, or to any vaccine containing diphtheria toxoid (for example, DTaP), should not get PCV13. Anyone with a severe allergy to any component of PCV13 should not get the vaccine. Tell your doctor if the person being vaccinated has any severe allergies. If the person scheduled for vaccination is not feeling well, your healthcare provider might decide to reschedule the shot on another day. Risks of a vaccine reaction With any medicine, including vaccines, there is a chance of reactions. These are usually mild and go away on their own, but serious reactions are also possible. Problems reported following PCV13 varied by age and dose in the series. The most common problems reported among children were: · About half became drowsy after the shot, had a temporary loss of appetite, or had redness or tenderness where the shot was given. · About 1 out of 3 had swelling where the shot was given. · About 1 out of 3 had a mild fever, and about 1 in 20 had a fever over 102.2°F. 
· Up to about 8 out of 10 became fussy or irritable. Adults have reported pain, redness, and swelling where the shot was given; also mild fever, fatigue, headache, chills, or muscle pain. [employer] children who get PCV13 along with inactivated flu vaccine at the same time may be at increased risk for seizures caused by fever. Ask your doctor for more information. Problems that could happen after any vaccine: · People sometimes faint after a medical procedure, including vaccination. Sitting or lying down for about 15 minutes can help prevent fainting and the injuries caused by a fall. Tell your doctor if you feel dizzy or have vision changes or ringing in the ears. · Some older children and adults get severe pain in the shoulder and have difficulty moving the arm where a shot was given. This happens very rarely. · Any medication can cause a severe allergic reaction. Such reactions from a vaccine are very rare, estimated at about 1 in a million doses, and would happen within a few minutes to a few hours after the vaccination. As with any medicine, there is a very small chance of a vaccine causing a serious injury or death. The safety of vaccines is always being monitored. For more information, visit: www.cdc.gov/vaccinesafety. What if there is a serious reaction? What should I look for? · Look for anything that concerns you, such as signs of a severe allergic reaction, very high fever, or unusual behavior. Signs of a severe allergic reaction can include hives, swelling of the face and throat, difficulty breathing, a fast heartbeat, dizziness, and weakness, usually within a few minutes to a few hours after the vaccination. What should I do? · If you think it is a severe allergic reaction or other emergency that can't wait, call 911 or get the person to the nearest hospital. Otherwise, call your doctor. · Reactions should be reported to the Vaccine Adverse Event Reporting System (VAERS). Your doctor should file this report, or you can do it yourself through the VAERS website at www.vaers. hhs.gov, or by calling 1-154.126.2247. VAERS does not give medical advice. The National Vaccine Injury Compensation Program 
The National Vaccine Injury Compensation Program (VICP) is a federal program that was created to compensate people who may have been injured by certain vaccines.  
Persons who believe they may have been injured by a vaccine can learn about the program and about filing a claim by calling 5-371.867.8616 or visiting the [employer] website at www.[facility].gov/vaccinecompensation. There is a time limit to file a claim for compensation. How can I learn more? · Ask your healthcare provider. He or she can give you the vaccine package insert or suggest other sources of information. · Call your local or state health department. · Contact the Centers for Disease Control and Prevention (CDC): 
¨ Call 0-925.162.8121 (1-800-CDC-INFO) or ¨ Visit CDC's website at www.cdc.gov/vaccines Vaccine Information Statement PCV13 Vaccine 11/5/2015 
42 [id number] [facility] and [place] Centers for Disease Control and Prevention Many Vaccine Information Statements are available in [place] and other languages. See www.immunize.org/vis. Muchas hojas de información sobre vacunas están disponibles en español y en otros idiomas. Visite www.immunize.org/vis. Care instructions adapted under license by [employer] (which disclaims liability or warranty for this information). If you have questions about a medical condition or this instruction, always ask your healthcare professional. [address] any warranty or liability for your use of this information. 
  
 
 
 
  
Introducing [facility]! Dear Jc Haas: 
Thank you for requesting a [employer] account. Our records indicate that you already have an active [employer] account. You can access your account anytime at [email or web address] Did you know that you can access your hospital and ER discharge instructions at any time in [employer]? You can also review all of your test results from your hospital stay or ER visit. Additional Information If you have questions, please visit the Frequently Asked Questions section of the [employer] website at [email or web address] Remember, [employer] is NOT to be used for urgent needs.  For medical emergencies, dial 911. Now available from your iPhone and Android! Please provide this summary of care documentation to your next provider. Your primary care clinician is listed as NONE. If you have any questions after today's visit, please call [phone].

## 2018-04-13 NOTE — PROGRESS NOTES
Chief Complaint   Patient presents with   Fani Medina Establish Care     1. Have you been to the ER, urgent care clinic since your last visit? Hospitalized since your last visit? N/A    2. Have you seen or consulted any other health care providers outside of the [address] [place] [address] since your last visit? Include any pap smears or colon screening.  N/A

## 2018-04-18 ENCOUNTER — TELEPHONE (OUTPATIENT)
Dept: ONCOLOGY | Age: 28
End: 2018-04-18

## 2018-04-18 NOTE — TELEPHONE ENCOUNTER
[employer]  Social Work Navigator Encounter     4/18/18 10:31 AM Supportive follow-up call placed to pt and vm left requesting a return call. Follow-up regarding fertility.

## 2018-04-20 RX ORDER — DEXTROSE MONOHYDRATE 50 MG/ML
25 INJECTION, SOLUTION INTRAVENOUS CONTINUOUS
Status: DISPENSED | OUTPATIENT
Start: 2018-04-24 | End: 2018-04-24

## 2018-04-20 RX ORDER — PALONOSETRON 0.05 MG/ML
0.25 INJECTION, SOLUTION INTRAVENOUS ONCE
Status: ACTIVE | OUTPATIENT
Start: 2018-04-24 | End: 2018-04-24

## 2018-04-20 RX ORDER — FLUOROURACIL 50 MG/ML
980 INJECTION, SOLUTION INTRAVENOUS ONCE
Status: DISPENSED | OUTPATIENT
Start: 2018-04-24 | End: 2018-04-24

## 2018-04-24 ENCOUNTER — OFFICE VISIT (OUTPATIENT)
Dept: ONCOLOGY | Age: 28
End: 2018-04-24

## 2018-04-24 ENCOUNTER — DOCUMENTATION ONLY (OUTPATIENT)
Dept: ONCOLOGY | Age: 28
End: 2018-04-24

## 2018-04-24 ENCOUNTER — HOSPITAL ENCOUNTER (OUTPATIENT)
Dept: INFUSION THERAPY | Age: 28
Discharge: HOME | End: 2018-04-24

## 2018-04-24 VITALS
OXYGEN SATURATION: 98 % | BODY MASS INDEX: 37.8 KG/M2 | DIASTOLIC BLOOD PRESSURE: 94 MMHG | HEIGHT: 71 IN | WEIGHT: 270 LBS | SYSTOLIC BLOOD PRESSURE: 151 MMHG | RESPIRATION RATE: 20 BRPM | TEMPERATURE: 98.6 F | HEART RATE: 83 BPM

## 2018-04-24 DIAGNOSIS — C18.9 COLON CANCER METASTASIZED TO LIVER (HCC): Primary | ICD-10-CM

## 2018-04-24 DIAGNOSIS — C78.7 COLON CANCER METASTASIZED TO LIVER (HCC): Primary | ICD-10-CM

## 2018-04-24 PROBLEM — R65.10 SIRS (SYSTEMIC INFLAMMATORY RESPONSE SYNDROME) (HCC): Status: RESOLVED | Noted: 2018-03-25 | Resolved: 2018-04-24

## 2018-04-24 PROBLEM — J18.9 PNEUMONIA: Status: RESOLVED | Noted: 2018-03-25 | Resolved: 2018-04-24

## 2018-04-24 RX ORDER — SODIUM CHLORIDE 0.9 % (FLUSH) 0.9 %
5-10 SYRINGE (ML) INJECTION AS NEEDED
Status: ACTIVE | OUTPATIENT
Start: 2018-04-24 | End: 2018-04-25

## 2018-04-24 RX ORDER — HEPARIN 100 UNIT/ML
500 SYRINGE INTRAVENOUS AS NEEDED
Status: ACTIVE | OUTPATIENT
Start: 2018-04-24 | End: 2018-04-25

## 2018-04-24 NOTE — PROGRESS NOTES
[facility]  [address] [facility], [address] [place] [address]  W: [phone]  F: [phone]     Reason for Visit:   Leatha Rivera is a 32 y.o. male who is seen for follow up of metastatic colon cancer. Treatment History:   · CT A/P 3/25/2018: Indeterminate 2 x 1 cm low-density liver lesion. · CT C/A/P with triphase liver 3/27/2018: No evidence of metastatic disease to the chest. Multiple ill-defined hepatic lesions. These do not represent simple cyst.  · CT guided liver biopsy 3/27/2018: metastatic adenocarcinoma, KRAS wild type, NRAS wild type  · Colectomy by Dr. Selvin Verdugo 3/29/2018: Adenocarcinoma, moderately differentiated. Negative margins. 3/16 nodes involved. pMMR  · Stage IV (pT3 pN1b pM1) Colon Cancer    History of Present Illness:   He presents today for chemotherapy start. He reports feeling okay. Mild nosebleed just started a few minutes ago but has stopped now. Pain improving. Moving bowels without difficulty. Eating well, good appetite with the help of marijuana edibles. Energy stable. No new complaints. He is accompanied by his girlfriend today. PAST HISTORY: The following sections were reviewed and updated in the EMR as appropriate: PMH, SH, FH, Medications, Allergies. No Known Allergies     Review of Systems: A complete review of systems was obtained, reviewed, and scanned into the EMR. Pertinent findings reviewed above.       Physical Exam:     Visit Vitals    BP (!) 151/94 (BP 1 Location: Right arm, BP Patient Position: Sitting)    Pulse 83    Temp 98.6 °F (37 °C) (Temporal)    Resp 20    Ht 5' 11\" (1.803 m)    Wt 270 lb (122.5 kg)    SpO2 98%    BMI 37.66 kg/m2     ECOG PS: 0  General: No distress  Eyes: PERRLA, anicteric sclerae  HENT: Atraumatic, OP clear  Neck: Supple  Lymphatic: No cervical, supraclavicular, or inguinal adenopathy  Respiratory: CTAB, normal respiratory effort  CV: Normal rate, regular rhythm, no murmurs, no peripheral edema  GI: Soft, nontender, nondistended, no masses, no hepatomegaly, no splenomegaly  MS: Normal gait and station. Digits without clubbing or cyanosis. Skin: No rashes, ecchymoses, or petechiae. Normal temperature, turgor, and texture. Psych: Alert, oriented, appropriate affect, normal judgment/insight    Results:     Lab Results   Component Value Date/Time    WBC 16.2 (H) 04/01/2018 02:45 AM    HGB 11.2 (L) 04/01/2018 02:45 AM    HCT 34.9 (L) 04/01/2018 02:45 AM    PLATELET 098 (H) 35/21/6097 02:45 AM    MCV 71.2 (L) 04/01/2018 02:45 AM    ABS. NEUTROPHILS 12.4 (H) 04/01/2018 02:45 AM     Lab Results   Component Value Date/Time    Sodium 137 03/31/2018 02:32 AM    Potassium 3.9 03/31/2018 02:32 AM    Chloride 103 03/31/2018 02:32 AM    CO2 27 03/31/2018 02:32 AM    Glucose 83 03/31/2018 02:32 AM    BUN 9 03/31/2018 02:32 AM    Creatinine 0.79 03/31/2018 02:32 AM    GFR est AA >60 03/31/2018 02:32 AM    GFR est non-AA >60 03/31/2018 02:32 AM    Calcium 8.4 (L) 03/31/2018 02:32 AM     Lab Results   Component Value Date/Time    Bilirubin, total 0.7 03/26/2018 06:00 AM    ALT (SGPT) 20 03/26/2018 06:00 AM    AST (SGOT) 10 (L) 03/26/2018 06:00 AM    Alk. phosphatase 70 03/26/2018 06:00 AM    Protein, total 6.9 03/26/2018 06:00 AM    Albumin 3.0 (L) 03/26/2018 06:00 AM    Globulin 3.9 03/26/2018 06:00 AM         Assessment:   1) Metastatic Colon Cancer  Stage IV, pMMR, KRAS/NRAS wild type  He has metastatic disease within his liver. His primary tumor has been resected. My recommendation is to start chemotherapy with FOLFOX. We will plan to add Bevacizumab with next cycle given recent surgery and port placement. We will restage after 6 cycles. Plan to have him follow up with surgical oncology at some point to discuss possible resection vs ablation of his liver metastases. The patient has consented to beginning therapy.  Plan had been to start today, but after further discussion about fertility risk, we have decided to postpone this for a week to give him time to pursue cryopreservation. We will plan to start next week. We will plan to discuss his case at our next tumor board. 2) Anemia  Mild, hopefully will improve now that his primary has been removed. Monitor. 3) Risk of infertility  We have previously reviewed that chemotherapy can potentially cause infertility. Information about fertility provided at his last visit, but he admits that he didn't give this much thought. I gave him and his girlfriend some time alone to discuss today before initiating chemotherapy, and he has decided to postpone therapy and pursue Sandra Orona. 4) Genetic risk  We discussed referral for genetic testing. pMMR argues against Hogan Syndrome, and no family history, but his young age warrants testing in my opinion. We will discuss further at a future visit. 5) Hypertension  Started on metoprolol during hospital stay. He is following with his PCP about this. Plan:     · Hold therapy today  · Referral to [facility] for cryopreservation  · Proceed next week with C#1 of mFOLFOX6 (oxaliplatin 85 mg/m2, leucovorin 400 mg/m2, fluorouracil 400 mg/m2, and a 46 hour infusion of fluorouracil 2400 mg/m2 given every 2 weeks). · Labs: CBC, BMP prior to each treatment, Hepatic function panel every 4 weeks  · Prophylactic antiemetics: Palonosetron and dexamethasone on the day of each chemotherapy infusion, with dexamethasone 8mg PO daily at home on days 2 and 3  · PRN antiemetics: Prochlorperazine and Ondansetron  · Return to clinic in 3 weeks with C#2    >50% of this 40 minute visit was spent on counseling/coordination of care regarding the above diagnoses.     Signed By: Maddison Aldrich MD

## 2018-04-24 NOTE — PROGRESS NOTES
[employer]  Social Work Navigator Encounter     Patient Name:  Lela Kee    Medical History: colon cancer    Narrative: Pt here with is girlfriend. Met with pt to provide support and resources. Pt provided me with Care Card application. Informed pt he will also need to provide band statements and pay stubs from all places of employment. Pt voiced understanding. Discussed disability benefits and he tells me he is thinking about when he wants to apply. Advised pt to let me know and I can assist.    Pt reports he live with his sister, brother in-law, and nieces/newphes. Pt's girlfriend has moved in with pt to help with with care needs as well. Pt tells me both of his parents have passes but he feels well supported by family and friends. Pt reports he is anxious about the unknown but feels he is doing okay. Offered active listening and normalized pt's feelings. Plan:   1. Pt will provide me with financials to submit with Care Card  2.  Ongoing psychosocial support as needed    -MASOOD Galvin

## 2018-04-25 RX ORDER — FLUOROURACIL 50 MG/ML
980 INJECTION, SOLUTION INTRAVENOUS ONCE
Status: COMPLETED | OUTPATIENT
Start: 2018-05-01 | End: 2018-05-01

## 2018-04-25 RX ORDER — PALONOSETRON 0.05 MG/ML
0.25 INJECTION, SOLUTION INTRAVENOUS ONCE
Status: COMPLETED | OUTPATIENT
Start: 2018-05-01 | End: 2018-05-01

## 2018-04-25 RX ORDER — DEXTROSE MONOHYDRATE 50 MG/ML
25 INJECTION, SOLUTION INTRAVENOUS CONTINUOUS
Status: DISPENSED | OUTPATIENT
Start: 2018-05-01 | End: 2018-05-01

## 2018-04-26 ENCOUNTER — TELEPHONE (OUTPATIENT)
Dept: ONCOLOGY | Age: 28
End: 2018-04-26

## 2018-04-26 ENCOUNTER — HOSPITAL ENCOUNTER (OUTPATIENT)
Dept: INFUSION THERAPY | Age: 28
End: 2018-04-26

## 2018-04-26 NOTE — TELEPHONE ENCOUNTER
[address] at [facility] [address]  [phone]    04/26/18Melany Hector from [place] [phone] called to report that patient has less than 5 million motile sperm- they were still able to freeze 3 viales. She wanted to provide our office an update- no further action needed.

## 2018-05-01 ENCOUNTER — HOSPITAL ENCOUNTER (OUTPATIENT)
Dept: INFUSION THERAPY | Age: 28
Discharge: HOME | End: 2018-05-01
Payer: COMMERCIAL

## 2018-05-01 VITALS
SYSTOLIC BLOOD PRESSURE: 146 MMHG | OXYGEN SATURATION: 95 % | HEIGHT: 72 IN | DIASTOLIC BLOOD PRESSURE: 92 MMHG | TEMPERATURE: 98 F | BODY MASS INDEX: 36.62 KG/M2 | RESPIRATION RATE: 18 BRPM | WEIGHT: 270.4 LBS | HEART RATE: 98 BPM

## 2018-05-01 LAB
ALBUMIN SERPL-MCNC: 3.4 G/DL (ref 3.5–5)
ALBUMIN/GLOB SERPL: 0.7 {RATIO} (ref 1.1–2.2)
ALP SERPL-CCNC: 74 U/L (ref 45–117)
ALT SERPL-CCNC: 48 U/L (ref 12–78)
ANION GAP SERPL CALC-SCNC: 7 MMOL/L (ref 5–15)
APPEARANCE UR: CLEAR
AST SERPL-CCNC: 21 U/L (ref 15–37)
BASOPHILS # BLD: 0 K/UL (ref 0–0.1)
BASOPHILS NFR BLD: 1 % (ref 0–1)
BILIRUB DIRECT SERPL-MCNC: <0.1 MG/DL (ref 0–0.2)
BILIRUB SERPL-MCNC: 0.3 MG/DL (ref 0.2–1)
BILIRUB UR QL: NEGATIVE
BUN SERPL-MCNC: 13 MG/DL (ref 6–20)
BUN/CREAT SERPL: 18 (ref 12–20)
CALCIUM SERPL-MCNC: 9.2 MG/DL (ref 8.5–10.1)
CHLORIDE SERPL-SCNC: 104 MMOL/L (ref 97–108)
CO2 SERPL-SCNC: 26 MMOL/L (ref 21–32)
COLOR UR: NORMAL
CREAT SERPL-MCNC: 0.74 MG/DL (ref 0.7–1.3)
DIFFERENTIAL METHOD BLD: ABNORMAL
EOSINOPHIL # BLD: 0.5 K/UL (ref 0–0.4)
EOSINOPHIL NFR BLD: 7 % (ref 0–7)
ERYTHROCYTE [DISTWIDTH] IN BLOOD BY AUTOMATED COUNT: 16.5 % (ref 11.5–14.5)
GLOBULIN SER CALC-MCNC: 4.7 G/DL (ref 2–4)
GLUCOSE SERPL-MCNC: 91 MG/DL (ref 65–100)
GLUCOSE UR STRIP.AUTO-MCNC: NEGATIVE MG/DL
HCT VFR BLD AUTO: 40.6 % (ref 36.6–50.3)
HGB BLD-MCNC: 12.9 G/DL (ref 12.1–17)
HGB UR QL STRIP: NEGATIVE
IMM GRANULOCYTES # BLD: 0 K/UL (ref 0–0.04)
IMM GRANULOCYTES NFR BLD AUTO: 0 % (ref 0–0.5)
KETONES UR QL STRIP.AUTO: NEGATIVE MG/DL
LEUKOCYTE ESTERASE UR QL STRIP.AUTO: NEGATIVE
LYMPHOCYTES # BLD: 1.7 K/UL (ref 0.8–3.5)
LYMPHOCYTES NFR BLD: 25 % (ref 12–49)
MCH RBC QN AUTO: 23.2 PG (ref 26–34)
MCHC RBC AUTO-ENTMCNC: 31.8 G/DL (ref 30–36.5)
MCV RBC AUTO: 73 FL (ref 80–99)
MONOCYTES # BLD: 0.5 K/UL (ref 0–1)
MONOCYTES NFR BLD: 7 % (ref 5–13)
NEUTS SEG # BLD: 4.2 K/UL (ref 1.8–8)
NEUTS SEG NFR BLD: 61 % (ref 32–75)
NITRITE UR QL STRIP.AUTO: NEGATIVE
NRBC # BLD: 0 K/UL (ref 0–0.01)
NRBC BLD-RTO: 0 PER 100 WBC
PH UR STRIP: 5.5 [PH] (ref 5–8)
PLATELET # BLD AUTO: 287 K/UL (ref 150–400)
PMV BLD AUTO: 9.5 FL (ref 8.9–12.9)
POTASSIUM SERPL-SCNC: 4.2 MMOL/L (ref 3.5–5.1)
PROT SERPL-MCNC: 8.1 G/DL (ref 6.4–8.2)
PROT UR STRIP-MCNC: NEGATIVE MG/DL
RBC # BLD AUTO: 5.56 M/UL (ref 4.1–5.7)
SODIUM SERPL-SCNC: 137 MMOL/L (ref 136–145)
SP GR UR REFRACTOMETRY: 1.02 (ref 1–1.03)
UROBILINOGEN UR QL STRIP.AUTO: 0.2 EU/DL (ref 0.2–1)
WBC # BLD AUTO: 7 K/UL (ref 4.1–11.1)

## 2018-05-01 PROCEDURE — 96416 CHEMO PROLONG INFUSE W/PUMP: CPT

## 2018-05-01 PROCEDURE — 85025 COMPLETE CBC W/AUTO DIFF WBC: CPT | Performed by: INTERNAL MEDICINE

## 2018-05-01 PROCEDURE — 74011250636 HC RX REV CODE- 250/636

## 2018-05-01 PROCEDURE — 96415 CHEMO IV INFUSION ADDL HR: CPT

## 2018-05-01 PROCEDURE — 74011000258 HC RX REV CODE- 258: Performed by: INTERNAL MEDICINE

## 2018-05-01 PROCEDURE — 77030012965 HC NDL HUBR BBMI -A

## 2018-05-01 PROCEDURE — 96375 TX/PRO/DX INJ NEW DRUG ADDON: CPT

## 2018-05-01 PROCEDURE — 96368 THER/DIAG CONCURRENT INF: CPT

## 2018-05-01 PROCEDURE — 96413 CHEMO IV INFUSION 1 HR: CPT

## 2018-05-01 PROCEDURE — 96411 CHEMO IV PUSH ADDL DRUG: CPT

## 2018-05-01 PROCEDURE — 74011000250 HC RX REV CODE- 250: Performed by: INTERNAL MEDICINE

## 2018-05-01 PROCEDURE — 81003 URINALYSIS AUTO W/O SCOPE: CPT | Performed by: INTERNAL MEDICINE

## 2018-05-01 PROCEDURE — 36415 COLL VENOUS BLD VENIPUNCTURE: CPT | Performed by: INTERNAL MEDICINE

## 2018-05-01 PROCEDURE — 74011250636 HC RX REV CODE- 250/636: Performed by: INTERNAL MEDICINE

## 2018-05-01 PROCEDURE — 80076 HEPATIC FUNCTION PANEL: CPT | Performed by: INTERNAL MEDICINE

## 2018-05-01 PROCEDURE — 96417 CHEMO IV INFUS EACH ADDL SEQ: CPT

## 2018-05-01 PROCEDURE — 80048 BASIC METABOLIC PNL TOTAL CA: CPT | Performed by: INTERNAL MEDICINE

## 2018-05-01 RX ORDER — SODIUM CHLORIDE 0.9 % (FLUSH) 0.9 %
5-10 SYRINGE (ML) INJECTION AS NEEDED
Status: ACTIVE | OUTPATIENT
Start: 2018-05-01 | End: 2018-05-02

## 2018-05-01 RX ORDER — HEPARIN 100 UNIT/ML
500 SYRINGE INTRAVENOUS AS NEEDED
Status: ACTIVE | OUTPATIENT
Start: 2018-05-01 | End: 2018-05-02

## 2018-05-01 RX ORDER — SODIUM CHLORIDE 9 MG/ML
10 INJECTION INTRAMUSCULAR; INTRAVENOUS; SUBCUTANEOUS AS NEEDED
Status: ACTIVE | OUTPATIENT
Start: 2018-05-01 | End: 2018-05-02

## 2018-05-01 RX ADMIN — PALONOSETRON HYDROCHLORIDE 0.25 MG: 0.25 INJECTION INTRAVENOUS at 11:33

## 2018-05-01 RX ADMIN — FLUOROURACIL 980 MG: 50 INJECTION, SOLUTION INTRAVENOUS at 16:25

## 2018-05-01 RX ADMIN — DEXAMETHASONE SODIUM PHOSPHATE 12 MG: 4 INJECTION, SOLUTION INTRA-ARTICULAR; INTRALESIONAL; INTRAMUSCULAR; INTRAVENOUS; SOFT TISSUE at 11:40

## 2018-05-01 RX ADMIN — DEXTROSE MONOHYDRATE 25 ML/HR: 5 INJECTION, SOLUTION INTRAVENOUS at 11:40

## 2018-05-01 RX ADMIN — FLUOROURACIL 5880 MG: 50 INJECTION, SOLUTION INTRAVENOUS at 16:30

## 2018-05-01 RX ADMIN — SODIUM CHLORIDE 10 ML: 9 INJECTION INTRAMUSCULAR; INTRAVENOUS; SUBCUTANEOUS at 16:24

## 2018-05-01 RX ADMIN — BEVACIZUMAB 600 MG: 400 INJECTION, SOLUTION INTRAVENOUS at 12:35

## 2018-05-01 RX ADMIN — SODIUM CHLORIDE 10 ML: 9 INJECTION INTRAMUSCULAR; INTRAVENOUS; SUBCUTANEOUS at 10:00

## 2018-05-01 RX ADMIN — OXALIPLATIN 210 MG: 5 INJECTION, SOLUTION, CONCENTRATE INTRAVENOUS at 14:20

## 2018-05-01 RX ADMIN — LEUCOVORIN CALCIUM 980 MG: 350 INJECTION, POWDER, LYOPHILIZED, FOR SOLUTION INTRAMUSCULAR; INTRAVENOUS at 14:20

## 2018-05-01 NOTE — PROGRESS NOTES
Problem: Chemotherapy Treatment  Goal: *Chemotherapy regimen followed  Outcome: Progressing Towards Goal  C1 Folfox/Avastin

## 2018-05-01 NOTE — PROGRESS NOTES
[facility] Progress Note    Date: May 1, 2018    Name: Arcelia Ridley    MRN: [id number]         : 1990    0945. Mr. Emily Meier Arrived ambulatory and in no distress for C1 Folfox regimen. Assessment was completed, patient reports he has a bump to the back of his head- thinking it is possibly an ingrown hair. Patient reports he has an appointment with PCP regarding this & also to address his elevated BP. Dr. Melody Francis office aware of elevated BP. Education regarding chemotherapy regimen provided to patient-hand outs given. Patient verbalized understanding. Questions clarified. R chest wall port accessed without difficulty using 1 inch costello needle, labs drawn and in process. + blood return. Chemotherapy Flowsheet 2018   Cycle C1   Date 2018   Drug / Regimen Folfox   Notes Given         Mr. Blair Torres vitals were reviewed. Patient Vitals for the past 12 hrs:   Temp Pulse Resp BP SpO2   18 1619 98 °F (36.7 °C) 98 18 (!) 146/92 95 %   18 1256 - 81 - 129/90 -   18 0941 97.8 °F (36.6 °C) 83 18 (!) 136/92 -       Lab results were obtained and reviewed. Recent Results (from the past 12 hour(s))   CBC WITH AUTOMATED DIFF    Collection Time: 18  9:51 AM   Result Value Ref Range    WBC 7.0 4.1 - 11.1 K/uL    RBC 5.56 4.10 - 5.70 M/uL    HGB 12.9 12.1 - 17.0 g/dL    HCT 40.6 36.6 - 50.3 %    MCV 73.0 (L) 80.0 - 99.0 FL    MCH 23.2 (L) 26.0 - 34.0 PG    MCHC 31.8 30.0 - 36.5 g/dL    RDW 16.5 (H) 11.5 - 14.5 %    PLATELET 728 522 - 725 K/uL    MPV 9.5 8.9 - 12.9 FL    NRBC 0.0 0  WBC    ABSOLUTE NRBC 0.00 0.00 - 0.01 K/uL    NEUTROPHILS 61 32 - 75 %    LYMPHOCYTES 25 12 - 49 %    MONOCYTES 7 5 - 13 %    EOSINOPHILS 7 0 - 7 %    BASOPHILS 1 0 - 1 %    IMMATURE GRANULOCYTES 0 0.0 - 0.5 %    ABS. NEUTROPHILS 4.2 1.8 - 8.0 K/UL    ABS. LYMPHOCYTES 1.7 0.8 - 3.5 K/UL    ABS. MONOCYTES 0.5 0.0 - 1.0 K/UL    ABS. EOSINOPHILS 0.5 (H) 0.0 - 0.4 K/UL    ABS. BASOPHILS 0.0 0.0 - 0.1 K/UL    ABS. IMMCampos Sky. 0.0 0.00 - 0.04 K/UL    DF AUTOMATED     METABOLIC PANEL, BASIC    Collection Time: 05/01/18  9:51 AM   Result Value Ref Range    Sodium 137 136 - 145 mmol/L    Potassium 4.2 3.5 - 5.1 mmol/L    Chloride 104 97 - 108 mmol/L    CO2 26 21 - 32 mmol/L    Anion gap 7 5 - 15 mmol/L    Glucose 91 65 - 100 mg/dL    BUN 13 6 - 20 MG/DL    Creatinine 0.74 0.70 - 1.30 MG/DL    BUN/Creatinine ratio 18 12 - 20      GFR est AA >60 >60 ml/min/1.73m2    GFR est non-AA >60 >60 ml/min/1.73m2    Calcium 9.2 8.5 - 10.1 MG/DL   HEPATIC FUNCTION PANEL    Collection Time: 05/01/18  9:51 AM   Result Value Ref Range    Protein, total 8.1 6.4 - 8.2 g/dL    Albumin 3.4 (L) 3.5 - 5.0 g/dL    Globulin 4.7 (H) 2.0 - 4.0 g/dL    A-G Ratio 0.7 (L) 1.1 - 2.2      Bilirubin, total 0.3 0.2 - 1.0 MG/DL    Bilirubin, direct <0.1 0.0 - 0.2 MG/DL    Alk. phosphatase 74 45 - 117 U/L    AST (SGOT) 21 15 - 37 U/L    ALT (SGPT) 48 12 - 78 U/L   URINALYSIS W/ RFLX MICROSCOPIC    Collection Time: 05/01/18 10:26 AM   Result Value Ref Range    Color YELLOW/STRAW      Appearance CLEAR CLEAR      Specific gravity 1.024 1.003 - 1.030      pH (UA) 5.5 5.0 - 8.0      Protein NEGATIVE  NEG mg/dL    Glucose NEGATIVE  NEG mg/dL    Ketone NEGATIVE  NEG mg/dL    Bilirubin NEGATIVE  NEG      Blood NEGATIVE  NEG      Urobilinogen 0.2 0.2 - 1.0 EU/dL    Nitrites NEGATIVE  NEG      Leukocyte Esterase NEGATIVE  NEG         Pre-medications  were administered as ordered and chemotherapy was initiated. Medications:  Dexamethasone IVPB  Aloxi IVP  Avastin IV  Oxaliplatin IV  Leucovorin IV  5FU IVP  5FU CADD pump    1645. Mr. Linda Gonsales tolerated treatment well and was discharged from [place] [address] in stable condition. Patient was monitored for 15 minutes after CADD pump attached- reports no signs/symptoms of adverse reactions. Port with + blood return prior to connecting pump- dressing reinforced. CADD pump attached to patient & infusing per order.   He is to return on 05/03/18 @ 1600 for his next appointment.     Vika Darden RN  May 1, 2018

## 2018-05-03 ENCOUNTER — HOSPITAL ENCOUNTER (OUTPATIENT)
Dept: INFUSION THERAPY | Age: 28
Discharge: HOME | End: 2018-05-03
Payer: COMMERCIAL

## 2018-05-03 VITALS
TEMPERATURE: 97.1 F | RESPIRATION RATE: 18 BRPM | SYSTOLIC BLOOD PRESSURE: 139 MMHG | DIASTOLIC BLOOD PRESSURE: 92 MMHG | HEART RATE: 93 BPM

## 2018-05-03 PROCEDURE — 96523 IRRIG DRUG DELIVERY DEVICE: CPT

## 2018-05-03 PROCEDURE — 74011250636 HC RX REV CODE- 250/636: Performed by: INTERNAL MEDICINE

## 2018-05-03 RX ORDER — HEPARIN 100 UNIT/ML
500 SYRINGE INTRAVENOUS AS NEEDED
Status: ACTIVE | OUTPATIENT
Start: 2018-05-03 | End: 2018-05-04

## 2018-05-03 RX ORDER — SODIUM CHLORIDE 0.9 % (FLUSH) 0.9 %
5-10 SYRINGE (ML) INJECTION AS NEEDED
Status: ACTIVE | OUTPATIENT
Start: 2018-05-03 | End: 2018-05-04

## 2018-05-03 RX ADMIN — SODIUM CHLORIDE, PRESERVATIVE FREE 500 UNITS: 5 INJECTION INTRAVENOUS at 16:10

## 2018-05-03 RX ADMIN — Medication 10 ML: at 16:10

## 2018-05-03 NOTE — PROGRESS NOTES
[facility] Progress Note    Date: May 3, 2018    Name: Satya Angeles    MRN: [id number]         : 1990    1600. Mr. Elaine Wallace Arrived ambulatory and in no distress for Pump Removal.  Assessment was completed, no acute issues at this time, no new complaints voiced. CADD pump complete- 100 ml infused. Port still with + blood return. Port de-accessed, flushed & heparinized per protocol. Mr. Arnoldo Darden vitals were reviewed. Patient Vitals for the past 12 hrs:   Temp Pulse Resp BP   18 1558 97.1 °F (36.2 °C) 93 18 (!) 139/92       1610. Mr. Elaine Wallace tolerated treatment well and was discharged from [place] [address] in stable condition. He is to return on 05/15/18 @ 0900 for his next appointment.     Omar Weinstein RN  May 3, 2018

## 2018-05-08 ENCOUNTER — OFFICE VISIT (OUTPATIENT)
Dept: FAMILY MEDICINE CLINIC | Age: 28
End: 2018-05-08

## 2018-05-08 ENCOUNTER — APPOINTMENT (OUTPATIENT)
Dept: INFUSION THERAPY | Age: 28
End: 2018-05-08
Payer: COMMERCIAL

## 2018-05-08 VITALS
WEIGHT: 270 LBS | HEART RATE: 87 BPM | TEMPERATURE: 98.4 F | HEIGHT: 72 IN | DIASTOLIC BLOOD PRESSURE: 80 MMHG | OXYGEN SATURATION: 97 % | RESPIRATION RATE: 16 BRPM | BODY MASS INDEX: 36.57 KG/M2 | SYSTOLIC BLOOD PRESSURE: 127 MMHG

## 2018-05-08 DIAGNOSIS — C18.9 MALIGNANT NEOPLASM OF COLON, UNSPECIFIED PART OF COLON (HCC): ICD-10-CM

## 2018-05-08 DIAGNOSIS — F43.20 ADJUSTMENT DISORDER, UNSPECIFIED TYPE: ICD-10-CM

## 2018-05-08 DIAGNOSIS — R06.83 SNORING: ICD-10-CM

## 2018-05-08 DIAGNOSIS — I10 ESSENTIAL HYPERTENSION: Primary | ICD-10-CM

## 2018-05-08 NOTE — PROGRESS NOTES
Chief Complaint   Patient presents with    Blood Pressure Check     1. Have you been to the ER, urgent care clinic since your last visit? Hospitalized since your last visit? No    2. Have you seen or consulted any other health care providers outside of the [address] [place] [address] since your last visit? Include any pap smears or colon screening.  No

## 2018-05-08 NOTE — PROGRESS NOTES
HPI     Chief Complaint   Patient presents with    Blood Pressure Check     He is a 32 y.o. male who presents for adjustment disorder follow up and HTN. Adjustment Disorder  - Diagnosed with colon cancer a little over a month ago  - Currently undergoing chemotherapy   - States he has been reaching out to close friends and doing better  - No thoughts of self harm or homocidal ideation  - Last PHQ9 7    HTN  - Casey on Metoprolol Tartrate 25mg BID  - States when he went to infusion center his DBP was still high in 90s  - Has not been taking his BP at home  - Endorses occasional headaches, denies blurred vision  - States he was feeling very anxious that day   - Repeat 127/80     Colon Cancer  - Started chemo last week  - Followed by Dr. Audi Lindsay he thinks he has sleep apnea  - Endorses loud snoring that wakes others up  - Often has choking/ gasping sensations in his sleep that wake him up  - Often feels fatigued    Review of Systems   Constitutional: Negative for chills and fever. Eyes: Negative for visual disturbance. Neurological: Positive for headaches. Psychiatric/Behavioral: Negative for self-injury and suicidal ideas. The patient is not nervous/anxious. Reviewed PmHx, RxHx, FmHx, SocHx, AllgHx and updated and dated in the chart. Physical Exam:  Visit Vitals    /80 (BP 1 Location: Left arm, BP Patient Position: Sitting)    Pulse 87    Temp 98.4 °F (36.9 °C) (Oral)    Resp 16    Ht 6' (1.829 m)    Wt 270 lb (122.5 kg)    SpO2 97%    BMI 36.62 kg/m2     Physical Exam   Constitutional: He is oriented to person, place, and time. He appears well-developed and well-nourished. No distress. Cardiovascular: Normal rate, regular rhythm and normal heart sounds. Exam reveals no gallop and no friction rub. No murmur heard. Pulmonary/Chest: Effort normal and breath sounds normal. No respiratory distress. He has no wheezes. He has no rales. Abdominal: Soft.  He exhibits no distension and no mass. There is no tenderness. There is no rebound and no guarding. Musculoskeletal: He exhibits no edema or tenderness. Neurological: He is alert and oriented to person, place, and time. Skin: Skin is warm and dry. He is not diaphoretic. Psychiatric: He has a normal mood and affect. His behavior is normal. Judgment and thought content normal.   Vitals reviewed. New onset depression:   9 Sx checklist. \"over the last 2 weeks how often have you had or been\"  (Answers:none=0, several days a week=1, more than half of the days=2, nearly every day=3)       Pleasure in doing things? 1   Feeling down or depressed? 0   Trouble sleeping?  0   No energy or fatigue? 1   Poor appetite or overeating? 0   Feeling you have let others down or you are a failure? 0   Trouble concentrating? 0   Moving slowly or fidgety all the time? 0   Thinking you would be better off dead or thoughts of hurting yourself? 0      Total: 2    (5-14 suspicious, >15 warrants medication and /or counseling)    No results found for this or any previous visit (from the past 12 hour(s)).        Assessment / Plan     Adjustment Disorder  - PHQ9 down from 7 to 2  - Patient states he is in a much better place now and sx have resolved  - Denies SI/ HI  - States he has close friends and family that he can reach out to    HTN  - Normotensive on repeat  - Continue Lopressor 25mg daily  - Discussed buying a home BP cuff and taking values at home if he has >4 SBP over 140 and/or >4 DBP over 90 in the next 2 weeks to call the office back and schedule an appt  - Likely 2/2 anxiety    Snoring  - Hx concerning for sleep apnea  - Will place consult for sleep specialist at this time    Metastatic Colon Cancer  - Followed by Dr. Cedric Ramos  - PARMINDER on Flu and PNA  - States he is having genetic testing done with the cancer center    Follow up in 2-3 months if BP WNL    I have discussed the diagnosis with the patient and the intended plan as seen in the above orders. The patient has received an after-visit summary and questions were answered concerning future plans. I have discussed medication side effects and warnings with the patient as well.     Patient discussed with MD Erin Mcelroy DO  Family Medicine Resident

## 2018-05-08 NOTE — MR AVS SNAPSHOT
[address] 
[phone] Patient: Leonidas Ellis 
MRN: [id number] :1990 Visit Information Date & Time Provider Department Dept. Phone Encounter #  
 2018  2:00 PM Db Hopkins, [facility][1] [phone] [id number] Your Appointments 5/15/2018  9:15 AM  
ESTABLISHED PATIENT with [facility] at [facility]) Appt Note: labs, Theresa Forget, FOLFOX #2.  
 301 [facility], [address] [place] [phone]  
[phone]  
  
   
 [address] Upcoming Health Maintenance Date Due Pneumococcal 19-64 Highest Risk (1 of 3 - PCV13) 2007 DTaP/Tdap/Td series (1 - Tdap) 2009 Influenza Age 5 to Adult 2018 Allergies as of 2018  Review Complete On: 2018 By: Jude Blood LPN No Known Allergies Current Immunizations  Reviewed on 5/3/2018 Name Date Influenza Vaccine (Quad) PF 2018 Not reviewed this visit You Were Diagnosed With   
  
 Codes Comments Snoring    -  Primary ICD-10-CM: R06.83 
ICD-9-CM: 786.09 Vitals BP Pulse Temp Resp Height(growth percentile) Weight(growth percentile) 146/84 (BP 1 Location: Left arm, BP Patient Position: Sitting) 87 98.4 °F (36.9 °C) (Oral) 16 6' (1.829 m) 270 lb (122.5 kg) SpO2 BMI Smoking Status 97% 36.62 kg/m2 Never Smoker Vitals History BMI and BSA Data Body Mass Index Body Surface Area  
 36.62 kg/m 2 2.49 m 2 Preferred Pharmacy Pharmacy Name Phone [facility] Lucrecia Martin, [address] [place] [address] [phone] Your Updated Medication List  
  
   
This list is accurate as of 18  2:25 PM.  Always use your most recent med list.  
  
  
  
  
 dexamethasone 4 mg tablet Commonly known as:  DECADRON Take 8mg (two tabs) daily in the morning for two days after chemotherapy (while pump is infusing). lidocaine-prilocaine topical cream  
Commonly known as:  EMLA Apply  to affected area as needed for Pain (Apply 30-60 min. prior to having your port accessed). metoprolol tartrate 25 mg tablet Commonly known as:  LOPRESSOR Take 1 Tab by mouth every twelve (12) hours. ondansetron hcl 8 mg tablet Commonly known as:  Ethelle Sites Take 1 Tab by mouth every eight (8) hours as needed for Nausea. OTHER Sperm cryopreservation Dx: C18.9-colon cancer  
  
 prochlorperazine 10 mg tablet Commonly known as:  COMPAZINE Take 1 Tab by mouth every six (6) hours as needed for Nausea. We Performed the Following SLEEP MEDICINE REFERRAL [GKR810 Custom] Comments:  
 Orders: 
Sleep Medicine Consult - Schedule patient for a sleep specialist consult. If appropriate, schedule patient for sleep study(s). Initiate treatment if needed. Forward correspondance to my office. To-Do List   
 05/15/2018 9:00 AM  
  Appointment with Rupert Ortizes 5 at [place] [address] ([phone])  
  
 05/17/2018 4:00 PM  
  Appointment with Soila Traci at [place] [address] ([phone])  
  
 05/29/2018 10:00 AM  
  Appointment with [address] Th[address] Los Allan 2 at [place] [address] ([phone])  
  
 05/31/2018 4:00 PM  
  Appointment with Soila Traci at [place] [address] ([phone]) Referral Information Referral ID Referred By Referred To  
  
 [id number] Cy BENNETT MD   
   [address] [place] [address] Phone: [phone] Fax: [phone] Visits Status Start Date End Date 1 New Request 5/8/18 5/8/19 If your referral has a status of pending review or denied, additional information will be sent to support the outcome of this decision. Patient Instructions Sleep Apnea: Care Instructions Your Care Instructions Sleep apnea means that you frequently stop breathing for 10 seconds or longer during sleep. It can be mild to severe, based on the number of times an hour that you stop breathing or have slowed breathing. Blocked or narrowed airways in your nose, mouth, or throat can cause sleep apnea. Your airway can become blocked when your throat muscles and tongue relax during sleep. You can treat sleep apnea at home by making lifestyle changes. You also can use a CPAP breathing machine that keeps tissues in the throat from blocking your airway. Or your doctor may suggest that you use a breathing device while you sleep. It helps keep your airway open. This could be a device that you put in your mouth. Other examples include strips or disks that you use on your nose. In some cases, surgery may be needed to remove enlarged tissues in the throat. Follow-up care is a key part of your treatment and safety. Be sure to make and go to all appointments, and call your doctor if you are having problems. It's also a good idea to know your test results and keep a list of the medicines you take. How can you care for yourself at home? · Lose weight, if needed. It may reduce the number of times you stop breathing or have slowed breathing. · Sleep on your side. It may stop mild apnea. If you tend to roll onto your back, sew a pocket in the back of your pajama top. Put a tennis ball into the pocket, and stitch the pocket shut. This will help keep you from sleeping on your back. · Avoid alcohol and medicines such as sleeping pills and sedatives before bed. · Do not smoke. Smoking can make sleep apnea worse. If you need help quitting, talk to your doctor about stop-smoking programs and medicines. These can increase your chances of quitting for good. · Prop up the head of your bed 4 to 6 inches by putting bricks under the legs of the bed. · Treat breathing problems, such as a stuffy nose, caused by a cold or allergies. · Try a continuous positive airway pressure (CPAP) breathing machine if your doctor recommends it. The machine keeps your airway open when you sleep. · If CPAP does not work for you, ask your doctor if you can try other breathing machines. A bilevel positive airway pressure machine uses one type of air pressure for breathing in and another type for breathing out. Another device raises or lowers air pressure as needed while you breathe. · Talk to your doctor if: 
¨ Your nose feels dry or bleeds when you use one of these machines. You may need to increase moisture in the air. A humidifier may help. ¨ Your nose is runny or stuffy from using a breathing machine. Decongestants or a corticosteroid nasal spray may help. ¨ You are sleepy during the day and it gets in the way of the normal things you do. Do not drive when you are drowsy. When should you call for help? Watch closely for changes in your health, and be sure to contact your doctor if: 
? · You still have sleep apnea even though you have made lifestyle changes. ? · You are thinking of trying a device such as CPAP. ? · You are having problems using a CPAP or similar machine. Where can you learn more? Go to http://anna-vasquez.info/. Enter R882 in the search box to learn more about \"Sleep Apnea: Care Instructions. \" Current as of: May 12, 2017 Content Version: 11.4 © 5041-3194 [employer]. Care instructions adapted under license by [employer] (which disclaims liability or warranty for this information). If you have questions about a medical condition or this instruction, always ask your healthcare professional. [address] any warranty or liability for your use of this information. 
  
 
 
 
  
Introducing [facility]! Dear Salvador Guy: Thank you for requesting a Artspace account. Our records indicate that you already have an active Artspace account. You can access your account anytime at [email or web address] Did you know that you can access your hospital and ER discharge instructions at any time in Artspace? You can also review all of your test results from your hospital stay or ER visit. Additional Information If you have questions, please visit the Frequently Asked Questions section of the Artspace website at [email or web address] Remember, Artspace is NOT to be used for urgent needs. For medical emergencies, dial 911. Now available from your iPhone and Android! Please provide this summary of care documentation to your next provider. Your primary care clinician is listed as Tomas Angulo. If you have any questions after today's visit, please call [phone].

## 2018-05-08 NOTE — PATIENT INSTRUCTIONS
Sleep Apnea: Care Instructions  Your Care Instructions    Sleep apnea means that you frequently stop breathing for 10 seconds or longer during sleep. It can be mild to severe, based on the number of times an hour that you stop breathing or have slowed breathing. Blocked or narrowed airways in your nose, mouth, or throat can cause sleep apnea. Your airway can become blocked when your throat muscles and tongue relax during sleep. You can treat sleep apnea at home by making lifestyle changes. You also can use a CPAP breathing machine that keeps tissues in the throat from blocking your airway. Or your doctor may suggest that you use a breathing device while you sleep. It helps keep your airway open. This could be a device that you put in your mouth. Other examples include strips or disks that you use on your nose. In some cases, surgery may be needed to remove enlarged tissues in the throat. Follow-up care is a key part of your treatment and safety. Be sure to make and go to all appointments, and call your doctor if you are having problems. It's also a good idea to know your test results and keep a list of the medicines you take. How can you care for yourself at home? · Lose weight, if needed. It may reduce the number of times you stop breathing or have slowed breathing. · Sleep on your side. It may stop mild apnea. If you tend to roll onto your back, sew a pocket in the back of your pajama top. Put a tennis ball into the pocket, and stitch the pocket shut. This will help keep you from sleeping on your back. · Avoid alcohol and medicines such as sleeping pills and sedatives before bed. · Do not smoke. Smoking can make sleep apnea worse. If you need help quitting, talk to your doctor about stop-smoking programs and medicines. These can increase your chances of quitting for good. · Prop up the head of your bed 4 to 6 inches by putting bricks under the legs of the bed.   · Treat breathing problems, such as a stuffy nose, caused by a cold or allergies. · Try a continuous positive airway pressure (CPAP) breathing machine if your doctor recommends it. The machine keeps your airway open when you sleep. · If CPAP does not work for you, ask your doctor if you can try other breathing machines. A bilevel positive airway pressure machine uses one type of air pressure for breathing in and another type for breathing out. Another device raises or lowers air pressure as needed while you breathe. · Talk to your doctor if:  ¨ Your nose feels dry or bleeds when you use one of these machines. You may need to increase moisture in the air. A humidifier may help. ¨ Your nose is runny or stuffy from using a breathing machine. Decongestants or a corticosteroid nasal spray may help. ¨ You are sleepy during the day and it gets in the way of the normal things you do. Do not drive when you are drowsy. When should you call for help? Watch closely for changes in your health, and be sure to contact your doctor if:  ? · You still have sleep apnea even though you have made lifestyle changes. ? · You are thinking of trying a device such as CPAP. ? · You are having problems using a CPAP or similar machine. Where can you learn more? Go to http://anna-vasquez.info/. Enter Q642 in the search box to learn more about \"Sleep Apnea: Care Instructions. \"  Current as of: May 12, 2017  Content Version: 11.4  © 2469-2997 [employer]. Care instructions adapted under license by [employer] (which disclaims liability or warranty for this information).  If you have questions about a medical condition or this instruction, always ask your healthcare professional. [place] [address] any warranty or liability for your use of this information.

## 2018-05-10 ENCOUNTER — APPOINTMENT (OUTPATIENT)
Dept: INFUSION THERAPY | Age: 28
End: 2018-05-10
Payer: COMMERCIAL

## 2018-05-10 RX ORDER — DEXTROSE MONOHYDRATE 50 MG/ML
25 INJECTION, SOLUTION INTRAVENOUS CONTINUOUS
Status: DISPENSED | OUTPATIENT
Start: 2018-05-15 | End: 2018-05-15

## 2018-05-10 RX ORDER — FLUOROURACIL 50 MG/ML
980 INJECTION, SOLUTION INTRAVENOUS ONCE
Status: COMPLETED | OUTPATIENT
Start: 2018-05-15 | End: 2018-05-15

## 2018-05-10 RX ORDER — PALONOSETRON 0.05 MG/ML
0.25 INJECTION, SOLUTION INTRAVENOUS ONCE
Status: COMPLETED | OUTPATIENT
Start: 2018-05-15 | End: 2018-05-15

## 2018-05-14 ENCOUNTER — TELEPHONE (OUTPATIENT)
Dept: ONCOLOGY | Age: 28
End: 2018-05-14

## 2018-05-14 NOTE — TELEPHONE ENCOUNTER
[facility]  Social Work Navigator Encounter         5/14/18 1:46pm - Returned pt's phone call regarding Care Card application. 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 Pt voiced he would still like to apply for the Care Card. Reminded pt the additional financial documentation he needs to provide (gil statements and pay stubs). Pt voiced understanding. He states he will bring them when he RTO on 5/15. No additional barriers at this time.

## 2018-05-15 ENCOUNTER — HOSPITAL ENCOUNTER (OUTPATIENT)
Dept: INFUSION THERAPY | Age: 28
Discharge: HOME | End: 2018-05-15
Payer: COMMERCIAL

## 2018-05-15 ENCOUNTER — OFFICE VISIT (OUTPATIENT)
Dept: ONCOLOGY | Age: 28
End: 2018-05-15

## 2018-05-15 VITALS
WEIGHT: 272.9 LBS | HEART RATE: 79 BPM | OXYGEN SATURATION: 93 % | SYSTOLIC BLOOD PRESSURE: 133 MMHG | DIASTOLIC BLOOD PRESSURE: 84 MMHG | RESPIRATION RATE: 16 BRPM | BODY MASS INDEX: 38.21 KG/M2 | HEIGHT: 71 IN | TEMPERATURE: 97.7 F

## 2018-05-15 VITALS
OXYGEN SATURATION: 94 % | TEMPERATURE: 98.2 F | BODY MASS INDEX: 36.84 KG/M2 | DIASTOLIC BLOOD PRESSURE: 97 MMHG | HEART RATE: 91 BPM | WEIGHT: 272 LBS | SYSTOLIC BLOOD PRESSURE: 142 MMHG | HEIGHT: 72 IN | RESPIRATION RATE: 18 BRPM

## 2018-05-15 DIAGNOSIS — C18.9 COLON CANCER METASTASIZED TO LIVER (HCC): Primary | ICD-10-CM

## 2018-05-15 DIAGNOSIS — R11.0 CHEMOTHERAPY-INDUCED NAUSEA: ICD-10-CM

## 2018-05-15 DIAGNOSIS — T45.1X5A CHEMOTHERAPY-INDUCED NAUSEA: ICD-10-CM

## 2018-05-15 DIAGNOSIS — C78.7 COLON CANCER METASTASIZED TO LIVER (HCC): Primary | ICD-10-CM

## 2018-05-15 LAB
ANION GAP SERPL CALC-SCNC: 8 MMOL/L (ref 5–15)
APPEARANCE UR: CLEAR
BASOPHILS # BLD: 0.1 K/UL (ref 0–0.1)
BASOPHILS NFR BLD: 1 % (ref 0–1)
BILIRUB UR QL: NEGATIVE
BUN SERPL-MCNC: 14 MG/DL (ref 6–20)
BUN/CREAT SERPL: 18 (ref 12–20)
CALCIUM SERPL-MCNC: 8.9 MG/DL (ref 8.5–10.1)
CHLORIDE SERPL-SCNC: 104 MMOL/L (ref 97–108)
CO2 SERPL-SCNC: 26 MMOL/L (ref 21–32)
COLOR UR: NORMAL
CREAT SERPL-MCNC: 0.78 MG/DL (ref 0.7–1.3)
DIFFERENTIAL METHOD BLD: ABNORMAL
EOSINOPHIL # BLD: 0.3 K/UL (ref 0–0.4)
EOSINOPHIL NFR BLD: 4 % (ref 0–7)
ERYTHROCYTE [DISTWIDTH] IN BLOOD BY AUTOMATED COUNT: 15.9 % (ref 11.5–14.5)
GLUCOSE SERPL-MCNC: 89 MG/DL (ref 65–100)
GLUCOSE UR STRIP.AUTO-MCNC: NEGATIVE MG/DL
HCT VFR BLD AUTO: 39.7 % (ref 36.6–50.3)
HGB BLD-MCNC: 12.7 G/DL (ref 12.1–17)
HGB UR QL STRIP: NEGATIVE
IMM GRANULOCYTES # BLD: 0 K/UL (ref 0–0.04)
IMM GRANULOCYTES NFR BLD AUTO: 0 % (ref 0–0.5)
KETONES UR QL STRIP.AUTO: NEGATIVE MG/DL
LEUKOCYTE ESTERASE UR QL STRIP.AUTO: NEGATIVE
LYMPHOCYTES # BLD: 1.7 K/UL (ref 0.8–3.5)
LYMPHOCYTES NFR BLD: 29 % (ref 12–49)
MCH RBC QN AUTO: 22.9 PG (ref 26–34)
MCHC RBC AUTO-ENTMCNC: 32 G/DL (ref 30–36.5)
MCV RBC AUTO: 71.7 FL (ref 80–99)
MONOCYTES # BLD: 0.5 K/UL (ref 0–1)
MONOCYTES NFR BLD: 8 % (ref 5–13)
NEUTS SEG # BLD: 3.3 K/UL (ref 1.8–8)
NEUTS SEG NFR BLD: 57 % (ref 32–75)
NITRITE UR QL STRIP.AUTO: NEGATIVE
NRBC # BLD: 0 K/UL (ref 0–0.01)
NRBC BLD-RTO: 0 PER 100 WBC
PH UR STRIP: 5.5 [PH] (ref 5–8)
PLATELET # BLD AUTO: 360 K/UL (ref 150–400)
PMV BLD AUTO: 8.7 FL (ref 8.9–12.9)
POTASSIUM SERPL-SCNC: 4.1 MMOL/L (ref 3.5–5.1)
PROT UR STRIP-MCNC: NEGATIVE MG/DL
RBC # BLD AUTO: 5.54 M/UL (ref 4.1–5.7)
SODIUM SERPL-SCNC: 138 MMOL/L (ref 136–145)
SP GR UR REFRACTOMETRY: 1.02 (ref 1–1.03)
UROBILINOGEN UR QL STRIP.AUTO: 0.2 EU/DL (ref 0.2–1)
WBC # BLD AUTO: 5.9 K/UL (ref 4.1–11.1)

## 2018-05-15 PROCEDURE — 96367 TX/PROPH/DG ADDL SEQ IV INF: CPT

## 2018-05-15 PROCEDURE — 85025 COMPLETE CBC W/AUTO DIFF WBC: CPT | Performed by: INTERNAL MEDICINE

## 2018-05-15 PROCEDURE — 74011250636 HC RX REV CODE- 250/636: Performed by: INTERNAL MEDICINE

## 2018-05-15 PROCEDURE — 96417 CHEMO IV INFUS EACH ADDL SEQ: CPT

## 2018-05-15 PROCEDURE — 74011000250 HC RX REV CODE- 250: Performed by: INTERNAL MEDICINE

## 2018-05-15 PROCEDURE — 96416 CHEMO PROLONG INFUSE W/PUMP: CPT

## 2018-05-15 PROCEDURE — 96375 TX/PRO/DX INJ NEW DRUG ADDON: CPT

## 2018-05-15 PROCEDURE — 80048 BASIC METABOLIC PNL TOTAL CA: CPT | Performed by: INTERNAL MEDICINE

## 2018-05-15 PROCEDURE — 74011000258 HC RX REV CODE- 258: Performed by: INTERNAL MEDICINE

## 2018-05-15 PROCEDURE — 96415 CHEMO IV INFUSION ADDL HR: CPT

## 2018-05-15 PROCEDURE — 81003 URINALYSIS AUTO W/O SCOPE: CPT | Performed by: INTERNAL MEDICINE

## 2018-05-15 PROCEDURE — 96413 CHEMO IV INFUSION 1 HR: CPT

## 2018-05-15 PROCEDURE — 36415 COLL VENOUS BLD VENIPUNCTURE: CPT | Performed by: INTERNAL MEDICINE

## 2018-05-15 PROCEDURE — 96368 THER/DIAG CONCURRENT INF: CPT

## 2018-05-15 PROCEDURE — 96411 CHEMO IV PUSH ADDL DRUG: CPT

## 2018-05-15 PROCEDURE — 77030012965 HC NDL HUBR BBMI -A

## 2018-05-15 PROCEDURE — 74011250636 HC RX REV CODE- 250/636

## 2018-05-15 RX ORDER — SODIUM CHLORIDE 0.9 % (FLUSH) 0.9 %
5-10 SYRINGE (ML) INJECTION AS NEEDED
Status: ACTIVE | OUTPATIENT
Start: 2018-05-15 | End: 2018-05-16

## 2018-05-15 RX ORDER — SODIUM CHLORIDE 9 MG/ML
10 INJECTION INTRAMUSCULAR; INTRAVENOUS; SUBCUTANEOUS AS NEEDED
Status: ACTIVE | OUTPATIENT
Start: 2018-05-15 | End: 2018-05-16

## 2018-05-15 RX ORDER — HEPARIN 100 UNIT/ML
500 SYRINGE INTRAVENOUS AS NEEDED
Status: ACTIVE | OUTPATIENT
Start: 2018-05-15 | End: 2018-05-16

## 2018-05-15 RX ADMIN — LEUCOVORIN CALCIUM 980 MG: 350 INJECTION, POWDER, LYOPHILIZED, FOR SOLUTION INTRAMUSCULAR; INTRAVENOUS at 14:34

## 2018-05-15 RX ADMIN — FLUOROURACIL 5880 MG: 50 INJECTION, SOLUTION INTRAVENOUS at 16:45

## 2018-05-15 RX ADMIN — BEVACIZUMAB 600 MG: 400 INJECTION, SOLUTION INTRAVENOUS at 13:00

## 2018-05-15 RX ADMIN — DEXTROSE MONOHYDRATE 25 ML/HR: 5 INJECTION, SOLUTION INTRAVENOUS at 14:27

## 2018-05-15 RX ADMIN — DEXAMETHASONE SODIUM PHOSPHATE 12 MG: 4 INJECTION, SOLUTION INTRA-ARTICULAR; INTRALESIONAL; INTRAMUSCULAR; INTRAVENOUS; SOFT TISSUE at 12:32

## 2018-05-15 RX ADMIN — FLUOROURACIL 980 MG: 50 INJECTION, SOLUTION INTRAVENOUS at 16:40

## 2018-05-15 RX ADMIN — SODIUM CHLORIDE 10 ML: 9 INJECTION INTRAMUSCULAR; INTRAVENOUS; SUBCUTANEOUS at 10:20

## 2018-05-15 RX ADMIN — OXALIPLATIN 210 MG: 5 INJECTION, SOLUTION INTRAVENOUS at 14:34

## 2018-05-15 RX ADMIN — PALONOSETRON HYDROCHLORIDE 0.25 MG: 0.25 INJECTION INTRAVENOUS at 11:33

## 2018-05-15 NOTE — PROGRESS NOTES
Cancer [place] at [facility]  [address] [facility], [address] [place] [address]  W: [phone]  F: [phone]     Reason for Visit:   Serg Fine is a 32 y.o. male who is seen for follow up of metastatic colon cancer. Treatment History:   · CT A/P 3/25/2018: Indeterminate 2 x 1 cm low-density liver lesion. · CT C/A/P with triphase liver 3/27/2018: No evidence of metastatic disease to the chest. Multiple ill-defined hepatic lesions. These do not represent simple cyst.  · CT guided liver biopsy 3/27/2018: metastatic adenocarcinoma, KRAS wild type, NRAS wild type  · Colectomy by Dr. Brian Cowart 3/29/2018: Adenocarcinoma, moderately differentiated. Negative margins. 3/16 nodes involved. pMMR  · Stage IV (pT3 pN1b pM1) Colon Cancer  · Chemotherapy with FOLFOX and bevacizumab beginning 5/1/2018, dany held with cycle 1    History of Present Illness:   Here today for follow up and next cycle of therapy. He states he tolerating first cycle of chemo well. Some nausea 2 days after therapy but no emesis, since resolved. Pain improved. Passing bowels well. Energy okay. Denies neuropathy. No bleeding. He is unaccompanied today. PAST HISTORY: The following sections were reviewed and updated in the EMR as appropriate: PMH, SH, FH, Medications, Allergies. No Known Allergies     Review of Systems: A complete review of systems was obtained, reviewed, and scanned into the EMR. Pertinent findings reviewed above.       Physical Exam:     Visit Vitals    BP (!) 142/97 (BP 1 Location: Left arm, BP Patient Position: Sitting)    Pulse 91    Temp 98.2 °F (36.8 °C) (Oral)    Resp 18    Ht 6' (1.829 m)    Wt 272 lb (123.4 kg)    SpO2 94%    BMI 36.89 kg/m2     ECOG PS: 0  General: No distress  Eyes: PERRLA, anicteric sclerae  HENT: Atraumatic, OP clear  Neck: Supple  Lymphatic: No cervical, supraclavicular, or inguinal adenopathy  Respiratory: CTAB, normal respiratory effort  CV: Normal rate, regular rhythm, no murmurs, no peripheral edema  GI: Soft, nontender, nondistended, no masses, no hepatomegaly, no splenomegaly  MS: Normal gait and station. Digits without clubbing or cyanosis. Skin: No rashes, ecchymoses, or petechiae. Normal temperature, turgor, and texture. Psych: Alert, oriented, appropriate affect, normal judgment/insight    Results:     Lab Results   Component Value Date/Time    WBC 5.9 05/15/2018 10:21 AM    HGB 12.7 05/15/2018 10:21 AM    HCT 39.7 05/15/2018 10:21 AM    PLATELET 491 38/31/8059 10:21 AM    MCV 71.7 (L) 05/15/2018 10:21 AM    ABS. NEUTROPHILS 3.3 05/15/2018 10:21 AM     Lab Results   Component Value Date/Time    Sodium 138 05/15/2018 10:21 AM    Potassium 4.1 05/15/2018 10:21 AM    Chloride 104 05/15/2018 10:21 AM    CO2 26 05/15/2018 10:21 AM    Glucose 89 05/15/2018 10:21 AM    BUN 14 05/15/2018 10:21 AM    Creatinine 0.78 05/15/2018 10:21 AM    GFR est AA >60 05/15/2018 10:21 AM    GFR est non-AA >60 05/15/2018 10:21 AM    Calcium 8.9 05/15/2018 10:21 AM     Lab Results   Component Value Date/Time    Bilirubin, total 0.3 05/01/2018 09:51 AM    ALT (SGPT) 48 05/01/2018 09:51 AM    AST (SGOT) 21 05/01/2018 09:51 AM    Alk. phosphatase 74 05/01/2018 09:51 AM    Protein, total 8.1 05/01/2018 09:51 AM    Albumin 3.4 (L) 05/01/2018 09:51 AM    Globulin 4.7 (H) 05/01/2018 09:51 AM         Assessment:   1) Metastatic Colon Cancer  Stage IV, pMMR, KRAS/NRAS wild type  He has metastatic disease within his liver. His primary tumor has been resected. He is currently receiving chemotherapy with FOLFOX and Bevacizumab, kenzie held with c#1. We will plan restage after 6 cycles and have him follow up with surgical oncology at some point to discuss possible resection vs ablation of his liver metastases. He tolerated first cycle well. We will continue with therapy today, adding Kenzie today.      He is interested in going ahead and meeting with surgical oncology to discuss possible options. We will reach out to Dr. Atkinson office and see if we can get him in with his colleague Dr. Phong Horta sometime soon. 2) Anemia  Mild. Resolved since surgery. Secondary to bleeding at primary tumor. Monitor. 3) Risk of infertility  We have previously reviewed that chemotherapy can potentially cause infertility. He has undergone cryopreservataion. 4) Genetic risk  We previously discussed referral for genetic testing. pMMR argues against Hogan Syndrome, and no family history, but his young age warrants testing in my opinion. We will discuss further at a future visit. 5) Hypertension  Mildly elevated today. Started on metoprolol during hospital stay. He is following with his PCP about this. 6) Nausea  Minimal. Secondary to therapy. Continue home meds PRN. Plan:     · Proceed today with C#2 of mFOLFOX6 (oxaliplatin 85 mg/m2, leucovorin 400 mg/m2, fluorouracil 400 mg/m2, and a 46 hour infusion of fluorouracil 2400 mg/m2 given every 2 weeks). · Labs: CBC, BMP prior to each treatment, Hepatic function panel every 4 weeks  · Prophylactic antiemetics: Palonosetron and dexamethasone on the day of each chemotherapy infusion, with dexamethasone 8mg PO daily at home on days 2 and 3  · PRN antiemetics: Prochlorperazine and Ondansetron  · Return to clinic in 2 weeks with next cycle     Patient was seen in conjunction with Edmond Cristina NP.     Signed By: Idalia Mo MD

## 2018-05-15 NOTE — PROGRESS NOTES
[facility] VISIT NOTE    1005. Pt arrived at [facility] ambulatory and in no distress for C2 Folfox/Avastin. Assessment completed, pt c/o brief episode of nausea 3-5 days after C1. Otherwise, patient states he has no complaints. RCW chest port accessed with 1 in [facility] with no difficulty. Positive blood return noted and labs drawn. Pt had already been to see MD.     Labs WL to treat. Medications received:  NS KVO  Aloxi IV  Dexamethasone IV  Avastin IV  D5 KVO  Oxaliplatin IV  Leucovorin IV  Fluorouracil IVP  Fluorouracil CIV    Patient Vitals for the past 12 hrs:   Temp Pulse Resp BP SpO2   05/15/18 1005 97.7 °F (36.5 °C) 79 16 133/84 93 %     Recent Results (from the past 12 hour(s))   CBC WITH AUTOMATED DIFF    Collection Time: 05/15/18 10:21 AM   Result Value Ref Range    WBC 5.9 4.1 - 11.1 K/uL    RBC 5.54 4.10 - 5.70 M/uL    HGB 12.7 12.1 - 17.0 g/dL    HCT 39.7 36.6 - 50.3 %    MCV 71.7 (L) 80.0 - 99.0 FL    MCH 22.9 (L) 26.0 - 34.0 PG    MCHC 32.0 30.0 - 36.5 g/dL    RDW 15.9 (H) 11.5 - 14.5 %    PLATELET 904 873 - 488 K/uL    MPV 8.7 (L) 8.9 - 12.9 FL    NRBC 0.0 0  WBC    ABSOLUTE NRBC 0.00 0.00 - 0.01 K/uL    NEUTROPHILS 57 32 - 75 %    LYMPHOCYTES 29 12 - 49 %    MONOCYTES 8 5 - 13 %    EOSINOPHILS 4 0 - 7 %    BASOPHILS 1 0 - 1 %    IMMATURE GRANULOCYTES 0 0.0 - 0.5 %    ABS. NEUTROPHILS 3.3 1.8 - 8.0 K/UL    ABS. LYMPHOCYTES 1.7 0.8 - 3.5 K/UL    ABS. MONOCYTES 0.5 0.0 - 1.0 K/UL    ABS. EOSINOPHILS 0.3 0.0 - 0.4 K/UL    ABS. BASOPHILS 0.1 0.0 - 0.1 K/UL    ABS. IMM.  GRANS. 0.0 0.00 - 0.04 K/UL    DF AUTOMATED     METABOLIC PANEL, BASIC    Collection Time: 05/15/18 10:21 AM   Result Value Ref Range    Sodium 138 136 - 145 mmol/L    Potassium 4.1 3.5 - 5.1 mmol/L    Chloride 104 97 - 108 mmol/L    CO2 26 21 - 32 mmol/L    Anion gap 8 5 - 15 mmol/L    Glucose 89 65 - 100 mg/dL    BUN 14 6 - 20 MG/DL    Creatinine 0.78 0.70 - 1.30 MG/DL    BUN/Creatinine ratio 18 12 - 20      GFR est AA >60 >60 ml/min/1.73m2    GFR est non-AA >60 >60 ml/min/1.73m2    Calcium 8.9 8.5 - 10.1 MG/DL   URINALYSIS W/ RFLX MICROSCOPIC    Collection Time: 05/15/18 10:21 AM   Result Value Ref Range    Color YELLOW/STRAW      Appearance CLEAR CLEAR      Specific gravity 1.022 1.003 - 1.030      pH (UA) 5.5 5.0 - 8.0      Protein NEGATIVE  NEG mg/dL    Glucose NEGATIVE  NEG mg/dL    Ketone NEGATIVE  NEG mg/dL    Bilirubin NEGATIVE  NEG      Blood NEGATIVE  NEG      Urobilinogen 0.2 0.2 - 1.0 EU/dL    Nitrites NEGATIVE  NEG      Leukocyte Esterase NEGATIVE  NEG           Tolerated treatment well, no adverse reaction noted. Port remained accessed and infusing. Positive blood return noted. 1655. D/C'd from [facility] ambulatory and in no distress.  Next appointment is 5/17/18 at 4:00 pm.

## 2018-05-17 ENCOUNTER — HOSPITAL ENCOUNTER (OUTPATIENT)
Dept: INFUSION THERAPY | Age: 28
Discharge: HOME | End: 2018-05-17
Payer: COMMERCIAL

## 2018-05-17 VITALS
TEMPERATURE: 97.5 F | DIASTOLIC BLOOD PRESSURE: 82 MMHG | RESPIRATION RATE: 18 BRPM | SYSTOLIC BLOOD PRESSURE: 131 MMHG | HEART RATE: 71 BPM

## 2018-05-17 PROCEDURE — 74011250636 HC RX REV CODE- 250/636: Performed by: INTERNAL MEDICINE

## 2018-05-17 PROCEDURE — 96523 IRRIG DRUG DELIVERY DEVICE: CPT

## 2018-05-17 RX ORDER — SODIUM CHLORIDE 0.9 % (FLUSH) 0.9 %
10 SYRINGE (ML) INJECTION AS NEEDED
Status: ACTIVE | OUTPATIENT
Start: 2018-05-17 | End: 2018-05-18

## 2018-05-17 RX ORDER — HEPARIN 100 UNIT/ML
500 SYRINGE INTRAVENOUS AS NEEDED
Status: ACTIVE | OUTPATIENT
Start: 2018-05-17 | End: 2018-05-18

## 2018-05-17 RX ADMIN — SODIUM CHLORIDE, PRESERVATIVE FREE 500 UNITS: 5 INJECTION INTRAVENOUS at 16:10

## 2018-05-17 RX ADMIN — Medication 10 ML: at 16:10

## 2018-05-17 NOTE — PROGRESS NOTES
[facility] SHORT VISIT NOTE    1600  Pt arrived to [facility] ambulatory and in no distress for pump removal.  Denies any new complaints. Blood pressure 131/82, pulse 71, temperature 97.5 °F (36.4 °C), resp. rate 18. 5FU pump empty on arrival and turned off. Pump disconnected and port flushed and de-accessed. Positive blood return noted. 1610  Discharged home ambulatory and in no distress. Tolerated treatment well. Next appointment 5/29/18 at 1000.

## 2018-05-19 DIAGNOSIS — C18.9 COLON CANCER METASTASIZED TO LIVER (HCC): ICD-10-CM

## 2018-05-19 DIAGNOSIS — C78.7 COLON CANCER METASTASIZED TO LIVER (HCC): ICD-10-CM

## 2018-05-21 RX ORDER — DEXAMETHASONE 4 MG/1
TABLET ORAL
Qty: 50 TAB | Refills: 0 | Status: SHIPPED | OUTPATIENT
Start: 2018-05-21 | End: 2019-08-20 | Stop reason: SDUPTHER

## 2018-05-22 ENCOUNTER — APPOINTMENT (OUTPATIENT)
Dept: INFUSION THERAPY | Age: 28
End: 2018-05-22
Payer: COMMERCIAL

## 2018-05-23 RX ORDER — FLUOROURACIL 50 MG/ML
980 INJECTION, SOLUTION INTRAVENOUS ONCE
Status: COMPLETED | OUTPATIENT
Start: 2018-05-29 | End: 2018-05-29

## 2018-05-23 RX ORDER — DEXTROSE MONOHYDRATE 50 MG/ML
25 INJECTION, SOLUTION INTRAVENOUS CONTINUOUS
Status: DISPENSED | OUTPATIENT
Start: 2018-05-29 | End: 2018-05-29

## 2018-05-23 RX ORDER — PALONOSETRON 0.05 MG/ML
0.25 INJECTION, SOLUTION INTRAVENOUS ONCE
Status: COMPLETED | OUTPATIENT
Start: 2018-05-29 | End: 2018-05-29

## 2018-05-24 ENCOUNTER — APPOINTMENT (OUTPATIENT)
Dept: INFUSION THERAPY | Age: 28
End: 2018-05-24
Payer: COMMERCIAL

## 2018-05-29 ENCOUNTER — HOSPITAL ENCOUNTER (OUTPATIENT)
Dept: INFUSION THERAPY | Age: 28
Discharge: HOME | End: 2018-05-29
Payer: COMMERCIAL

## 2018-05-29 ENCOUNTER — OFFICE VISIT (OUTPATIENT)
Dept: ONCOLOGY | Age: 28
End: 2018-05-29

## 2018-05-29 VITALS
HEART RATE: 87 BPM | BODY MASS INDEX: 39.51 KG/M2 | DIASTOLIC BLOOD PRESSURE: 91 MMHG | SYSTOLIC BLOOD PRESSURE: 146 MMHG | OXYGEN SATURATION: 98 % | WEIGHT: 276 LBS | HEIGHT: 70 IN | TEMPERATURE: 96.4 F | RESPIRATION RATE: 20 BRPM

## 2018-05-29 VITALS
DIASTOLIC BLOOD PRESSURE: 90 MMHG | WEIGHT: 277.7 LBS | HEIGHT: 71 IN | HEART RATE: 82 BPM | TEMPERATURE: 97.6 F | SYSTOLIC BLOOD PRESSURE: 144 MMHG | RESPIRATION RATE: 17 BRPM | BODY MASS INDEX: 38.88 KG/M2

## 2018-05-29 DIAGNOSIS — C18.9 COLON CANCER METASTASIZED TO LIVER (HCC): Primary | ICD-10-CM

## 2018-05-29 DIAGNOSIS — C78.7 COLON CANCER METASTASIZED TO LIVER (HCC): Primary | ICD-10-CM

## 2018-05-29 DIAGNOSIS — I10 ESSENTIAL HYPERTENSION: ICD-10-CM

## 2018-05-29 LAB
ALBUMIN SERPL-MCNC: 3.1 G/DL (ref 3.5–5)
ALBUMIN/GLOB SERPL: 0.8 {RATIO} (ref 1.1–2.2)
ALP SERPL-CCNC: 68 U/L (ref 45–117)
ALT SERPL-CCNC: 27 U/L (ref 12–78)
ANION GAP SERPL CALC-SCNC: 9 MMOL/L (ref 5–15)
APPEARANCE UR: CLEAR
AST SERPL-CCNC: 20 U/L (ref 15–37)
BACTERIA URNS QL MICRO: NEGATIVE /HPF
BASOPHILS # BLD: 0.1 K/UL (ref 0–0.1)
BASOPHILS NFR BLD: 1 % (ref 0–1)
BILIRUB DIRECT SERPL-MCNC: 0.1 MG/DL (ref 0–0.2)
BILIRUB SERPL-MCNC: 0.3 MG/DL (ref 0.2–1)
BILIRUB UR QL: NEGATIVE
BUN SERPL-MCNC: 16 MG/DL (ref 6–20)
BUN/CREAT SERPL: 20 (ref 12–20)
CALCIUM SERPL-MCNC: 8.6 MG/DL (ref 8.5–10.1)
CHLORIDE SERPL-SCNC: 104 MMOL/L (ref 97–108)
CO2 SERPL-SCNC: 26 MMOL/L (ref 21–32)
COLOR UR: NORMAL
CREAT SERPL-MCNC: 0.82 MG/DL (ref 0.7–1.3)
DIFFERENTIAL METHOD BLD: ABNORMAL
EOSINOPHIL # BLD: 0.2 K/UL (ref 0–0.4)
EOSINOPHIL NFR BLD: 4 % (ref 0–7)
EPITH CASTS URNS QL MICRO: NORMAL /LPF
ERYTHROCYTE [DISTWIDTH] IN BLOOD BY AUTOMATED COUNT: 16.8 % (ref 11.5–14.5)
GLOBULIN SER CALC-MCNC: 3.7 G/DL (ref 2–4)
GLUCOSE SERPL-MCNC: 187 MG/DL (ref 65–100)
GLUCOSE UR STRIP.AUTO-MCNC: NEGATIVE MG/DL
HCT VFR BLD AUTO: 38.7 % (ref 36.6–50.3)
HGB BLD-MCNC: 12.2 G/DL (ref 12.1–17)
HGB UR QL STRIP: NEGATIVE
HYALINE CASTS URNS QL MICRO: NORMAL /LPF (ref 0–5)
IMM GRANULOCYTES # BLD: 0 K/UL (ref 0–0.04)
IMM GRANULOCYTES NFR BLD AUTO: 1 % (ref 0–0.5)
KETONES UR QL STRIP.AUTO: NEGATIVE MG/DL
LEUKOCYTE ESTERASE UR QL STRIP.AUTO: NEGATIVE
LYMPHOCYTES # BLD: 1.7 K/UL (ref 0.8–3.5)
LYMPHOCYTES NFR BLD: 31 % (ref 12–49)
MCH RBC QN AUTO: 22.7 PG (ref 26–34)
MCHC RBC AUTO-ENTMCNC: 31.5 G/DL (ref 30–36.5)
MCV RBC AUTO: 71.9 FL (ref 80–99)
MONOCYTES # BLD: 0.5 K/UL (ref 0–1)
MONOCYTES NFR BLD: 9 % (ref 5–13)
NEUTS SEG # BLD: 3 K/UL (ref 1.8–8)
NEUTS SEG NFR BLD: 55 % (ref 32–75)
NITRITE UR QL STRIP.AUTO: NEGATIVE
NRBC # BLD: 0 K/UL (ref 0–0.01)
NRBC BLD-RTO: 0 PER 100 WBC
PH UR STRIP: 6 [PH] (ref 5–8)
PLATELET # BLD AUTO: 254 K/UL (ref 150–400)
PMV BLD AUTO: 9.4 FL (ref 8.9–12.9)
POTASSIUM SERPL-SCNC: 3.9 MMOL/L (ref 3.5–5.1)
PROT SERPL-MCNC: 6.8 G/DL (ref 6.4–8.2)
PROT UR STRIP-MCNC: NEGATIVE MG/DL
RBC # BLD AUTO: 5.38 M/UL (ref 4.1–5.7)
RBC #/AREA URNS HPF: NORMAL /HPF (ref 0–5)
SODIUM SERPL-SCNC: 139 MMOL/L (ref 136–145)
SP GR UR REFRACTOMETRY: 1.01 (ref 1–1.03)
UA: UC IF INDICATED,UAUC: NORMAL
UROBILINOGEN UR QL STRIP.AUTO: 0.2 EU/DL (ref 0.2–1)
WBC # BLD AUTO: 5.4 K/UL (ref 4.1–11.1)
WBC URNS QL MICRO: NORMAL /HPF (ref 0–4)

## 2018-05-29 PROCEDURE — 80076 HEPATIC FUNCTION PANEL: CPT | Performed by: INTERNAL MEDICINE

## 2018-05-29 PROCEDURE — 96417 CHEMO IV INFUS EACH ADDL SEQ: CPT

## 2018-05-29 PROCEDURE — 96413 CHEMO IV INFUSION 1 HR: CPT

## 2018-05-29 PROCEDURE — 81001 URINALYSIS AUTO W/SCOPE: CPT | Performed by: INTERNAL MEDICINE

## 2018-05-29 PROCEDURE — 96375 TX/PRO/DX INJ NEW DRUG ADDON: CPT

## 2018-05-29 PROCEDURE — 74011250636 HC RX REV CODE- 250/636

## 2018-05-29 PROCEDURE — 96416 CHEMO PROLONG INFUSE W/PUMP: CPT

## 2018-05-29 PROCEDURE — 74011250636 HC RX REV CODE- 250/636: Performed by: INTERNAL MEDICINE

## 2018-05-29 PROCEDURE — 80048 BASIC METABOLIC PNL TOTAL CA: CPT | Performed by: INTERNAL MEDICINE

## 2018-05-29 PROCEDURE — 96368 THER/DIAG CONCURRENT INF: CPT

## 2018-05-29 PROCEDURE — 36415 COLL VENOUS BLD VENIPUNCTURE: CPT | Performed by: INTERNAL MEDICINE

## 2018-05-29 PROCEDURE — 74011000258 HC RX REV CODE- 258: Performed by: INTERNAL MEDICINE

## 2018-05-29 PROCEDURE — 85025 COMPLETE CBC W/AUTO DIFF WBC: CPT | Performed by: INTERNAL MEDICINE

## 2018-05-29 PROCEDURE — 96415 CHEMO IV INFUSION ADDL HR: CPT

## 2018-05-29 RX ORDER — SODIUM CHLORIDE 9 MG/ML
10 INJECTION INTRAMUSCULAR; INTRAVENOUS; SUBCUTANEOUS AS NEEDED
Status: DISPENSED | OUTPATIENT
Start: 2018-05-29 | End: 2018-05-30

## 2018-05-29 RX ORDER — SODIUM CHLORIDE 9 MG/ML
25 INJECTION, SOLUTION INTRAVENOUS ONCE
Status: DISPENSED | OUTPATIENT
Start: 2018-05-29 | End: 2018-05-30

## 2018-05-29 RX ORDER — SODIUM CHLORIDE 0.9 % (FLUSH) 0.9 %
5-10 SYRINGE (ML) INJECTION AS NEEDED
Status: ACTIVE | OUTPATIENT
Start: 2018-05-29 | End: 2018-05-30

## 2018-05-29 RX ORDER — METOPROLOL TARTRATE 25 MG/1
25 TABLET, FILM COATED ORAL EVERY 12 HOURS
Qty: 60 TAB | Refills: 0 | Status: SHIPPED | OUTPATIENT
Start: 2018-05-29 | End: 2018-07-01 | Stop reason: SDUPTHER

## 2018-05-29 RX ADMIN — FLUOROURACIL 5880 MG: 50 INJECTION, SOLUTION INTRAVENOUS at 16:29

## 2018-05-29 RX ADMIN — BEVACIZUMAB 600 MG: 400 INJECTION, SOLUTION INTRAVENOUS at 13:00

## 2018-05-29 RX ADMIN — FLUOROURACIL 980 MG: 50 INJECTION, SOLUTION INTRAVENOUS at 16:25

## 2018-05-29 RX ADMIN — DEXAMETHASONE SODIUM PHOSPHATE 12 MG: 4 INJECTION, SOLUTION INTRAMUSCULAR; INTRAVENOUS at 12:14

## 2018-05-29 RX ADMIN — LEUCOVORIN CALCIUM 980 MG: 350 INJECTION, POWDER, LYOPHILIZED, FOR SOLUTION INTRAMUSCULAR; INTRAVENOUS at 13:50

## 2018-05-29 RX ADMIN — DEXTROSE MONOHYDRATE 25 ML/HR: 5 INJECTION, SOLUTION INTRAVENOUS at 13:45

## 2018-05-29 RX ADMIN — OXALIPLATIN 210 MG: 5 INJECTION, SOLUTION INTRAVENOUS at 13:50

## 2018-05-29 RX ADMIN — PALONOSETRON HYDROCHLORIDE 0.25 MG: 0.25 INJECTION INTRAVENOUS at 12:15

## 2018-05-29 NOTE — PROGRESS NOTES
Cancer [place] at [facility] 88  [address] [facility], [address] 100[address] [facility]  W: [phone]  F: [phone]     Reason for Visit:   Serg Fine is a 32 y.o. male who is seen for follow up of metastatic colon cancer. Treatment History:   · CT A/P 3/25/2018: Indeterminate 2 x 1 cm low-density liver lesion. · CT C/A/P with triphase liver 3/27/2018: No evidence of metastatic disease to the chest. Multiple ill-defined hepatic lesions. These do not represent simple cyst.  · CT guided liver biopsy 3/27/2018: metastatic adenocarcinoma, KRAS wild type, NRAS wild type  · Colectomy by Dr. Brian Cowart 3/29/2018: Adenocarcinoma, moderately differentiated. Negative margins. 3/16 nodes involved. pMMR  · Stage IV (pT3 pN1b pM1) Colon Cancer  · Chemotherapy with FOLFOX and bevacizumab beginning 5/1/2018, dany held with cycle 1    History of Present Illness:   Here today for follow up and next cycle of therapy. He reports he continues to feel well. Some abdominal cramping with BM since last visit but states he thinks he was constipated from eating cheese. No N/V. Energy good. Continues working. No neuropathy or bleeding. Seen by  [facility] last week. He is unaccompanied today. PAST HISTORY: The following sections were reviewed and updated in the EMR as appropriate: PMH, SH, FH, Medications, Allergies. No Known Allergies     Review of Systems: A complete review of systems was obtained, reviewed, and scanned into the EMR. Pertinent findings reviewed above.       Physical Exam:     Visit Vitals    BP (!) 146/91 (BP 1 Location: Right arm, BP Patient Position: Sitting)    Pulse 87    Temp 96.4 °F (35.8 °C) (Temporal)    Resp 20    Ht 5' 10\" (1.778 m)    Wt 276 lb (125.2 kg)    SpO2 98%    BMI 39.6 kg/m2     ECOG PS: 0  General: No distress  Eyes: PERRLA, anicteric sclerae  HENT: Atraumatic, OP clear  Neck: Supple  Lymphatic: No cervical, supraclavicular, or inguinal adenopathy  Respiratory: CTAB, normal respiratory effort  CV: Normal rate, regular rhythm, no murmurs, no peripheral edema  GI: Soft, nontender, nondistended, no masses, no hepatomegaly, no splenomegaly  MS: Normal gait and station. Digits without clubbing or cyanosis. Skin: No rashes, ecchymoses, or petechiae. Normal temperature, turgor, and texture. Psych: Alert, oriented, appropriate affect, normal judgment/insight    Results:     Lab Results   Component Value Date/Time    WBC 5.4 05/29/2018 10:19 AM    HGB 12.2 05/29/2018 10:19 AM    HCT 38.7 05/29/2018 10:19 AM    PLATELET 021 82/80/0642 10:19 AM    MCV 71.9 (L) 05/29/2018 10:19 AM    ABS. NEUTROPHILS 3.0 05/29/2018 10:19 AM     Lab Results   Component Value Date/Time    Sodium 139 05/29/2018 10:19 AM    Potassium 3.9 05/29/2018 10:19 AM    Chloride 104 05/29/2018 10:19 AM    CO2 26 05/29/2018 10:19 AM    Glucose 187 (H) 05/29/2018 10:19 AM    BUN 16 05/29/2018 10:19 AM    Creatinine 0.82 05/29/2018 10:19 AM    GFR est AA >60 05/29/2018 10:19 AM    GFR est non-AA >60 05/29/2018 10:19 AM    Calcium 8.6 05/29/2018 10:19 AM     Lab Results   Component Value Date/Time    Bilirubin, total 0.3 05/29/2018 10:19 AM    ALT (SGPT) 27 05/29/2018 10:19 AM    AST (SGOT) 20 05/29/2018 10:19 AM    Alk. phosphatase 68 05/29/2018 10:19 AM    Protein, total 6.8 05/29/2018 10:19 AM    Albumin 3.1 (L) 05/29/2018 10:19 AM    Globulin 3.7 05/29/2018 10:19 AM         Assessment:   1) Metastatic Colon Cancer  Stage IV, pMMR, KRAS/NRAS wild type  He has metastatic disease within his liver. His primary tumor has been resected. He is currently receiving chemotherapy with FOLFOX and Bevacizumab, dany held with c#1. He has now been seen by surgical oncology. We will plan restage after 6 cycles and have him follow up with surgical oncology to discuss possible resection vs ablation of his liver metastases. He continues tolerating therapy very well. We will proceed with next cycle of therapy today.     2) Anemia  Mild. Resolved since surgery. Secondary to bleeding at primary tumor. Monitor. 3) Risk of infertility  We have previously reviewed that chemotherapy can potentially cause infertility. He has undergone cryopreservation. 4) Genetic risk  We previously discussed referral for genetic testing. pMMR argues against Hogan Syndrome, and no family history, but his young age warrants testing in my opinion. We will discuss further at a future visit. 5) Hypertension  Mildly elevated today. Started on metoprolol during hospital stay. Again discussed following with his PCP about this. 6) Nausea  Minimal. Secondary to therapy. Continue home meds PRN. Plan:     · Proceed today with C#3 of mFOLFOX6 (oxaliplatin 85 mg/m2, leucovorin 400 mg/m2, fluorouracil 400 mg/m2, and a 46 hour infusion of fluorouracil 2400 mg/m2 given every 2 weeks). · Labs: CBC, BMP prior to each treatment, Hepatic function panel every 4 weeks  · Prophylactic antiemetics: Palonosetron and dexamethasone on the day of each chemotherapy infusion, with dexamethasone 8mg PO daily at home on days 2 and 3  · PRN antiemetics: Prochlorperazine and Ondansetron  · Return to clinic in 2 weeks with next cycle     Patient was seen in conjunction with Mi Dodd NP.     Signed By: Roberto Mcdonnell MD

## 2018-05-29 NOTE — PROGRESS NOTES
[facility] Progress Note    Date: May 29, 2018    Name: Mauricio Argueta    MRN: [id number]         : 1990    Mr. Gonsales Arrived ambulatory and in no distress for C3 FOLFOX/Avastin regimen. Assessment was completed, no acute issues at this time, no new complaints voiced. 1017:  R chest VAD accessed w/ 0.75 inch Samuels without difficulty, labs drawn and in process. Biopatch placed as per protocol. Chemotherapy Flowsheet 2018   Cycle C3   Date 2018   Drug / Regimen Folfox/Avastin   Notes -       1021:  Proceeded to appt with Dr. Jg Bowie. Mr. Eliezer Dorsey vitals were reviewed. Visit Vitals    BP (P) 132/87    Pulse (P) 82    Temp (P) 98.1 °F (36.7 °C)    Resp (P) 17    Ht 5' 10.98\" (1.803 m)    Wt 126 kg (277 lb 11.2 oz)    BMI 38.75 kg/m2       Lab results were obtained and reviewed. Recent Results (from the past 12 hour(s))   CBC WITH AUTOMATED DIFF    Collection Time: 18 10:19 AM   Result Value Ref Range    WBC 5.4 4.1 - 11.1 K/uL    RBC 5.38 4.10 - 5.70 M/uL    HGB 12.2 12.1 - 17.0 g/dL    HCT 38.7 36.6 - 50.3 %    MCV 71.9 (L) 80.0 - 99.0 FL    MCH 22.7 (L) 26.0 - 34.0 PG    MCHC 31.5 30.0 - 36.5 g/dL    RDW 16.8 (H) 11.5 - 14.5 %    PLATELET 727 325 - 097 K/uL    MPV 9.4 8.9 - 12.9 FL    NRBC 0.0 0  WBC    ABSOLUTE NRBC 0.00 0.00 - 0.01 K/uL    NEUTROPHILS 55 32 - 75 %    LYMPHOCYTES 31 12 - 49 %    MONOCYTES 9 5 - 13 %    EOSINOPHILS 4 0 - 7 %    BASOPHILS 1 0 - 1 %    IMMATURE GRANULOCYTES 1 (H) 0.0 - 0.5 %    ABS. NEUTROPHILS 3.0 1.8 - 8.0 K/UL    ABS. LYMPHOCYTES 1.7 0.8 - 3.5 K/UL    ABS. MONOCYTES 0.5 0.0 - 1.0 K/UL    ABS. EOSINOPHILS 0.2 0.0 - 0.4 K/UL    ABS. BASOPHILS 0.1 0.0 - 0.1 K/UL    ABS. IMM.  GRANS. 0.0 0.00 - 0.04 K/UL    DF AUTOMATED     HEPATIC FUNCTION PANEL    Collection Time: 18 10:19 AM   Result Value Ref Range    Protein, total 6.8 6.4 - 8.2 g/dL    Albumin 3.1 (L) 3.5 - 5.0 g/dL    Globulin 3.7 2.0 - 4.0 g/dL    A-G Ratio 0.8 (L) 1.1 - 2.2 Bilirubin, total 0.3 0.2 - 1.0 MG/DL    Bilirubin, direct 0.1 0.0 - 0.2 MG/DL    Alk. phosphatase 68 45 - 117 U/L    AST (SGOT) 20 15 - 37 U/L    ALT (SGPT) 27 12 - 78 U/L   METABOLIC PANEL, BASIC    Collection Time: 05/29/18 10:19 AM   Result Value Ref Range    Sodium 139 136 - 145 mmol/L    Potassium 3.9 3.5 - 5.1 mmol/L    Chloride 104 97 - 108 mmol/L    CO2 26 21 - 32 mmol/L    Anion gap 9 5 - 15 mmol/L    Glucose 187 (H) 65 - 100 mg/dL    BUN 16 6 - 20 MG/DL    Creatinine 0.82 0.70 - 1.30 MG/DL    BUN/Creatinine ratio 20 12 - 20      GFR est AA >60 >60 ml/min/1.73m2    GFR est non-AA >60 >60 ml/min/1.73m2    Calcium 8.6 8.5 - 10.1 MG/DL   URINALYSIS W/ REFLEX CULTURE    Collection Time: 05/29/18 10:19 AM   Result Value Ref Range    Color YELLOW/STRAW      Appearance CLEAR CLEAR      Specific gravity 1.009 1.003 - 1.030      pH (UA) 6.0 5.0 - 8.0      Protein NEGATIVE  NEG mg/dL    Glucose NEGATIVE  NEG mg/dL    Ketone NEGATIVE  NEG mg/dL    Bilirubin NEGATIVE  NEG      Blood NEGATIVE  NEG      Urobilinogen 0.2 0.2 - 1.0 EU/dL    Nitrites NEGATIVE  NEG      Leukocyte Esterase NEGATIVE  NEG      WBC 0-4 0 - 4 /hpf    RBC 0-5 0 - 5 /hpf    Epithelial cells FEW FEW /lpf    Bacteria NEGATIVE  NEG /hpf    UA:UC IF INDICATED CULTURE NOT INDICATED BY UA RESULT CNI      Hyaline cast 0-2 0 - 5 /lpf       Pre-medications  were administered as ordered and chemotherapy was initiated. MEDICATIONS GIVEN:  NS IV @ KVO  Aloxi IVP  Decadron IVPB  Avastin IV    D5 IV @ KVO  Oxaliplatin IV  Leucovorin IV  5FU IVP  5FU CIV      Mr. Emily Meier tolerated treatment well and was discharged from [place] [address] in stable condition at ***. He is to return on 5/31/18 at 4:00pm for his next appointment.     Eloisa Gauthier RN  May 29, 2018

## 2018-05-31 ENCOUNTER — HOSPITAL ENCOUNTER (OUTPATIENT)
Dept: INFUSION THERAPY | Age: 28
Discharge: HOME | End: 2018-05-31
Payer: COMMERCIAL

## 2018-05-31 VITALS
RESPIRATION RATE: 18 BRPM | SYSTOLIC BLOOD PRESSURE: 144 MMHG | DIASTOLIC BLOOD PRESSURE: 92 MMHG | TEMPERATURE: 98 F | HEART RATE: 78 BPM

## 2018-05-31 PROCEDURE — 96523 IRRIG DRUG DELIVERY DEVICE: CPT

## 2018-05-31 PROCEDURE — 74011250636 HC RX REV CODE- 250/636: Performed by: INTERNAL MEDICINE

## 2018-05-31 RX ORDER — HEPARIN 100 UNIT/ML
500 SYRINGE INTRAVENOUS AS NEEDED
Status: ACTIVE | OUTPATIENT
Start: 2018-05-31 | End: 2018-06-01

## 2018-05-31 RX ORDER — SODIUM CHLORIDE 0.9 % (FLUSH) 0.9 %
10 SYRINGE (ML) INJECTION AS NEEDED
Status: ACTIVE | OUTPATIENT
Start: 2018-05-31 | End: 2018-06-01

## 2018-05-31 RX ADMIN — Medication 500 UNITS: at 15:29

## 2018-05-31 RX ADMIN — Medication 10 ML: at 15:29

## 2018-05-31 NOTE — PROGRESS NOTES
[facility] SHORT VISIT NOTE    1525  Pt arrived to [facility] ambulatory and in no distress for pump removal.  Denies any new complaints. Blood pressure (!) 144/92, pulse 78, temperature 98 °F (36.7 °C), resp. rate 18. Patient arrived with right chest port intact and connected to 5FU CADD pump. Pump infusion completed prior to admission. Pump disconnected and port flushed & de-accessed per protocol. Positive blood return noted. 1530  Discharged home ambulatory and in no distress. Tolerated treatment well. Next appointment 6/12/18 at 0900.

## 2018-06-05 ENCOUNTER — TELEPHONE (OUTPATIENT)
Dept: ONCOLOGY | Age: 28
End: 2018-06-05

## 2018-06-05 ENCOUNTER — APPOINTMENT (OUTPATIENT)
Dept: INFUSION THERAPY | Age: 28
End: 2018-06-05
Payer: COMMERCIAL

## 2018-06-05 NOTE — TELEPHONE ENCOUNTER
Patient called and stated that he has been having problems with his bowel movements, and reports that they are painful. Patient stated that this has been happening since the 2nd chemo treatment. Patient stated that he is not sure whether it could be his diet or not.  # [phone]

## 2018-06-05 NOTE — TELEPHONE ENCOUNTER
[address] at [facility]  [phone]    06/05/18- Patient reports constipation with chemotherapy. Stated he's going regularly, but it's hard and painful at times. Directed patient to start taking miralax daily and increase fluid intake. Also, discussed some dietary options to help with constipation. Encouraged patient to call back if symptoms continue or worsen. He verbalized understanding.

## 2018-06-07 ENCOUNTER — APPOINTMENT (OUTPATIENT)
Dept: INFUSION THERAPY | Age: 28
End: 2018-06-07
Payer: COMMERCIAL

## 2018-06-07 RX ORDER — SODIUM CHLORIDE 9 MG/ML
25 INJECTION, SOLUTION INTRAVENOUS ONCE
Status: COMPLETED | OUTPATIENT
Start: 2018-06-12 | End: 2018-06-12

## 2018-06-07 RX ORDER — FLUOROURACIL 50 MG/ML
980 INJECTION, SOLUTION INTRAVENOUS ONCE
Status: COMPLETED | OUTPATIENT
Start: 2018-06-12 | End: 2018-06-12

## 2018-06-07 RX ORDER — DEXTROSE MONOHYDRATE 50 MG/ML
25 INJECTION, SOLUTION INTRAVENOUS CONTINUOUS
Status: DISPENSED | OUTPATIENT
Start: 2018-06-12 | End: 2018-06-12

## 2018-06-07 RX ORDER — PALONOSETRON 0.05 MG/ML
0.25 INJECTION, SOLUTION INTRAVENOUS ONCE
Status: COMPLETED | OUTPATIENT
Start: 2018-06-12 | End: 2018-06-12

## 2018-06-10 ENCOUNTER — OFFICE VISIT (OUTPATIENT)
Dept: FAMILY MEDICINE CLINIC | Age: 28
End: 2018-06-10

## 2018-06-10 ENCOUNTER — TELEPHONE (OUTPATIENT)
Dept: FAMILY MEDICINE CLINIC | Age: 28
End: 2018-06-10

## 2018-06-10 VITALS
DIASTOLIC BLOOD PRESSURE: 98 MMHG | WEIGHT: 277 LBS | BODY MASS INDEX: 39.65 KG/M2 | HEIGHT: 70 IN | TEMPERATURE: 98.3 F | OXYGEN SATURATION: 97 % | HEART RATE: 92 BPM | RESPIRATION RATE: 16 BRPM | SYSTOLIC BLOOD PRESSURE: 140 MMHG

## 2018-06-10 DIAGNOSIS — M79.89 SWELLING OF BOTH HANDS: Primary | ICD-10-CM

## 2018-06-10 DIAGNOSIS — I10 ESSENTIAL HYPERTENSION: ICD-10-CM

## 2018-06-10 PROBLEM — E66.01 SEVERE OBESITY (BMI 35.0-39.9): Status: ACTIVE | Noted: 2018-06-10

## 2018-06-10 NOTE — PROGRESS NOTES
Chief Complaint   Patient presents with    Hand Swelling     Yesterday, hands swollen and itchy. Patient on chemo treatment plan. Told itchy hands is possible side effect. 1. Have you been to the ER, urgent care clinic since your last visit? Hospitalized since your last visit? No    2. Have you seen or consulted any other health care providers outside of the [address] [place] [address] since your last visit? Include any pap smears or colon screening.  No

## 2018-06-10 NOTE — MR AVS SNAPSHOT
[address] 
[phone] Patient: Jian Joshua 
MRN: [id number] :1990 Visit Information Date & Time Provider Department Dept. Phone Encounter #  
 6/10/2018  8:45 AM Anil Jordan MD [address] [facility] [phone] [id number] Your Appointments 2018  9:15 AM  
ESTABLISHED PATIENT with Miki Plummer NP  
[facility] at [facility]) Appt Note: labs, [place], FOLFOX #4.  
 [address] [facility], [address] [facility] [address]  
[phone]  
  
   
 [address] [facility], [address] [place] [address]  
  
    
 2018  1:30 PM  
ACUTE CARE with Carleton Saint, DO [address] [facility] ([facility]) Appt Note: f/u to depression [address] [employer] [address]  
[phone]  
  
   
 [address] [place] [facility] [address] [phone]  
  
    
 2018 11:00 AM  
New Patient with Soumya Brady MD  
[address] [place] [facility] ([facility]) Appt Note: NP; referred by Dr. Carleton Saint; snoring [address] [employer] [address] [phone] 5[address] [facility] [phone] Upcoming Health Maintenance Date Due Pneumococcal 19-64 Highest Risk (1 of 3 - PCV13) 2007 DTaP/Tdap/Td series (1 - Tdap) 2009 Influenza Age 5 to Adult 2018 Allergies as of 6/10/2018  Review Complete On: 6/10/2018 By: Isabella Challenger No Known Allergies Current Immunizations  Reviewed on 2018 Name Date Influenza Vaccine (Quad) PF 2018 Not reviewed this visit Vitals BP Pulse Temp Resp Height(growth percentile) Weight(growth percentile) (!) 140/98 (BP 1 Location: Left arm, BP Patient Position: Sitting) 92 98.3 °F (36.8 °C) (Oral) 16 5' 10\" (1.778 m) 277 lb (125.6 kg) SpO2 BMI Smoking Status 97% 39.75 kg/m2 Never Smoker Vitals History BMI and BSA Data Body Mass Index Body Surface Area 39.75 kg/m 2 2.49 m 2 Preferred Pharmacy Pharmacy Name Phone [facility] [address], [address] [place] [address] [phone] Your Updated Medication List  
  
   
This list is accurate as of 6/10/18  9:31 AM.  Always use your most recent med list.  
  
  
  
  
 dexamethasone 4 mg tablet Commonly known as:  DECADRON  
TAKE 8MG (TWO TABS) DAILY IN THE MORNING FOR TWO DAYS AFTER CHEMOTHERAPY (WHILE PUMP IS INFUSING). lidocaine-prilocaine topical cream  
Commonly known as:  EMLA Apply  to affected area as needed for Pain (Apply 30-60 min. prior to having your port accessed). metoprolol tartrate 25 mg tablet Commonly known as:  LOPRESSOR Take 1 Tab by mouth every twelve (12) hours. ondansetron hcl 8 mg tablet Commonly known as:  Ranny Kanaris Take 1 Tab by mouth every eight (8) hours as needed for Nausea. OTHER Sperm cryopreservation Dx: C18.9-colon cancer  
  
 prochlorperazine 10 mg tablet Commonly known as:  COMPAZINE Take 1 Tab by mouth every six (6) hours as needed for Nausea. To-Do List   
 06/12/2018  9:00 AM  
  Appointment with Rupert Siegel Brandon Allan 4 at [place] [address] ([phone])  
  
 06/14/2018 4:00 PM  
  Appointment with Soila Aviles at [place] [address] ([phone])  
  
 06/26/2018 9:00 AM  
  Appointment with Rupert Siegel Brandon Allan 4 at [place] [address] ([phone])  
  
 06/28/2018 4:00 PM  
  Appointment with Soila Aviles at [place] [address] ([phone]) [facility]! Dear Shaunna Orozco: 
Thank you for requesting a LVL[employer] account. Our records indicate that you already have an active LVL6 account. You can access your account anytime at [email or web address] Did you know that you can access your hospital and ER discharge instructions at any time in [employer]? You can also review all of your test results from your hospital stay or ER visit. Additional Information If you have questions, please visit the Frequently Asked Questions section of the [employer] website at [email or web address] [email or web address] Remember, [employer] is NOT to be used for urgent needs. For medical emergencies, dial 911. Now available from your iPhone and Android! Please provide this summary of care documentation to your next provider. Your primary care clinician is listed as [facility]. If you have any questions after today's visit, please call [phone].

## 2018-06-10 NOTE — LETTER
NOTIFICATION RETURN TO WORK  
 
6/10/2018 9:31 AM 
 
Mr. Hattie Deleon 
[address] [place] [phone] To Whom It May Concern: 
 
Chalo Brittle Sam is currently under the care of [address] [employer]. He will return to work on: Monday 6/11/18 If there are questions or concerns please have the patient contact our office.  
 
 
 
Sincerely, 
 
 
Melonie Zaldivar MD

## 2018-06-10 NOTE — PROGRESS NOTES
Chief Complaint   Patient presents with    Hand Swelling     Yesterday, hands swollen and itchy. Patient on chemo treatment plan. Told itchy hands is possible side effect. he is a 32y.o. year old male who presents for evalution. He has colon CA and on chemo  He noted swelling in right Lower arm and hand. The left hand was slightly swollen but not as bad. It is difficult to   He c/o itching yesterday in the same areas. The swelling is effecting his manual dexterity and it hurts to make a fist  He has no chest pain or SOB    Reviewed PmHx, RxHx, FmHx, SocHx, AllgHx and updated and dated in the chart. Aspirin yes ____   No____ N/A____    Patient Active Problem List    Diagnosis    Severe obesity (BMI 35.0-39.9) ([facility]    Colon cancer metastasized to liver ([facility]    Acute diverticulitis    Hypertension       Nurse notes were reviewed and copied and are correct  Review of Systems - negative except as listed above in the HPI    Objective:     Vitals:    06/10/18 0840   BP: (!) 140/98   Pulse: 92   Resp: 16   Temp: 98.3 °F (36.8 °C)   TempSrc: Oral   SpO2: 97%   Weight: 277 lb (125.6 kg)   Height: 5' 10\" (1.778 m)     Physical Examination: General appearance - alert, well appearing, and in no distress  Mental status - alert, oriented to person, place, and time  Chest - clear to auscultation, no wheezes, rales or rhonchi, symmetric air entry  Heart - normal rate, regular rhythm, normal S1, S2, no murmurs, rubs, clicks or gallops  Extremities - peripheral pulses normal, no pedal edema, no clubbing or cyanosis. Both hands slightly swollen, no erythema  Skin - normal coloration and turgor, no rashes, no suspicious skin lesions noted         Assessment/ Plan:   Diagnoses and all orders for this visit:    1. Swelling of both hands  We tried to contact the on call oncology doc but we were told she coould no offer anything to him.  I instructed the patient to call his oncologist in the am or go to the ER if this worsened. I do not think this is a DVt in both arms. Since he is on chemo It is possible this is associated with that  I explained that chemo can cause tingling which he might be reading as itching. He says it is different. I am not sure if this is related to the chemo. I have to defer to oncology. He will call them asap in the am  2. Essential hypertension  He is aware. He says a repeat BP is usually normal. I defer to pcp for further treatment     Follow-up Disposition: Not on File    ICD-10-CM ICD-9-CM    1. Swelling of both hands M79.89 729.81    2. Essential hypertension I10 401.9        I have discussed the diagnosis with the patient and the intended plan as seen in the above orders. The patient has received an after-visit summary and questions were answered concerning future plans. Medication Side Effects and Warnings were discussed with patient: yes  Patient Labs were reviewed and or requested: yes  Patient Past Records were reviewed and or requested: yes        There are no Patient Instructions on file for this visit.     The patient verbalizes understanding and agrees with the plan of care        Patient has the advanced directives booklet to review

## 2018-06-10 NOTE — LETTER
NOTIFICATION RETURN TO WORK / SCHOOL 
 
6/10/2018 9:28 AM 
 
Mr. Lea Nguyễn 
[address] [place] [phone] To Whom It May Concern: 
 
Bette Gonsales is currently under the care of [facility] [facility]. He will return to work/school on: 6/11/18 If there are questions or concerns please have the patient contact our office.  
 
 
 
Sincerely, 
 
 
Nicky Harper MD

## 2018-06-12 ENCOUNTER — OFFICE VISIT (OUTPATIENT)
Dept: ONCOLOGY | Age: 28
End: 2018-06-12

## 2018-06-12 ENCOUNTER — HOSPITAL ENCOUNTER (OUTPATIENT)
Dept: INFUSION THERAPY | Age: 28
Discharge: HOME | End: 2018-06-12
Payer: COMMERCIAL

## 2018-06-12 VITALS
DIASTOLIC BLOOD PRESSURE: 90 MMHG | SYSTOLIC BLOOD PRESSURE: 148 MMHG | RESPIRATION RATE: 18 BRPM | HEART RATE: 80 BPM | BODY MASS INDEX: 37.33 KG/M2 | HEIGHT: 72 IN | TEMPERATURE: 96 F | WEIGHT: 275.6 LBS

## 2018-06-12 VITALS
WEIGHT: 276 LBS | HEART RATE: 87 BPM | OXYGEN SATURATION: 97 % | DIASTOLIC BLOOD PRESSURE: 95 MMHG | RESPIRATION RATE: 20 BRPM | BODY MASS INDEX: 37.38 KG/M2 | TEMPERATURE: 96.1 F | SYSTOLIC BLOOD PRESSURE: 153 MMHG | HEIGHT: 72 IN

## 2018-06-12 DIAGNOSIS — K59.00 CONSTIPATION, UNSPECIFIED CONSTIPATION TYPE: ICD-10-CM

## 2018-06-12 DIAGNOSIS — R21 RASH: ICD-10-CM

## 2018-06-12 DIAGNOSIS — C78.7 COLON CANCER METASTASIZED TO LIVER (HCC): Primary | ICD-10-CM

## 2018-06-12 DIAGNOSIS — I10 ESSENTIAL HYPERTENSION: ICD-10-CM

## 2018-06-12 DIAGNOSIS — C18.9 COLON CANCER METASTASIZED TO LIVER (HCC): Primary | ICD-10-CM

## 2018-06-12 DIAGNOSIS — E66.01 SEVERE OBESITY (BMI 35.0-39.9): ICD-10-CM

## 2018-06-12 LAB
ANION GAP SERPL CALC-SCNC: 11 MMOL/L (ref 5–15)
APPEARANCE UR: CLEAR
BASOPHILS # BLD: 0.1 K/UL (ref 0–0.1)
BASOPHILS NFR BLD: 1 % (ref 0–1)
BILIRUB UR QL: NEGATIVE
BUN SERPL-MCNC: 16 MG/DL (ref 6–20)
BUN/CREAT SERPL: 20 (ref 12–20)
CALCIUM SERPL-MCNC: 8.9 MG/DL (ref 8.5–10.1)
CHLORIDE SERPL-SCNC: 103 MMOL/L (ref 97–108)
CO2 SERPL-SCNC: 25 MMOL/L (ref 21–32)
COLOR UR: NORMAL
CREAT SERPL-MCNC: 0.82 MG/DL (ref 0.7–1.3)
DIFFERENTIAL METHOD BLD: ABNORMAL
EOSINOPHIL # BLD: 0.2 K/UL (ref 0–0.4)
EOSINOPHIL NFR BLD: 4 % (ref 0–7)
ERYTHROCYTE [DISTWIDTH] IN BLOOD BY AUTOMATED COUNT: 17.1 % (ref 11.5–14.5)
GLUCOSE SERPL-MCNC: 91 MG/DL (ref 65–100)
GLUCOSE UR STRIP.AUTO-MCNC: NEGATIVE MG/DL
HCT VFR BLD AUTO: 40.9 % (ref 36.6–50.3)
HGB BLD-MCNC: 12.9 G/DL (ref 12.1–17)
HGB UR QL STRIP: NEGATIVE
IMM GRANULOCYTES # BLD: 0 K/UL (ref 0–0.04)
IMM GRANULOCYTES NFR BLD AUTO: 0 % (ref 0–0.5)
KETONES UR QL STRIP.AUTO: NEGATIVE MG/DL
LEUKOCYTE ESTERASE UR QL STRIP.AUTO: NEGATIVE
LYMPHOCYTES # BLD: 1.8 K/UL (ref 0.8–3.5)
LYMPHOCYTES NFR BLD: 28 % (ref 12–49)
MCH RBC QN AUTO: 22.5 PG (ref 26–34)
MCHC RBC AUTO-ENTMCNC: 31.5 G/DL (ref 30–36.5)
MCV RBC AUTO: 71.3 FL (ref 80–99)
MONOCYTES # BLD: 0.8 K/UL (ref 0–1)
MONOCYTES NFR BLD: 13 % (ref 5–13)
NEUTS SEG # BLD: 3.5 K/UL (ref 1.8–8)
NEUTS SEG NFR BLD: 54 % (ref 32–75)
NITRITE UR QL STRIP.AUTO: NEGATIVE
NRBC # BLD: 0 K/UL (ref 0–0.01)
NRBC BLD-RTO: 0 PER 100 WBC
PH UR STRIP: 5.5 [PH] (ref 5–8)
PLATELET # BLD AUTO: 257 K/UL (ref 150–400)
PMV BLD AUTO: 9.1 FL (ref 8.9–12.9)
POTASSIUM SERPL-SCNC: 4.3 MMOL/L (ref 3.5–5.1)
PROT UR STRIP-MCNC: NEGATIVE MG/DL
RBC # BLD AUTO: 5.74 M/UL (ref 4.1–5.7)
SODIUM SERPL-SCNC: 139 MMOL/L (ref 136–145)
SP GR UR REFRACTOMETRY: 1.03 (ref 1–1.03)
UROBILINOGEN UR QL STRIP.AUTO: 0.2 EU/DL (ref 0.2–1)
WBC # BLD AUTO: 6.4 K/UL (ref 4.1–11.1)

## 2018-06-12 PROCEDURE — 96366 THER/PROPH/DIAG IV INF ADDON: CPT

## 2018-06-12 PROCEDURE — 74011000258 HC RX REV CODE- 258: Performed by: INTERNAL MEDICINE

## 2018-06-12 PROCEDURE — 96417 CHEMO IV INFUS EACH ADDL SEQ: CPT

## 2018-06-12 PROCEDURE — 96411 CHEMO IV PUSH ADDL DRUG: CPT

## 2018-06-12 PROCEDURE — 96368 THER/DIAG CONCURRENT INF: CPT

## 2018-06-12 PROCEDURE — 36415 COLL VENOUS BLD VENIPUNCTURE: CPT | Performed by: INTERNAL MEDICINE

## 2018-06-12 PROCEDURE — 80048 BASIC METABOLIC PNL TOTAL CA: CPT | Performed by: INTERNAL MEDICINE

## 2018-06-12 PROCEDURE — 74011250636 HC RX REV CODE- 250/636

## 2018-06-12 PROCEDURE — 74011000250 HC RX REV CODE- 250: Performed by: INTERNAL MEDICINE

## 2018-06-12 PROCEDURE — 77030012965 HC NDL HUBR BBMI -A

## 2018-06-12 PROCEDURE — 74011250636 HC RX REV CODE- 250/636: Performed by: INTERNAL MEDICINE

## 2018-06-12 PROCEDURE — 96415 CHEMO IV INFUSION ADDL HR: CPT

## 2018-06-12 PROCEDURE — 96413 CHEMO IV INFUSION 1 HR: CPT

## 2018-06-12 PROCEDURE — 96375 TX/PRO/DX INJ NEW DRUG ADDON: CPT

## 2018-06-12 PROCEDURE — 85025 COMPLETE CBC W/AUTO DIFF WBC: CPT | Performed by: INTERNAL MEDICINE

## 2018-06-12 PROCEDURE — 81003 URINALYSIS AUTO W/O SCOPE: CPT | Performed by: INTERNAL MEDICINE

## 2018-06-12 PROCEDURE — 96416 CHEMO PROLONG INFUSE W/PUMP: CPT

## 2018-06-12 RX ORDER — SODIUM CHLORIDE 9 MG/ML
10 INJECTION INTRAMUSCULAR; INTRAVENOUS; SUBCUTANEOUS AS NEEDED
Status: DISPENSED | OUTPATIENT
Start: 2018-06-12 | End: 2018-06-13

## 2018-06-12 RX ORDER — SODIUM CHLORIDE 0.9 % (FLUSH) 0.9 %
10 SYRINGE (ML) INJECTION AS NEEDED
Status: ACTIVE | OUTPATIENT
Start: 2018-06-12 | End: 2018-06-13

## 2018-06-12 RX ORDER — HEPARIN 100 UNIT/ML
500 SYRINGE INTRAVENOUS AS NEEDED
Status: ACTIVE | OUTPATIENT
Start: 2018-06-12 | End: 2018-06-13

## 2018-06-12 RX ADMIN — BEVACIZUMAB 600 MG: 400 INJECTION, SOLUTION INTRAVENOUS at 11:52

## 2018-06-12 RX ADMIN — LEUCOVORIN CALCIUM 980 MG: 350 INJECTION, POWDER, LYOPHILIZED, FOR SOLUTION INTRAMUSCULAR; INTRAVENOUS at 12:40

## 2018-06-12 RX ADMIN — PALONOSETRON HYDROCHLORIDE 0.25 MG: 0.25 INJECTION INTRAVENOUS at 10:40

## 2018-06-12 RX ADMIN — FLUOROURACIL 5880 MG: 50 INJECTION, SOLUTION INTRAVENOUS at 15:18

## 2018-06-12 RX ADMIN — OXALIPLATIN 210 MG: 100 INJECTION, SOLUTION, CONCENTRATE INTRAVENOUS at 12:40

## 2018-06-12 RX ADMIN — SODIUM CHLORIDE 25 ML/HR: 900 INJECTION, SOLUTION INTRAVENOUS at 10:37

## 2018-06-12 RX ADMIN — DEXAMETHASONE SODIUM PHOSPHATE 12 MG: 4 INJECTION, SOLUTION INTRA-ARTICULAR; INTRALESIONAL; INTRAMUSCULAR; INTRAVENOUS; SOFT TISSUE at 11:06

## 2018-06-12 RX ADMIN — Medication 10 ML: at 10:37

## 2018-06-12 RX ADMIN — SODIUM CHLORIDE 10 ML: 9 INJECTION INTRAMUSCULAR; INTRAVENOUS; SUBCUTANEOUS at 10:40

## 2018-06-12 RX ADMIN — DEXTROSE MONOHYDRATE 25 ML/HR: 5 INJECTION, SOLUTION INTRAVENOUS at 12:27

## 2018-06-12 RX ADMIN — FLUOROURACIL 980 MG: 50 INJECTION, SOLUTION INTRAVENOUS at 15:18

## 2018-06-12 RX ADMIN — Medication 10 ML: at 10:33

## 2018-06-12 RX ADMIN — Medication 10 ML: at 10:38

## 2018-06-12 NOTE — PROGRESS NOTES
[facility] - Chemotherapy Progress Note    7703 Pt admit to [facility] for labs/Folfox/Avastin ambulatory in stable condition. Assessment completed. No new concerns voiced. PAC with positive blood return. Pt reports a contact allergic reaction to something at his place of work on Sat resulting in swelling to bilateral hands and small hives that resolved by Sunday. Pt advised to notify MD and place of work, consider gloves, frequent washing and vigilance. Chemotherapy Flowsheet 6/12/2018   Cycle C4   Date -   Drug / Regimen Folfox/avastin   Notes -       Visit Vitals    Ht 6' (1.829 m)    Wt 125 kg (275 lb 9.6 oz)    BMI 37.38 kg/m2     Pt left for MD appointment @ [phone]- labs sent for processing. Medications:  Aloxi ivp  Dexamethasone ivp    Benadryl iv  oxaliplatin iv  Leucovorian iv  5 FU IVP  5 FU CADD CI    1530 Pt tolerated treatment well. PAC maintained positive blood return throughout treatment, flushed with positive blood return at conclusion and throughout treatment. D/c home ambulatory in no distress. Pt aware of next appointment scheduled for 4/14/18 @ 4 pm.    Recent Results (from the past 12 hour(s))   CBC WITH AUTOMATED DIFF    Collection Time: 06/12/18  9:36 AM   Result Value Ref Range    WBC 6.4 4.1 - 11.1 K/uL    RBC 5.74 (H) 4.10 - 5.70 M/uL    HGB 12.9 12.1 - 17.0 g/dL    HCT 40.9 36.6 - 50.3 %    MCV 71.3 (L) 80.0 - 99.0 FL    MCH 22.5 (L) 26.0 - 34.0 PG    MCHC 31.5 30.0 - 36.5 g/dL    RDW 17.1 (H) 11.5 - 14.5 %    PLATELET 349 762 - 749 K/uL    MPV 9.1 8.9 - 12.9 FL    NRBC 0.0 0  WBC    ABSOLUTE NRBC 0.00 0.00 - 0.01 K/uL    NEUTROPHILS 54 32 - 75 %    LYMPHOCYTES 28 12 - 49 %    MONOCYTES 13 5 - 13 %    EOSINOPHILS 4 0 - 7 %    BASOPHILS 1 0 - 1 %    IMMATURE GRANULOCYTES 0 0.0 - 0.5 %    ABS. NEUTROPHILS 3.5 1.8 - 8.0 K/UL    ABS. LYMPHOCYTES 1.8 0.8 - 3.5 K/UL    ABS. MONOCYTES 0.8 0.0 - 1.0 K/UL    ABS. EOSINOPHILS 0.2 0.0 - 0.4 K/UL    ABS.  BASOPHILS 0.1 0.0 - 0.1 K/UL    ABS. IMM.  GRANS. 0.0 0.00 - 0.04 K/UL    DF AUTOMATED     METABOLIC PANEL, BASIC    Collection Time: 06/12/18  9:36 AM   Result Value Ref Range    Sodium 139 136 - 145 mmol/L    Potassium 4.3 3.5 - 5.1 mmol/L    Chloride 103 97 - 108 mmol/L    CO2 25 21 - 32 mmol/L    Anion gap 11 5 - 15 mmol/L    Glucose 91 65 - 100 mg/dL    BUN 16 6 - 20 MG/DL    Creatinine 0.82 0.70 - 1.30 MG/DL    BUN/Creatinine ratio 20 12 - 20      GFR est AA >60 >60 ml/min/1.73m2    GFR est non-AA >60 >60 ml/min/1.73m2    Calcium 8.9 8.5 - 10.1 MG/DL   URINALYSIS W/ RFLX MICROSCOPIC    Collection Time: 06/12/18  9:36 AM   Result Value Ref Range    Color YELLOW/STRAW      Appearance CLEAR CLEAR      Specific gravity 1.026 1.003 - 1.030      pH (UA) 5.5 5.0 - 8.0      Protein NEGATIVE  NEG mg/dL    Glucose NEGATIVE  NEG mg/dL    Ketone NEGATIVE  NEG mg/dL    Bilirubin NEGATIVE  NEG      Blood NEGATIVE  NEG      Urobilinogen 0.2 0.2 - 1.0 EU/dL    Nitrites NEGATIVE  NEG      Leukocyte Esterase NEGATIVE  NEG

## 2018-06-12 NOTE — PROGRESS NOTES
[facility] [place] at [address] [facility]  W: [phone]  F: [phone]     Reason for Visit:   Curt Mas is a 32 y.o. male who is seen for follow up of metastatic colon cancer. Treatment History:   · CT A/P 3/25/2018: Indeterminate 2 x 1 cm low-density liver lesion. · CT C/A/P with triphase liver 3/27/2018: No evidence of metastatic disease to the chest. Multiple ill-defined hepatic lesions. These do not represent simple cyst.  · CT guided liver biopsy 3/27/2018: metastatic adenocarcinoma, KRAS wild type, NRAS wild type  · Colectomy by Dr. Cody Clark 3/29/2018: Adenocarcinoma, moderately differentiated. Negative margins. 3/16 nodes involved. pMMR  · Stage IV (pT3 pN1b pM1) Colon Cancer  · Chemotherapy with FOLFOX and bevacizumab beginning 5/1/2018, dany held with cycle 1    History of Present Illness:   Here today for follow up and next cycle of therapy. He states he is feeling well. Constipation improved with starting miralax and trying to eat more fruit and whole grains. He states he he got an itchy rash and swelling to hands last weekend, resolved now. He wonders if he touched something at work that caused it. Rash to palms and upper inner aspect of wrists, little bumps per patient. Energy good. Denies N/V. No bleeding. No additional complaints. He is unaccompanied today. PAST HISTORY: The following sections were reviewed and updated in the EMR as appropriate: PMH, SH, FH, Medications, Allergies. No Known Allergies     Review of Systems: A complete review of systems was obtained, reviewed, and scanned into the EMR. Pertinent findings reviewed above.       Physical Exam:     Visit Vitals    BP (!) 153/95 (BP 1 Location: Right arm, BP Patient Position: Sitting)    Pulse 87    Temp 96.1 °F (35.6 °C) (Temporal)    Resp 20    Ht 6' (1.829 m)    Wt 276 lb (125.2 kg)    SpO2 97%    BMI 37.43 kg/m2     ECOG PS: 0  General: No distress  Eyes: PERRLA, anicteric sclerae  HENT: Atraumatic, OP clear  Neck: Supple  Lymphatic: No cervical, supraclavicular, or inguinal adenopathy  Respiratory: CTAB, normal respiratory effort  CV: Normal rate, regular rhythm, no murmurs, no peripheral edema  GI: Soft, nontender, nondistended, no masses, no hepatomegaly, no splenomegaly  MS: Normal gait and station. Digits without clubbing or cyanosis. Skin: No rashes, ecchymoses, or petechiae. Normal temperature, turgor, and texture. Psych: Alert, oriented, appropriate affect, normal judgment/insight    Results:     Lab Results   Component Value Date/Time    WBC 6.4 06/12/2018 09:36 AM    HGB 12.9 06/12/2018 09:36 AM    HCT 40.9 06/12/2018 09:36 AM    PLATELET 704 83/26/3360 09:36 AM    MCV 71.3 (L) 06/12/2018 09:36 AM    ABS. NEUTROPHILS 3.5 06/12/2018 09:36 AM     Lab Results   Component Value Date/Time    Sodium 139 06/12/2018 09:36 AM    Potassium 4.3 06/12/2018 09:36 AM    Chloride 103 06/12/2018 09:36 AM    CO2 25 06/12/2018 09:36 AM    Glucose 91 06/12/2018 09:36 AM    BUN 16 06/12/2018 09:36 AM    Creatinine 0.82 06/12/2018 09:36 AM    GFR est AA >60 06/12/2018 09:36 AM    GFR est non-AA >60 06/12/2018 09:36 AM    Calcium 8.9 06/12/2018 09:36 AM     Lab Results   Component Value Date/Time    Bilirubin, total 0.3 05/29/2018 10:19 AM    ALT (SGPT) 27 05/29/2018 10:19 AM    AST (SGOT) 20 05/29/2018 10:19 AM    Alk. phosphatase 68 05/29/2018 10:19 AM    Protein, total 6.8 05/29/2018 10:19 AM    Albumin 3.1 (L) 05/29/2018 10:19 AM    Globulin 3.7 05/29/2018 10:19 AM         Assessment:   1) Metastatic Colon Cancer  Stage IV, pMMR, KRAS/NRAS wild type  He has metastatic disease within his liver. His primary tumor has been resected. He is currently receiving chemotherapy with FOLFOX and Bevacizumab, dany held with c#1. He has been seen by surgical oncology, Dr. Rosanna Ruvalcaba- I discussed case with him today.  We will plan restage after 6 cycles and have him follow up with surgical oncology to discuss possible resection vs ablation of his liver metastases. He continues tolerating therapy well. We will continue with next cycle of therapy today. 2) Anemia  Mild. Resolved since surgery. Secondary to bleeding at primary tumor. Monitor. 3) Risk of infertility  We have previously reviewed that chemotherapy can potentially cause infertility. He has undergone cryopreservation. 4) Genetic risk  We previously discussed referral for genetic testing. pMMR argues against Hogan Syndrome, and no family history, but his young age warrants testing in my opinion. We will discuss further at a future visit. 5) Hypertension  Mildly elevated today. Started on metoprolol during hospital stay. Plans to follow upwith his PCP about this. 6) Nausea  Minimal. Secondary to therapy. Continue home meds PRN. 7) Rash to hands  Resolved at present. Unclear etiology. Not consistent with HFS. Okay to take Benadryl if needed. Patient to notify us if this recurs. Plan:     · Proceed today with C#4 of mFOLFOX6 (oxaliplatin 85 mg/m2, leucovorin 400 mg/m2, fluorouracil 400 mg/m2, and a 46 hour infusion of fluorouracil 2400 mg/m2 given every 2 weeks). · Labs: CBC, BMP prior to each treatment, Hepatic function panel every 4 weeks  · Prophylactic antiemetics: Palonosetron and dexamethasone on the day of each chemotherapy infusion, with dexamethasone 8mg PO daily at home on days 2 and 3  · PRN antiemetics: Prochlorperazine and Ondansetron  · Return to clinic in 2 weeks with next cycle     Patient was seen in conjunction with Steff Jack NP.     Signed By: Otto Bear MD

## 2018-06-14 ENCOUNTER — HOSPITAL ENCOUNTER (OUTPATIENT)
Dept: INFUSION THERAPY | Age: 28
Discharge: HOME | End: 2018-06-14
Payer: COMMERCIAL

## 2018-06-14 VITALS
SYSTOLIC BLOOD PRESSURE: 140 MMHG | DIASTOLIC BLOOD PRESSURE: 89 MMHG | RESPIRATION RATE: 18 BRPM | HEART RATE: 76 BPM | TEMPERATURE: 98 F

## 2018-06-14 PROCEDURE — 74011250636 HC RX REV CODE- 250/636: Performed by: INTERNAL MEDICINE

## 2018-06-14 PROCEDURE — 96523 IRRIG DRUG DELIVERY DEVICE: CPT

## 2018-06-14 RX ORDER — SODIUM CHLORIDE 0.9 % (FLUSH) 0.9 %
10-40 SYRINGE (ML) INJECTION AS NEEDED
Status: ACTIVE | OUTPATIENT
Start: 2018-06-14 | End: 2018-06-15

## 2018-06-14 RX ORDER — HEPARIN 100 UNIT/ML
500 SYRINGE INTRAVENOUS AS NEEDED
Status: ACTIVE | OUTPATIENT
Start: 2018-06-14 | End: 2018-06-15

## 2018-06-14 RX ADMIN — HEPARIN 500 UNITS: 100 SYRINGE at 14:18

## 2018-06-14 RX ADMIN — Medication 10 ML: at 14:18

## 2018-06-14 NOTE — PROGRESS NOTES
Outpatient Infusion [facility] Short Visit Progress Note    6613 Patient admitted to [facility] for Pump Removal ambulatory in stable condition. Assessment completed. No new concerns voiced. Patient Vitals for the past 12 hrs:   Temp Pulse Resp BP   06/14/18 1416 98 °F (36.7 °C) 76 18 140/89       RCW port de-accessed, flushed, and heparinized per protocol. 1420 Patient tolerated treatment well. Patient discharged from [place] [address] ambulatory in no distress at 1420.  Patient aware of next appointment scheduled for 6/26/2018 at 9:00am.

## 2018-06-18 ENCOUNTER — TELEPHONE (OUTPATIENT)
Dept: ONCOLOGY | Age: 28
End: 2018-06-18

## 2018-06-18 NOTE — TELEPHONE ENCOUNTER
[address] at [place]  [phone]    06/18/18- Patient stated he's still having issues with constipation, despite taking miralax daily. Discussed starting docusate 1-2 tabs BID and senna 2 tabs nightly. Informed patient that miralax can be increased as well, if needed. Patient stated that he's having bright red rectal bleeding with his bowel movements. Denies bleeding from anywhere else or at any other time. Will discuss with Kurt Davis and Dr. Marisa Márquez. Reminded patient to increase fluid intake to help with constipation as well. He verbalized understanding, no further questions or concerns at this time.

## 2018-06-18 NOTE — TELEPHONE ENCOUNTER
Pt called complaining of constipation and having bright red bleeding when he wipes.  Call back number [phone]

## 2018-06-20 RX ORDER — FLUOROURACIL 50 MG/ML
980 INJECTION, SOLUTION INTRAVENOUS ONCE
Status: COMPLETED | OUTPATIENT
Start: 2018-06-26 | End: 2018-06-26

## 2018-06-20 RX ORDER — SODIUM CHLORIDE 9 MG/ML
25 INJECTION, SOLUTION INTRAVENOUS ONCE
Status: DISPENSED | OUTPATIENT
Start: 2018-06-26 | End: 2018-06-26

## 2018-06-20 RX ORDER — DEXTROSE MONOHYDRATE 50 MG/ML
25 INJECTION, SOLUTION INTRAVENOUS CONTINUOUS
Status: DISPENSED | OUTPATIENT
Start: 2018-06-26 | End: 2018-06-26

## 2018-06-20 RX ORDER — PALONOSETRON 0.05 MG/ML
0.25 INJECTION, SOLUTION INTRAVENOUS ONCE
Status: COMPLETED | OUTPATIENT
Start: 2018-06-26 | End: 2018-06-26

## 2018-06-20 NOTE — TELEPHONE ENCOUNTER
MICHELLEE dELiAs at [facility]  [phone]    06/20/18- Phone call placed to patient to touch base- he reported he is doing better today. He reported he is moving his bowels, yesterday had one episode of blood in stools. No bleeding, dizziness, lightheadedness, SOB or other symptoms. He will continue to use OTC measures to help move bowels and will return phone call if symptoms worsen. No further questions or concerns.

## 2018-06-26 ENCOUNTER — HOSPITAL ENCOUNTER (OUTPATIENT)
Dept: INFUSION THERAPY | Age: 28
Discharge: HOME | End: 2018-06-26
Payer: COMMERCIAL

## 2018-06-26 ENCOUNTER — OFFICE VISIT (OUTPATIENT)
Dept: ONCOLOGY | Age: 28
End: 2018-06-26

## 2018-06-26 VITALS
BODY MASS INDEX: 37.42 KG/M2 | RESPIRATION RATE: 18 BRPM | HEART RATE: 87 BPM | WEIGHT: 276.3 LBS | TEMPERATURE: 98.3 F | DIASTOLIC BLOOD PRESSURE: 94 MMHG | SYSTOLIC BLOOD PRESSURE: 137 MMHG | HEIGHT: 72 IN

## 2018-06-26 VITALS
WEIGHT: 279 LBS | OXYGEN SATURATION: 99 % | DIASTOLIC BLOOD PRESSURE: 88 MMHG | TEMPERATURE: 97.9 F | HEART RATE: 89 BPM | BODY MASS INDEX: 37.84 KG/M2 | SYSTOLIC BLOOD PRESSURE: 126 MMHG

## 2018-06-26 DIAGNOSIS — K64.9 BLEEDING HEMORRHOIDS: ICD-10-CM

## 2018-06-26 DIAGNOSIS — C78.7 COLON CANCER METASTASIZED TO LIVER (HCC): Primary | ICD-10-CM

## 2018-06-26 DIAGNOSIS — C18.9 COLON CANCER METASTASIZED TO LIVER (HCC): Primary | ICD-10-CM

## 2018-06-26 DIAGNOSIS — E66.01 SEVERE OBESITY (BMI 35.0-39.9): ICD-10-CM

## 2018-06-26 LAB
ALBUMIN SERPL-MCNC: 3.3 G/DL (ref 3.5–5)
ALBUMIN/GLOB SERPL: 0.7 {RATIO} (ref 1.1–2.2)
ALP SERPL-CCNC: 72 U/L (ref 45–117)
ALT SERPL-CCNC: 38 U/L (ref 12–78)
ANION GAP SERPL CALC-SCNC: 11 MMOL/L (ref 5–15)
APPEARANCE UR: CLEAR
AST SERPL-CCNC: 22 U/L (ref 15–37)
BACTERIA URNS QL MICRO: NEGATIVE /HPF
BASOPHILS # BLD: 0.1 K/UL (ref 0–0.1)
BASOPHILS NFR BLD: 1 % (ref 0–1)
BILIRUB SERPL-MCNC: 0.4 MG/DL (ref 0.2–1)
BILIRUB UR QL: NEGATIVE
BUN SERPL-MCNC: 13 MG/DL (ref 6–20)
BUN/CREAT SERPL: 14 (ref 12–20)
CALCIUM SERPL-MCNC: 9.3 MG/DL (ref 8.5–10.1)
CHLORIDE SERPL-SCNC: 101 MMOL/L (ref 97–108)
CO2 SERPL-SCNC: 25 MMOL/L (ref 21–32)
COLOR UR: NORMAL
CREAT SERPL-MCNC: 0.93 MG/DL (ref 0.7–1.3)
DIFFERENTIAL METHOD BLD: ABNORMAL
EOSINOPHIL # BLD: 0.3 K/UL (ref 0–0.4)
EOSINOPHIL NFR BLD: 4 % (ref 0–7)
EPITH CASTS URNS QL MICRO: NORMAL /LPF
ERYTHROCYTE [DISTWIDTH] IN BLOOD BY AUTOMATED COUNT: 17.9 % (ref 11.5–14.5)
GLOBULIN SER CALC-MCNC: 4.7 G/DL (ref 2–4)
GLUCOSE SERPL-MCNC: 143 MG/DL (ref 65–100)
GLUCOSE UR STRIP.AUTO-MCNC: NEGATIVE MG/DL
HCT VFR BLD AUTO: 41.2 % (ref 36.6–50.3)
HGB BLD-MCNC: 13.1 G/DL (ref 12.1–17)
HGB UR QL STRIP: NEGATIVE
HYALINE CASTS URNS QL MICRO: NORMAL /LPF (ref 0–5)
IMM GRANULOCYTES # BLD: 0 K/UL (ref 0–0.04)
IMM GRANULOCYTES NFR BLD AUTO: 0 % (ref 0–0.5)
KETONES UR QL STRIP.AUTO: NEGATIVE MG/DL
LEUKOCYTE ESTERASE UR QL STRIP.AUTO: NEGATIVE
LYMPHOCYTES # BLD: 1.8 K/UL (ref 0.8–3.5)
LYMPHOCYTES NFR BLD: 30 % (ref 12–49)
MCH RBC QN AUTO: 22.8 PG (ref 26–34)
MCHC RBC AUTO-ENTMCNC: 31.8 G/DL (ref 30–36.5)
MCV RBC AUTO: 71.8 FL (ref 80–99)
MONOCYTES # BLD: 0.7 K/UL (ref 0–1)
MONOCYTES NFR BLD: 12 % (ref 5–13)
NEUTS SEG # BLD: 3.2 K/UL (ref 1.8–8)
NEUTS SEG NFR BLD: 53 % (ref 32–75)
NITRITE UR QL STRIP.AUTO: NEGATIVE
NRBC # BLD: 0 K/UL (ref 0–0.01)
NRBC BLD-RTO: 0 PER 100 WBC
PH UR STRIP: 5.5 [PH] (ref 5–8)
PLATELET # BLD AUTO: 254 K/UL (ref 150–400)
PMV BLD AUTO: 9.3 FL (ref 8.9–12.9)
POTASSIUM SERPL-SCNC: 3.9 MMOL/L (ref 3.5–5.1)
PROT SERPL-MCNC: 8 G/DL (ref 6.4–8.2)
PROT UR STRIP-MCNC: NEGATIVE MG/DL
RBC # BLD AUTO: 5.74 M/UL (ref 4.1–5.7)
RBC #/AREA URNS HPF: NORMAL /HPF (ref 0–5)
SODIUM SERPL-SCNC: 137 MMOL/L (ref 136–145)
SP GR UR REFRACTOMETRY: 1.01 (ref 1–1.03)
UA: UC IF INDICATED,UAUC: NORMAL
UROBILINOGEN UR QL STRIP.AUTO: 0.2 EU/DL (ref 0.2–1)
WBC # BLD AUTO: 6.1 K/UL (ref 4.1–11.1)
WBC URNS QL MICRO: NORMAL /HPF (ref 0–4)

## 2018-06-26 PROCEDURE — 96415 CHEMO IV INFUSION ADDL HR: CPT

## 2018-06-26 PROCEDURE — 74011000258 HC RX REV CODE- 258: Performed by: INTERNAL MEDICINE

## 2018-06-26 PROCEDURE — 74011250636 HC RX REV CODE- 250/636: Performed by: INTERNAL MEDICINE

## 2018-06-26 PROCEDURE — 96368 THER/DIAG CONCURRENT INF: CPT

## 2018-06-26 PROCEDURE — 81001 URINALYSIS AUTO W/SCOPE: CPT | Performed by: INTERNAL MEDICINE

## 2018-06-26 PROCEDURE — 96375 TX/PRO/DX INJ NEW DRUG ADDON: CPT

## 2018-06-26 PROCEDURE — 85025 COMPLETE CBC W/AUTO DIFF WBC: CPT | Performed by: INTERNAL MEDICINE

## 2018-06-26 PROCEDURE — 36415 COLL VENOUS BLD VENIPUNCTURE: CPT | Performed by: INTERNAL MEDICINE

## 2018-06-26 PROCEDURE — 96413 CHEMO IV INFUSION 1 HR: CPT

## 2018-06-26 PROCEDURE — 77030012965 HC NDL HUBR BBMI -A

## 2018-06-26 PROCEDURE — 74011250636 HC RX REV CODE- 250/636

## 2018-06-26 PROCEDURE — 80053 COMPREHEN METABOLIC PANEL: CPT | Performed by: INTERNAL MEDICINE

## 2018-06-26 PROCEDURE — 96416 CHEMO PROLONG INFUSE W/PUMP: CPT

## 2018-06-26 PROCEDURE — 96411 CHEMO IV PUSH ADDL DRUG: CPT

## 2018-06-26 RX ORDER — SODIUM CHLORIDE 9 MG/ML
10 INJECTION INTRAMUSCULAR; INTRAVENOUS; SUBCUTANEOUS AS NEEDED
Status: ACTIVE | OUTPATIENT
Start: 2018-06-26 | End: 2018-06-27

## 2018-06-26 RX ORDER — HEPARIN 100 UNIT/ML
500 SYRINGE INTRAVENOUS AS NEEDED
Status: ACTIVE | OUTPATIENT
Start: 2018-06-26 | End: 2018-06-27

## 2018-06-26 RX ORDER — SODIUM CHLORIDE 900 MG/100ML
INJECTION INTRAVENOUS
Status: DISPENSED
Start: 2018-06-26 | End: 2018-06-26

## 2018-06-26 RX ORDER — SODIUM CHLORIDE 0.9 % (FLUSH) 0.9 %
10-40 SYRINGE (ML) INJECTION AS NEEDED
Status: ACTIVE | OUTPATIENT
Start: 2018-06-26 | End: 2018-06-27

## 2018-06-26 RX ADMIN — DEXTROSE MONOHYDRATE 25 ML/HR: 5 INJECTION, SOLUTION INTRAVENOUS at 11:00

## 2018-06-26 RX ADMIN — OXALIPLATIN 210 MG: 5 INJECTION, SOLUTION INTRAVENOUS at 12:25

## 2018-06-26 RX ADMIN — DEXAMETHASONE SODIUM PHOSPHATE 12 MG: 4 INJECTION, SOLUTION INTRAMUSCULAR; INTRAVENOUS at 11:55

## 2018-06-26 RX ADMIN — LEUCOVORIN CALCIUM 980 MG: 350 INJECTION, POWDER, LYOPHILIZED, FOR SOLUTION INTRAMUSCULAR; INTRAVENOUS at 12:25

## 2018-06-26 RX ADMIN — PALONOSETRON 0.25 MG: 0.05 INJECTION, SOLUTION INTRAVENOUS at 11:03

## 2018-06-26 RX ADMIN — FLUOROURACIL 980 MG: 50 INJECTION, SOLUTION INTRAVENOUS at 15:02

## 2018-06-26 RX ADMIN — FLUOROURACIL 5880 MG: 50 INJECTION, SOLUTION INTRAVENOUS at 15:03

## 2018-06-26 NOTE — PROGRESS NOTES
[facility] [place] at [address] [facility] [facility] [address] [facility]  W: [phone]  F: [phone]     Reason for Visit:   Joaquín Avila is a 32 y.o. male who is seen for follow up of metastatic colon cancer. Treatment History:   · CT A/P 3/25/2018: Indeterminate 2 x 1 cm low-density liver lesion. · CT C/A/P with triphase liver 3/27/2018: No evidence of metastatic disease to the chest. Multiple ill-defined hepatic lesions. These do not represent simple cyst.  · CT guided liver biopsy 3/27/2018: metastatic adenocarcinoma, KRAS wild type, NRAS wild type  · Colectomy by Dr. Sena Lopez 3/29/2018: Adenocarcinoma, moderately differentiated. Negative margins. 3/16 nodes involved. pMMR  · Stage IV (pT3 pN1b pM1) Colon Cancer  · Chemotherapy with FOLFOX and bevacizumab beginning 5/1/2018, dany held with cycle 1    History of Present Illness:   Here today for follow up and next cycle of therapy. He reports feeling pretty well. Some rectal bleeding after last cycle which he attributes to constipation and hemorrhoids. He has started colace and senna with relief of constipation. No recurrence of rash. Fatigue worsening, having more difficulty at work. Denies NV. No additional complaints. He is unaccompanied today. PAST HISTORY: The following sections were reviewed and updated in the EMR as appropriate: PMH, SH, FH, Medications, Allergies. No Known Allergies     Review of Systems: A complete review of systems was obtained, reviewed, and scanned into the EMR. Pertinent findings reviewed above.       Physical Exam:     Visit Vitals    /88 (BP 1 Location: Right arm, BP Patient Position: Sitting)    Pulse 89    Temp 97.9 °F (36.6 °C)    Wt 279 lb (126.6 kg)    SpO2 99%    BMI 37.84 kg/m2     ECOG PS: 0  General: No distress  Eyes: PERRLA, anicteric sclerae  HENT: Atraumatic, OP clear  Neck: Supple  Lymphatic: No cervical, supraclavicular, or inguinal adenopathy  Respiratory: CTAB, normal respiratory effort  CV: Normal rate, regular rhythm, no murmurs, no peripheral edema  GI: Soft, nontender, nondistended, no masses, no hepatomegaly, no splenomegaly  MS: Normal gait and station. Digits without clubbing or cyanosis. Skin: No rashes, ecchymoses, or petechiae. Normal temperature, turgor, and texture. Psych: Alert, oriented, appropriate affect, normal judgment/insight    Results:     Lab Results   Component Value Date/Time    WBC 6.1 06/26/2018 09:27 AM    HGB 13.1 06/26/2018 09:27 AM    HCT 41.2 06/26/2018 09:27 AM    PLATELET 579 55/35/2869 09:27 AM    MCV 71.8 (L) 06/26/2018 09:27 AM    ABS. NEUTROPHILS 3.2 06/26/2018 09:27 AM     Lab Results   Component Value Date/Time    Sodium 137 06/26/2018 09:27 AM    Potassium 3.9 06/26/2018 09:27 AM    Chloride 101 06/26/2018 09:27 AM    CO2 25 06/26/2018 09:27 AM    Glucose 143 (H) 06/26/2018 09:27 AM    BUN 13 06/26/2018 09:27 AM    Creatinine 0.93 06/26/2018 09:27 AM    GFR est AA >60 06/26/2018 09:27 AM    GFR est non-AA >60 06/26/2018 09:27 AM    Calcium 9.3 06/26/2018 09:27 AM     Lab Results   Component Value Date/Time    Bilirubin, total 0.4 06/26/2018 09:27 AM    ALT (SGPT) 38 06/26/2018 09:27 AM    AST (SGOT) 22 06/26/2018 09:27 AM    Alk. phosphatase 72 06/26/2018 09:27 AM    Protein, total 8.0 06/26/2018 09:27 AM    Albumin 3.3 (L) 06/26/2018 09:27 AM    Globulin 4.7 (H) 06/26/2018 09:27 AM         Assessment:   1) Metastatic Colon Cancer  Stage IV, pMMR, KRAS/NRAS wild type  He has metastatic disease within his liver. His primary tumor has been resected. He is currently receiving chemotherapy with FOLFOX and Bevacizumab, dany held with c#1. He has been seen by surgical oncology, Dr. Tristan Whaley. We will plan restage after 6 cycles and have him follow up with surgical oncology to discuss possible resection vs ablation of his liver metastases. He continues tolerating therapy very well.   We will continue therapy, holding dany given recent rectal bleeding. We will plan to see him back in 2 weeks with next cycle and get CT about a week later, ordered today. 2) Anemia  Mild. Resolved since surgery. Secondary to bleeding at primary tumor. Monitor. 3) Risk of infertility  We have previously reviewed that chemotherapy can potentially cause infertility. He has undergone cryopreservation. 4) Genetic risk  We previously discussed referral for genetic testing. pMMR argues against Hogan Syndrome, and no family history, but his young age warrants testing in my opinion. We will discuss further at a future visit. 5) Hypertension  Improved today. Started on metoprolol during hospital stay. PCP now following. 6) Nausea  Minimal. Secondary to therapy. Continue home meds PRN. 7) Rash to hands  No recurrence. Unclear etiology. Not consistent with HFS. Okay to take Benadryl if needed. Patient to notify us if this recurs. Plan:     · Proceed today with C#5 of FOLFOX and bevacizumab, holding dany  · Labs: CBC, BMP prior to each treatment, Hepatic function panel every 4 weeks  · Prophylactic antiemetics: Palonosetron and dexamethasone on the day of each chemotherapy infusion, with dexamethasone 8mg PO daily at home on days 2 and 3  · PRN antiemetics: Prochlorperazine and Ondansetron  · CT C/A/P with triphasic liver in about 3 weeks  · Return to clinic in 2 weeks with next cycle     Patient was seen in conjunction with Jairo Katz NP.     Signed By: Eliud Dunlap MD

## 2018-06-26 NOTE — PROGRESS NOTES
[facility] VISIT NOTE    Pt arrived at [place] ambulatory and in no distress for  C5 Folfox/Bevacizumab. Assessment completed, pt c/o feeling nausea and fatigue post last chemo treatment but nausea has resolved . Patient Vitals for the past 12 hrs:   Temp Pulse Resp BP   06/26/18 1511 98.3 °F (36.8 °C) 87 18 (!) 137/94   06/26/18 0912 98.2 °F (36.8 °C) 94 18 (!) 143/95     Right chest port accessed with 1  in [place] no difficulty. Positive blood return noted and labs drawn. Proceeded to MD appt. Recent Results (from the past 12 hour(s))   CBC WITH AUTOMATED DIFF    Collection Time: 06/26/18  9:27 AM   Result Value Ref Range    WBC 6.1 4.1 - 11.1 K/uL    RBC 5.74 (H) 4.10 - 5.70 M/uL    HGB 13.1 12.1 - 17.0 g/dL    HCT 41.2 36.6 - 50.3 %    MCV 71.8 (L) 80.0 - 99.0 FL    MCH 22.8 (L) 26.0 - 34.0 PG    MCHC 31.8 30.0 - 36.5 g/dL    RDW 17.9 (H) 11.5 - 14.5 %    PLATELET 961 549 - 120 K/uL    MPV 9.3 8.9 - 12.9 FL    NRBC 0.0 0  WBC    ABSOLUTE NRBC 0.00 0.00 - 0.01 K/uL    NEUTROPHILS 53 32 - 75 %    LYMPHOCYTES 30 12 - 49 %    MONOCYTES 12 5 - 13 %    EOSINOPHILS 4 0 - 7 %    BASOPHILS 1 0 - 1 %    IMMATURE GRANULOCYTES 0 0.0 - 0.5 %    ABS. NEUTROPHILS 3.2 1.8 - 8.0 K/UL    ABS. LYMPHOCYTES 1.8 0.8 - 3.5 K/UL    ABS. MONOCYTES 0.7 0.0 - 1.0 K/UL    ABS. EOSINOPHILS 0.3 0.0 - 0.4 K/UL    ABS. BASOPHILS 0.1 0.0 - 0.1 K/UL    ABS. IMM.  GRANS. 0.0 0.00 - 0.04 K/UL    DF AUTOMATED     METABOLIC PANEL, COMPREHENSIVE    Collection Time: 06/26/18  9:27 AM   Result Value Ref Range    Sodium 137 136 - 145 mmol/L    Potassium 3.9 3.5 - 5.1 mmol/L    Chloride 101 97 - 108 mmol/L    CO2 25 21 - 32 mmol/L    Anion gap 11 5 - 15 mmol/L    Glucose 143 (H) 65 - 100 mg/dL    BUN 13 6 - 20 MG/DL    Creatinine 0.93 0.70 - 1.30 MG/DL    BUN/Creatinine ratio 14 12 - 20      GFR est AA >60 >60 ml/min/1.73m2    GFR est non-AA >60 >60 ml/min/1.73m2    Calcium 9.3 8.5 - 10.1 MG/DL    Bilirubin, total 0.4 0.2 - 1.0 MG/DL    ALT (SGPT) 38 12 - 78 U/L    AST (SGOT) 22 15 - 37 U/L    Alk. phosphatase 72 45 - 117 U/L    Protein, total 8.0 6.4 - 8.2 g/dL    Albumin 3.3 (L) 3.5 - 5.0 g/dL    Globulin 4.7 (H) 2.0 - 4.0 g/dL    A-G Ratio 0.7 (L) 1.1 - 2.2     URINALYSIS W/ REFLEX CULTURE    Collection Time: 06/26/18  9:27 AM   Result Value Ref Range    Color YELLOW/STRAW      Appearance CLEAR CLEAR      Specific gravity 1.014 1.003 - 1.030      pH (UA) 5.5 5.0 - 8.0      Protein NEGATIVE  NEG mg/dL    Glucose NEGATIVE  NEG mg/dL    Ketone NEGATIVE  NEG mg/dL    Bilirubin NEGATIVE  NEG      Blood NEGATIVE  NEG      Urobilinogen 0.2 0.2 - 1.0 EU/dL    Nitrites NEGATIVE  NEG      Leukocyte Esterase NEGATIVE  NEG      WBC 0-4 0 - 4 /hpf    RBC 0-5 0 - 5 /hpf    Epithelial cells FEW FEW /lpf    Bacteria NEGATIVE  NEG /hpf    UA:UC IF INDICATED CULTURE NOT INDICATED BY UA RESULT CNI      Hyaline cast 2-5 0 - 5 /lpf     Received orders from MD office that we were going to hold Avastin today. Medications received:  Aloxi IV  Dexlamethasone IV  Oxaliplatin IV  Leucovorin IV  5FU IVP  5FU CIV    Tolerated treatment well, no adverse reaction noted. Port de-accessed and flushed per protocol. Positive blood return noted. D/C'd from [facility] ambulatory and in no distress. Next appointment is 6/28/18 at 4:00. Alexandria Jennings

## 2018-06-26 NOTE — PROGRESS NOTES
Problem: Chemotherapy Treatment  Goal: *Chemotherapy regimen followed  Outcome: Progressing Towards Goal  Pt here for folfox/avastin

## 2018-06-26 NOTE — PROGRESS NOTES
Serg Fine is a 32 y.o. male  Chief Complaint   Patient presents with    Colon Cancer     Visit Vitals    /88 (BP 1 Location: Right arm, BP Patient Position: Sitting)    Pulse 89    Temp 97.9 °F (36.6 °C)    Wt 279 lb (126.6 kg)    SpO2 99%    BMI 37.84 kg/m2     1. Have you been to the ER, urgent care clinic since your last visit?no Hospitalized since your last visit?no    2. Have you seen or consulted any other health care providers outside of the [address] [place] [address] since your last visit? No  Include any pap smears or colon screening.  no

## 2018-06-28 ENCOUNTER — HOSPITAL ENCOUNTER (OUTPATIENT)
Dept: INFUSION THERAPY | Age: 28
Discharge: HOME | End: 2018-06-28
Payer: COMMERCIAL

## 2018-06-28 VITALS
RESPIRATION RATE: 18 BRPM | HEART RATE: 82 BPM | SYSTOLIC BLOOD PRESSURE: 132 MMHG | DIASTOLIC BLOOD PRESSURE: 85 MMHG | TEMPERATURE: 98.6 F

## 2018-06-28 PROCEDURE — 96523 IRRIG DRUG DELIVERY DEVICE: CPT

## 2018-06-28 NOTE — PROGRESS NOTES
[facility] VISIT NOTE    Pt arrived at [facility] ambulatory and in no distress for Pump Removal.  Assessment completed, pt had no complaints. Patient Vitals for the past 12 hrs:   Temp Pulse Resp BP   06/28/18 1401 98.6 °F (37 °C) 82 18 132/85     Right chest port disconnected from 5FU CIV without difficulty. Tolerated treatment well, no adverse reaction noted. Port de-accessed and flushed per protocol. Positive blood return noted. D/C'd from [facility] ambulatory and in no distress. Next appointment is 7/10/18 at 9:00.

## 2018-07-01 DIAGNOSIS — I10 ESSENTIAL HYPERTENSION: ICD-10-CM

## 2018-07-02 RX ORDER — METOPROLOL TARTRATE 25 MG/1
TABLET, FILM COATED ORAL
Qty: 60 TAB | Refills: 0 | Status: SHIPPED | OUTPATIENT
Start: 2018-07-02 | End: 2018-10-09 | Stop reason: SDUPTHER

## 2018-07-03 RX ORDER — DEXTROSE MONOHYDRATE 50 MG/ML
25 INJECTION, SOLUTION INTRAVENOUS CONTINUOUS
Status: DISPENSED | OUTPATIENT
Start: 2018-07-10 | End: 2018-07-10

## 2018-07-03 RX ORDER — FLUOROURACIL 50 MG/ML
980 INJECTION, SOLUTION INTRAVENOUS ONCE
Status: COMPLETED | OUTPATIENT
Start: 2018-07-10 | End: 2018-07-10

## 2018-07-03 RX ORDER — PALONOSETRON 0.05 MG/ML
0.25 INJECTION, SOLUTION INTRAVENOUS ONCE
Status: COMPLETED | OUTPATIENT
Start: 2018-07-10 | End: 2018-07-10

## 2018-07-03 RX ORDER — SODIUM CHLORIDE 9 MG/ML
25 INJECTION, SOLUTION INTRAVENOUS ONCE
Status: DISPENSED | OUTPATIENT
Start: 2018-07-10 | End: 2018-07-10

## 2018-07-10 ENCOUNTER — OFFICE VISIT (OUTPATIENT)
Dept: ONCOLOGY | Age: 28
End: 2018-07-10

## 2018-07-10 ENCOUNTER — HOSPITAL ENCOUNTER (OUTPATIENT)
Dept: INFUSION THERAPY | Age: 28
Discharge: HOME | End: 2018-07-10
Payer: COMMERCIAL

## 2018-07-10 ENCOUNTER — TELEPHONE (OUTPATIENT)
Dept: ONCOLOGY | Age: 28
End: 2018-07-10

## 2018-07-10 VITALS
WEIGHT: 280 LBS | RESPIRATION RATE: 22 BRPM | DIASTOLIC BLOOD PRESSURE: 98 MMHG | TEMPERATURE: 95.6 F | HEART RATE: 83 BPM | HEIGHT: 72 IN | BODY MASS INDEX: 37.93 KG/M2 | OXYGEN SATURATION: 93 % | SYSTOLIC BLOOD PRESSURE: 146 MMHG

## 2018-07-10 VITALS
TEMPERATURE: 97 F | WEIGHT: 278.6 LBS | RESPIRATION RATE: 18 BRPM | DIASTOLIC BLOOD PRESSURE: 98 MMHG | BODY MASS INDEX: 37.73 KG/M2 | SYSTOLIC BLOOD PRESSURE: 138 MMHG | HEART RATE: 78 BPM | HEIGHT: 72 IN

## 2018-07-10 DIAGNOSIS — C18.9 COLON CANCER METASTASIZED TO LIVER (HCC): Primary | ICD-10-CM

## 2018-07-10 DIAGNOSIS — C78.7 COLON CANCER METASTASIZED TO LIVER (HCC): Primary | ICD-10-CM

## 2018-07-10 LAB
ANION GAP SERPL CALC-SCNC: 9 MMOL/L (ref 5–15)
APPEARANCE UR: CLEAR
BASOPHILS # BLD: 0 K/UL (ref 0–0.1)
BASOPHILS NFR BLD: 1 % (ref 0–1)
BILIRUB UR QL: NEGATIVE
BUN SERPL-MCNC: 14 MG/DL (ref 6–20)
BUN/CREAT SERPL: 16 (ref 12–20)
CALCIUM SERPL-MCNC: 9.2 MG/DL (ref 8.5–10.1)
CHLORIDE SERPL-SCNC: 104 MMOL/L (ref 97–108)
CO2 SERPL-SCNC: 26 MMOL/L (ref 21–32)
COLOR UR: NORMAL
CREAT SERPL-MCNC: 0.86 MG/DL (ref 0.7–1.3)
DIFFERENTIAL METHOD BLD: ABNORMAL
EOSINOPHIL # BLD: 0.3 K/UL (ref 0–0.4)
EOSINOPHIL NFR BLD: 5 % (ref 0–7)
ERYTHROCYTE [DISTWIDTH] IN BLOOD BY AUTOMATED COUNT: 19 % (ref 11.5–14.5)
GLUCOSE SERPL-MCNC: 95 MG/DL (ref 65–100)
GLUCOSE UR STRIP.AUTO-MCNC: NEGATIVE MG/DL
HCT VFR BLD AUTO: 41.5 % (ref 36.6–50.3)
HGB BLD-MCNC: 13.1 G/DL (ref 12.1–17)
HGB UR QL STRIP: NEGATIVE
IMM GRANULOCYTES # BLD: 0 K/UL (ref 0–0.04)
IMM GRANULOCYTES NFR BLD AUTO: 1 % (ref 0–0.5)
KETONES UR QL STRIP.AUTO: NEGATIVE MG/DL
LEUKOCYTE ESTERASE UR QL STRIP.AUTO: NEGATIVE
LYMPHOCYTES # BLD: 1.6 K/UL (ref 0.8–3.5)
LYMPHOCYTES NFR BLD: 28 % (ref 12–49)
MCH RBC QN AUTO: 22.7 PG (ref 26–34)
MCHC RBC AUTO-ENTMCNC: 31.6 G/DL (ref 30–36.5)
MCV RBC AUTO: 71.9 FL (ref 80–99)
MONOCYTES # BLD: 1 K/UL (ref 0–1)
MONOCYTES NFR BLD: 18 % (ref 5–13)
NEUTS SEG # BLD: 2.7 K/UL (ref 1.8–8)
NEUTS SEG NFR BLD: 48 % (ref 32–75)
NITRITE UR QL STRIP.AUTO: NEGATIVE
NRBC # BLD: 0 K/UL (ref 0–0.01)
NRBC BLD-RTO: 0 PER 100 WBC
PH UR STRIP: 5.5 [PH] (ref 5–8)
PLATELET # BLD AUTO: 249 K/UL (ref 150–400)
PMV BLD AUTO: 10 FL (ref 8.9–12.9)
POTASSIUM SERPL-SCNC: 4.3 MMOL/L (ref 3.5–5.1)
PROT UR STRIP-MCNC: NEGATIVE MG/DL
RBC # BLD AUTO: 5.77 M/UL (ref 4.1–5.7)
SODIUM SERPL-SCNC: 139 MMOL/L (ref 136–145)
SP GR UR REFRACTOMETRY: 1.02 (ref 1–1.03)
UR CULT HOLD, URHOLD: NORMAL
UROBILINOGEN UR QL STRIP.AUTO: 0.2 EU/DL (ref 0.2–1)
WBC # BLD AUTO: 5.6 K/UL (ref 4.1–11.1)

## 2018-07-10 PROCEDURE — 85025 COMPLETE CBC W/AUTO DIFF WBC: CPT | Performed by: INTERNAL MEDICINE

## 2018-07-10 PROCEDURE — 96415 CHEMO IV INFUSION ADDL HR: CPT

## 2018-07-10 PROCEDURE — 74011250636 HC RX REV CODE- 250/636

## 2018-07-10 PROCEDURE — 36415 COLL VENOUS BLD VENIPUNCTURE: CPT | Performed by: INTERNAL MEDICINE

## 2018-07-10 PROCEDURE — 96375 TX/PRO/DX INJ NEW DRUG ADDON: CPT

## 2018-07-10 PROCEDURE — 74011250636 HC RX REV CODE- 250/636: Performed by: INTERNAL MEDICINE

## 2018-07-10 PROCEDURE — 96368 THER/DIAG CONCURRENT INF: CPT

## 2018-07-10 PROCEDURE — 81003 URINALYSIS AUTO W/O SCOPE: CPT | Performed by: INTERNAL MEDICINE

## 2018-07-10 PROCEDURE — 80048 BASIC METABOLIC PNL TOTAL CA: CPT | Performed by: INTERNAL MEDICINE

## 2018-07-10 PROCEDURE — 96411 CHEMO IV PUSH ADDL DRUG: CPT

## 2018-07-10 PROCEDURE — 96416 CHEMO PROLONG INFUSE W/PUMP: CPT

## 2018-07-10 PROCEDURE — 74011000258 HC RX REV CODE- 258: Performed by: INTERNAL MEDICINE

## 2018-07-10 PROCEDURE — 77030012965 HC NDL HUBR BBMI -A

## 2018-07-10 PROCEDURE — 96366 THER/PROPH/DIAG IV INF ADDON: CPT

## 2018-07-10 PROCEDURE — 96413 CHEMO IV INFUSION 1 HR: CPT

## 2018-07-10 RX ORDER — SODIUM CHLORIDE 0.9 % (FLUSH) 0.9 %
10 SYRINGE (ML) INJECTION AS NEEDED
Status: ACTIVE | OUTPATIENT
Start: 2018-07-10 | End: 2018-07-11

## 2018-07-10 RX ORDER — HEPARIN 100 UNIT/ML
500 SYRINGE INTRAVENOUS AS NEEDED
Status: ACTIVE | OUTPATIENT
Start: 2018-07-10 | End: 2018-07-11

## 2018-07-10 RX ORDER — SODIUM CHLORIDE 9 MG/ML
25 INJECTION, SOLUTION INTRAVENOUS AS NEEDED
Status: DISPENSED | OUTPATIENT
Start: 2018-07-10 | End: 2018-07-11

## 2018-07-10 RX ORDER — SODIUM CHLORIDE 9 MG/ML
10 INJECTION INTRAMUSCULAR; INTRAVENOUS; SUBCUTANEOUS AS NEEDED
Status: ACTIVE | OUTPATIENT
Start: 2018-07-10 | End: 2018-07-11

## 2018-07-10 RX ADMIN — FLUOROURACIL 5880 MG: 50 INJECTION, SOLUTION INTRAVENOUS at 14:47

## 2018-07-10 RX ADMIN — OXALIPLATIN 210 MG: 5 INJECTION, SOLUTION, CONCENTRATE INTRAVENOUS at 12:26

## 2018-07-10 RX ADMIN — SODIUM CHLORIDE 10 ML: 9 INJECTION INTRAMUSCULAR; INTRAVENOUS; SUBCUTANEOUS at 10:01

## 2018-07-10 RX ADMIN — SODIUM CHLORIDE 10 ML: 9 INJECTION INTRAMUSCULAR; INTRAVENOUS; SUBCUTANEOUS at 10:05

## 2018-07-10 RX ADMIN — SODIUM CHLORIDE 10 ML: 9 INJECTION INTRAMUSCULAR; INTRAVENOUS; SUBCUTANEOUS at 10:03

## 2018-07-10 RX ADMIN — DEXTROSE MONOHYDRATE 25 ML/HR: 5 INJECTION, SOLUTION INTRAVENOUS at 11:49

## 2018-07-10 RX ADMIN — LEUCOVORIN CALCIUM 980 MG: 350 INJECTION, POWDER, LYOPHILIZED, FOR SOLUTION INTRAMUSCULAR; INTRAVENOUS at 12:28

## 2018-07-10 RX ADMIN — PALONOSETRON 0.25 MG: 0.05 INJECTION, SOLUTION INTRAVENOUS at 11:49

## 2018-07-10 RX ADMIN — DEXAMETHASONE SODIUM PHOSPHATE 12 MG: 4 INJECTION, SOLUTION INTRAMUSCULAR; INTRAVENOUS at 11:49

## 2018-07-10 RX ADMIN — FLUOROURACIL 980 MG: 50 INJECTION, SOLUTION INTRAVENOUS at 14:47

## 2018-07-10 NOTE — TELEPHONE ENCOUNTER
[address] at [place]  [phone]    07/10/18- New referral placed to [facility] genetics- phone call placed to [phone]. NP coordinator will return phone call to scheduled initial appointment. 07/12/18- Phone call returned to [address] [place] [address] [phone]- appointment scheduled for 8/30/18 at 9:00am with genetics.

## 2018-07-10 NOTE — PROGRESS NOTES
[facility] [place] at [address], [address] [place] [address]  W: [phone]  F: [phone]     Reason for Visit:   Dina Murphy is a 32 y.o. male who is seen for follow up of metastatic colon cancer. Treatment History:   · CT A/P 3/25/2018: Indeterminate 2 x 1 cm low-density liver lesion. · CT C/A/P with triphase liver 3/27/2018: No evidence of metastatic disease to the chest. Multiple ill-defined hepatic lesions. These do not represent simple cyst.  · CT guided liver biopsy 3/27/2018: metastatic adenocarcinoma, KRAS wild type, NRAS wild type  · Colectomy by Dr. Tana Rizvi 3/29/2018: Adenocarcinoma, moderately differentiated. Negative margins. 3/16 nodes involved. pMMR  · Stage IV (pT3 pN1b pM1) Colon Cancer  · Chemotherapy with FOLFOX and bevacizumab beginning 5/1/2018, dany held with cycle 1    History of Present Illness:   He felt much better after most recent cycle. No nausea. Bowel movements improved with stool softness and miralax. Some redness at port site, nontender, mild itching. No fevers. Fatigue persists, a little better. He is unaccompanied today. PAST HISTORY: The following sections were reviewed and updated in the EMR as appropriate: PMH, SH, FH, Medications, Allergies. No Known Allergies     Review of Systems: A complete review of systems was obtained, reviewed, and scanned into the EMR. Pertinent findings reviewed above.       Physical Exam:     Visit Vitals    BP (!) 146/98 (BP 1 Location: Right arm, BP Patient Position: Sitting)    Pulse 83    Temp 95.6 °F (35.3 °C) (Temporal)    Resp 22    Ht 6' (1.829 m)    Wt 280 lb (127 kg)    SpO2 93%    BMI 37.97 kg/m2     ECOG PS: 1  General: No distress  Eyes: PERRLA, anicteric sclerae  HENT: Atraumatic, OP clear  Neck: Supple  Lymphatic: No cervical, supraclavicular, or inguinal adenopathy  Respiratory: CTAB, normal respiratory effort  CV: Normal rate, regular rhythm, no murmurs, no peripheral edema  GI: Soft, nontender, nondistended, no masses, no hepatomegaly, no splenomegaly  MS: Normal gait and station. Digits without clubbing or cyanosis. Skin: No rashes, ecchymoses, or petechiae. Normal temperature, turgor, and texture. Psych: Alert, oriented, appropriate affect, normal judgment/insight    Results:     Lab Results   Component Value Date/Time    WBC 6.1 06/26/2018 09:27 AM    HGB 13.1 06/26/2018 09:27 AM    HCT 41.2 06/26/2018 09:27 AM    PLATELET 120 14/96/6362 09:27 AM    MCV 71.8 (L) 06/26/2018 09:27 AM    ABS. NEUTROPHILS 3.2 06/26/2018 09:27 AM     Lab Results   Component Value Date/Time    Sodium 137 06/26/2018 09:27 AM    Potassium 3.9 06/26/2018 09:27 AM    Chloride 101 06/26/2018 09:27 AM    CO2 25 06/26/2018 09:27 AM    Glucose 143 (H) 06/26/2018 09:27 AM    BUN 13 06/26/2018 09:27 AM    Creatinine 0.93 06/26/2018 09:27 AM    GFR est AA >60 06/26/2018 09:27 AM    GFR est non-AA >60 06/26/2018 09:27 AM    Calcium 9.3 06/26/2018 09:27 AM     Lab Results   Component Value Date/Time    Bilirubin, total 0.4 06/26/2018 09:27 AM    ALT (SGPT) 38 06/26/2018 09:27 AM    AST (SGOT) 22 06/26/2018 09:27 AM    Alk. phosphatase 72 06/26/2018 09:27 AM    Protein, total 8.0 06/26/2018 09:27 AM    Albumin 3.3 (L) 06/26/2018 09:27 AM    Globulin 4.7 (H) 06/26/2018 09:27 AM         Assessment:   1) Metastatic Colon Cancer  Stage IV, pMMR, KRAS/NRAS wild type  He has metastatic disease within his liver. His primary tumor has been resected. He is currently receiving chemotherapy with FOLFOX and Bevacizumab. Bevacizumab was administered with cycles 2-4, now on hold in anticipation of possible surgery. We will proceed today with C#6 of therapy, and he is scheduled for restaging CT next week. He has met with Dr. Lyssa Valdez (surgical oncology), and he will follow up with him after his upcoming scans to discuss possible resection vs ablation of his liver metastases.   We will hold on further therapy after today, pending his evaluation. 2) Risk of infertility  We have previously reviewed that chemotherapy can potentially cause infertility. He has undergone sperm cryopreservation. 3) Genetic risk  We previously discussed referral for genetic testing. pMMR argues against Hogan Syndrome, and no family history, but his young age warrants testing in my opinion. He is agreeable to referral.    4) Hypertension  Started on metoprolol during hospital stay. PCP managing. 5) Nausea  Minimal. Secondary to therapy. Continue home meds PRN.      Plan:     · Proceed today with C#6 of FOLFOX, holding bevacizumab  · CT C/A/P with triphasic liver scheduled for 7/19  · Follow up with Dr. Lala Villareal after the CT  · Referral to genetics  · Return to clinic in 2 weeks      Signed By: Amira Phillips MD

## 2018-07-10 NOTE — PROGRESS NOTES
Outpatient Infusion Center - Chemotherapy Progress Note    0930 Pt admit to [facility] for C 6 Folfox/Avastin ambulatory in stable condition. Assessment completed. No new concerns voiced. PAC with positive blood return. Chemotherapy Flowsheet 7/10/2018   Cycle C 6   Date 7/10/2018   Drug / Regimen Folfox/Avastin   Notes -     Labs sent and patient proceeded to MD appointment scheduled for 0930. Avastin on hold. Medications:  Aloxi ivp  Dexamethasone ivp      Oxaliplatin iv  Leucovorian iv  5 FU ivp  5 FU CADD CI        1500 Pt tolerated treatment well. PAC maintained positive blood return throughout treatment, flushed with positive blood return at conclusion and throughout treatment. Leverne Later intact for home CADD. D/c home ambulatory in no distress. Pt aware of next appointment scheduled for 7/123/18 @ 4 pm for pump d/c. Recent Results (from the past 12 hour(s))   CBC WITH AUTOMATED DIFF    Collection Time: 07/10/18 10:03 AM   Result Value Ref Range    WBC 5.6 4.1 - 11.1 K/uL    RBC 5.77 (H) 4.10 - 5.70 M/uL    HGB 13.1 12.1 - 17.0 g/dL    HCT 41.5 36.6 - 50.3 %    MCV 71.9 (L) 80.0 - 99.0 FL    MCH 22.7 (L) 26.0 - 34.0 PG    MCHC 31.6 30.0 - 36.5 g/dL    RDW 19.0 (H) 11.5 - 14.5 %    PLATELET 692 335 - 765 K/uL    MPV 10.0 8.9 - 12.9 FL    NRBC 0.0 0  WBC    ABSOLUTE NRBC 0.00 0.00 - 0.01 K/uL    NEUTROPHILS 48 32 - 75 %    LYMPHOCYTES 28 12 - 49 %    MONOCYTES 18 (H) 5 - 13 %    EOSINOPHILS 5 0 - 7 %    BASOPHILS 1 0 - 1 %    IMMATURE GRANULOCYTES 1 (H) 0.0 - 0.5 %    ABS. NEUTROPHILS 2.7 1.8 - 8.0 K/UL    ABS. LYMPHOCYTES 1.6 0.8 - 3.5 K/UL    ABS. MONOCYTES 1.0 0.0 - 1.0 K/UL    ABS. EOSINOPHILS 0.3 0.0 - 0.4 K/UL    ABS. BASOPHILS 0.0 0.0 - 0.1 K/UL    ABS. IMM.  GRANS. 0.0 0.00 - 0.04 K/UL    DF AUTOMATED     METABOLIC PANEL, BASIC    Collection Time: 07/10/18 10:03 AM   Result Value Ref Range    Sodium 139 136 - 145 mmol/L    Potassium 4.3 3.5 - 5.1 mmol/L    Chloride 104 97 - 108 mmol/L    CO2 26 21 - 32 mmol/L    Anion gap 9 5 - 15 mmol/L    Glucose 95 65 - 100 mg/dL    BUN 14 6 - 20 MG/DL    Creatinine 0.86 0.70 - 1.30 MG/DL    BUN/Creatinine ratio 16 12 - 20      GFR est AA >60 >60 ml/min/1.73m2    GFR est non-AA >60 >60 ml/min/1.73m2    Calcium 9.2 8.5 - 10.1 MG/DL   URINALYSIS W/ RFLX MICROSCOPIC    Collection Time: 07/10/18 10:03 AM   Result Value Ref Range    Color YELLOW/STRAW      Appearance CLEAR CLEAR      Specific gravity 1.025 1.003 - 1.030      pH (UA) 5.5 5.0 - 8.0      Protein NEGATIVE  NEG mg/dL    Glucose NEGATIVE  NEG mg/dL    Ketone NEGATIVE  NEG mg/dL    Bilirubin NEGATIVE  NEG      Blood NEGATIVE  NEG      Urobilinogen 0.2 0.2 - 1.0 EU/dL    Nitrites NEGATIVE  NEG      Leukocyte Esterase NEGATIVE  NEG     URINE CULTURE HOLD SAMPLE    Collection Time: 07/10/18 10:03 AM   Result Value Ref Range    Urine culture hold        URINE ON HOLD IN MICROBIOLOGY DEPT FOR 3 DAYS. IF UNPRESERVED URINE IS SUBMITTED, IT CANNOT BE USED FOR ADDITIONAL TESTING AFTER 24 HRS, RECOLLECTION WILL BE REQUIRED.

## 2018-07-12 ENCOUNTER — HOSPITAL ENCOUNTER (OUTPATIENT)
Dept: INFUSION THERAPY | Age: 28
Discharge: HOME | End: 2018-07-12
Payer: COMMERCIAL

## 2018-07-12 VITALS
DIASTOLIC BLOOD PRESSURE: 90 MMHG | HEART RATE: 76 BPM | RESPIRATION RATE: 18 BRPM | SYSTOLIC BLOOD PRESSURE: 138 MMHG | TEMPERATURE: 98 F

## 2018-07-12 PROCEDURE — 74011250636 HC RX REV CODE- 250/636: Performed by: INTERNAL MEDICINE

## 2018-07-12 PROCEDURE — 96523 IRRIG DRUG DELIVERY DEVICE: CPT

## 2018-07-12 RX ORDER — SODIUM CHLORIDE 0.9 % (FLUSH) 0.9 %
10 SYRINGE (ML) INJECTION AS NEEDED
Status: ACTIVE | OUTPATIENT
Start: 2018-07-12 | End: 2018-07-13

## 2018-07-12 RX ORDER — HEPARIN 100 UNIT/ML
500 SYRINGE INTRAVENOUS AS NEEDED
Status: ACTIVE | OUTPATIENT
Start: 2018-07-12 | End: 2018-07-13

## 2018-07-12 RX ADMIN — Medication 10 ML: at 13:54

## 2018-07-12 RX ADMIN — HEPARIN 500 UNITS: 100 SYRINGE at 13:54

## 2018-07-12 NOTE — PROGRESS NOTES
Outpatient Infusion Center Short Visit Progress Note    1330 Patient admitted to [facility] for pump D/D ambulatory in stable condition. Assessment completed. No new concerns voiced. Visit Vitals    /90    Pulse 76    Temp 98 °F (36.7 °C)    Resp 18     All medications infused through CADD      PAC with positive blood return. 1340 Patient tolerated treatment well.  Patient discharged from [facility] 58 ambulatory in no distress at 1340 Patient aware of next appointment scheduled for 7/24/18 @ 1100

## 2018-07-18 ENCOUNTER — OFFICE VISIT (OUTPATIENT)
Dept: SLEEP MEDICINE | Age: 28
End: 2018-07-18

## 2018-07-18 VITALS
BODY MASS INDEX: 37.52 KG/M2 | OXYGEN SATURATION: 100 % | HEART RATE: 87 BPM | WEIGHT: 277 LBS | DIASTOLIC BLOOD PRESSURE: 97 MMHG | SYSTOLIC BLOOD PRESSURE: 141 MMHG | HEIGHT: 72 IN

## 2018-07-18 DIAGNOSIS — G47.33 OSA (OBSTRUCTIVE SLEEP APNEA): Primary | ICD-10-CM

## 2018-07-18 NOTE — PROGRESS NOTES
[address] [place]. [place], [address]  Tel.  [phone]  Fax. 100 [facility] 60  [place], [address] [facility]  Tel.  [phone]  Fax. [phone] [facility] 3 Aakash Zafar  Tel.  [phone]  Fax. [phone]       Chief Complaint       Chief Complaint   Patient presents with    Sleep Problem     NP referred by Dr rTa Hunt snoring       HPI      Alethea Hdez is a  32y.o. year old male referred by Dr. Tra Hunt for evaluation of a sleep disorder  . The patient reports he has experienced snoring, witnessed apnea. Snoring is described as loud and associated with apparent apnea. The patient retires at 9-10 pm and awakens at 8-9 am. The patient notes that he will not experience frequent awakening from sleep. In general he is able to return to sleep after awakening. he tends to awaken spontaneously. The patient reports he may fall asleep if he is seated and watching a movie or engaged in other activities on his smart phone. The patient denies hypnagogic hallucinations, cataplexy, sleep paralysis. The patient has not undergone diagnostic testing for the current problems. Adger Sleepiness Score: 8       No Known Allergies    Current Outpatient Prescriptions   Medication Sig Dispense Refill    metoprolol tartrate (LOPRESSOR) 25 mg tablet TAKE 1 TAB BY MOUTH EVERY TWELVE (12) HOURS. 60 Tab 0    dexamethasone (DECADRON) 4 mg tablet TAKE 8MG (TWO TABS) DAILY IN THE MORNING FOR TWO DAYS AFTER CHEMOTHERAPY (WHILE PUMP IS INFUSING). 50 Tab 0    OTHER Sperm cryopreservation Dx: C18.9-colon cancer 1 Each 0    prochlorperazine (COMPAZINE) 10 mg tablet Take 1 Tab by mouth every six (6) hours as needed for Nausea. 50 Tab 5    lidocaine-prilocaine (EMLA) topical cream Apply  to affected area as needed for Pain (Apply 30-60 min. prior to having your port accessed).  30 g 0    ondansetron hcl (ZOFRAN) 8 mg tablet Take 1 Tab by mouth every eight (8) hours as needed for Nausea. 39 Tab 5        He  has a past medical history of Cancer ([facility] [address] (03/2018) and Hypertension. He  has a past surgical history that includes colonoscopy (N/A, 3/27/2018). He family history includes Diabetes in his mother; Hypertension in his father; Stroke in his father and mother. He  reports that he has never smoked. He has never used smokeless tobacco. He reports that he uses illicit drugs, including Marijuana. He reports that he does not drink alcohol. Review of Systems:  Review of Systems   Gastrointestinal:        Metastatic CA         Objective:     Visit Vitals    BP (!) 141/97    Pulse 87    Ht 6' (1.829 m)    Wt 277 lb (125.6 kg)    SpO2 100%    BMI 37.57 kg/m2     Body mass index is 37.57 kg/(m^2). General:   Conversant, cooperative   Eyes:  Pupils equal and reactive, no nystagmus   Oropharynx:   Mallampati score II, tongue large    Tonsils:   tonsils   Neck:   No carotid bruits; Neck circ. in \"inches\": 18   Chest/Lungs:  Clear on auscultation    CVS:  Normal rate, regular rhythm   Skin:  Warm to touch; no obvious rashes   Neuro:  Speech fluent, face symmetrical, tongue movement normal   Psych:  Normal affect,  normal countenance        Assessment:       ICD-10-CM ICD-9-CM    1. FAVIO (obstructive sleep apnea) G47.33 327.23 SLEEP STUDY UNATTENDED, 4 CHANNEL     History of snoring and witnessed apnea consistent with sleep disordered breathing. He will be evaluated with a home sleep test.  Results will be reviewed with him. Plan:     Orders Placed This Encounter    SLEEP STUDY UNATTENDED, 4 CHANNEL     Order Specific Question:   Reason for Exam     Answer:   History of snoring, witnessed apnea       * Patient has a history and examination consistent with the diagnosis of sleep apnea. * Sleep testing was ordered for initial evaluation.     * He was provided information on sleep apnea including corresponding risk factors and the importance of proper treatment. * Treatment options if indicated were reviewed today. Instructions:.  o The patient would benefit from weight reduction measures. o Do not engage in activities requiring a normal degree of alertness if fatigue is present.   o The patient understands that untreated or undertreated sleep apnea could impair judgement and the ability to function normally during the day.  o Call or return if symptoms worsen or persist.          Zenaida Joaquin MD, FAASM  Electronically signed 07/18/18

## 2018-07-18 NOTE — PROGRESS NOTES
[address] [facility]. [place], [address]  Tel.  [phone]  Fax. 100 [facility] 60  [place], [address] [facility]  Tel.  [phone]  Fax. [phone] [address] [place] [address]  Tel.  [phone]  Fax. [phone]       S>La Gonzalez is a 32 y.o. male seen today to receive a home sleep testing unit (HST). · Patient was educated on proper hookup and operation of the HST. · Instruction forms and documentation were reviewed and signed. · The patient demonstrated good understanding of the HST. O>    Visit Vitals    BP (!) 141/97    Pulse 87    Ht 6' (1.829 m)    Wt 277 lb (125.6 kg)    SpO2 100%    BMI 37.57 kg/m2    Neck circ. in \"inches\": 18    A>  No diagnosis found. P>  · General information regarding operations and maintenance of the device was provided. · He was provided information on sleep apnea including coresponding risk factors and the importance of proper treatment. · Follow-up appointment was made to return the HST. He will be contacted once the results have been reviewed. · He was asked to contact our office for any problems regarding his home sleep test study.

## 2018-07-18 NOTE — PATIENT INSTRUCTIONS
Sleep Apnea: Care Instructions  Your Care Instructions    Sleep apnea means that you frequently stop breathing for 10 seconds or longer during sleep. It can be mild to severe, based on the number of times an hour that you stop breathing or have slowed breathing. Blocked or narrowed airways in your nose, mouth, or throat can cause sleep apnea. Your airway can become blocked when your throat muscles and tongue relax during sleep. You can treat sleep apnea at home by making lifestyle changes. You also can use a CPAP breathing machine that keeps tissues in the throat from blocking your airway. Or your doctor may suggest that you use a breathing device while you sleep. It helps keep your airway open. This could be a device that you put in your mouth. Other examples include strips or disks that you use on your nose. In some cases, surgery may be needed to remove enlarged tissues in the throat. Follow-up care is a key part of your treatment and safety. Be sure to make and go to all appointments, and call your doctor if you are having problems. It's also a good idea to know your test results and keep a list of the medicines you take. How can you care for yourself at home? · Lose weight, if needed. It may reduce the number of times you stop breathing or have slowed breathing. · Sleep on your side. It may stop mild apnea. If you tend to roll onto your back, sew a pocket in the back of your pajama top. Put a tennis ball into the pocket, and stitch the pocket shut. This will help keep you from sleeping on your back. · Avoid alcohol and medicines such as sleeping pills and sedatives before bed. · Do not smoke. Smoking can make sleep apnea worse. If you need help quitting, talk to your doctor about stop-smoking programs and medicines. These can increase your chances of quitting for good. · Prop up the head of your bed 4 to 6 inches by putting bricks under the legs of the bed.   · Treat breathing problems, such as a stuffy nose, caused by a cold or allergies. · Try a continuous positive airway pressure (CPAP) breathing machine if your doctor recommends it. The machine keeps your airway open when you sleep. · If CPAP does not work for you, ask your doctor if you can try other breathing machines. A bilevel positive airway pressure machine uses one type of air pressure for breathing in and another type for breathing out. Another device raises or lowers air pressure as needed while you breathe. · Talk to your doctor if:  ¨ Your nose feels dry or bleeds when you use one of these machines. You may need to increase moisture in the air. A humidifier may help. ¨ Your nose is runny or stuffy from using a breathing machine. Decongestants or a corticosteroid nasal spray may help. ¨ You are sleepy during the day and it gets in the way of the normal things you do. Do not drive when you are drowsy. When should you call for help? Watch closely for changes in your health, and be sure to contact your doctor if:    · You still have sleep apnea even though you have made lifestyle changes.     · You are thinking of trying a device such as CPAP.     · You are having problems using a CPAP or similar machine. Where can you learn more? Go to http://anna-vaqsuez.info/. Enter G241 in the search box to learn more about \"Sleep Apnea: Care Instructions. \"  Current as of: December 6, 2017  Content Version: 11.7  © 8641-8482 [employer]. Care instructions adapted under license by [employer] (which disclaims liability or warranty for this information). If you have questions about a medical condition or this instruction, always ask your healthcare professional. [place] [address] any warranty or liability for your use of this information.

## 2018-07-18 NOTE — MR AVS SNAPSHOT
[address] Service [address] [phone] Patient: Garrison Macias 
MRN: [id number] :1990 Visit Information Date & Time Provider Department Dept. Phone Encounter #  
 2018 11:00 AM Marina Marshall MD [place] [address] [id number] Your Appointments 2018 10:15 AM  
ESTABLISHED PATIENT with Germaine Atkinson NP  
[facility] at [facility] [address] [facility]) Appt Note: OPIC then, Otis/Raddin, FOLFOX #7, image results [address] [place] [address], [address] [place] [phone]  
[phone]  
  
   
 [address] [place] [address] Upcoming Health Maintenance Date Due Pneumococcal 19-64 Highest Risk (1 of 3 - PCV13) 2007 DTaP/Tdap/Td series (1 - Tdap) 2009 Influenza Age 5 to Adult 2018 Allergies as of 2018  Review Complete On: 2018 By: Marina Marshall MD  
 No Known Allergies Current Immunizations  Reviewed on 2018 Name Date Influenza Vaccine (Quad) PF 2018 Not reviewed this visit Vitals BP Pulse Height(growth percentile) Weight(growth percentile) SpO2 BMI  
 (!) 141/97 87 6' (1.829 m) 277 lb (125.6 kg) 100% 37.57 kg/m2 Smoking Status Never Smoker Vitals History BMI and BSA Data Body Mass Index Body Surface Area  
 37.57 kg/m 2 2.53 m 2 Preferred Pharmacy Pharmacy Name Phone [facility] [address] [phone] Your Updated Medication List  
  
   
This list is accurate as of 18 12:14 PM.  Always use your most recent med list.  
  
  
  
  
 dexamethasone 4 mg tablet Commonly known as:  DECADRON  
TAKE 8MG (TWO TABS) DAILY IN THE MORNING FOR TWO DAYS AFTER CHEMOTHERAPY (WHILE PUMP IS INFUSING). lidocaine-prilocaine topical cream  
Commonly known as:  EMLA Apply  to affected area as needed for Pain (Apply 30-60 min. prior to having your port accessed). metoprolol tartrate 25 mg tablet Commonly known as:  LOPRESSOR  
TAKE 1 TAB BY MOUTH EVERY TWELVE (12) HOURS.  
  
 ondansetron hcl 8 mg tablet Commonly known as:  Lurlean Precise Take 1 Tab by mouth every eight (8) hours as needed for Nausea. OTHER Sperm cryopreservation Dx: C18.9-colon cancer  
  
 prochlorperazine 10 mg tablet Commonly known as:  COMPAZINE Take 1 Tab by mouth every six (6) hours as needed for Nausea. To-Do List   
 07/19/2018 12:00 PM  
  Appointment with [facility] 1 at [facility] CT ([phone]) CONTRAST STUDY: 1. The patient should not eat solid food four hours before the appointment but should be encouraged to drink clear liquids. 2. The patient will require IV access for contrast administration. 3. The patient should not take  Ibuprofen (Advil, Motrin, etc.) and Naproxen Sodium (Aleve, etc.)  on the day of the exam. Stopping non-steroidal anti-inflammatory agents (NSAIDs) like Ibuprofen decreases the risk of kidney damage from the x-ray contrast (dye). 4. Bring any non [facility] facility films/images pertaining to the area of interest with you on the day of appointment. 5. Bring current lab work if available(within last 90 days CMP) ***If scheduled at [facility], iSTAT is not available, labs will need to be done before appointment*** 6. Check in at registration at least 30 minutes before appt time unless you were instructed to do otherwise. 7. If you have to drink oral contrast please pick it up any weekday prior to your appointment, if you cannot please check in 2 hrs  before appt time.   
  
 07/24/2018 11:00 AM  
  Appointment with [address] at [place] [address] ([phone])  
  
 09/04/2018 4:00 PM  
  Appointment with [address] at [place] [address] ([phone])  
  
 10/16/2018 4:00 PM  
  Appointment with Rupert Siegel [address] at [place] [address] ([phone]) Introducing [facility] SERVICES! Dear Selene Schroeder: 
Thank you for requesting a [employer] account. Our records indicate that you already have an active [employer] account. You can access your account anytime at [email or web address] Did you know that you can access your hospital and ER discharge instructions at any time in [employer]? You can also review all of your test results from your hospital stay or ER visit. Additional Information If you have questions, please visit the Frequently Asked Questions section of the [employer] website at [email or web address] Remember, [employer] is NOT to be used for urgent needs. For medical emergencies, dial 911. Now available from your iPhone and Android! Please provide this summary of care documentation to your next provider. Your primary care clinician is listed as Queenie Singh. If you have any questions after today's visit, please call [phone].

## 2018-07-19 ENCOUNTER — DOCUMENTATION ONLY (OUTPATIENT)
Dept: SLEEP MEDICINE | Age: 28
End: 2018-07-19

## 2018-07-19 ENCOUNTER — HOSPITAL ENCOUNTER (OUTPATIENT)
Dept: CT IMAGING | Age: 28
Discharge: HOME | End: 2018-07-19
Attending: NURSE PRACTITIONER
Payer: COMMERCIAL

## 2018-07-19 DIAGNOSIS — C78.7 COLON CANCER METASTASIZED TO LIVER (HCC): ICD-10-CM

## 2018-07-19 DIAGNOSIS — C18.9 COLON CANCER METASTASIZED TO LIVER (HCC): ICD-10-CM

## 2018-07-19 PROCEDURE — 74011636320 HC RX REV CODE- 636/320: Performed by: RADIOLOGY

## 2018-07-19 PROCEDURE — 71260 CT THORAX DX C+: CPT

## 2018-07-19 PROCEDURE — 72193 CT PELVIS W/DYE: CPT

## 2018-07-19 RX ADMIN — IOPAMIDOL 98 ML: 755 INJECTION, SOLUTION INTRAVENOUS at 13:00

## 2018-07-19 NOTE — PROGRESS NOTES
Patient came in office to return device, reports only had 2-21/2 hrs of sleep last night. Patient will redo testing again tonight and return device 7/20/18.

## 2018-07-20 ENCOUNTER — DOCUMENTATION ONLY (OUTPATIENT)
Dept: SLEEP MEDICINE | Age: 28
End: 2018-07-20

## 2018-07-24 ENCOUNTER — APPOINTMENT (OUTPATIENT)
Dept: INFUSION THERAPY | Age: 28
End: 2018-07-24
Payer: COMMERCIAL

## 2018-07-24 ENCOUNTER — HOSPITAL ENCOUNTER (OUTPATIENT)
Dept: INFUSION THERAPY | Age: 28
Discharge: HOME | End: 2018-07-24
Payer: COMMERCIAL

## 2018-07-24 ENCOUNTER — OFFICE VISIT (OUTPATIENT)
Dept: ONCOLOGY | Age: 28
End: 2018-07-24

## 2018-07-24 ENCOUNTER — TELEPHONE (OUTPATIENT)
Dept: SLEEP MEDICINE | Age: 28
End: 2018-07-24

## 2018-07-24 VITALS
BODY MASS INDEX: 38.39 KG/M2 | WEIGHT: 283.4 LBS | OXYGEN SATURATION: 97 % | SYSTOLIC BLOOD PRESSURE: 142 MMHG | TEMPERATURE: 97 F | HEIGHT: 72 IN | HEART RATE: 98 BPM | RESPIRATION RATE: 20 BRPM | DIASTOLIC BLOOD PRESSURE: 101 MMHG

## 2018-07-24 VITALS
OXYGEN SATURATION: 98 % | HEART RATE: 106 BPM | SYSTOLIC BLOOD PRESSURE: 148 MMHG | RESPIRATION RATE: 18 BRPM | DIASTOLIC BLOOD PRESSURE: 99 MMHG | TEMPERATURE: 97.5 F

## 2018-07-24 DIAGNOSIS — C18.9 COLON CANCER METASTASIZED TO LIVER (HCC): Primary | ICD-10-CM

## 2018-07-24 DIAGNOSIS — E66.01 SEVERE OBESITY (BMI 35.0-39.9): ICD-10-CM

## 2018-07-24 DIAGNOSIS — C78.7 COLON CANCER METASTASIZED TO LIVER (HCC): Primary | ICD-10-CM

## 2018-07-24 LAB
ALBUMIN SERPL-MCNC: 3.3 G/DL (ref 3.5–5)
ALBUMIN/GLOB SERPL: 0.8 {RATIO} (ref 1.1–2.2)
ALP SERPL-CCNC: 68 U/L (ref 45–117)
ALT SERPL-CCNC: 36 U/L (ref 12–78)
ANION GAP SERPL CALC-SCNC: 5 MMOL/L (ref 5–15)
AST SERPL-CCNC: 26 U/L (ref 15–37)
BASOPHILS # BLD: 0 K/UL (ref 0–0.1)
BASOPHILS NFR BLD: 1 % (ref 0–1)
BILIRUB SERPL-MCNC: 0.3 MG/DL (ref 0.2–1)
BUN SERPL-MCNC: 12 MG/DL (ref 6–20)
BUN/CREAT SERPL: 13 (ref 12–20)
CALCIUM SERPL-MCNC: 9.2 MG/DL (ref 8.5–10.1)
CEA SERPL-MCNC: 3 NG/ML
CHLORIDE SERPL-SCNC: 102 MMOL/L (ref 97–108)
CO2 SERPL-SCNC: 30 MMOL/L (ref 21–32)
CREAT SERPL-MCNC: 0.89 MG/DL (ref 0.7–1.3)
DIFFERENTIAL METHOD BLD: ABNORMAL
EOSINOPHIL # BLD: 0.2 K/UL (ref 0–0.4)
EOSINOPHIL NFR BLD: 4 % (ref 0–7)
ERYTHROCYTE [DISTWIDTH] IN BLOOD BY AUTOMATED COUNT: 19.6 % (ref 11.5–14.5)
GLOBULIN SER CALC-MCNC: 4.4 G/DL (ref 2–4)
GLUCOSE SERPL-MCNC: 150 MG/DL (ref 65–100)
HCT VFR BLD AUTO: 42.6 % (ref 36.6–50.3)
HGB BLD-MCNC: 13.7 G/DL (ref 12.1–17)
IMM GRANULOCYTES # BLD: 0 K/UL (ref 0–0.04)
IMM GRANULOCYTES NFR BLD AUTO: 1 % (ref 0–0.5)
LYMPHOCYTES # BLD: 1.8 K/UL (ref 0.8–3.5)
LYMPHOCYTES NFR BLD: 33 % (ref 12–49)
MCH RBC QN AUTO: 23.1 PG (ref 26–34)
MCHC RBC AUTO-ENTMCNC: 32.2 G/DL (ref 30–36.5)
MCV RBC AUTO: 71.7 FL (ref 80–99)
MONOCYTES # BLD: 0.7 K/UL (ref 0–1)
MONOCYTES NFR BLD: 12 % (ref 5–13)
NEUTS SEG # BLD: 2.8 K/UL (ref 1.8–8)
NEUTS SEG NFR BLD: 50 % (ref 32–75)
NRBC # BLD: 0 K/UL (ref 0–0.01)
NRBC BLD-RTO: 0 PER 100 WBC
PLATELET # BLD AUTO: 230 K/UL (ref 150–400)
PMV BLD AUTO: 9.2 FL (ref 8.9–12.9)
POTASSIUM SERPL-SCNC: 4 MMOL/L (ref 3.5–5.1)
PROT SERPL-MCNC: 7.7 G/DL (ref 6.4–8.2)
RBC # BLD AUTO: 5.94 M/UL (ref 4.1–5.7)
SODIUM SERPL-SCNC: 137 MMOL/L (ref 136–145)
WBC # BLD AUTO: 5.6 K/UL (ref 4.1–11.1)

## 2018-07-24 PROCEDURE — 74011250636 HC RX REV CODE- 250/636: Performed by: INTERNAL MEDICINE

## 2018-07-24 PROCEDURE — 80053 COMPREHEN METABOLIC PANEL: CPT | Performed by: INTERNAL MEDICINE

## 2018-07-24 PROCEDURE — 36591 DRAW BLOOD OFF VENOUS DEVICE: CPT

## 2018-07-24 PROCEDURE — 85025 COMPLETE CBC W/AUTO DIFF WBC: CPT | Performed by: INTERNAL MEDICINE

## 2018-07-24 PROCEDURE — 82378 CARCINOEMBRYONIC ANTIGEN: CPT | Performed by: INTERNAL MEDICINE

## 2018-07-24 PROCEDURE — 77030012965 HC NDL HUBR BBMI -A

## 2018-07-24 PROCEDURE — 74011000250 HC RX REV CODE- 250: Performed by: INTERNAL MEDICINE

## 2018-07-24 PROCEDURE — 36415 COLL VENOUS BLD VENIPUNCTURE: CPT | Performed by: INTERNAL MEDICINE

## 2018-07-24 RX ORDER — HEPARIN 100 UNIT/ML
500 SYRINGE INTRAVENOUS AS NEEDED
Status: ACTIVE | OUTPATIENT
Start: 2018-07-24 | End: 2018-07-25

## 2018-07-24 RX ORDER — SODIUM CHLORIDE 0.9 % (FLUSH) 0.9 %
10 SYRINGE (ML) INJECTION AS NEEDED
Status: ACTIVE | OUTPATIENT
Start: 2018-07-24 | End: 2018-07-25

## 2018-07-24 RX ADMIN — SODIUM CHLORIDE 10 ML: 9 INJECTION, SOLUTION INTRAMUSCULAR; INTRAVENOUS; SUBCUTANEOUS at 10:47

## 2018-07-24 RX ADMIN — SODIUM CHLORIDE 10 ML: 9 INJECTION, SOLUTION INTRAMUSCULAR; INTRAVENOUS; SUBCUTANEOUS at 10:48

## 2018-07-24 RX ADMIN — HEPARIN 500 UNITS: 100 SYRINGE at 10:48

## 2018-07-24 NOTE — PROGRESS NOTES
[facility] VISIT NOTE    3634  Pt arrived at [facility] ambulatory and in no distress for port flush and labs. Blood pressure (!) 148/99, pulse (!) 106, temperature 97.5 °F (36.4 °C), resp. rate 18, SpO2 98 %. Right chest port accessed with 0.75 in costello no difficulty. Positive blood return noted and labs drawn. Port de-accessed and flushed per protocol. 1050  D/C'd from [facility] ambulatory and in no distress.  Next appointment is 9/4/18 at 4pm.

## 2018-07-24 NOTE — TELEPHONE ENCOUNTER
HSAT [facility]    Date of Study: 7/18/18 & 7/19/18    The following information was gathered from the patients study log:    · Lights off: 9:30p  · Estimated sleep onset: 11:25p    · Awakened a total of 2 times  · The patient felt they slept 6 hours  · Patient took no sleep aid before starting the test  · Sleep quality was the same compared to a usual nights sleep. Further information provided: none    (SpO2 sensor malfunction.  Patient will pickup repeat HSAT 7/25/18)

## 2018-07-24 NOTE — PROGRESS NOTES
[facility] [place] at [place] [address] [facility] [address] [facility]  W: [phone]  F: [phone]     Reason for Visit:   Cat Larson is a 32 y.o. male who is seen for follow up of metastatic colon cancer. Treatment History:   · CT A/P 3/25/2018: Indeterminate 2 x 1 cm low-density liver lesion. · CT C/A/P with triphase liver 3/27/2018: No evidence of metastatic disease to the chest. Multiple ill-defined hepatic lesions. These do not represent simple cyst.  · CT guided liver biopsy 3/27/2018: metastatic adenocarcinoma, KRAS wild type, NRAS wild type  · Colectomy by Dr. Shahbaz Summers 3/29/2018: Adenocarcinoma, moderately differentiated. Negative margins. 3/16 nodes involved. pMMR  · Stage IV (pT3 pN1b pM1) Colon Cancer  · Chemotherapy with FOLFOX and bevacizumab 5/1/2018 - 7/12/2018 for a total of 6 cycles, dany administered with cycles 2-4     History of Present Illness:   He today for follow up. He states he continues to feel well. Seeing Dr. Saud Brody on Friday. No complaints today. He is unaccompanied today. PAST HISTORY: The following sections were reviewed and updated in the EMR as appropriate: PMH, SH, FH, Medications, Allergies. No Known Allergies     Review of Systems: A complete review of systems was obtained, reviewed, and scanned into the EMR. Pertinent findings reviewed above.       Physical Exam:     Visit Vitals    BP (!) 142/101 (BP 1 Location: Left arm, BP Patient Position: Sitting)    Pulse 98    Temp 97 °F (36.1 °C) (Oral)    Resp 20    Ht 6' (1.829 m)    Wt 283 lb 6.4 oz (128.5 kg)    SpO2 97%    BMI 38.44 kg/m2     ECOG PS: 1  General: No distress  Eyes: PERRLA, anicteric sclerae  HENT: Atraumatic, OP clear  Neck: Supple  Lymphatic: No cervical, supraclavicular, or inguinal adenopathy  Respiratory: CTAB, normal respiratory effort  CV: Normal rate, regular rhythm, no murmurs, no peripheral edema  GI: Soft, nontender, nondistended, no masses, no hepatomegaly, no splenomegaly  MS: Normal gait and station. Digits without clubbing or cyanosis. Skin: No rashes, ecchymoses, or petechiae. Normal temperature, turgor, and texture. Psych: Alert, oriented, appropriate affect, normal judgment/insight    Results:     Lab Results   Component Value Date/Time    WBC 5.6 07/24/2018 10:47 AM    HGB 13.7 07/24/2018 10:47 AM    HCT 42.6 07/24/2018 10:47 AM    PLATELET 396 90/13/3782 10:47 AM    MCV 71.7 (L) 07/24/2018 10:47 AM    ABS. NEUTROPHILS 2.8 07/24/2018 10:47 AM     Lab Results   Component Value Date/Time    Sodium 137 07/24/2018 10:47 AM    Potassium 4.0 07/24/2018 10:47 AM    Chloride 102 07/24/2018 10:47 AM    CO2 30 07/24/2018 10:47 AM    Glucose 150 (H) 07/24/2018 10:47 AM    BUN 12 07/24/2018 10:47 AM    Creatinine 0.89 07/24/2018 10:47 AM    GFR est AA >60 07/24/2018 10:47 AM    GFR est non-AA >60 07/24/2018 10:47 AM    Calcium 9.2 07/24/2018 10:47 AM     Lab Results   Component Value Date/Time    Bilirubin, total 0.3 07/24/2018 10:47 AM    ALT (SGPT) 36 07/24/2018 10:47 AM    AST (SGOT) 26 07/24/2018 10:47 AM    Alk. phosphatase 68 07/24/2018 10:47 AM    Protein, total 7.7 07/24/2018 10:47 AM    Albumin 3.3 (L) 07/24/2018 10:47 AM    Globulin 4.4 (H) 07/24/2018 10:47 AM       CT C/A/P with triphasic liver 7/19/2018: There is no axilla, mediastinal or hilar adenopathy. There is no pericardial pleural effusion no shift or pneumothorax, infusion catheter is in place. No parenchymal mass.    The liver lesion and decreased in size, a dome of the liver lesion measures 11 x 14 mm, it was 15 x 20. An additional right lobe of the liver lower lesion is also decreased in size measuring 12 x 7 mm it was 16 x 12. No new lesion is seen. Liver and spleen remain normal in size, the gallbladder is not distended. The kidneys are unobstructed. There is no bowel wall thickening or obstruction.  Bowel wall thickening in the ascending colon appear stable some of this may be related to fecal stasis up. There is no free air or free fluid. There is no  adenopathy in the abdomen or pelvis. The bladder is midline. Review of bone windows does not suggest metastases. There is no inguinal adenopathy. Major  vessels do appear patent. Assessment:   1) Metastatic Colon Cancer  Stage IV, pMMR, KRAS/NRAS wild type  He has metastatic disease within his liver. His primary tumor has been resected. He is currently receiving chemotherapy with FOLFOX and Bevacizumab. He has met with Dr. Neil Nassar (surgical oncology), and he will follow up with him later this week to discuss possible resection vs ablation of his liver metastases. CT shows response to therapy, discussed with patient in detail today. I recommend he follow up with Dr. Madie Lomax as planned and we will plan to see him back with next port flush, pending surgical plans. Tentative plan for another 6 cycles of chemotherapy after surgery. 2) Risk of infertility  We have previously reviewed that chemotherapy can potentially cause infertility. He has undergone sperm cryopreservation. 3) Genetic risk  We previously discussed referral for genetic testing. pMMR argues against Hogan Syndrome, and no family history, but his young age warrants testing in my opinion. Referral to genetics pending. 4) Hypertension  Started on metoprolol during hospital stay. PCP managing. 5) Nausea  Resolved at present. Secondary to therapy. Continue home meds PRN. Plan:     · D/C chemotherapy  · Follow up with Dr. Madie Lomax Friday  · Referral to genetics pending  · Port flush every 4-6 weeks  · Return to clinic in 4-6 weeks with port flush    Patient was seen in conjunction with Regis Zaragoza NP.       Signed By: Alfreda Gotti MD

## 2018-07-26 ENCOUNTER — APPOINTMENT (OUTPATIENT)
Dept: INFUSION THERAPY | Age: 28
End: 2018-07-26
Payer: COMMERCIAL

## 2018-08-07 ENCOUNTER — APPOINTMENT (OUTPATIENT)
Dept: INFUSION THERAPY | Age: 28
End: 2018-08-07
Payer: COMMERCIAL

## 2018-08-09 ENCOUNTER — APPOINTMENT (OUTPATIENT)
Dept: INFUSION THERAPY | Age: 28
End: 2018-08-09
Payer: COMMERCIAL

## 2018-08-16 ENCOUNTER — TELEPHONE (OUTPATIENT)
Dept: ONCOLOGY | Age: 28
End: 2018-08-16

## 2018-08-16 RX ORDER — HEPARIN 100 UNIT/ML
300-500 SYRINGE INTRAVENOUS AS NEEDED
Status: CANCELLED
Start: 2018-12-20

## 2018-08-16 RX ORDER — SODIUM CHLORIDE 0.9 % (FLUSH) 0.9 %
10 SYRINGE (ML) INJECTION AS NEEDED
Status: CANCELLED
Start: 2019-05-02

## 2018-08-16 RX ORDER — ACETAMINOPHEN 325 MG/1
650 TABLET ORAL AS NEEDED
Status: CANCELLED
Start: 2018-10-09

## 2018-08-16 RX ORDER — SODIUM CHLORIDE 0.9 % (FLUSH) 0.9 %
10 SYRINGE (ML) INJECTION AS NEEDED
Status: CANCELLED
Start: 2018-10-23

## 2018-08-16 RX ORDER — HEPARIN 100 UNIT/ML
300-500 SYRINGE INTRAVENOUS AS NEEDED
Status: CANCELLED
Start: 2019-01-02

## 2018-08-16 RX ORDER — ACETAMINOPHEN 325 MG/1
650 TABLET ORAL AS NEEDED
Status: CANCELLED
Start: 2018-10-23

## 2018-08-16 RX ORDER — FLUOROURACIL 50 MG/ML
400 INJECTION, SOLUTION INTRAVENOUS ONCE
Status: CANCELLED
Start: 2018-10-23 | End: 2018-10-23

## 2018-08-16 RX ORDER — ACETAMINOPHEN 325 MG/1
650 TABLET ORAL AS NEEDED
Status: CANCELLED
Start: 2018-12-18

## 2018-08-16 RX ORDER — SODIUM CHLORIDE 0.9 % (FLUSH) 0.9 %
10 SYRINGE (ML) INJECTION AS NEEDED
Status: CANCELLED
Start: 2019-01-02

## 2018-08-16 RX ORDER — HEPARIN 100 UNIT/ML
300-500 SYRINGE INTRAVENOUS AS NEEDED
Status: CANCELLED
Start: 2018-12-18

## 2018-08-16 RX ORDER — ONDANSETRON 2 MG/ML
8 INJECTION INTRAMUSCULAR; INTRAVENOUS AS NEEDED
Status: CANCELLED | OUTPATIENT
Start: 2018-12-18

## 2018-08-16 RX ORDER — ACETAMINOPHEN 325 MG/1
650 TABLET ORAL AS NEEDED
Status: CANCELLED
Start: 2018-12-04

## 2018-08-16 RX ORDER — SODIUM CHLORIDE 0.9 % (FLUSH) 0.9 %
10 SYRINGE (ML) INJECTION AS NEEDED
Status: CANCELLED
Start: 2018-10-09

## 2018-08-16 RX ORDER — ONDANSETRON 2 MG/ML
8 INJECTION INTRAMUSCULAR; INTRAVENOUS AS NEEDED
Status: CANCELLED | OUTPATIENT
Start: 2019-01-02

## 2018-08-16 RX ORDER — DIPHENHYDRAMINE HYDROCHLORIDE 50 MG/ML
50 INJECTION, SOLUTION INTRAMUSCULAR; INTRAVENOUS AS NEEDED
Status: CANCELLED
Start: 2019-01-02

## 2018-08-16 RX ORDER — SODIUM CHLORIDE 9 MG/ML
10 INJECTION INTRAMUSCULAR; INTRAVENOUS; SUBCUTANEOUS AS NEEDED
Status: CANCELLED | OUTPATIENT
Start: 2018-12-06

## 2018-08-16 RX ORDER — SODIUM CHLORIDE 9 MG/ML
10 INJECTION INTRAMUSCULAR; INTRAVENOUS; SUBCUTANEOUS AS NEEDED
Status: CANCELLED | OUTPATIENT
Start: 2018-10-23

## 2018-08-16 RX ORDER — HEPARIN 100 UNIT/ML
300-500 SYRINGE INTRAVENOUS AS NEEDED
Status: CANCELLED
Start: 2018-10-09

## 2018-08-16 RX ORDER — PALONOSETRON 0.05 MG/ML
0.25 INJECTION, SOLUTION INTRAVENOUS ONCE
Status: CANCELLED | OUTPATIENT
Start: 2018-12-18 | End: 2018-10-23

## 2018-08-16 RX ORDER — SODIUM CHLORIDE 9 MG/ML
10 INJECTION INTRAMUSCULAR; INTRAVENOUS; SUBCUTANEOUS AS NEEDED
Status: CANCELLED | OUTPATIENT
Start: 2018-10-25

## 2018-08-16 RX ORDER — DIPHENHYDRAMINE HYDROCHLORIDE 50 MG/ML
50 INJECTION, SOLUTION INTRAMUSCULAR; INTRAVENOUS AS NEEDED
Status: CANCELLED
Start: 2018-12-04

## 2018-08-16 RX ORDER — PALONOSETRON 0.05 MG/ML
0.25 INJECTION, SOLUTION INTRAVENOUS ONCE
Status: CANCELLED | OUTPATIENT
Start: 2018-12-04 | End: 2018-10-09

## 2018-08-16 RX ORDER — SODIUM CHLORIDE 0.9 % (FLUSH) 0.9 %
10 SYRINGE (ML) INJECTION AS NEEDED
Status: CANCELLED
Start: 2018-09-27

## 2018-08-16 RX ORDER — FLUOROURACIL 50 MG/ML
400 INJECTION, SOLUTION INTRAVENOUS ONCE
Status: CANCELLED
Start: 2018-12-04 | End: 2018-11-06

## 2018-08-16 RX ORDER — DIPHENHYDRAMINE HYDROCHLORIDE 50 MG/ML
50 INJECTION, SOLUTION INTRAMUSCULAR; INTRAVENOUS AS NEEDED
Status: CANCELLED
Start: 2018-12-18

## 2018-08-16 RX ORDER — HEPARIN 100 UNIT/ML
300-500 SYRINGE INTRAVENOUS AS NEEDED
Status: CANCELLED
Start: 2018-10-25

## 2018-08-16 RX ORDER — DEXTROSE MONOHYDRATE 50 MG/ML
25 INJECTION, SOLUTION INTRAVENOUS CONTINUOUS
Status: CANCELLED
Start: 2019-01-02

## 2018-08-16 RX ORDER — DIPHENHYDRAMINE HYDROCHLORIDE 50 MG/ML
50 INJECTION, SOLUTION INTRAMUSCULAR; INTRAVENOUS AS NEEDED
Status: CANCELLED
Start: 2018-10-23

## 2018-08-16 RX ORDER — SODIUM CHLORIDE 0.9 % (FLUSH) 0.9 %
10 SYRINGE (ML) INJECTION AS NEEDED
Status: CANCELLED
Start: 2018-10-25

## 2018-08-16 RX ORDER — SODIUM CHLORIDE 9 MG/ML
10 INJECTION INTRAMUSCULAR; INTRAVENOUS; SUBCUTANEOUS AS NEEDED
Status: CANCELLED | OUTPATIENT
Start: 2019-05-02

## 2018-08-16 RX ORDER — HEPARIN 100 UNIT/ML
300-500 SYRINGE INTRAVENOUS AS NEEDED
Status: CANCELLED
Start: 2018-08-28

## 2018-08-16 RX ORDER — ALBUTEROL SULFATE 0.83 MG/ML
2.5 SOLUTION RESPIRATORY (INHALATION) AS NEEDED
Status: CANCELLED
Start: 2018-12-04

## 2018-08-16 RX ORDER — HEPARIN 100 UNIT/ML
300-500 SYRINGE INTRAVENOUS AS NEEDED
Status: CANCELLED
Start: 2018-10-23

## 2018-08-16 RX ORDER — FLUOROURACIL 50 MG/ML
400 INJECTION, SOLUTION INTRAVENOUS ONCE
Status: CANCELLED
Start: 2018-08-28 | End: 2018-08-28

## 2018-08-16 RX ORDER — SODIUM CHLORIDE 9 MG/ML
500 INJECTION, SOLUTION INTRAVENOUS CONTINUOUS
Status: CANCELLED | OUTPATIENT
Start: 2018-10-23

## 2018-08-16 RX ORDER — SODIUM CHLORIDE 0.9 % (FLUSH) 0.9 %
10 SYRINGE (ML) INJECTION AS NEEDED
Status: CANCELLED
Start: 2018-10-11

## 2018-08-16 RX ORDER — ALBUTEROL SULFATE 0.83 MG/ML
2.5 SOLUTION RESPIRATORY (INHALATION) AS NEEDED
Status: CANCELLED
Start: 2018-10-23

## 2018-08-16 RX ORDER — SODIUM CHLORIDE 9 MG/ML
500 INJECTION, SOLUTION INTRAVENOUS CONTINUOUS
Status: CANCELLED | OUTPATIENT
Start: 2018-12-18

## 2018-08-16 RX ORDER — SODIUM CHLORIDE 9 MG/ML
10 INJECTION INTRAMUSCULAR; INTRAVENOUS; SUBCUTANEOUS AS NEEDED
Status: CANCELLED | OUTPATIENT
Start: 2018-10-11

## 2018-08-16 RX ORDER — SODIUM CHLORIDE 9 MG/ML
10 INJECTION INTRAMUSCULAR; INTRAVENOUS; SUBCUTANEOUS AS NEEDED
Status: CANCELLED | OUTPATIENT
Start: 2018-12-18

## 2018-08-16 RX ORDER — DEXTROSE MONOHYDRATE 50 MG/ML
25 INJECTION, SOLUTION INTRAVENOUS CONTINUOUS
Status: CANCELLED
Start: 2018-08-28

## 2018-08-16 RX ORDER — DEXTROSE MONOHYDRATE 50 MG/ML
25 INJECTION, SOLUTION INTRAVENOUS CONTINUOUS
Status: CANCELLED
Start: 2018-10-09

## 2018-08-16 RX ORDER — EPINEPHRINE 1 MG/ML
0.3 INJECTION, SOLUTION, CONCENTRATE INTRAVENOUS AS NEEDED
Status: CANCELLED | OUTPATIENT
Start: 2018-08-28

## 2018-08-16 RX ORDER — ONDANSETRON 2 MG/ML
8 INJECTION INTRAMUSCULAR; INTRAVENOUS AS NEEDED
Status: CANCELLED | OUTPATIENT
Start: 2018-12-04

## 2018-08-16 RX ORDER — SODIUM CHLORIDE 9 MG/ML
10 INJECTION INTRAMUSCULAR; INTRAVENOUS; SUBCUTANEOUS AS NEEDED
Status: CANCELLED | OUTPATIENT
Start: 2019-01-02

## 2018-08-16 RX ORDER — HEPARIN 100 UNIT/ML
300-500 SYRINGE INTRAVENOUS AS NEEDED
Status: CANCELLED
Start: 2019-05-02

## 2018-08-16 RX ORDER — SODIUM CHLORIDE 0.9 % (FLUSH) 0.9 %
10 SYRINGE (ML) INJECTION AS NEEDED
Status: CANCELLED
Start: 2018-12-18

## 2018-08-16 RX ORDER — HEPARIN 100 UNIT/ML
300-500 SYRINGE INTRAVENOUS AS NEEDED
Status: CANCELLED
Start: 2018-12-06

## 2018-08-16 RX ORDER — SODIUM CHLORIDE 9 MG/ML
10 INJECTION INTRAMUSCULAR; INTRAVENOUS; SUBCUTANEOUS AS NEEDED
Status: CANCELLED | OUTPATIENT
Start: 2018-10-09

## 2018-08-16 RX ORDER — PALONOSETRON 0.05 MG/ML
0.25 INJECTION, SOLUTION INTRAVENOUS ONCE
Status: CANCELLED | OUTPATIENT
Start: 2018-10-09 | End: 2018-09-11

## 2018-08-16 RX ORDER — DIPHENHYDRAMINE HYDROCHLORIDE 50 MG/ML
50 INJECTION, SOLUTION INTRAMUSCULAR; INTRAVENOUS AS NEEDED
Status: CANCELLED
Start: 2018-10-09

## 2018-08-16 RX ORDER — SODIUM CHLORIDE 9 MG/ML
10 INJECTION INTRAMUSCULAR; INTRAVENOUS; SUBCUTANEOUS AS NEEDED
Status: CANCELLED | OUTPATIENT
Start: 2018-09-27

## 2018-08-16 RX ORDER — SODIUM CHLORIDE 9 MG/ML
500 INJECTION, SOLUTION INTRAVENOUS CONTINUOUS
Status: CANCELLED | OUTPATIENT
Start: 2018-12-04

## 2018-08-16 RX ORDER — HYDROCORTISONE SODIUM SUCCINATE 100 MG/2ML
100 INJECTION, POWDER, FOR SOLUTION INTRAMUSCULAR; INTRAVENOUS AS NEEDED
Status: CANCELLED | OUTPATIENT
Start: 2018-12-04

## 2018-08-16 RX ORDER — SODIUM CHLORIDE 9 MG/ML
10 INJECTION INTRAMUSCULAR; INTRAVENOUS; SUBCUTANEOUS AS NEEDED
Status: CANCELLED | OUTPATIENT
Start: 2018-12-20

## 2018-08-16 RX ORDER — EPINEPHRINE 1 MG/ML
0.3 INJECTION, SOLUTION, CONCENTRATE INTRAVENOUS AS NEEDED
Status: CANCELLED | OUTPATIENT
Start: 2018-12-18

## 2018-08-16 RX ORDER — HEPARIN 100 UNIT/ML
300-500 SYRINGE INTRAVENOUS AS NEEDED
Status: CANCELLED
Start: 2018-09-27

## 2018-08-16 RX ORDER — EPINEPHRINE 1 MG/ML
0.3 INJECTION, SOLUTION, CONCENTRATE INTRAVENOUS AS NEEDED
Status: CANCELLED | OUTPATIENT
Start: 2018-10-09

## 2018-08-16 RX ORDER — DIPHENHYDRAMINE HYDROCHLORIDE 50 MG/ML
50 INJECTION, SOLUTION INTRAMUSCULAR; INTRAVENOUS AS NEEDED
Status: CANCELLED
Start: 2018-08-28

## 2018-08-16 RX ORDER — HEPARIN 100 UNIT/ML
300-500 SYRINGE INTRAVENOUS AS NEEDED
Status: CANCELLED
Start: 2018-10-11

## 2018-08-16 RX ORDER — PALONOSETRON 0.05 MG/ML
0.25 INJECTION, SOLUTION INTRAVENOUS ONCE
Status: CANCELLED | OUTPATIENT
Start: 2018-08-28 | End: 2018-08-28

## 2018-08-16 RX ORDER — EPINEPHRINE 1 MG/ML
0.3 INJECTION, SOLUTION, CONCENTRATE INTRAVENOUS AS NEEDED
Status: CANCELLED | OUTPATIENT
Start: 2018-12-04

## 2018-08-16 RX ORDER — SODIUM CHLORIDE 0.9 % (FLUSH) 0.9 %
10 SYRINGE (ML) INJECTION AS NEEDED
Status: CANCELLED
Start: 2018-12-06

## 2018-08-16 RX ORDER — ALBUTEROL SULFATE 0.83 MG/ML
2.5 SOLUTION RESPIRATORY (INHALATION) AS NEEDED
Status: CANCELLED
Start: 2018-08-28

## 2018-08-16 RX ORDER — DEXTROSE MONOHYDRATE 50 MG/ML
25 INJECTION, SOLUTION INTRAVENOUS CONTINUOUS
Status: CANCELLED
Start: 2018-12-18

## 2018-08-16 RX ORDER — SODIUM CHLORIDE 0.9 % (FLUSH) 0.9 %
10 SYRINGE (ML) INJECTION AS NEEDED
Status: CANCELLED
Start: 2018-12-04

## 2018-08-16 RX ORDER — SODIUM CHLORIDE 0.9 % (FLUSH) 0.9 %
10 SYRINGE (ML) INJECTION AS NEEDED
Status: CANCELLED
Start: 2018-12-20

## 2018-08-16 RX ORDER — SODIUM CHLORIDE 9 MG/ML
10 INJECTION INTRAMUSCULAR; INTRAVENOUS; SUBCUTANEOUS AS NEEDED
Status: CANCELLED | OUTPATIENT
Start: 2018-08-28

## 2018-08-16 RX ORDER — DEXTROSE MONOHYDRATE 50 MG/ML
25 INJECTION, SOLUTION INTRAVENOUS CONTINUOUS
Status: CANCELLED
Start: 2018-10-23

## 2018-08-16 RX ORDER — SODIUM CHLORIDE 9 MG/ML
500 INJECTION, SOLUTION INTRAVENOUS CONTINUOUS
Status: CANCELLED | OUTPATIENT
Start: 2019-01-02

## 2018-08-16 RX ORDER — PALONOSETRON 0.05 MG/ML
0.25 INJECTION, SOLUTION INTRAVENOUS ONCE
Status: CANCELLED | OUTPATIENT
Start: 2019-01-02 | End: 2018-11-06

## 2018-08-16 RX ORDER — FLUOROURACIL 50 MG/ML
400 INJECTION, SOLUTION INTRAVENOUS ONCE
Status: CANCELLED
Start: 2018-12-18 | End: 2018-12-18

## 2018-08-16 RX ORDER — PALONOSETRON 0.05 MG/ML
0.25 INJECTION, SOLUTION INTRAVENOUS ONCE
Status: CANCELLED | OUTPATIENT
Start: 2018-10-23 | End: 2018-09-25

## 2018-08-16 RX ORDER — EPINEPHRINE 1 MG/ML
0.3 INJECTION, SOLUTION, CONCENTRATE INTRAVENOUS AS NEEDED
Status: CANCELLED | OUTPATIENT
Start: 2018-10-23

## 2018-08-16 RX ORDER — ONDANSETRON 2 MG/ML
8 INJECTION INTRAMUSCULAR; INTRAVENOUS AS NEEDED
Status: CANCELLED | OUTPATIENT
Start: 2018-10-09

## 2018-08-16 RX ORDER — FLUOROURACIL 50 MG/ML
400 INJECTION, SOLUTION INTRAVENOUS ONCE
Status: CANCELLED
Start: 2019-01-02 | End: 2019-01-02

## 2018-08-16 RX ORDER — HYDROCORTISONE SODIUM SUCCINATE 100 MG/2ML
100 INJECTION, POWDER, FOR SOLUTION INTRAMUSCULAR; INTRAVENOUS AS NEEDED
Status: CANCELLED | OUTPATIENT
Start: 2019-01-02

## 2018-08-16 RX ORDER — ONDANSETRON 2 MG/ML
8 INJECTION INTRAMUSCULAR; INTRAVENOUS AS NEEDED
Status: CANCELLED | OUTPATIENT
Start: 2018-10-23

## 2018-08-16 RX ORDER — HYDROCORTISONE SODIUM SUCCINATE 100 MG/2ML
100 INJECTION, POWDER, FOR SOLUTION INTRAMUSCULAR; INTRAVENOUS AS NEEDED
Status: CANCELLED | OUTPATIENT
Start: 2018-08-28

## 2018-08-16 RX ORDER — ALBUTEROL SULFATE 0.83 MG/ML
2.5 SOLUTION RESPIRATORY (INHALATION) AS NEEDED
Status: CANCELLED
Start: 2018-12-18

## 2018-08-16 RX ORDER — SODIUM CHLORIDE 9 MG/ML
500 INJECTION, SOLUTION INTRAVENOUS CONTINUOUS
Status: CANCELLED | OUTPATIENT
Start: 2018-10-09

## 2018-08-16 RX ORDER — DEXTROSE MONOHYDRATE 50 MG/ML
25 INJECTION, SOLUTION INTRAVENOUS CONTINUOUS
Status: CANCELLED
Start: 2018-12-04

## 2018-08-16 RX ORDER — HEPARIN 100 UNIT/ML
300-500 SYRINGE INTRAVENOUS AS NEEDED
Status: CANCELLED
Start: 2018-12-04

## 2018-08-16 RX ORDER — ACETAMINOPHEN 325 MG/1
650 TABLET ORAL AS NEEDED
Status: CANCELLED
Start: 2019-01-02

## 2018-08-16 RX ORDER — FLUOROURACIL 50 MG/ML
400 INJECTION, SOLUTION INTRAVENOUS ONCE
Status: CANCELLED
Start: 2018-10-09 | End: 2018-10-09

## 2018-08-16 RX ORDER — ACETAMINOPHEN 325 MG/1
650 TABLET ORAL AS NEEDED
Status: CANCELLED
Start: 2018-08-28

## 2018-08-16 RX ORDER — SODIUM CHLORIDE 0.9 % (FLUSH) 0.9 %
10 SYRINGE (ML) INJECTION AS NEEDED
Status: CANCELLED
Start: 2018-08-28

## 2018-08-16 RX ORDER — ALBUTEROL SULFATE 0.83 MG/ML
2.5 SOLUTION RESPIRATORY (INHALATION) AS NEEDED
Status: CANCELLED
Start: 2019-01-02

## 2018-08-16 RX ORDER — ONDANSETRON 2 MG/ML
8 INJECTION INTRAMUSCULAR; INTRAVENOUS AS NEEDED
Status: CANCELLED | OUTPATIENT
Start: 2018-08-28

## 2018-08-16 RX ORDER — SODIUM CHLORIDE 9 MG/ML
500 INJECTION, SOLUTION INTRAVENOUS CONTINUOUS
Status: CANCELLED | OUTPATIENT
Start: 2018-08-28

## 2018-08-16 RX ORDER — SODIUM CHLORIDE 9 MG/ML
10 INJECTION INTRAMUSCULAR; INTRAVENOUS; SUBCUTANEOUS AS NEEDED
Status: CANCELLED | OUTPATIENT
Start: 2018-12-04

## 2018-08-16 RX ORDER — EPINEPHRINE 1 MG/ML
0.3 INJECTION, SOLUTION, CONCENTRATE INTRAVENOUS AS NEEDED
Status: CANCELLED | OUTPATIENT
Start: 2019-01-02

## 2018-08-16 RX ORDER — HYDROCORTISONE SODIUM SUCCINATE 100 MG/2ML
100 INJECTION, POWDER, FOR SOLUTION INTRAMUSCULAR; INTRAVENOUS AS NEEDED
Status: CANCELLED | OUTPATIENT
Start: 2018-12-18

## 2018-08-16 RX ORDER — HYDROCORTISONE SODIUM SUCCINATE 100 MG/2ML
100 INJECTION, POWDER, FOR SOLUTION INTRAMUSCULAR; INTRAVENOUS AS NEEDED
Status: CANCELLED | OUTPATIENT
Start: 2018-10-09

## 2018-08-16 RX ORDER — ALBUTEROL SULFATE 0.83 MG/ML
2.5 SOLUTION RESPIRATORY (INHALATION) AS NEEDED
Status: CANCELLED
Start: 2018-10-09

## 2018-08-16 RX ORDER — HYDROCORTISONE SODIUM SUCCINATE 100 MG/2ML
100 INJECTION, POWDER, FOR SOLUTION INTRAMUSCULAR; INTRAVENOUS AS NEEDED
Status: CANCELLED | OUTPATIENT
Start: 2018-10-23

## 2018-08-16 NOTE — TELEPHONE ENCOUNTER
[facility]  [phone]    Received call from Dr. Kimberlyn Thomas, surgical oncology. He ordered an MRI abdomen and PET/CT which look excellent, no measurable disease on these studies. However, he also reviewed our CT scans with his tumor board. Based on these results, he feels there is more disease present then is reported by the radiologist.  Unfortunately, he does not think that Mr. Lori Delgadillo disease is amenable to a definitive resection. I recommend we resume treatment with FOLFOX/Avastin, as I think he is responding to treatment. The radiologists at [facility] report a response. His falling CEA is consistent with response, as is his negative MRI and PET/CT. I am reluctant to abandon his current therapy, when we have so few options for colorectal cancer. I called and discussed with the patient. He is agreeable to resuming therapy. He asked if he could wait and start the end of September, but I strongly recommended against waiting that long. He is agreeable to starting around 8/28.

## 2018-08-17 RX ORDER — EPINEPHRINE 1 MG/ML
0.3 INJECTION, SOLUTION, CONCENTRATE INTRAVENOUS AS NEEDED
Status: CANCELLED | OUTPATIENT
Start: 2018-09-25

## 2018-08-17 RX ORDER — DIPHENHYDRAMINE HYDROCHLORIDE 50 MG/ML
50 INJECTION, SOLUTION INTRAMUSCULAR; INTRAVENOUS AS NEEDED
Status: CANCELLED
Start: 2018-09-25

## 2018-08-17 RX ORDER — ACETAMINOPHEN 325 MG/1
650 TABLET ORAL AS NEEDED
Status: CANCELLED
Start: 2018-09-25

## 2018-08-17 RX ORDER — HYDROCORTISONE SODIUM SUCCINATE 100 MG/2ML
100 INJECTION, POWDER, FOR SOLUTION INTRAMUSCULAR; INTRAVENOUS AS NEEDED
Status: CANCELLED | OUTPATIENT
Start: 2018-09-25

## 2018-08-17 RX ORDER — SODIUM CHLORIDE 9 MG/ML
10 INJECTION INTRAMUSCULAR; INTRAVENOUS; SUBCUTANEOUS AS NEEDED
Status: CANCELLED | OUTPATIENT
Start: 2018-09-25

## 2018-08-17 RX ORDER — DEXTROSE MONOHYDRATE 50 MG/ML
25 INJECTION, SOLUTION INTRAVENOUS CONTINUOUS
Status: CANCELLED
Start: 2018-09-25

## 2018-08-17 RX ORDER — ALBUTEROL SULFATE 0.83 MG/ML
2.5 SOLUTION RESPIRATORY (INHALATION) AS NEEDED
Status: CANCELLED
Start: 2018-09-25

## 2018-08-17 RX ORDER — ONDANSETRON 2 MG/ML
8 INJECTION INTRAMUSCULAR; INTRAVENOUS AS NEEDED
Status: CANCELLED | OUTPATIENT
Start: 2018-09-25

## 2018-08-17 RX ORDER — FLUOROURACIL 50 MG/ML
400 INJECTION, SOLUTION INTRAVENOUS ONCE
Status: CANCELLED
Start: 2018-09-25 | End: 2018-09-25

## 2018-08-17 RX ORDER — SODIUM CHLORIDE 0.9 % (FLUSH) 0.9 %
10 SYRINGE (ML) INJECTION AS NEEDED
Status: CANCELLED
Start: 2018-09-25

## 2018-08-17 RX ORDER — SODIUM CHLORIDE 9 MG/ML
500 INJECTION, SOLUTION INTRAVENOUS CONTINUOUS
Status: CANCELLED | OUTPATIENT
Start: 2018-09-25

## 2018-08-17 RX ORDER — PALONOSETRON 0.05 MG/ML
0.25 INJECTION, SOLUTION INTRAVENOUS ONCE
Status: CANCELLED | OUTPATIENT
Start: 2018-09-25 | End: 2018-09-11

## 2018-08-17 RX ORDER — HEPARIN 100 UNIT/ML
300-500 SYRINGE INTRAVENOUS AS NEEDED
Status: CANCELLED
Start: 2018-09-25

## 2018-08-24 ENCOUNTER — TELEPHONE (OUTPATIENT)
Dept: SLEEP MEDICINE | Age: 28
End: 2018-08-24

## 2018-08-28 ENCOUNTER — HOSPITAL ENCOUNTER (OUTPATIENT)
Dept: INFUSION THERAPY | Age: 28
End: 2018-08-28
Payer: COMMERCIAL

## 2018-08-30 ENCOUNTER — TELEPHONE (OUTPATIENT)
Dept: ONCOLOGY | Age: 28
End: 2018-08-30

## 2018-08-30 ENCOUNTER — APPOINTMENT (OUTPATIENT)
Dept: INFUSION THERAPY | Age: 28
End: 2018-08-30
Payer: COMMERCIAL

## 2018-08-30 NOTE — TELEPHONE ENCOUNTER
Patient called and stated that he has filed for olena through his bank to help with his car loan. Patient stated that they will be calling our office to confirm all of his medical information. Patient stated that his last day of work was June 30, and he plans to go back on  9/17.   # [phone]

## 2018-08-31 NOTE — TELEPHONE ENCOUNTER
[address] at [facility]  [phone]    08/31/18- Patient reported our office may received a call from [place] regarding his disability claim- he reported okay to give them his medical information and his last day of work was 7/30 and will be returning 9/17/18. Patient reported he did receive a call stating he was approved for disability- so he is unsure if our office will be contacted. No further questions or concerns.

## 2018-09-04 ENCOUNTER — APPOINTMENT (OUTPATIENT)
Dept: INFUSION THERAPY | Age: 28
End: 2018-09-04
Payer: COMMERCIAL

## 2018-09-11 ENCOUNTER — TELEPHONE (OUTPATIENT)
Dept: ONCOLOGY | Age: 28
End: 2018-09-11

## 2018-09-11 NOTE — TELEPHONE ENCOUNTER
[address] at [facility]  [phone]    I called Mr. Gonsales to check in, after he cancelled his appointment today to resume chemotherapy. No answer, left message for him to call us back. He has already delayed resumption of chemotherapy longer than recommended, and now is delaying further. I want to speak with him about possibly starting treatment next week, rather than waiting until 9/25.

## 2018-09-11 NOTE — TELEPHONE ENCOUNTER
Pt called the [facility] and spoke with Og Negron and per Og Negron pt cancelled his appointment and infusion for today because he had a family emergency. He stated he will be in for treatment on 9/25.

## 2018-09-11 NOTE — TELEPHONE ENCOUNTER
He returned my call. He is in [place] visiting with his sick grandmother, who is not expected to survive much longer. He is going to talk to his work and see if he can resume chemotherapy next week. If not, we will plan on starting 9/25 as scheduled. He will call us back once he has made a final decision. He will then need to also be scheduled to see us in the office the same day.

## 2018-09-13 ENCOUNTER — APPOINTMENT (OUTPATIENT)
Dept: INFUSION THERAPY | Age: 28
End: 2018-09-13
Payer: COMMERCIAL

## 2018-09-14 ENCOUNTER — TELEPHONE (OUTPATIENT)
Dept: ONCOLOGY | Age: 28
End: 2018-09-14

## 2018-09-14 NOTE — TELEPHONE ENCOUNTER
Called the patient and left a message to call Dr. Ida Ventura office back at his earliest convenience.

## 2018-09-20 ENCOUNTER — TELEPHONE (OUTPATIENT)
Dept: ONCOLOGY | Age: 28
End: 2018-09-20

## 2018-09-20 NOTE — TELEPHONE ENCOUNTER
[address] at [facility]  [phone]    09/20/18- Phone call placed to patient he reported he has returned to work and per his HR department they need a letter stating patient is working 25 hour per week on chemo weeks and 30 hours per week on non-chemo weeks. He would like this schedule to start on 9/24/18. Phone call placed to April [phone] -  left. Cell -[phone]  [phone]    09/21/18- Spoke to April- she reported a letter can be sent stating patients hours per week- letter sent and fax confirmation delivery successful. No further actions needed at this time.

## 2018-09-20 NOTE — TELEPHONE ENCOUNTER
Pt called requesting a letter to be sent to his job stating he can only work 25 hours the week of chemo and 30 hours the weeks he doesn't have chemo.  He requested we fax this letter to [employer], [address] at [phone]

## 2018-09-20 NOTE — LETTER
NOTIFICATION RETURN TO WORK / SCHOOL 
 
9/21/2018 9:28 AM 
 
Mr. Frieda Verma 
[address] [place] [phone] To Whom It May Concern: 
 
 
[place] Jay[place] Gonsales is currently under the care of Brenna Parekh and Jackie Alonso NP at the [facility]. He will return to work on: 9/24/18 with restricted hours. On weeks where he will receive treatment patient needs to work 25 hours per week and non-treatment/rest weeks 30 hours per week. He will be resuming therapy on a every 2 week schedule on 9/25/2018. If there are questions or concerns please have the patient contact our office. Sincerely, RUTH Ordaz

## 2018-09-25 ENCOUNTER — DOCUMENTATION ONLY (OUTPATIENT)
Dept: ONCOLOGY | Age: 28
End: 2018-09-25

## 2018-09-25 ENCOUNTER — OFFICE VISIT (OUTPATIENT)
Dept: ONCOLOGY | Age: 28
End: 2018-09-25

## 2018-09-25 ENCOUNTER — HOSPITAL ENCOUNTER (OUTPATIENT)
Dept: INFUSION THERAPY | Age: 28
Discharge: HOME | End: 2018-09-25
Payer: COMMERCIAL

## 2018-09-25 VITALS
HEIGHT: 72 IN | DIASTOLIC BLOOD PRESSURE: 90 MMHG | TEMPERATURE: 97.1 F | SYSTOLIC BLOOD PRESSURE: 148 MMHG | WEIGHT: 286.2 LBS | BODY MASS INDEX: 38.76 KG/M2 | HEART RATE: 86 BPM | RESPIRATION RATE: 18 BRPM | OXYGEN SATURATION: 97 %

## 2018-09-25 VITALS
DIASTOLIC BLOOD PRESSURE: 84 MMHG | SYSTOLIC BLOOD PRESSURE: 140 MMHG | BODY MASS INDEX: 38.71 KG/M2 | RESPIRATION RATE: 18 BRPM | TEMPERATURE: 97.1 F | WEIGHT: 285.8 LBS | HEIGHT: 72 IN | OXYGEN SATURATION: 100 % | HEART RATE: 89 BPM

## 2018-09-25 DIAGNOSIS — C18.9 COLON CANCER METASTASIZED TO LIVER (HCC): ICD-10-CM

## 2018-09-25 DIAGNOSIS — C78.7 COLON CANCER METASTASIZED TO LIVER (HCC): Primary | ICD-10-CM

## 2018-09-25 DIAGNOSIS — C18.9 COLON CANCER METASTASIZED TO LIVER (HCC): Primary | ICD-10-CM

## 2018-09-25 DIAGNOSIS — C78.7 COLON CANCER METASTASIZED TO LIVER (HCC): ICD-10-CM

## 2018-09-25 LAB
ALBUMIN SERPL-MCNC: 3.3 G/DL (ref 3.5–5)
ALBUMIN/GLOB SERPL: 0.8 {RATIO} (ref 1.1–2.2)
ALP SERPL-CCNC: 59 U/L (ref 45–117)
ALT SERPL-CCNC: 29 U/L (ref 12–78)
ANION GAP SERPL CALC-SCNC: 6 MMOL/L (ref 5–15)
APPEARANCE UR: CLEAR
AST SERPL-CCNC: 16 U/L (ref 15–37)
BASOPHILS # BLD: 0.1 K/UL (ref 0–0.1)
BASOPHILS NFR BLD: 1 % (ref 0–1)
BILIRUB SERPL-MCNC: 0.3 MG/DL (ref 0.2–1)
BILIRUB UR QL: NEGATIVE
BUN SERPL-MCNC: 12 MG/DL (ref 6–20)
BUN/CREAT SERPL: 14 (ref 12–20)
CALCIUM SERPL-MCNC: 8.9 MG/DL (ref 8.5–10.1)
CEA SERPL-MCNC: 2.2 NG/ML
CHLORIDE SERPL-SCNC: 105 MMOL/L (ref 97–108)
CO2 SERPL-SCNC: 28 MMOL/L (ref 21–32)
COLOR UR: NORMAL
CREAT SERPL-MCNC: 0.84 MG/DL (ref 0.7–1.3)
DIFFERENTIAL METHOD BLD: ABNORMAL
EOSINOPHIL # BLD: 0.5 K/UL (ref 0–0.4)
EOSINOPHIL NFR BLD: 6 % (ref 0–7)
ERYTHROCYTE [DISTWIDTH] IN BLOOD BY AUTOMATED COUNT: 16.2 % (ref 11.5–14.5)
GLOBULIN SER CALC-MCNC: 4.1 G/DL (ref 2–4)
GLUCOSE SERPL-MCNC: 98 MG/DL (ref 65–100)
GLUCOSE UR STRIP.AUTO-MCNC: NEGATIVE MG/DL
HCT VFR BLD AUTO: 41.8 % (ref 36.6–50.3)
HGB BLD-MCNC: 13.2 G/DL (ref 12.1–17)
HGB UR QL STRIP: NEGATIVE
IMM GRANULOCYTES # BLD: 0 K/UL (ref 0–0.04)
IMM GRANULOCYTES NFR BLD AUTO: 0 % (ref 0–0.5)
KETONES UR QL STRIP.AUTO: NEGATIVE MG/DL
LEUKOCYTE ESTERASE UR QL STRIP.AUTO: NEGATIVE
LYMPHOCYTES # BLD: 2.2 K/UL (ref 0.8–3.5)
LYMPHOCYTES NFR BLD: 25 % (ref 12–49)
MCH RBC QN AUTO: 23.7 PG (ref 26–34)
MCHC RBC AUTO-ENTMCNC: 31.6 G/DL (ref 30–36.5)
MCV RBC AUTO: 75.2 FL (ref 80–99)
MONOCYTES # BLD: 0.5 K/UL (ref 0–1)
MONOCYTES NFR BLD: 6 % (ref 5–13)
NEUTS SEG # BLD: 5.5 K/UL (ref 1.8–8)
NEUTS SEG NFR BLD: 62 % (ref 32–75)
NITRITE UR QL STRIP.AUTO: NEGATIVE
NRBC # BLD: 0 K/UL (ref 0–0.01)
NRBC BLD-RTO: 0 PER 100 WBC
PH UR STRIP: 5.5 [PH] (ref 5–8)
PLATELET # BLD AUTO: 309 K/UL (ref 150–400)
PMV BLD AUTO: 9 FL (ref 8.9–12.9)
POTASSIUM SERPL-SCNC: 4 MMOL/L (ref 3.5–5.1)
PROT SERPL-MCNC: 7.4 G/DL (ref 6.4–8.2)
PROT UR STRIP-MCNC: NEGATIVE MG/DL
RBC # BLD AUTO: 5.56 M/UL (ref 4.1–5.7)
SODIUM SERPL-SCNC: 139 MMOL/L (ref 136–145)
SP GR UR REFRACTOMETRY: 1.02 (ref 1–1.03)
UROBILINOGEN UR QL STRIP.AUTO: 0.2 EU/DL (ref 0.2–1)
WBC # BLD AUTO: 8.9 K/UL (ref 4.1–11.1)

## 2018-09-25 PROCEDURE — 74011250636 HC RX REV CODE- 250/636: Performed by: NURSE PRACTITIONER

## 2018-09-25 PROCEDURE — 96417 CHEMO IV INFUS EACH ADDL SEQ: CPT

## 2018-09-25 PROCEDURE — 96375 TX/PRO/DX INJ NEW DRUG ADDON: CPT

## 2018-09-25 PROCEDURE — 96413 CHEMO IV INFUSION 1 HR: CPT

## 2018-09-25 PROCEDURE — 74011000258 HC RX REV CODE- 258: Performed by: NURSE PRACTITIONER

## 2018-09-25 PROCEDURE — 77030012965 HC NDL HUBR BBMI -A

## 2018-09-25 PROCEDURE — 80053 COMPREHEN METABOLIC PANEL: CPT | Performed by: INTERNAL MEDICINE

## 2018-09-25 PROCEDURE — 82378 CARCINOEMBRYONIC ANTIGEN: CPT | Performed by: INTERNAL MEDICINE

## 2018-09-25 PROCEDURE — 85025 COMPLETE CBC W/AUTO DIFF WBC: CPT | Performed by: INTERNAL MEDICINE

## 2018-09-25 PROCEDURE — 81003 URINALYSIS AUTO W/O SCOPE: CPT | Performed by: INTERNAL MEDICINE

## 2018-09-25 PROCEDURE — 96415 CHEMO IV INFUSION ADDL HR: CPT

## 2018-09-25 PROCEDURE — 96411 CHEMO IV PUSH ADDL DRUG: CPT

## 2018-09-25 PROCEDURE — 36415 COLL VENOUS BLD VENIPUNCTURE: CPT | Performed by: INTERNAL MEDICINE

## 2018-09-25 RX ORDER — DEXTROSE MONOHYDRATE 50 MG/ML
25 INJECTION, SOLUTION INTRAVENOUS CONTINUOUS
Status: DISPENSED | OUTPATIENT
Start: 2018-09-25 | End: 2018-09-25

## 2018-09-25 RX ORDER — HEPARIN 100 UNIT/ML
300-500 SYRINGE INTRAVENOUS AS NEEDED
Status: ACTIVE | OUTPATIENT
Start: 2018-09-25 | End: 2018-09-25

## 2018-09-25 RX ORDER — PALONOSETRON 0.05 MG/ML
0.25 INJECTION, SOLUTION INTRAVENOUS ONCE
Status: COMPLETED | OUTPATIENT
Start: 2018-09-25 | End: 2018-09-25

## 2018-09-25 RX ORDER — SODIUM CHLORIDE 9 MG/ML
10 INJECTION INTRAMUSCULAR; INTRAVENOUS; SUBCUTANEOUS AS NEEDED
Status: ACTIVE | OUTPATIENT
Start: 2018-09-25 | End: 2018-09-25

## 2018-09-25 RX ORDER — FLUOROURACIL 50 MG/ML
400 INJECTION, SOLUTION INTRAVENOUS ONCE
Status: COMPLETED | OUTPATIENT
Start: 2018-09-25 | End: 2018-09-25

## 2018-09-25 RX ORDER — SODIUM CHLORIDE 9 MG/ML
500 INJECTION, SOLUTION INTRAVENOUS CONTINUOUS
Status: DISPENSED | OUTPATIENT
Start: 2018-09-25 | End: 2018-09-25

## 2018-09-25 RX ADMIN — DEXTROSE MONOHYDRATE 25 ML/HR: 5 INJECTION, SOLUTION INTRAVENOUS at 11:26

## 2018-09-25 RX ADMIN — DEXTROSE MONOHYDRATE 1020 MG: 5 INJECTION, SOLUTION INTRAVENOUS at 11:25

## 2018-09-25 RX ADMIN — SODIUM CHLORIDE 500 ML: 900 INJECTION, SOLUTION INTRAVENOUS at 09:41

## 2018-09-25 RX ADMIN — FLUOROURACIL 6120 MG: 50 INJECTION, SOLUTION INTRAVENOUS at 13:44

## 2018-09-25 RX ADMIN — DEXAMETHASONE SODIUM PHOSPHATE 12 MG: 4 INJECTION, SOLUTION INTRA-ARTICULAR; INTRALESIONAL; INTRAMUSCULAR; INTRAVENOUS; SOFT TISSUE at 09:41

## 2018-09-25 RX ADMIN — OXALIPLATIN 217 MG: 5 INJECTION, SOLUTION INTRAVENOUS at 11:25

## 2018-09-25 RX ADMIN — SODIUM CHLORIDE 10 ML: 9 INJECTION INTRAMUSCULAR; INTRAVENOUS; SUBCUTANEOUS at 07:50

## 2018-09-25 RX ADMIN — BEVACIZUMAB 643 MG: 400 INJECTION, SOLUTION INTRAVENOUS at 10:35

## 2018-09-25 RX ADMIN — FLUOROURACIL 1020 MG: 50 INJECTION, SOLUTION INTRAVENOUS at 13:44

## 2018-09-25 RX ADMIN — PALONOSETRON HYDROCHLORIDE 0.25 MG: 0.25 INJECTION INTRAVENOUS at 09:42

## 2018-09-25 NOTE — PROGRESS NOTES
Problem: Patient Education:  Go to Education Activity  Goal: Patient/Family Education  Outcome: Progressing Towards Goal  Pt receiving  Folfox 6 D1 C7

## 2018-09-25 NOTE — PROGRESS NOTES
[facility] [place] at [address], [address] [facility] [address] [facility]  Isaias Dee: [phone]  F: [phone]     Reason for Visit:   Viral Gary is a 25 y.o. male who is seen for follow up of metastatic colon cancer. Treatment History:   · CT A/P 3/25/2018: Indeterminate 2 x 1 cm low-density liver lesion. · CT C/A/P with triphase liver 3/27/2018: No evidence of metastatic disease to the chest. Multiple ill-defined hepatic lesions. These do not represent simple cyst.  · CT guided liver biopsy 3/27/2018: metastatic adenocarcinoma, KRAS wild type, NRAS wild type  · Colectomy by Dr. Augustin  3/29/2018: Adenocarcinoma, moderately differentiated. Negative margins. 3/16 nodes involved. pMMR  · Stage IV (pT3 pN1b pM1) Colon Cancer  · Chemotherapy with FOLFOX and bevacizumab 5/1/2018 - 7/12/2018 for a total of 6 cycles, dany administered with cycles 2-4  · Resumed chemotherapy with FOLFOX and bevacizumab 9/25/2018    History of Present Illness:   Presents for follow up to resume chemotherapy. Feeling well. Some neuropathy in fingertips. Not painful, not interfering with function. No nausea or vomiting. Eating and drinking well. Tooth extracted in August, all healed. Working at [employer] full time, thinking of working decreased hours weeks of chemotherapy. He is unaccompanied today. PAST HISTORY: The following sections were reviewed and updated in the EMR as appropriate: PMH, SH, FH, Medications, Allergies. No Known Allergies     Review of Systems: A complete review of systems was obtained, reviewed, and scanned into the EMR. Pertinent findings reviewed above.       Physical Exam:     Visit Vitals    /90 (BP 1 Location: Left arm, BP Patient Position: Sitting)    Pulse 86    Temp 97.1 °F (36.2 °C) (Oral)    Resp 18    Ht 6' (1.829 m)    Wt 286 lb 3.2 oz (129.8 kg)    SpO2 97%    BMI 38.82 kg/m2     ECOG PS: 1  General: No distress  Eyes: PERRLA, anicteric sclerae  HENT: Atraumatic, OP clear  Neck: Supple  Lymphatic: No cervical, supraclavicular, or inguinal adenopathy  Respiratory: CTAB, normal respiratory effort  CV: Normal rate, regular rhythm, no murmurs, no peripheral edema  GI: Soft, nontender, nondistended, no masses, no hepatomegaly, no splenomegaly  MS: Normal gait and station. Digits without clubbing or cyanosis. Skin: No rashes, ecchymoses, or petechiae. Normal temperature, turgor, and texture. Psych: Alert, oriented, appropriate affect, normal judgment/insight    Results:     Lab Results   Component Value Date/Time    WBC 8.9 09/25/2018 07:56 AM    HGB 13.2 09/25/2018 07:56 AM    HCT 41.8 09/25/2018 07:56 AM    PLATELET 731 45/79/8809 07:56 AM    MCV 75.2 (L) 09/25/2018 07:56 AM    ABS. NEUTROPHILS 5.5 09/25/2018 07:56 AM     Lab Results   Component Value Date/Time    Sodium 139 09/25/2018 07:56 AM    Potassium 4.0 09/25/2018 07:56 AM    Chloride 105 09/25/2018 07:56 AM    CO2 28 09/25/2018 07:56 AM    Glucose 98 09/25/2018 07:56 AM    BUN 12 09/25/2018 07:56 AM    Creatinine 0.84 09/25/2018 07:56 AM    GFR est AA >60 09/25/2018 07:56 AM    GFR est non-AA >60 09/25/2018 07:56 AM    Calcium 8.9 09/25/2018 07:56 AM     Lab Results   Component Value Date/Time    Bilirubin, total 0.3 09/25/2018 07:56 AM    ALT (SGPT) 29 09/25/2018 07:56 AM    AST (SGOT) 16 09/25/2018 07:56 AM    Alk. phosphatase 59 09/25/2018 07:56 AM    Protein, total 7.4 09/25/2018 07:56 AM    Albumin 3.3 (L) 09/25/2018 07:56 AM    Globulin 4.1 (H) 09/25/2018 07:56 AM       CT C/A/P with triphasic liver 7/19/2018: There is no axilla, mediastinal or hilar adenopathy. There is no pericardial pleural effusion no shift or pneumothorax, infusion catheter is in place. No parenchymal mass. The liver lesion and decreased in size, a dome of the liver lesion measures 11 x 14 mm, it was 15 x 20.  An additional right lobe of the liver lower lesion is also decreased in size measuring 12 x 7 mm it was 16 x 12. No new lesion is seen. Liver and spleen remain normal in size, the gallbladder is not distended. The kidneys are unobstructed. There is no bowel wall thickening or obstruction. Bowel wall thickening in the ascending colon appear stable some of this may be related to fecal stasis up. There is no free air or free fluid. There is no adenopathy in the abdomen or pelvis. The bladder is midline. Review of bone windows does not suggest metastases. There is no inguinal adenopathy. Major vessels do appear patent. Assessment:   1) Metastatic Colon Cancer  Stage IV, pMMR, KRAS/NRAS wild type    He has metastatic disease within his liver. His primary tumor has been resected. He is currently receiving chemotherapy with FOLFOX and Bevacizumab. Break from therapy since July 2018 due to evaluation of surgical resection with Dr. Ayaz Servin (surgical oncology). He ordered an MRI abdomen and PET/CT which look excellent, no measurable disease on these studies. However, he also reviewed our CT scans with his tumor board. Based on these results, he feels there is more disease present then is reported by the radiologist.  Unfortunately, he does not think that Mr. Tang Richards disease is amenable to a definitive resection. Resuming chemotherapy today. Patient would like to be evaluated by another surgical oncologist. We reviewed various academic centers around the country. He will discuss with family and decide on where he would like to seek opinion. He is interested in pursuing alternative therapy with Vit C infusions. We do not offer this therapy here. He has a referral for this from a friend and is looking into costs associated.  referral for follow up of care card application today. 2) Risk of infertility  We have previously reviewed that chemotherapy can potentially cause infertility. He has undergone sperm cryopreservation. 3) Genetic risk  Seen by genetics at [facility]. Notes reviewed.  No genetic alteration identified. Risk thought to be due to environmental exposure. No further follow up. He is encouraging his sister to undergo screening colonoscopy. 4) Hypertension  Off metoprolol currently due to patient preference. Monitor on therapy with Avastin. PCP to follow. Plan:     Proceed to resume therapy today with C7 of mFOLFOX6 with bevacizumab (oxaliplatin 85 mg/m2, leucovorin 400 mg/m2, fluorouracil 400 mg/m2, bevacizumab 5mg/kg and a 46 hour infusion of fluorouracil 2400 mg/m2 given every 2 weeks). Labs: CBC, CMP prior to each treatment, CEA every 4 weeks  Prophylactic antiemetics: Palonosetron and dexamethasone on the day of each chemotherapy infusion, with dexamethasone 8mg PO daily at home on days 2 and 3  PRN antiemetics: Prochlorperazine and Ondansetron  Refer to surgical oncology at [facility] center of patient's choice. Return to clinic every 2 weeks on therapy. Patient was seen in conjunction with Lito Green NP.     Signed By: Yumiko Hooks MD

## 2018-09-25 NOTE — PROGRESS NOTES
[facility] VISIT NOTE    0740  Pt arrived at [facility] ambulatory and in no distress for Folfox 6 D1 C7  Assessment completed, pt w/o complaints    Right  chest port accessed with . 75 in costello with no difficulty. Positive blood return noted and labs drawn. Medications received:  Aloxi IVP  Decadrin IVPB  Avastin IV  Oxaliplatin IV  Florouracil IVP  Florouracil CADD pump  Leucovorin IV    Tolerated treatment well, no adverse reaction noted. Port de-accessed and flushed per protocol. Positive blood return noted. Patient Vitals for the past 12 hrs:   Temp Pulse Resp BP SpO2   09/25/18 0744 97.1 °F (36.2 °C) 89 18 140/84 100 %     Recent Results (from the past 12 hour(s))   CBC WITH AUTOMATED DIFF    Collection Time: 09/25/18  7:56 AM   Result Value Ref Range    WBC 8.9 4.1 - 11.1 K/uL    RBC 5.56 4.10 - 5.70 M/uL    HGB 13.2 12.1 - 17.0 g/dL    HCT 41.8 36.6 - 50.3 %    MCV 75.2 (L) 80.0 - 99.0 FL    MCH 23.7 (L) 26.0 - 34.0 PG    MCHC 31.6 30.0 - 36.5 g/dL    RDW 16.2 (H) 11.5 - 14.5 %    PLATELET 011 259 - 706 K/uL    MPV 9.0 8.9 - 12.9 FL    NRBC 0.0 0  WBC    ABSOLUTE NRBC 0.00 0.00 - 0.01 K/uL    NEUTROPHILS 62 32 - 75 %    LYMPHOCYTES 25 12 - 49 %    MONOCYTES 6 5 - 13 %    EOSINOPHILS 6 0 - 7 %    BASOPHILS 1 0 - 1 %    IMMATURE GRANULOCYTES 0 0.0 - 0.5 %    ABS. NEUTROPHILS 5.5 1.8 - 8.0 K/UL    ABS. LYMPHOCYTES 2.2 0.8 - 3.5 K/UL    ABS. MONOCYTES 0.5 0.0 - 1.0 K/UL    ABS. EOSINOPHILS 0.5 (H) 0.0 - 0.4 K/UL    ABS. BASOPHILS 0.1 0.0 - 0.1 K/UL    ABS. IMM.  GRANS. 0.0 0.00 - 0.04 K/UL    DF AUTOMATED     URINALYSIS W/ RFLX MICROSCOPIC    Collection Time: 09/25/18  7:56 AM   Result Value Ref Range    Color YELLOW/STRAW      Appearance CLEAR CLEAR      Specific gravity 1.023 1.003 - 1.030      pH (UA) 5.5 5.0 - 8.0      Protein NEGATIVE  NEG mg/dL    Glucose NEGATIVE  NEG mg/dL    Ketone NEGATIVE  NEG mg/dL    Bilirubin NEGATIVE  NEG      Blood NEGATIVE  NEG      Urobilinogen 0.2 0.2 - 1.0 EU/dL Nitrites NEGATIVE  NEG      Leukocyte Esterase NEGATIVE  NEG     METABOLIC PANEL, COMPREHENSIVE    Collection Time: 09/25/18  7:56 AM   Result Value Ref Range    Sodium 139 136 - 145 mmol/L    Potassium 4.0 3.5 - 5.1 mmol/L    Chloride 105 97 - 108 mmol/L    CO2 28 21 - 32 mmol/L    Anion gap 6 5 - 15 mmol/L    Glucose 98 65 - 100 mg/dL    BUN 12 6 - 20 MG/DL    Creatinine 0.84 0.70 - 1.30 MG/DL    BUN/Creatinine ratio 14 12 - 20      GFR est AA >60 >60 ml/min/1.73m2    GFR est non-AA >60 >60 ml/min/1.73m2    Calcium 8.9 8.5 - 10.1 MG/DL    Bilirubin, total 0.3 0.2 - 1.0 MG/DL    ALT (SGPT) 29 12 - 78 U/L    AST (SGOT) 16 15 - 37 U/L    Alk. phosphatase 59 45 - 117 U/L    Protein, total 7.4 6.4 - 8.2 g/dL    Albumin 3.3 (L) 3.5 - 5.0 g/dL    Globulin 4.1 (H) 2.0 - 4.0 g/dL    A-G Ratio 0.8 (L) 1.1 - 2.2     CEA    Collection Time: 09/25/18  7:56 AM   Result Value Ref Range    CEA 2.2 ng/mL     1400  D/C'd from [facility] ambulatory and in no distress accompanied by self.  Next appointment is 9/27/18 at 400pm.

## 2018-09-25 NOTE — PROGRESS NOTES
[employer]  Social Work Navigator Encounter     Patient Name:  Faith Avelar    Medical History: colon cancer    Narrative: Patient here for follow-up and treatment. Supportive visit with patient in [facility] [address] [place] [address]. Patient in actively coping with diagnosis and treatment. He continues to be well supported by family, girlfriend, and friends. He tells me his goal is to focus on creating positive and meaningful memories with is loved ones. He shared that he is working on getting his financial affairs in order now to reduce the future burden on family. He discussed his desire to seek second opinions from surgical oncology as discussed with Dr. Negrtia Stephens today. Offered active listening and supportive counseling to patient. Explained to patient that his Care Card is still under review. Patient voiced feeling overwhelmed by his medical debt. Offered to assist patient review bills and apply for assistance or make payment arrangements as appropriate. Patient voiced understanding and appreciation. Plan:   1. Provided supportive counseling as patient ventilates feelings. 2. Offered to assist patient review medical bills and apply for assistance or set up payment arrangement as appropriate.    3. Patient has a pending Care Card application.    -MASOOD Ramos

## 2018-09-25 NOTE — PROGRESS NOTES
Joe Armenta is a 25 y.o. male    Chief Complaint   Patient presents with    Colon Cancer     returnimg for chemo       1. Have you been to the ER, urgent care clinic since your last visit? Hospitalized since your last visit? No  M  2. Have you seen or consulted any other health care providers outside of the [facility] since your last visit? Include any pap smears or colon screening.  No      Visit Vitals    /90 (BP 1 Location: Left arm, BP Patient Position: Sitting)    Pulse 86    Temp 97.1 °F (36.2 °C) (Oral)    Resp 18    Ht 6' (1.829 m)    Wt 286 lb 3.2 oz (129.8 kg)    SpO2 97%    BMI 38.82 kg/m2           Health Maintenance Due   Topic Date Due    Pneumococcal 19-64 Highest Risk (1 of 3 - PCV13) 07/26/2007    DTaP/Tdap/Td series (1 - Tdap) 07/26/2009    Influenza Age 9 to Adult  08/01/2018

## 2018-09-27 ENCOUNTER — HOSPITAL ENCOUNTER (OUTPATIENT)
Dept: INFUSION THERAPY | Age: 28
Discharge: HOME | End: 2018-09-27
Payer: COMMERCIAL

## 2018-09-27 VITALS
TEMPERATURE: 97.7 F | HEART RATE: 104 BPM | SYSTOLIC BLOOD PRESSURE: 140 MMHG | DIASTOLIC BLOOD PRESSURE: 95 MMHG | RESPIRATION RATE: 18 BRPM

## 2018-09-27 DIAGNOSIS — C78.7 COLON CANCER METASTASIZED TO LIVER (HCC): ICD-10-CM

## 2018-09-27 DIAGNOSIS — C18.9 COLON CANCER METASTASIZED TO LIVER (HCC): ICD-10-CM

## 2018-09-27 PROCEDURE — 74011250636 HC RX REV CODE- 250/636: Performed by: INTERNAL MEDICINE

## 2018-09-27 PROCEDURE — 96523 IRRIG DRUG DELIVERY DEVICE: CPT

## 2018-09-27 RX ORDER — SODIUM CHLORIDE 0.9 % (FLUSH) 0.9 %
10 SYRINGE (ML) INJECTION AS NEEDED
Status: ACTIVE | OUTPATIENT
Start: 2018-09-27 | End: 2018-09-28

## 2018-09-27 RX ORDER — SODIUM CHLORIDE 9 MG/ML
10 INJECTION INTRAMUSCULAR; INTRAVENOUS; SUBCUTANEOUS AS NEEDED
Status: ACTIVE | OUTPATIENT
Start: 2018-09-27 | End: 2018-09-28

## 2018-09-27 RX ORDER — HEPARIN 100 UNIT/ML
300-500 SYRINGE INTRAVENOUS AS NEEDED
Status: ACTIVE | OUTPATIENT
Start: 2018-09-27 | End: 2018-09-28

## 2018-09-27 RX ADMIN — SODIUM CHLORIDE, PRESERVATIVE FREE 500 UNITS: 5 INJECTION INTRAVENOUS at 12:45

## 2018-09-27 RX ADMIN — Medication 10 ML: at 12:45

## 2018-10-02 ENCOUNTER — TELEPHONE (OUTPATIENT)
Dept: ONCOLOGY | Age: 28
End: 2018-10-02

## 2018-10-02 NOTE — TELEPHONE ENCOUNTER
Patient left a voicemail and stated that he would like a call back to discuss some side effects of his chemo.  # [phone]

## 2018-10-02 NOTE — TELEPHONE ENCOUNTER
[address] at [facility] 88  [phone]    10/02/18- Returned patient's call, no answer, voicemail left requesting a return call when available. 11:27 AM- Patient reports worsening neuropathy in both hands, from fingertip to mid-finger. He reports constant numbness that's bothersome if he's holding something for too long, and he's having trouble opening items. Patient stated he started feeling numbness on the bottom of his right foot at night as well. Denies any difficulty with balance or walking. Patient also reported two nose bleeds in one day last week, both clotted easily. Patient felt it was because he was out in the heat and \"over did it\". Instructed patient to call the office back if nose bleeds continue or has any trouble getting them to clot. Will discuss neuropathy concerns with Geronimo Oppenheim and call patient back. He verbalized understanding. 4:43 PM- Informed patient that Margaret/Dr. Parekh will assess neuropathy further at his follow up and discuss medication management with him. He verbalized understanding, no other questions at this time.

## 2018-10-02 NOTE — TELEPHONE ENCOUNTER
He is one week out from resuming therapy now. Neuropathy symptoms are at a peak. Will need to see what degree symptoms are present at time of next treatment. Will likely need dose reduction of Oxaliplatin moving forward. Will also discuss addition of Cymbalta to manage symptoms at follow up.

## 2018-10-09 ENCOUNTER — HOSPITAL ENCOUNTER (OUTPATIENT)
Dept: INFUSION THERAPY | Age: 28
Discharge: HOME | End: 2018-10-09
Payer: COMMERCIAL

## 2018-10-09 ENCOUNTER — OFFICE VISIT (OUTPATIENT)
Dept: ONCOLOGY | Age: 28
End: 2018-10-09

## 2018-10-09 VITALS
SYSTOLIC BLOOD PRESSURE: 138 MMHG | BODY MASS INDEX: 38.76 KG/M2 | DIASTOLIC BLOOD PRESSURE: 98 MMHG | TEMPERATURE: 98.1 F | HEIGHT: 72 IN | WEIGHT: 286.2 LBS | HEART RATE: 99 BPM | RESPIRATION RATE: 18 BRPM

## 2018-10-09 VITALS
TEMPERATURE: 97 F | DIASTOLIC BLOOD PRESSURE: 100 MMHG | HEART RATE: 91 BPM | BODY MASS INDEX: 38.87 KG/M2 | HEIGHT: 72 IN | WEIGHT: 287 LBS | SYSTOLIC BLOOD PRESSURE: 160 MMHG | OXYGEN SATURATION: 92 % | RESPIRATION RATE: 20 BRPM

## 2018-10-09 DIAGNOSIS — T45.1X5A CHEMOTHERAPY-INDUCED NEUROPATHY (HCC): ICD-10-CM

## 2018-10-09 DIAGNOSIS — I10 ESSENTIAL HYPERTENSION: ICD-10-CM

## 2018-10-09 DIAGNOSIS — G62.0 CHEMOTHERAPY-INDUCED NEUROPATHY (HCC): ICD-10-CM

## 2018-10-09 DIAGNOSIS — C78.7 COLON CANCER METASTASIZED TO LIVER (HCC): Primary | ICD-10-CM

## 2018-10-09 DIAGNOSIS — C78.7 COLON CANCER METASTASIZED TO LIVER (HCC): ICD-10-CM

## 2018-10-09 DIAGNOSIS — C18.9 COLON CANCER METASTASIZED TO LIVER (HCC): Primary | ICD-10-CM

## 2018-10-09 DIAGNOSIS — C18.9 COLON CANCER METASTASIZED TO LIVER (HCC): ICD-10-CM

## 2018-10-09 LAB
ALBUMIN SERPL-MCNC: 3.4 G/DL (ref 3.5–5)
ALBUMIN/GLOB SERPL: 0.9 {RATIO} (ref 1.1–2.2)
ALP SERPL-CCNC: 60 U/L (ref 45–117)
ALT SERPL-CCNC: 35 U/L (ref 12–78)
ANION GAP SERPL CALC-SCNC: 8 MMOL/L (ref 5–15)
APPEARANCE UR: CLEAR
AST SERPL-CCNC: 22 U/L (ref 15–37)
BACTERIA URNS QL MICRO: NEGATIVE /HPF
BASOPHILS # BLD: 0 K/UL (ref 0–0.1)
BASOPHILS NFR BLD: 0 % (ref 0–1)
BILIRUB SERPL-MCNC: 0.2 MG/DL (ref 0.2–1)
BILIRUB UR QL: NEGATIVE
BUN SERPL-MCNC: 16 MG/DL (ref 6–20)
BUN/CREAT SERPL: 17 (ref 12–20)
CALCIUM SERPL-MCNC: 8.7 MG/DL (ref 8.5–10.1)
CHLORIDE SERPL-SCNC: 105 MMOL/L (ref 97–108)
CO2 SERPL-SCNC: 27 MMOL/L (ref 21–32)
COLOR UR: NORMAL
CREAT SERPL-MCNC: 0.96 MG/DL (ref 0.7–1.3)
DIFFERENTIAL METHOD BLD: ABNORMAL
EOSINOPHIL # BLD: 0.6 K/UL (ref 0–0.4)
EOSINOPHIL NFR BLD: 8 % (ref 0–7)
EPITH CASTS URNS QL MICRO: NORMAL /LPF
ERYTHROCYTE [DISTWIDTH] IN BLOOD BY AUTOMATED COUNT: 16 % (ref 11.5–14.5)
GLOBULIN SER CALC-MCNC: 3.7 G/DL (ref 2–4)
GLUCOSE SERPL-MCNC: 96 MG/DL (ref 65–100)
GLUCOSE UR STRIP.AUTO-MCNC: NEGATIVE MG/DL
HCT VFR BLD AUTO: 42.1 % (ref 36.6–50.3)
HGB BLD-MCNC: 13.5 G/DL (ref 12.1–17)
HGB UR QL STRIP: NEGATIVE
HYALINE CASTS URNS QL MICRO: NORMAL /LPF (ref 0–5)
IMM GRANULOCYTES # BLD: 0 K/UL (ref 0–0.04)
IMM GRANULOCYTES NFR BLD AUTO: 0 % (ref 0–0.5)
KETONES UR QL STRIP.AUTO: NEGATIVE MG/DL
LEUKOCYTE ESTERASE UR QL STRIP.AUTO: NEGATIVE
LYMPHOCYTES # BLD: 2.4 K/UL (ref 0.8–3.5)
LYMPHOCYTES NFR BLD: 29 % (ref 12–49)
MCH RBC QN AUTO: 24.2 PG (ref 26–34)
MCHC RBC AUTO-ENTMCNC: 32.1 G/DL (ref 30–36.5)
MCV RBC AUTO: 75.4 FL (ref 80–99)
MONOCYTES # BLD: 0.8 K/UL (ref 0–1)
MONOCYTES NFR BLD: 9 % (ref 5–13)
NEUTS SEG # BLD: 4.3 K/UL (ref 1.8–8)
NEUTS SEG NFR BLD: 53 % (ref 32–75)
NITRITE UR QL STRIP.AUTO: NEGATIVE
NRBC # BLD: 0 K/UL (ref 0–0.01)
NRBC BLD-RTO: 0 PER 100 WBC
PH UR STRIP: 5 [PH] (ref 5–8)
PLATELET # BLD AUTO: 278 K/UL (ref 150–400)
PMV BLD AUTO: 9 FL (ref 8.9–12.9)
POTASSIUM SERPL-SCNC: 4.3 MMOL/L (ref 3.5–5.1)
PROT SERPL-MCNC: 7.1 G/DL (ref 6.4–8.2)
PROT UR STRIP-MCNC: NEGATIVE MG/DL
RBC # BLD AUTO: 5.58 M/UL (ref 4.1–5.7)
RBC #/AREA URNS HPF: NORMAL /HPF (ref 0–5)
SODIUM SERPL-SCNC: 140 MMOL/L (ref 136–145)
SP GR UR REFRACTOMETRY: 1.02 (ref 1–1.03)
UA: UC IF INDICATED,UAUC: NORMAL
UR CULT HOLD, URHOLD: NORMAL
UROBILINOGEN UR QL STRIP.AUTO: 0.2 EU/DL (ref 0.2–1)
WBC # BLD AUTO: 8.1 K/UL (ref 4.1–11.1)
WBC URNS QL MICRO: NORMAL /HPF (ref 0–4)

## 2018-10-09 PROCEDURE — 74011000258 HC RX REV CODE- 258

## 2018-10-09 PROCEDURE — 96375 TX/PRO/DX INJ NEW DRUG ADDON: CPT

## 2018-10-09 PROCEDURE — 96411 CHEMO IV PUSH ADDL DRUG: CPT

## 2018-10-09 PROCEDURE — 77030012965 HC NDL HUBR BBMI -A

## 2018-10-09 PROCEDURE — 74011250636 HC RX REV CODE- 250/636: Performed by: INTERNAL MEDICINE

## 2018-10-09 PROCEDURE — 96368 THER/DIAG CONCURRENT INF: CPT

## 2018-10-09 PROCEDURE — 36415 COLL VENOUS BLD VENIPUNCTURE: CPT | Performed by: INTERNAL MEDICINE

## 2018-10-09 PROCEDURE — 96417 CHEMO IV INFUS EACH ADDL SEQ: CPT

## 2018-10-09 PROCEDURE — 96416 CHEMO PROLONG INFUSE W/PUMP: CPT

## 2018-10-09 PROCEDURE — 85025 COMPLETE CBC W/AUTO DIFF WBC: CPT | Performed by: INTERNAL MEDICINE

## 2018-10-09 PROCEDURE — 80053 COMPREHEN METABOLIC PANEL: CPT | Performed by: INTERNAL MEDICINE

## 2018-10-09 PROCEDURE — 74011250636 HC RX REV CODE- 250/636

## 2018-10-09 PROCEDURE — 96415 CHEMO IV INFUSION ADDL HR: CPT

## 2018-10-09 PROCEDURE — 96413 CHEMO IV INFUSION 1 HR: CPT

## 2018-10-09 PROCEDURE — 74011000258 HC RX REV CODE- 258: Performed by: INTERNAL MEDICINE

## 2018-10-09 PROCEDURE — 81001 URINALYSIS AUTO W/SCOPE: CPT | Performed by: INTERNAL MEDICINE

## 2018-10-09 RX ORDER — SODIUM CHLORIDE 9 MG/ML
500 INJECTION, SOLUTION INTRAVENOUS CONTINUOUS
Status: DISPENSED | OUTPATIENT
Start: 2018-10-09 | End: 2018-10-09

## 2018-10-09 RX ORDER — LIDOCAINE AND PRILOCAINE 25; 25 MG/G; MG/G
CREAM TOPICAL AS NEEDED
Qty: 30 G | Refills: 0 | Status: SHIPPED | OUTPATIENT
Start: 2018-10-09 | End: 2020-01-01 | Stop reason: SDUPTHER

## 2018-10-09 RX ORDER — FLUOROURACIL 50 MG/ML
400 INJECTION, SOLUTION INTRAVENOUS ONCE
Status: COMPLETED | OUTPATIENT
Start: 2018-10-09 | End: 2018-10-09

## 2018-10-09 RX ORDER — HEPARIN 100 UNIT/ML
300-500 SYRINGE INTRAVENOUS AS NEEDED
Status: ACTIVE | OUTPATIENT
Start: 2018-10-09 | End: 2018-10-09

## 2018-10-09 RX ORDER — SODIUM CHLORIDE 9 MG/ML
10 INJECTION INTRAMUSCULAR; INTRAVENOUS; SUBCUTANEOUS AS NEEDED
Status: ACTIVE | OUTPATIENT
Start: 2018-10-09 | End: 2018-10-09

## 2018-10-09 RX ORDER — METOPROLOL TARTRATE 25 MG/1
TABLET, FILM COATED ORAL
Qty: 60 TAB | Refills: 0 | Status: SHIPPED | OUTPATIENT
Start: 2018-10-09 | End: 2019-01-11 | Stop reason: SDUPTHER

## 2018-10-09 RX ORDER — SODIUM CHLORIDE 0.9 % (FLUSH) 0.9 %
10 SYRINGE (ML) INJECTION AS NEEDED
Status: ACTIVE | OUTPATIENT
Start: 2018-10-09 | End: 2018-10-09

## 2018-10-09 RX ORDER — PALONOSETRON 0.05 MG/ML
0.25 INJECTION, SOLUTION INTRAVENOUS ONCE
Status: COMPLETED | OUTPATIENT
Start: 2018-10-09 | End: 2018-10-09

## 2018-10-09 RX ORDER — DEXTROSE MONOHYDRATE 50 MG/ML
25 INJECTION, SOLUTION INTRAVENOUS CONTINUOUS
Status: DISPENSED | OUTPATIENT
Start: 2018-10-09 | End: 2018-10-09

## 2018-10-09 RX ADMIN — BEVACIZUMAB 643 MG: 400 INJECTION, SOLUTION INTRAVENOUS at 12:25

## 2018-10-09 RX ADMIN — PALONOSETRON 0.25 MG: 0.05 INJECTION, SOLUTION INTRAVENOUS at 10:36

## 2018-10-09 RX ADMIN — SODIUM CHLORIDE 250 ML: 900 INJECTION, SOLUTION INTRAVENOUS at 10:36

## 2018-10-09 RX ADMIN — FLUOROURACIL 6120 MG: 50 INJECTION, SOLUTION INTRAVENOUS at 15:35

## 2018-10-09 RX ADMIN — FLUOROURACIL 1020 MG: 50 INJECTION, SOLUTION INTRAVENOUS at 15:30

## 2018-10-09 RX ADMIN — OXALIPLATIN 217 MG: 5 INJECTION, SOLUTION INTRAVENOUS at 13:10

## 2018-10-09 RX ADMIN — DEXTROSE MONOHYDRATE 25 ML/HR: 5 INJECTION, SOLUTION INTRAVENOUS at 13:00

## 2018-10-09 RX ADMIN — Medication 10 ML: at 08:15

## 2018-10-09 RX ADMIN — DEXAMETHASONE SODIUM PHOSPHATE 12 MG: 4 INJECTION, SOLUTION INTRA-ARTICULAR; INTRALESIONAL; INTRAMUSCULAR; INTRAVENOUS; SOFT TISSUE at 10:45

## 2018-10-09 NOTE — PROGRESS NOTES
[facility] Progress Note    Date: 2018    Name: Felice Wren    MRN: [id number]         : 1990     0800Mr. Dru Arrived ambulatory and in no distress for C8 Folfox/Avastin regimen. Assessment was completed, pt states that his neuropathy has increased and worsened. 8485: R chest VAD accessed without difficulty, labs drawn and in process. Chemotherapy Flowsheet 10/9/2018   Cycle C8   Date 10/9/2018   Drug / Regimen FOLFOX/Avastin   Pre Meds -   Notes -       0820:  Proceeded to appt with Dr. Polo Niño. Mr. Sukhjinder Blackmon vitals were reviewed. Visit Vitals    BP (!) 138/98    Pulse 99    Temp 98.1 °F (36.7 °C)    Resp 18    Ht 6' 0.01\" (1.829 m)    Wt 129.8 kg (286 lb 3.2 oz)    BMI 38.81 kg/m2       Lab results were obtained and reviewed. Recent Results (from the past 12 hour(s))   CBC WITH AUTOMATED DIFF    Collection Time: 10/09/18  8:20 AM   Result Value Ref Range    WBC 8.1 4.1 - 11.1 K/uL    RBC 5.58 4.10 - 5.70 M/uL    HGB 13.5 12.1 - 17.0 g/dL    HCT 42.1 36.6 - 50.3 %    MCV 75.4 (L) 80.0 - 99.0 FL    MCH 24.2 (L) 26.0 - 34.0 PG    MCHC 32.1 30.0 - 36.5 g/dL    RDW 16.0 (H) 11.5 - 14.5 %    PLATELET 397 255 - 890 K/uL    MPV 9.0 8.9 - 12.9 FL    NRBC 0.0 0  WBC    ABSOLUTE NRBC 0.00 0.00 - 0.01 K/uL    NEUTROPHILS 53 32 - 75 %    LYMPHOCYTES 29 12 - 49 %    MONOCYTES 9 5 - 13 %    EOSINOPHILS 8 (H) 0 - 7 %    BASOPHILS 0 0 - 1 %    IMMATURE GRANULOCYTES 0 0.0 - 0.5 %    ABS. NEUTROPHILS 4.3 1.8 - 8.0 K/UL    ABS. LYMPHOCYTES 2.4 0.8 - 3.5 K/UL    ABS. MONOCYTES 0.8 0.0 - 1.0 K/UL    ABS. EOSINOPHILS 0.6 (H) 0.0 - 0.4 K/UL    ABS. BASOPHILS 0.0 0.0 - 0.1 K/UL    ABS. IMM.  GRANS. 0.0 0.00 - 0.04 K/UL    DF AUTOMATED     METABOLIC PANEL, COMPREHENSIVE    Collection Time: 10/09/18  8:20 AM   Result Value Ref Range    Sodium 140 136 - 145 mmol/L    Potassium 4.3 3.5 - 5.1 mmol/L    Chloride 105 97 - 108 mmol/L    CO2 27 21 - 32 mmol/L    Anion gap 8 5 - 15 mmol/L    Glucose 96 65 - 100 mg/dL    BUN 16 6 - 20 MG/DL    Creatinine 0.96 0.70 - 1.30 MG/DL    BUN/Creatinine ratio 17 12 - 20      GFR est AA >60 >60 ml/min/1.73m2    GFR est non-AA >60 >60 ml/min/1.73m2    Calcium 8.7 8.5 - 10.1 MG/DL    Bilirubin, total 0.2 0.2 - 1.0 MG/DL    ALT (SGPT) 35 12 - 78 U/L    AST (SGOT) 22 15 - 37 U/L    Alk. phosphatase 60 45 - 117 U/L    Protein, total 7.1 6.4 - 8.2 g/dL    Albumin 3.4 (L) 3.5 - 5.0 g/dL    Globulin 3.7 2.0 - 4.0 g/dL    A-G Ratio 0.9 (L) 1.1 - 2.2     URINALYSIS W/ REFLEX CULTURE    Collection Time: 10/09/18  8:20 AM   Result Value Ref Range    Color YELLOW/STRAW      Appearance CLEAR CLEAR      Specific gravity 1.024 1.003 - 1.030      pH (UA) 5.0 5.0 - 8.0      Protein NEGATIVE  NEG mg/dL    Glucose NEGATIVE  NEG mg/dL    Ketone NEGATIVE  NEG mg/dL    Bilirubin NEGATIVE  NEG      Blood NEGATIVE  NEG      Urobilinogen 0.2 0.2 - 1.0 EU/dL    Nitrites NEGATIVE  NEG      Leukocyte Esterase NEGATIVE  NEG      WBC 0-4 0 - 4 /hpf    RBC 0-5 0 - 5 /hpf    Epithelial cells FEW FEW /lpf    Bacteria NEGATIVE  NEG /hpf    UA:UC IF INDICATED CULTURE NOT INDICATED BY UA RESULT CNI      Hyaline cast 0-2 0 - 5 /lpf   URINE CULTURE HOLD SAMPLE    Collection Time: 10/09/18  8:20 AM   Result Value Ref Range    Urine culture hold        URINE ON HOLD IN MICROBIOLOGY DEPT FOR 3 DAYS. IF UNPRESERVED URINE IS SUBMITTED, IT CANNOT BE USED FOR ADDITIONAL TESTING AFTER 24 HRS, RECOLLECTION WILL BE REQUIRED. Pre-medications  were administered as ordered and chemotherapy was initiated. BP outside of MD parameters, nurse called MD office to inform of continued BP. Per Nile Vance NP - okay to treat\" as MD is aware (order received), will proceed with treatment. Medications Received:  NS @ KVO  Avastin IV  Aloxi IVP  Decadron IVPB  D5 @ KVO  Leucovorin IV  Oxaliplatin IV  Adrucil IVP  Adrucil CADD      Mr. Gonsales tolerated treatment well and was discharged from [place] [address] in stable condition at [phone]. He is to return on 10/11/18 at 4:00pm for his next appointment.     Aydin Carrington RN  October 9, 2018

## 2018-10-09 NOTE — PROGRESS NOTES
[facility] at [address], [address] [place] [address] [place]: [phone]  F: [phone]     Reason for Visit:   Lela Byrne is a 25 y.o. male who is seen for follow up of metastatic colon cancer. Treatment History:   · CT A/P 3/25/2018: Indeterminate 2 x 1 cm low-density liver lesion. · CT C/A/P with triphase liver 3/27/2018: No evidence of metastatic disease to the chest. Multiple ill-defined hepatic lesions. These do not represent simple cyst.  · CT guided liver biopsy 3/27/2018: metastatic adenocarcinoma, KRAS wild type, NRAS wild type  · Colectomy by Dr. Sharmaine Platt 3/29/2018: Adenocarcinoma, moderately differentiated. Negative margins. 3/16 nodes involved. pMMR  · Stage IV (pT3 pN1b pM1) Colon Cancer  · Chemotherapy with FOLFOX and bevacizumab 5/1/2018 - 7/12/2018 for a total of 6 cycles, dnay administered with cycles 2-4  · Resumed chemotherapy with FOLFOX and bevacizumab 9/25/2018    History of Present Illness:   Presents for follow up to resume chemotherapy. Neuropathy more of an issue since resuming treatment two weeks ago. Worse last week, improved since then. Symptoms present only in fingertips currently. Not interfering with function, not painful today. Mild symptoms in the bottom of right foot. Had a random nose bleed last week. He believes related to getting too overheated, resolved on its own. No other s/s of bleeding. Appetite stable, eating and drinking well. No nausea or vomiting. Not taking Dexamethasone at home dosing. Not currently on BP medication. Working at [employer] full time, thinking of working decreased hours weeks of chemotherapy. He is unaccompanied today, supportive family. PAST HISTORY: The following sections were reviewed and updated in the EMR as appropriate: PMH, SH, FH, Medications, Allergies. No Known Allergies     Review of Systems: A complete review of systems was obtained, reviewed, and scanned into the EMR. Pertinent findings reviewed above. Physical Exam:     Visit Vitals    BP (!) 160/100 (BP 1 Location: Left arm, BP Patient Position: Sitting)    Pulse 91    Temp 97 °F (36.1 °C) (Oral)    Resp 20    Ht 6' (1.829 m)    Wt 287 lb (130.2 kg)    SpO2 92%    BMI 38.92 kg/m2     ECOG PS: 1  General: No distress, obese  Eyes: PERRLA, anicteric sclerae  HENT: Atraumatic, OP clear  Neck: Supple  Lymphatic: No cervical, supraclavicular, or inguinal adenopathy  Respiratory: CTAB, normal respiratory effort  CV: Normal rate, regular rhythm, no murmurs, no peripheral edema  GI: Soft, nontender, nondistended, no masses, no hepatomegaly, no splenomegaly  MS: Normal gait and station. Digits without clubbing or cyanosis. Skin: No rashes, ecchymoses, or petechiae. Normal temperature, turgor, and texture. Psych: Alert, oriented, appropriate affect, normal judgment/insight    Results:     Lab Results   Component Value Date/Time    WBC 8.1 10/09/2018 08:20 AM    HGB 13.5 10/09/2018 08:20 AM    HCT 42.1 10/09/2018 08:20 AM    PLATELET 233 89/18/4817 08:20 AM    MCV 75.4 (L) 10/09/2018 08:20 AM    ABS. NEUTROPHILS 4.3 10/09/2018 08:20 AM     Lab Results   Component Value Date/Time    Sodium 139 09/25/2018 07:56 AM    Potassium 4.0 09/25/2018 07:56 AM    Chloride 105 09/25/2018 07:56 AM    CO2 28 09/25/2018 07:56 AM    Glucose 98 09/25/2018 07:56 AM    BUN 12 09/25/2018 07:56 AM    Creatinine 0.84 09/25/2018 07:56 AM    GFR est AA >60 09/25/2018 07:56 AM    GFR est non-AA >60 09/25/2018 07:56 AM    Calcium 8.9 09/25/2018 07:56 AM     Lab Results   Component Value Date/Time    Bilirubin, total 0.3 09/25/2018 07:56 AM    ALT (SGPT) 29 09/25/2018 07:56 AM    AST (SGOT) 16 09/25/2018 07:56 AM    Alk. phosphatase 59 09/25/2018 07:56 AM    Protein, total 7.4 09/25/2018 07:56 AM    Albumin 3.3 (L) 09/25/2018 07:56 AM    Globulin 4.1 (H) 09/25/2018 07:56 AM       CT C/A/P with triphasic liver 7/19/2018:  There is no axilla, mediastinal or hilar adenopathy. There is no pericardial pleural effusion no shift or pneumothorax, infusion catheter is in place. No parenchymal mass. The liver lesion and decreased in size, a dome of the liver lesion measures 11 x 14 mm, it was 15 x 20. An additional right lobe of the liver lower lesion is also decreased in size measuring 12 x 7 mm it was 16 x 12. No new lesion is seen. Liver and spleen remain normal in size, the gallbladder is not distended. The kidneys are unobstructed. There is no bowel wall thickening or obstruction. Bowel wall thickening in the ascending colon appear stable some of this may be related to fecal stasis up. There is no free air or free fluid. There is no adenopathy in the abdomen or pelvis. The bladder is midline. Review of bone windows does not suggest metastases. There is no inguinal adenopathy. Major vessels do appear patent. Assessment:   1) Metastatic Colon Cancer  Stage IV, pMMR, KRAS/NRAS wild type    He has metastatic disease within his liver. His primary tumor has been resected. He is currently receiving chemotherapy with FOLFOX and Bevacizumab. Break from therapy since July 2018 due to evaluation of surgical resection with Dr. Sacha Arnold (surgical oncology). He ordered an MRI abdomen and PET/CT which look excellent, no measurable disease on these studies. However, he also reviewed our CT scans with his tumor board. Based on these results, he feels there is more disease present then is reported by the radiologist.  Unfortunately, he does not think that Mr. Nancy Beck disease is amenable to a definitive resection. Resumed treatment 2 weeks ago, tolerated well with exception of grade 1 neuropathy. Labs are stable today. Proceed with C8 as ordered. He is interested in pursuing second opinion for surgical resection. Would like our recommendations of who to choose and will discuss with family. Reviewed options of [facility], [address]  Also discussed Dr. Calin Smiley at [address] [place] as he is limited in ability to travel. 2) Risk of infertility  We have previously reviewed that chemotherapy can potentially cause infertility. He has undergone sperm cryopreservation. 3) Genetic risk  Seen by genetics at [facility]. Notes reviewed. No genetic alteration identified. Risk thought to be due to environmental exposure. No further follow up.      4) Hypertension  Off metoprolol currently due to patient preference. BP elevated today diastolic 440. He relates to nerves on days of treatment, but is not monitoring in the home. Recommend resuming Metoprolol, he agrees. Will refill. Follow up with PCP for future monitoring. 5) Chemotherapy induced neuropathy. Grade 1 currently. Reviewed option of Cymbalta for symptomatic relief. He wishes to hold off for now. May require dose adjustment of Oxaliplatin if progresses to Grade 2/3. Plan:     Proceed to proceed today with C8 of mFOLFOX6 with bevacizumab (oxaliplatin 85 mg/m2, leucovorin 400 mg/m2, fluorouracil 400 mg/m2, bevacizumab 5mg/kg and a 46 hour infusion of fluorouracil 2400 mg/m2 given every 2 weeks). Labs: CBC, CMP prior to each treatment, CEA every 4 weeks  Prophylactic antiemetics: Palonosetron and dexamethasone on the day of each chemotherapy infusion, not taking Dexamethasone at home dosing. PRN antiemetics: Prochlorperazine and Ondansetron  Refer to surgical oncology at academic center of patient's choice. Resume Metoprolol. Return to clinic every 2 weeks on therapy.     Signed By: Karyle Senate, NP

## 2018-10-11 ENCOUNTER — HOSPITAL ENCOUNTER (OUTPATIENT)
Dept: INFUSION THERAPY | Age: 28
Discharge: HOME | End: 2018-10-11
Payer: COMMERCIAL

## 2018-10-11 VITALS
OXYGEN SATURATION: 97 % | SYSTOLIC BLOOD PRESSURE: 144 MMHG | RESPIRATION RATE: 18 BRPM | HEART RATE: 94 BPM | DIASTOLIC BLOOD PRESSURE: 94 MMHG | TEMPERATURE: 97.3 F

## 2018-10-11 DIAGNOSIS — C18.9 COLON CANCER METASTASIZED TO LIVER (HCC): ICD-10-CM

## 2018-10-11 DIAGNOSIS — C78.7 COLON CANCER METASTASIZED TO LIVER (HCC): ICD-10-CM

## 2018-10-11 PROCEDURE — 96523 IRRIG DRUG DELIVERY DEVICE: CPT

## 2018-10-11 PROCEDURE — 74011250636 HC RX REV CODE- 250/636: Performed by: INTERNAL MEDICINE

## 2018-10-11 RX ORDER — SODIUM CHLORIDE 0.9 % (FLUSH) 0.9 %
10 SYRINGE (ML) INJECTION AS NEEDED
Status: ACTIVE | OUTPATIENT
Start: 2018-10-11 | End: 2018-10-12

## 2018-10-11 RX ORDER — HEPARIN 100 UNIT/ML
300-500 SYRINGE INTRAVENOUS AS NEEDED
Status: ACTIVE | OUTPATIENT
Start: 2018-10-11 | End: 2018-10-12

## 2018-10-11 RX ORDER — SODIUM CHLORIDE 9 MG/ML
10 INJECTION INTRAMUSCULAR; INTRAVENOUS; SUBCUTANEOUS AS NEEDED
Status: ACTIVE | OUTPATIENT
Start: 2018-10-11 | End: 2018-10-12

## 2018-10-11 RX ADMIN — Medication 500 UNITS: at 15:13

## 2018-10-11 RX ADMIN — Medication 10 ML: at 15:13

## 2018-10-11 NOTE — PROGRESS NOTES
[facility] Progress Note    Date: 2018    Name: Anish Oshea    MRN: [id number]         : 1990    1520. Mr. Loyda Garcia Arrived ambulatory and in no distress for Pump Removal.  Assessment was completed, no acute issues at this time, no new complaints voiced. CADD pump complete- 100 ml infused. Port still with + blood return. Port de-accessed, flushed & heparinized per protocol. Mr. Kathleen Street vitals were reviewed. Patient Vitals for the past 12 hrs:   Temp Pulse Resp BP SpO2   10/11/18 1511 97.3 °F (36.3 °C) 94 18 (!) 144/94 97 %         1530. Mr. Loyda Garcia tolerated treatment well and was discharged from [place] [address] in stable condition. He is to return on 10/23/18 for his next appointment.     Nica Gauthier RN  2018

## 2018-10-16 ENCOUNTER — APPOINTMENT (OUTPATIENT)
Dept: INFUSION THERAPY | Age: 28
End: 2018-10-16

## 2018-10-16 ENCOUNTER — TELEPHONE (OUTPATIENT)
Dept: ONCOLOGY | Age: 28
End: 2018-10-16

## 2018-10-16 DIAGNOSIS — C18.9 COLON CANCER METASTASIZED TO LIVER (HCC): Primary | ICD-10-CM

## 2018-10-16 DIAGNOSIS — C78.7 COLON CANCER METASTASIZED TO LIVER (HCC): Primary | ICD-10-CM

## 2018-10-16 NOTE — TELEPHONE ENCOUNTER
[facility] at [facility]  [phone]    10/16/18- Patient called to report nose bleeds continuing, not everyday, and clot after a couple minutes. Stated his nose just started bleeding at work after blowing it, and will sometimes happen at night when he's sleeping. Recommended that patient try using saline nasal spray to help with dryness. Stated he has a humidifier that he can use to help at night. Patient stated the only other bleeding he has is occasionally after straining to have a bowel movement. Stated he is back on his bowel regimen to help with constipation. Will notify Daryl Wilhelm of this tomorrow, and see if she has any other recommendations. Patient verbalized understanding. 10/17/18- Voicemail left for patient requesting a return call when available. Per Daryl Wilhelm, need to make sure patient resumed his blood pressure medication and if bleeding continues we may need to hold his Avastin. 10/18/18- Spoke to patient, stated he has not been taking his metoprolol, but will start back on it tomorrow. Also, informed patient that Ace Navas put a referral in for [facility] with Dr. Lilly Mortimer for surgery second opinion. We will call back with that appointment information. He verbalized understanding, no further questions at this time.

## 2018-10-23 ENCOUNTER — HOSPITAL ENCOUNTER (OUTPATIENT)
Dept: INFUSION THERAPY | Age: 28
Discharge: HOME | End: 2018-10-23
Payer: COMMERCIAL

## 2018-10-23 ENCOUNTER — OFFICE VISIT (OUTPATIENT)
Dept: ONCOLOGY | Age: 28
End: 2018-10-23

## 2018-10-23 VITALS
OXYGEN SATURATION: 97 % | DIASTOLIC BLOOD PRESSURE: 93 MMHG | BODY MASS INDEX: 38.6 KG/M2 | WEIGHT: 285 LBS | RESPIRATION RATE: 18 BRPM | HEART RATE: 82 BPM | SYSTOLIC BLOOD PRESSURE: 146 MMHG | TEMPERATURE: 97.1 F | HEIGHT: 72 IN

## 2018-10-23 VITALS
DIASTOLIC BLOOD PRESSURE: 82 MMHG | WEIGHT: 285 LBS | HEIGHT: 72 IN | SYSTOLIC BLOOD PRESSURE: 137 MMHG | BODY MASS INDEX: 38.6 KG/M2 | TEMPERATURE: 98.2 F | HEART RATE: 81 BPM | RESPIRATION RATE: 18 BRPM

## 2018-10-23 DIAGNOSIS — C18.9 COLON CANCER METASTASIZED TO LIVER (HCC): Primary | ICD-10-CM

## 2018-10-23 DIAGNOSIS — C78.7 COLON CANCER METASTASIZED TO LIVER (HCC): Primary | ICD-10-CM

## 2018-10-23 LAB
ALBUMIN SERPL-MCNC: 3.3 G/DL (ref 3.5–5)
ALBUMIN/GLOB SERPL: 0.8 {RATIO} (ref 1.1–2.2)
ALP SERPL-CCNC: 62 U/L (ref 45–117)
ALT SERPL-CCNC: 34 U/L (ref 12–78)
ANION GAP SERPL CALC-SCNC: 8 MMOL/L (ref 5–15)
APPEARANCE UR: CLEAR
AST SERPL-CCNC: 20 U/L (ref 15–37)
BASOPHILS # BLD: 0 K/UL (ref 0–0.1)
BASOPHILS NFR BLD: 1 % (ref 0–1)
BILIRUB SERPL-MCNC: 0.3 MG/DL (ref 0.2–1)
BILIRUB UR QL: NEGATIVE
BUN SERPL-MCNC: 18 MG/DL (ref 6–20)
BUN/CREAT SERPL: 19 (ref 12–20)
CALCIUM SERPL-MCNC: 9 MG/DL (ref 8.5–10.1)
CEA SERPL-MCNC: 2.8 NG/ML
CHLORIDE SERPL-SCNC: 104 MMOL/L (ref 97–108)
CO2 SERPL-SCNC: 27 MMOL/L (ref 21–32)
COLOR UR: NORMAL
CREAT SERPL-MCNC: 0.95 MG/DL (ref 0.7–1.3)
DIFFERENTIAL METHOD BLD: ABNORMAL
EOSINOPHIL # BLD: 0.3 K/UL (ref 0–0.4)
EOSINOPHIL NFR BLD: 4 % (ref 0–7)
ERYTHROCYTE [DISTWIDTH] IN BLOOD BY AUTOMATED COUNT: 15.9 % (ref 11.5–14.5)
GLOBULIN SER CALC-MCNC: 4.4 G/DL (ref 2–4)
GLUCOSE SERPL-MCNC: 97 MG/DL (ref 65–100)
GLUCOSE UR STRIP.AUTO-MCNC: NEGATIVE MG/DL
HCT VFR BLD AUTO: 43.5 % (ref 36.6–50.3)
HGB BLD-MCNC: 13.9 G/DL (ref 12.1–17)
HGB UR QL STRIP: NEGATIVE
IMM GRANULOCYTES # BLD: 0 K/UL (ref 0–0.04)
IMM GRANULOCYTES NFR BLD AUTO: 1 % (ref 0–0.5)
KETONES UR QL STRIP.AUTO: NEGATIVE MG/DL
LEUKOCYTE ESTERASE UR QL STRIP.AUTO: NEGATIVE
LYMPHOCYTES # BLD: 2 K/UL (ref 0.8–3.5)
LYMPHOCYTES NFR BLD: 31 % (ref 12–49)
MCH RBC QN AUTO: 24 PG (ref 26–34)
MCHC RBC AUTO-ENTMCNC: 32 G/DL (ref 30–36.5)
MCV RBC AUTO: 75.1 FL (ref 80–99)
MONOCYTES # BLD: 0.8 K/UL (ref 0–1)
MONOCYTES NFR BLD: 12 % (ref 5–13)
NEUTS SEG # BLD: 3.3 K/UL (ref 1.8–8)
NEUTS SEG NFR BLD: 52 % (ref 32–75)
NITRITE UR QL STRIP.AUTO: NEGATIVE
NRBC # BLD: 0 K/UL (ref 0–0.01)
NRBC BLD-RTO: 0 PER 100 WBC
PH UR STRIP: 5.5 [PH] (ref 5–8)
PLATELET # BLD AUTO: 242 K/UL (ref 150–400)
PMV BLD AUTO: 9.4 FL (ref 8.9–12.9)
POTASSIUM SERPL-SCNC: 4 MMOL/L (ref 3.5–5.1)
PROT SERPL-MCNC: 7.7 G/DL (ref 6.4–8.2)
PROT UR STRIP-MCNC: NEGATIVE MG/DL
RBC # BLD AUTO: 5.79 M/UL (ref 4.1–5.7)
SODIUM SERPL-SCNC: 139 MMOL/L (ref 136–145)
SP GR UR REFRACTOMETRY: 1.02 (ref 1–1.03)
UROBILINOGEN UR QL STRIP.AUTO: 0.2 EU/DL (ref 0.2–1)
WBC # BLD AUTO: 6.4 K/UL (ref 4.1–11.1)

## 2018-10-23 PROCEDURE — 74011000258 HC RX REV CODE- 258: Performed by: INTERNAL MEDICINE

## 2018-10-23 PROCEDURE — 96366 THER/PROPH/DIAG IV INF ADDON: CPT

## 2018-10-23 PROCEDURE — 82378 CARCINOEMBRYONIC ANTIGEN: CPT | Performed by: INTERNAL MEDICINE

## 2018-10-23 PROCEDURE — 96416 CHEMO PROLONG INFUSE W/PUMP: CPT

## 2018-10-23 PROCEDURE — 96417 CHEMO IV INFUS EACH ADDL SEQ: CPT

## 2018-10-23 PROCEDURE — 74011250636 HC RX REV CODE- 250/636: Performed by: INTERNAL MEDICINE

## 2018-10-23 PROCEDURE — 81003 URINALYSIS AUTO W/O SCOPE: CPT | Performed by: INTERNAL MEDICINE

## 2018-10-23 PROCEDURE — 96413 CHEMO IV INFUSION 1 HR: CPT

## 2018-10-23 PROCEDURE — 96411 CHEMO IV PUSH ADDL DRUG: CPT

## 2018-10-23 PROCEDURE — 96368 THER/DIAG CONCURRENT INF: CPT

## 2018-10-23 PROCEDURE — 85025 COMPLETE CBC W/AUTO DIFF WBC: CPT | Performed by: INTERNAL MEDICINE

## 2018-10-23 PROCEDURE — 80053 COMPREHEN METABOLIC PANEL: CPT | Performed by: INTERNAL MEDICINE

## 2018-10-23 PROCEDURE — 96415 CHEMO IV INFUSION ADDL HR: CPT

## 2018-10-23 PROCEDURE — 36415 COLL VENOUS BLD VENIPUNCTURE: CPT | Performed by: INTERNAL MEDICINE

## 2018-10-23 PROCEDURE — 96375 TX/PRO/DX INJ NEW DRUG ADDON: CPT

## 2018-10-23 RX ORDER — PALONOSETRON 0.05 MG/ML
0.25 INJECTION, SOLUTION INTRAVENOUS ONCE
Status: COMPLETED | OUTPATIENT
Start: 2018-10-23 | End: 2018-10-23

## 2018-10-23 RX ORDER — SODIUM CHLORIDE 9 MG/ML
10 INJECTION INTRAMUSCULAR; INTRAVENOUS; SUBCUTANEOUS AS NEEDED
Status: ACTIVE | OUTPATIENT
Start: 2018-10-23 | End: 2018-10-23

## 2018-10-23 RX ORDER — FLUOROURACIL 50 MG/ML
400 INJECTION, SOLUTION INTRAVENOUS ONCE
Status: COMPLETED | OUTPATIENT
Start: 2018-10-23 | End: 2018-10-23

## 2018-10-23 RX ORDER — HEPARIN 100 UNIT/ML
300-500 SYRINGE INTRAVENOUS AS NEEDED
Status: ACTIVE | OUTPATIENT
Start: 2018-10-23 | End: 2018-10-23

## 2018-10-23 RX ORDER — DEXTROSE MONOHYDRATE 50 MG/ML
25 INJECTION, SOLUTION INTRAVENOUS CONTINUOUS
Status: DISPENSED | OUTPATIENT
Start: 2018-10-23 | End: 2018-10-23

## 2018-10-23 RX ADMIN — DEXTROSE MONOHYDRATE 25 ML/HR: 5 INJECTION, SOLUTION INTRAVENOUS at 10:55

## 2018-10-23 RX ADMIN — DEXAMETHASONE SODIUM PHOSPHATE 12 MG: 4 INJECTION, SOLUTION INTRA-ARTICULAR; INTRALESIONAL; INTRAMUSCULAR; INTRAVENOUS; SOFT TISSUE at 09:20

## 2018-10-23 RX ADMIN — SODIUM CHLORIDE 10 ML: 9 INJECTION INTRAMUSCULAR; INTRAVENOUS; SUBCUTANEOUS at 08:10

## 2018-10-23 RX ADMIN — LEUCOVORIN CALCIUM 1020 MG: 350 INJECTION, POWDER, LYOPHILIZED, FOR SOLUTION INTRAMUSCULAR; INTRAVENOUS at 10:55

## 2018-10-23 RX ADMIN — PALONOSETRON 0.25 MG: 0.05 INJECTION, SOLUTION INTRAVENOUS at 09:19

## 2018-10-23 RX ADMIN — FLUOROURACIL 6120 MG: 50 INJECTION, SOLUTION INTRAVENOUS at 13:20

## 2018-10-23 RX ADMIN — FLUOROURACIL 1020 MG: 50 INJECTION, SOLUTION INTRAVENOUS at 13:20

## 2018-10-23 RX ADMIN — OXALIPLATIN 217 MG: 5 INJECTION, SOLUTION INTRAVENOUS at 10:56

## 2018-10-23 RX ADMIN — BEVACIZUMAB 643 MG: 400 INJECTION, SOLUTION INTRAVENOUS at 09:54

## 2018-10-23 NOTE — PROGRESS NOTES
[facility] [place] at [address] [facility]  Lesley Hercules: [phone]  F: [phone]     Reason for Visit:   Raquel Peterson is a 25 y.o. male who is seen for follow up of metastatic colon cancer. Treatment History:   · CT A/P 3/25/2018: Indeterminate 2 x 1 cm low-density liver lesion. · CT C/A/P with triphase liver 3/27/2018: No evidence of metastatic disease to the chest. Multiple ill-defined hepatic lesions. These do not represent simple cyst.  · CT guided liver biopsy 3/27/2018: metastatic adenocarcinoma, KRAS wild type, NRAS wild type  · Colectomy by Dr. Mike Dwyer 3/29/2018: Adenocarcinoma, moderately differentiated. Negative margins. 3/16 nodes involved. pMMR  · Stage IV (pT3 pN1b pM1) Colon Cancer  · Chemotherapy with FOLFOX and bevacizumab 5/1/2018 - 7/12/2018 for a total of 6 cycles, dany administered with cycles 2-4  · Resumed chemotherapy with FOLFOX and bevacizumab 9/25/2018    History of Present Illness:   Presents for follow up to for treatment. Today is cycle 9 of treatment. Having a hard time opening objects due to pain/tenderness in fingertips. Has a hard time opening soda cans mostly. Feels that he has gotten used to this and isn't interfering with everyday function. No symptoms in feet at present. Had symptoms in bottom of feet right after last treatment, but this went away. Appetite is down the first week of treatment, then recovers during rest week. Drinking plenty of fluids. Mild nausea. No vomiting. Taste changes. No mouth sores. No fever, chills. Some nasal congestion. He is unaccompanied today, supportive family. PAST HISTORY: The following sections were reviewed and updated in the EMR as appropriate: PMH, SH, FH, Medications, Allergies. No Known Allergies     Review of Systems: A complete review of systems was obtained, reviewed, and scanned into the EMR. Pertinent findings reviewed above.       Physical Exam:     Visit Vitals  BP (!) 146/93 (BP 1 Location: Left arm, BP Patient Position: Sitting)   Pulse 82   Temp 97.1 °F (36.2 °C) (Oral)   Resp 18   Ht 6' (1.829 m)   Wt 285 lb (129.3 kg)   SpO2 97%   BMI 38.65 kg/m²     ECOG PS: 1  General: No distress, obese  Eyes: PERRLA, anicteric sclerae  HENT: Atraumatic, OP clear  Neck: Supple  Lymphatic: No cervical, supraclavicular, or inguinal adenopathy  Respiratory: CTAB, normal respiratory effort  CV: Normal rate, regular rhythm, no murmurs, no peripheral edema  GI: Soft, nontender, nondistended, no masses, no hepatomegaly, no splenomegaly  MS: Normal gait and station. Digits without clubbing or cyanosis. Skin: No rashes, ecchymoses, or petechiae. Normal temperature, turgor, and texture. Psych: Alert, oriented, appropriate affect, normal judgment/insight    Results:     Lab Results   Component Value Date/Time    WBC 6.4 10/23/2018 08:13 AM    HGB 13.9 10/23/2018 08:13 AM    HCT 43.5 10/23/2018 08:13 AM    PLATELET 431 92/71/9028 08:13 AM    MCV 75.1 (L) 10/23/2018 08:13 AM    ABS. NEUTROPHILS 3.3 10/23/2018 08:13 AM     Lab Results   Component Value Date/Time    Sodium 139 10/23/2018 08:13 AM    Potassium 4.0 10/23/2018 08:13 AM    Chloride 104 10/23/2018 08:13 AM    CO2 27 10/23/2018 08:13 AM    Glucose 97 10/23/2018 08:13 AM    BUN 18 10/23/2018 08:13 AM    Creatinine 0.95 10/23/2018 08:13 AM    GFR est AA >60 10/23/2018 08:13 AM    GFR est non-AA >60 10/23/2018 08:13 AM    Calcium 9.0 10/23/2018 08:13 AM     Lab Results   Component Value Date/Time    Bilirubin, total 0.3 10/23/2018 08:13 AM    ALT (SGPT) 34 10/23/2018 08:13 AM    AST (SGOT) 20 10/23/2018 08:13 AM    Alk. phosphatase 62 10/23/2018 08:13 AM    Protein, total 7.7 10/23/2018 08:13 AM    Albumin 3.3 (L) 10/23/2018 08:13 AM    Globulin 4.4 (H) 10/23/2018 08:13 AM       CT C/A/P with triphasic liver 7/19/2018: There is no axilla, mediastinal or hilar adenopathy.  There is no pericardial pleural effusion no shift or pneumothorax, infusion catheter is in place. No parenchymal mass. The liver lesion and decreased in size, a dome of the liver lesion measures 11 x 14 mm, it was 15 x 20. An additional right lobe of the liver lower lesion is also decreased in size measuring 12 x 7 mm it was 16 x 12. No new lesion is seen. Liver and spleen remain normal in size, the gallbladder is not distended. The kidneys are unobstructed. There is no bowel wall thickening or obstruction. Bowel wall thickening in the ascending colon appear stable some of this may be related to fecal stasis up. There is no free air or free fluid. There is no adenopathy in the abdomen or pelvis. The bladder is midline. Review of bone windows does not suggest metastases. There is no inguinal adenopathy. Major vessels do appear patent. Assessment:   1) Metastatic Colon Cancer  Stage IV, pMMR, KRAS/NRAS wild type    He has metastatic disease within his liver. His primary tumor has been resected. He is currently receiving chemotherapy with FOLFOX and Bevacizumab. Break from therapy since July 2018 due to evaluation of surgical resection with Dr. Michael Gaming (surgical oncology). He ordered an MRI abdomen and PET/CT which look excellent, no measurable disease on these studies. However, he also reviewed our CT scans with his tumor board. Based on these results, he feels there is more disease present then is reported by the radiologist.  Unfortunately, he does not think that Mr. Sonu Luong disease is amenable to a definitive resection. Resumed treatment 9/25/2018, tolerating well with exception of grade 2 neuropathy. Labs are stable today. Proceed with C9 as ordered, repeat imaging after cycle 12. Francis Raphael He is interested in pursuing second surgical oncology opinion. His family is exploring some options out of state, but he would first like to be seen at [address] [place] [address].   Referral placed to Dr. Lilly Mortimer at [address] [place] [address] to discuss whether some combination of resection and ablation may be feasible. 2) Risk of infertility  We have previously reviewed that chemotherapy can potentially cause infertility. He has undergone sperm cryopreservation. 3) Genetic risk  Seen by genetics at [facility]. Notes reviewed. No genetic alteration identified. Risk thought to be due to environmental exposure. No further follow up.      4) Hypertension  Encouraged continuation of Metoprolol. 5) Chemotherapy induced neuropathy. Grade 2 currently. Consider dose adjustment of Oxaliplatin if progresses with subsequent cycles. Plan:     Proceed to proceed today with C9 of mFOLFOX6 with bevacizumab (oxaliplatin 85 mg/m2, leucovorin 400 mg/m2, fluorouracil 400 mg/m2, bevacizumab 5mg/kg and a 46 hour infusion of fluorouracil 2400 mg/m2 given every 2 weeks). Labs: CBC, CMP prior to each treatment, CEA every 4 weeks  Prophylactic antiemetics: Palonosetron and dexamethasone on the day of each chemotherapy infusion, not taking Dexamethasone at home dosing. PRN antiemetics: Prochlorperazine and Ondansetron  Referral to [facility] pending  Continue Metoprolol. Return to clinic every 2 weeks on therapy. Patient was seen in conjunction with Bala Pina NP.     Signed By: Bon Ochoa MD

## 2018-10-23 NOTE — PROGRESS NOTES
Problem: Chemotherapy Treatment  Goal: *Chemotherapy regimen followed  Outcome: Progressing Towards Goal  Pt receiving Folfox 6

## 2018-10-23 NOTE — PROGRESS NOTES
[facility] VISIT NOTE    0800  Pt arrived at [facility] ambulatory and in no distress for Folfox 6. Assessment completed, pt c/o stuffy nose. Right chest port accessed with . 75 in costello with no difficulty. Positive blood return noted and labs drawn. Medications received:  Aloxi IVP  Decadron IVP  Avastin IV  Oxaliplatin IV  Leucovorin IV  Fluorouracil IVP  Fluorouracil CADD    Tolerated treatment well, no adverse reaction noted. Port de-accessed and flushed per protocol. Positive blood return noted. Patient Vitals for the past 12 hrs:   Temp Pulse Resp BP   10/23/18 0802 98.2 °F (36.8 °C) 81 18 137/82     Recent Results (from the past 12 hour(s))   CBC WITH AUTOMATED DIFF    Collection Time: 10/23/18  8:13 AM   Result Value Ref Range    WBC 6.4 4.1 - 11.1 K/uL    RBC 5.79 (H) 4.10 - 5.70 M/uL    HGB 13.9 12.1 - 17.0 g/dL    HCT 43.5 36.6 - 50.3 %    MCV 75.1 (L) 80.0 - 99.0 FL    MCH 24.0 (L) 26.0 - 34.0 PG    MCHC 32.0 30.0 - 36.5 g/dL    RDW 15.9 (H) 11.5 - 14.5 %    PLATELET 067 494 - 595 K/uL    MPV 9.4 8.9 - 12.9 FL    NRBC 0.0 0  WBC    ABSOLUTE NRBC 0.00 0.00 - 0.01 K/uL    NEUTROPHILS 52 32 - 75 %    LYMPHOCYTES 31 12 - 49 %    MONOCYTES 12 5 - 13 %    EOSINOPHILS 4 0 - 7 %    BASOPHILS 1 0 - 1 %    IMMATURE GRANULOCYTES 1 (H) 0.0 - 0.5 %    ABS. NEUTROPHILS 3.3 1.8 - 8.0 K/UL    ABS. LYMPHOCYTES 2.0 0.8 - 3.5 K/UL    ABS. MONOCYTES 0.8 0.0 - 1.0 K/UL    ABS. EOSINOPHILS 0.3 0.0 - 0.4 K/UL    ABS. BASOPHILS 0.0 0.0 - 0.1 K/UL    ABS. IMM.  GRANS. 0.0 0.00 - 0.04 K/UL    DF AUTOMATED     METABOLIC PANEL, COMPREHENSIVE    Collection Time: 10/23/18  8:13 AM   Result Value Ref Range    Sodium 139 136 - 145 mmol/L    Potassium 4.0 3.5 - 5.1 mmol/L    Chloride 104 97 - 108 mmol/L    CO2 27 21 - 32 mmol/L    Anion gap 8 5 - 15 mmol/L    Glucose 97 65 - 100 mg/dL    BUN 18 6 - 20 MG/DL    Creatinine 0.95 0.70 - 1.30 MG/DL    BUN/Creatinine ratio 19 12 - 20      GFR est AA >60 >60 ml/min/1.73m2    GFR est non-AA >60 >60 ml/min/1.73m2    Calcium 9.0 8.5 - 10.1 MG/DL    Bilirubin, total 0.3 0.2 - 1.0 MG/DL    ALT (SGPT) 34 12 - 78 U/L    AST (SGOT) 20 15 - 37 U/L    Alk. phosphatase 62 45 - 117 U/L    Protein, total 7.7 6.4 - 8.2 g/dL    Albumin 3.3 (L) 3.5 - 5.0 g/dL    Globulin 4.4 (H) 2.0 - 4.0 g/dL    A-G Ratio 0.8 (L) 1.1 - 2.2     URINALYSIS W/ RFLX MICROSCOPIC    Collection Time: 10/23/18  8:13 AM   Result Value Ref Range    Color YELLOW/STRAW      Appearance CLEAR CLEAR      Specific gravity 1.025 1.003 - 1.030      pH (UA) 5.5 5.0 - 8.0      Protein NEGATIVE  NEG mg/dL    Glucose NEGATIVE  NEG mg/dL    Ketone NEGATIVE  NEG mg/dL    Bilirubin NEGATIVE  NEG      Blood NEGATIVE  NEG      Urobilinogen 0.2 0.2 - 1.0 EU/dL    Nitrites NEGATIVE  NEG      Leukocyte Esterase NEGATIVE  NEG     CEA    Collection Time: 10/23/18  8:13 AM   Result Value Ref Range    CEA 2.8 ng/mL     1330  D/C'd from [facility] ambulatory and in no distress accompanied by self.  Next appointment is 10/25/18 at 400pm.

## 2018-10-25 ENCOUNTER — HOSPITAL ENCOUNTER (OUTPATIENT)
Dept: INFUSION THERAPY | Age: 28
Discharge: HOME | End: 2018-10-25
Payer: COMMERCIAL

## 2018-10-25 VITALS
RESPIRATION RATE: 18 BRPM | DIASTOLIC BLOOD PRESSURE: 95 MMHG | TEMPERATURE: 98 F | HEART RATE: 86 BPM | SYSTOLIC BLOOD PRESSURE: 134 MMHG

## 2018-10-25 DIAGNOSIS — C78.7 COLON CANCER METASTASIZED TO LIVER (HCC): Primary | ICD-10-CM

## 2018-10-25 DIAGNOSIS — C18.9 COLON CANCER METASTASIZED TO LIVER (HCC): Primary | ICD-10-CM

## 2018-10-25 PROCEDURE — 96523 IRRIG DRUG DELIVERY DEVICE: CPT

## 2018-10-25 PROCEDURE — 74011250636 HC RX REV CODE- 250/636: Performed by: INTERNAL MEDICINE

## 2018-10-25 RX ORDER — SODIUM CHLORIDE 0.9 % (FLUSH) 0.9 %
10 SYRINGE (ML) INJECTION AS NEEDED
Status: ACTIVE | OUTPATIENT
Start: 2018-10-25 | End: 2018-10-25

## 2018-10-25 RX ORDER — SODIUM CHLORIDE 9 MG/ML
10 INJECTION INTRAMUSCULAR; INTRAVENOUS; SUBCUTANEOUS AS NEEDED
Status: ACTIVE | OUTPATIENT
Start: 2018-10-25 | End: 2018-10-25

## 2018-10-25 RX ORDER — HEPARIN 100 UNIT/ML
300-500 SYRINGE INTRAVENOUS AS NEEDED
Status: ACTIVE | OUTPATIENT
Start: 2018-10-25 | End: 2018-10-25

## 2018-10-25 RX ADMIN — HEPARIN 500 UNITS: 100 SYRINGE at 11:27

## 2018-10-25 RX ADMIN — Medication 10 ML: at 11:27

## 2018-10-25 NOTE — PROGRESS NOTES
[facility] Progress Note    Date: 2018    Name: Baron Lou    MRN: [id number]         : 1990    1120. Mr. Tayler García Arrived ambulatory and in no distress for Pump Removal.  Assessment was completed, no acute issues at this time, no new complaints voiced. CADD pump complete- 100 ml infused. Port still with + blood return. Port de-accessed, flushed & heparinized per protocol. Mr. Osvaldo Armijo vitals were reviewed. Patient Vitals for the past 12 hrs:   Temp Pulse Resp BP   10/25/18 1120 98 °F (36.7 °C) 86 18 (!) 134/95         1130. Mr. Tayler García tolerated treatment well and was discharged from [place] [address] in stable condition. He is to return on 618 for his next appointment.     Mechele Ganser, RN  2018

## 2018-11-07 ENCOUNTER — TELEPHONE (OUTPATIENT)
Dept: ONCOLOGY | Age: 28
End: 2018-11-07
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
## 2018-11-08 ENCOUNTER — HOSPITAL ENCOUNTER (OUTPATIENT)
Dept: INFUSION THERAPY | Age: 28
End: 2018-11-08

## 2018-11-12 ENCOUNTER — OFFICE VISIT (OUTPATIENT)
Dept: FAMILY MEDICINE CLINIC | Age: 28
End: 2018-11-12

## 2018-11-12 VITALS
WEIGHT: 284 LBS | RESPIRATION RATE: 16 BRPM | HEART RATE: 77 BPM | OXYGEN SATURATION: 100 % | DIASTOLIC BLOOD PRESSURE: 83 MMHG | BODY MASS INDEX: 38.47 KG/M2 | SYSTOLIC BLOOD PRESSURE: 135 MMHG | HEIGHT: 72 IN | TEMPERATURE: 98.7 F

## 2018-11-12 DIAGNOSIS — K92.1 BLOOD IN STOOL: Primary | ICD-10-CM

## 2018-11-12 LAB — HGB BLD-MCNC: 15.2 G/DL

## 2018-11-12 RX ORDER — DOCUSATE SODIUM 100 MG/1
100 CAPSULE, LIQUID FILLED ORAL 2 TIMES DAILY
COMMUNITY
End: 2020-01-01

## 2018-11-12 RX ORDER — POLYETHYLENE GLYCOL 3350 17 G/17G
17 POWDER, FOR SOLUTION ORAL DAILY
COMMUNITY
End: 2020-01-01

## 2018-11-12 NOTE — PROGRESS NOTES
CC: Blood in stool     HPI:    Patient is 28yo M with hx colon cancer s/p partical colectomy and currently on chemotherapy who presents with complaints of blood in stool. He states he has seen bright red blood in his stool each time he has a BM, roughly 1-2x/day. He does report having constipation and strains when he uses the restroom. He does report some lower abdominal discomfort, about a 1/10 intensity, without any nausea or vomiting. He has had episodes of blood in his stool in the past which was thought to be due to constipation and he was prescribed miralax and colace. He reports that since Saturday he has not been taking his colace but has taken his miralax regularly. Denies any melena. Denies NSAID use. He reports no recent changes to his chemotherapy regimen, however he did miss his last infusion, but is due to restart on Dec 4th. Colon cancer: diagnosed 3/27/2018, had colectomy the next day on 3/28 - Unsure of when needs a repeat colonoscopy     Review of Systems: (positive items in bold)    Constitutional: Fever,chills, night sweats, weight change, fatigue   CV:  CP, palpitations, dizziness, syncope   Resp:  SOB, Cough, Wheezing  GI:  abdominal pain, n/v/d/c, blood in stool       Current Outpatient Medications:     docusate sodium (COLACE) 100 mg capsule, Take 100 mg by mouth two (2) times a day., Disp: , Rfl:     polyethylene glycol (MIRALAX) 17 gram/dose powder, Take 17 g by mouth daily. , Disp: , Rfl:     lidocaine-prilocaine (EMLA) topical cream, Apply  to affected area as needed for Pain (Apply 30-60 min. prior to having your port accessed). , Disp: 30 g, Rfl: 0    metoprolol tartrate (LOPRESSOR) 25 mg tablet, TAKE 1 TAB BY MOUTH EVERY TWELVE (12) HOURS., Disp: 60 Tab, Rfl: 0    dexamethasone (DECADRON) 4 mg tablet, TAKE 8MG (TWO TABS) DAILY IN THE MORNING FOR TWO DAYS AFTER CHEMOTHERAPY (WHILE PUMP IS INFUSING). , Disp: 48 Tab, Rfl: 0    OTHER, Sperm cryopreservation Dx: C18.9-colon cancer, Disp: 1 Each, Rfl: 0    prochlorperazine (COMPAZINE) 10 mg tablet, Take 1 Tab by mouth every six (6) hours as needed for Nausea., Disp: 50 Tab, Rfl: 5    ondansetron hcl (ZOFRAN) 8 mg tablet, Take 1 Tab by mouth every eight (8) hours as needed for Nausea., Disp: 45 Tab, Rfl: 5    ALLERGIES- REVIEWED  No Known Allergies    PAST MEDICAL HISTORY - REVIEWED  Past Medical History:   Diagnosis Date    [facility]    colon-Stage 4    Hypertension         SOCIAL HISTORY - REVIEWED   Social History     Socioeconomic History    Marital status: SINGLE     Spouse name: Not on file    Number of children: Not on file    Years of education: Not on file    Highest education level: Not on file   Social Needs    Financial resource strain: Not on file    Food insecurity - worry: Not on file    Food insecurity - inability: Not on file   Setswana Dynamighty needs - medical: Not on file   SetswanaIntentio needs - non-medical: Not on file   Occupational History    Not on file   Tobacco Use    Smoking status: Never Smoker    Smokeless tobacco: Never Used   Substance and Sexual Activity    Alcohol use: No     Comment: socially    Drug use: Yes     Types: Marijuana    Sexual activity: Yes     Partners: Female     Birth control/protection: None   Other Topics Concern    Not on file   Social History Narrative    Not on file        FAMILY MEDICAL HISTORY - REVIEWED  Family History   Problem Relation Age of Onset    Stroke Mother     Diabetes Mother     Stroke Father     Hypertension Father          Physical Exam:     Visit Vitals  /83 (BP 1 Location: Right arm, BP Patient Position: Sitting)   Pulse 77   Temp 98.7 °F (37.1 °C) (Oral)   Resp 16   Ht 6' (1.829 m)   Wt 284 lb (128.8 kg)   SpO2 100%   BMI 38.52 kg/m²       General appearance: alert, oriented, NAD   HEENT: PERRLA, moist mucus membranes, no LAD  CV: RRR, S1/S2 heard, no m/r/g  Resp: CTAB, no w/r/r  Abdominal: soft, mild tenderness to palpation of abdomen diffusely, +BS  Extremities: no swelling or edema   Skin: no visible rashes    Diagnostic testing:   Recent Results (from the past 12 hour(s))   AMB POC HEMOGLOBIN (HGB)    Collection Time: 11/12/18 12:58 PM   Result Value Ref Range    Hemoglobin (POC) 15.2      FOBT: negative     Assessment/Plan:     1. Blood in stool: Blood in stool could be secondary to chemotherapy that could possibly be resulting in sloughing of GI tract, leaving patient prone to bleeding episodes. POC hgb 15.2 today with negative FOBT and normal vital signs.  Patient advised to follow up with Heme/Onc.     RTC as needed        Patient discussed with Dr. Savannah Cooper MD  [facility] Medicine Resident

## 2018-11-12 NOTE — PATIENT INSTRUCTIONS
Continue your miralax and colace. Call your Hematologist to schedule an appointment soon if possible to discuss the blood in your stool.

## 2018-11-12 NOTE — PROGRESS NOTES
Identified Patient with two Patient identifiers (Name and ). Two Patient Identifiers confirmed. Reviewed record in preparation for visit and have obtained necessary documentation. Chief Complaint   Patient presents with    Melena     x3 days - patient just not feeling well. Visit Vitals  /83 (BP 1 Location: Right arm, BP Patient Position: Sitting)   Pulse 77   Temp 98.7 °F (37.1 °C) (Oral)   Resp 16   Ht 6' (1.829 m)   Wt 284 lb (128.8 kg)   SpO2 100%   BMI 38.52 kg/m²       1. Have you been to the ER, urgent care clinic since your last visit? Hospitalized since your last visit? PATIENT CURRENTLY IN TREATMENT FOR COLON CANCER    2. Have you seen or consulted any other health care providers outside of the [address] [place] [address] since your last visit? Include any pap smears or colon screening.  PATIENT CURRENTLY IN TREATMENT FOR COLON CANCER

## 2018-11-12 NOTE — LETTER
NOTIFICATION RETURN TO WORK / SCHOOL 
 
11/12/2018 12:54 PM 
 
Mr. Dannie Dolan 
[address] [place] [phone] To Whom It May Concern: 
 
Regan Gonsales is currently under the care of [facility]. He will return to work/school on: 11/14 If there are questions or concerns please have the patient contact our office. Sincerely, Claudio Nieto MD

## 2018-11-20 ENCOUNTER — APPOINTMENT (OUTPATIENT)
Dept: INFUSION THERAPY | Age: 28
End: 2018-11-20

## 2018-11-28 ENCOUNTER — TELEPHONE (OUTPATIENT)
Dept: ONCOLOGY | Age: 28
End: 2018-11-28

## 2018-11-28 NOTE — TELEPHONE ENCOUNTER
[address] at [facility]  [phone]    11/28/18- Call placed to verify that patient is planning to come for treatment on 12/4. Need to see him for an office visit that day as well. Voicemail left requesting a return call to confirm this. 12/03/18- Second voicemail left for patient to verify treatment and office visit tomorrow. Requested a return call to confirm.

## 2018-12-04 ENCOUNTER — HOSPITAL ENCOUNTER (OUTPATIENT)
Dept: INFUSION THERAPY | Age: 28
Discharge: HOME | End: 2018-12-04
Payer: COMMERCIAL

## 2018-12-04 ENCOUNTER — OFFICE VISIT (OUTPATIENT)
Dept: ONCOLOGY | Age: 28
End: 2018-12-04

## 2018-12-04 VITALS
RESPIRATION RATE: 16 BRPM | TEMPERATURE: 97.5 F | OXYGEN SATURATION: 99 % | WEIGHT: 288.4 LBS | SYSTOLIC BLOOD PRESSURE: 129 MMHG | HEART RATE: 78 BPM | DIASTOLIC BLOOD PRESSURE: 85 MMHG | HEIGHT: 72 IN | BODY MASS INDEX: 39.06 KG/M2

## 2018-12-04 VITALS
DIASTOLIC BLOOD PRESSURE: 93 MMHG | SYSTOLIC BLOOD PRESSURE: 133 MMHG | HEART RATE: 72 BPM | WEIGHT: 288.4 LBS | RESPIRATION RATE: 18 BRPM | OXYGEN SATURATION: 98 % | HEIGHT: 72 IN | BODY MASS INDEX: 39.06 KG/M2

## 2018-12-04 DIAGNOSIS — C78.7 COLON CANCER METASTASIZED TO LIVER (HCC): Primary | ICD-10-CM

## 2018-12-04 DIAGNOSIS — C18.9 COLON CANCER METASTASIZED TO LIVER (HCC): Primary | ICD-10-CM

## 2018-12-04 LAB
ALBUMIN SERPL-MCNC: 3.3 G/DL (ref 3.5–5)
ALBUMIN/GLOB SERPL: 0.8 {RATIO} (ref 1.1–2.2)
ALP SERPL-CCNC: 51 U/L (ref 45–117)
ALT SERPL-CCNC: 33 U/L (ref 12–78)
ANION GAP SERPL CALC-SCNC: 8 MMOL/L (ref 5–15)
APPEARANCE UR: CLEAR
AST SERPL-CCNC: 20 U/L (ref 15–37)
BACTERIA URNS QL MICRO: NEGATIVE /HPF
BASOPHILS # BLD: 0.1 K/UL (ref 0–0.1)
BASOPHILS NFR BLD: 1 % (ref 0–1)
BILIRUB SERPL-MCNC: 0.3 MG/DL (ref 0.2–1)
BILIRUB UR QL: NEGATIVE
BUN SERPL-MCNC: 14 MG/DL (ref 6–20)
BUN/CREAT SERPL: 15 (ref 12–20)
CALCIUM SERPL-MCNC: 8.8 MG/DL (ref 8.5–10.1)
CEA SERPL-MCNC: 2.1 NG/ML
CHLORIDE SERPL-SCNC: 104 MMOL/L (ref 97–108)
CO2 SERPL-SCNC: 25 MMOL/L (ref 21–32)
COLOR UR: NORMAL
CREAT SERPL-MCNC: 0.96 MG/DL (ref 0.7–1.3)
DIFFERENTIAL METHOD BLD: ABNORMAL
EOSINOPHIL # BLD: 1.1 K/UL (ref 0–0.4)
EOSINOPHIL NFR BLD: 12 % (ref 0–7)
EPITH CASTS URNS QL MICRO: NORMAL /LPF
ERYTHROCYTE [DISTWIDTH] IN BLOOD BY AUTOMATED COUNT: 17.7 % (ref 11.5–14.5)
GLOBULIN SER CALC-MCNC: 4.3 G/DL (ref 2–4)
GLUCOSE SERPL-MCNC: 108 MG/DL (ref 65–100)
GLUCOSE UR STRIP.AUTO-MCNC: NEGATIVE MG/DL
HCT VFR BLD AUTO: 44.3 % (ref 36.6–50.3)
HGB BLD-MCNC: 14.2 G/DL (ref 12.1–17)
HGB UR QL STRIP: NEGATIVE
HYALINE CASTS URNS QL MICRO: NORMAL /LPF (ref 0–5)
IMM GRANULOCYTES # BLD: 0.1 K/UL (ref 0–0.04)
IMM GRANULOCYTES NFR BLD AUTO: 1 % (ref 0–0.5)
KETONES UR QL STRIP.AUTO: NEGATIVE MG/DL
LEUKOCYTE ESTERASE UR QL STRIP.AUTO: NEGATIVE
LYMPHOCYTES # BLD: 2.2 K/UL (ref 0.8–3.5)
LYMPHOCYTES NFR BLD: 25 % (ref 12–49)
MCH RBC QN AUTO: 24.2 PG (ref 26–34)
MCHC RBC AUTO-ENTMCNC: 32.1 G/DL (ref 30–36.5)
MCV RBC AUTO: 75.6 FL (ref 80–99)
MONOCYTES # BLD: 0.6 K/UL (ref 0–1)
MONOCYTES NFR BLD: 7 % (ref 5–13)
NEUTS SEG # BLD: 4.8 K/UL (ref 1.8–8)
NEUTS SEG NFR BLD: 54 % (ref 32–75)
NITRITE UR QL STRIP.AUTO: NEGATIVE
NRBC # BLD: 0 K/UL (ref 0–0.01)
NRBC BLD-RTO: 0 PER 100 WBC
PH UR STRIP: 5 [PH] (ref 5–8)
PLATELET # BLD AUTO: 312 K/UL (ref 150–400)
PMV BLD AUTO: 9.5 FL (ref 8.9–12.9)
POTASSIUM SERPL-SCNC: 3.9 MMOL/L (ref 3.5–5.1)
PROT SERPL-MCNC: 7.6 G/DL (ref 6.4–8.2)
PROT UR STRIP-MCNC: NEGATIVE MG/DL
RBC # BLD AUTO: 5.86 M/UL (ref 4.1–5.7)
RBC #/AREA URNS HPF: NORMAL /HPF (ref 0–5)
RBC MORPH BLD: ABNORMAL
RBC MORPH BLD: ABNORMAL
SODIUM SERPL-SCNC: 137 MMOL/L (ref 136–145)
SP GR UR REFRACTOMETRY: 1.02 (ref 1–1.03)
UA: UC IF INDICATED,UAUC: NORMAL
UROBILINOGEN UR QL STRIP.AUTO: 0.2 EU/DL (ref 0.2–1)
WBC # BLD AUTO: 8.9 K/UL (ref 4.1–11.1)
WBC MORPH BLD: ABNORMAL
WBC URNS QL MICRO: NORMAL /HPF (ref 0–4)

## 2018-12-04 PROCEDURE — 80053 COMPREHEN METABOLIC PANEL: CPT

## 2018-12-04 PROCEDURE — 96416 CHEMO PROLONG INFUSE W/PUMP: CPT

## 2018-12-04 PROCEDURE — 96415 CHEMO IV INFUSION ADDL HR: CPT

## 2018-12-04 PROCEDURE — 81001 URINALYSIS AUTO W/SCOPE: CPT

## 2018-12-04 PROCEDURE — 74011250636 HC RX REV CODE- 250/636: Performed by: INTERNAL MEDICINE

## 2018-12-04 PROCEDURE — 96413 CHEMO IV INFUSION 1 HR: CPT

## 2018-12-04 PROCEDURE — 82378 CARCINOEMBRYONIC ANTIGEN: CPT

## 2018-12-04 PROCEDURE — 85025 COMPLETE CBC W/AUTO DIFF WBC: CPT

## 2018-12-04 PROCEDURE — 77030012965 HC NDL HUBR BBMI -A

## 2018-12-04 PROCEDURE — 36415 COLL VENOUS BLD VENIPUNCTURE: CPT

## 2018-12-04 PROCEDURE — 96368 THER/DIAG CONCURRENT INF: CPT

## 2018-12-04 PROCEDURE — 96411 CHEMO IV PUSH ADDL DRUG: CPT

## 2018-12-04 PROCEDURE — 74011000258 HC RX REV CODE- 258: Performed by: INTERNAL MEDICINE

## 2018-12-04 PROCEDURE — 96375 TX/PRO/DX INJ NEW DRUG ADDON: CPT

## 2018-12-04 RX ORDER — FLUOROURACIL 50 MG/ML
400 INJECTION, SOLUTION INTRAVENOUS ONCE
Status: COMPLETED | OUTPATIENT
Start: 2018-12-04 | End: 2018-12-04

## 2018-12-04 RX ORDER — SODIUM CHLORIDE 0.9 % (FLUSH) 0.9 %
10 SYRINGE (ML) INJECTION AS NEEDED
Status: ACTIVE | OUTPATIENT
Start: 2018-12-04 | End: 2018-12-04

## 2018-12-04 RX ORDER — SODIUM CHLORIDE 9 MG/ML
10 INJECTION INTRAMUSCULAR; INTRAVENOUS; SUBCUTANEOUS AS NEEDED
Status: ACTIVE | OUTPATIENT
Start: 2018-12-04 | End: 2018-12-04

## 2018-12-04 RX ORDER — PALONOSETRON 0.05 MG/ML
0.25 INJECTION, SOLUTION INTRAVENOUS ONCE
Status: COMPLETED | OUTPATIENT
Start: 2018-12-04 | End: 2018-12-04

## 2018-12-04 RX ORDER — HEPARIN 100 UNIT/ML
300-500 SYRINGE INTRAVENOUS AS NEEDED
Status: ACTIVE | OUTPATIENT
Start: 2018-12-04 | End: 2018-12-04

## 2018-12-04 RX ORDER — SODIUM CHLORIDE 9 MG/ML
500 INJECTION, SOLUTION INTRAVENOUS CONTINUOUS
Status: DISPENSED | OUTPATIENT
Start: 2018-12-04 | End: 2018-12-04

## 2018-12-04 RX ORDER — DEXTROSE MONOHYDRATE 50 MG/ML
25 INJECTION, SOLUTION INTRAVENOUS CONTINUOUS
Status: DISPENSED | OUTPATIENT
Start: 2018-12-04 | End: 2018-12-04

## 2018-12-04 RX ADMIN — PALONOSETRON 0.25 MG: 0.05 INJECTION, SOLUTION INTRAVENOUS at 10:50

## 2018-12-04 RX ADMIN — DEXTROSE MONOHYDRATE 25 ML/HR: 5 INJECTION, SOLUTION INTRAVENOUS at 10:50

## 2018-12-04 RX ADMIN — FLUOROURACIL 6120 MG: 50 INJECTION, SOLUTION INTRAVENOUS at 14:04

## 2018-12-04 RX ADMIN — FLUOROURACIL 1020 MG: 50 INJECTION, SOLUTION INTRAVENOUS at 14:04

## 2018-12-04 RX ADMIN — DEXAMETHASONE SODIUM PHOSPHATE 12 MG: 4 INJECTION, SOLUTION INTRA-ARTICULAR; INTRALESIONAL; INTRAMUSCULAR; INTRAVENOUS; SOFT TISSUE at 10:55

## 2018-12-04 RX ADMIN — OXALIPLATIN 217 MG: 5 INJECTION, SOLUTION INTRAVENOUS at 11:44

## 2018-12-04 RX ADMIN — LEUCOVORIN CALCIUM 1020 MG: 350 INJECTION, POWDER, LYOPHILIZED, FOR SOLUTION INTRAMUSCULAR; INTRAVENOUS at 11:44

## 2018-12-04 NOTE — PROGRESS NOTES
[facility] [place] at [facility]  [facility], [address] [place], [place] [address]thiago Arzate [place]: [phone]  F: [phone]     Reason for Visit:   Abena Yu is a 25 y.o. male who is seen for follow up of metastatic colon cancer. Treatment History:   · CT A/P 3/25/2018: Indeterminate 2 x 1 cm low-density liver lesion. · CT C/A/P with triphase liver 3/27/2018: No evidence of metastatic disease to the chest. Multiple ill-defined hepatic lesions. These do not represent simple cyst.  · CT guided liver biopsy 3/27/2018: metastatic adenocarcinoma, KRAS wild type, NRAS wild type  · Colectomy by Dr. Catalina Mcleod 3/29/2018: Adenocarcinoma, moderately differentiated. Negative margins. 3/16 nodes involved. pMMR  · Stage IV (pT3 pN1b pM1) Colon Cancer  · Chemotherapy with FOLFOX and bevacizumab 2018 - 2018 for a total of 6 cycles, dany administered with cycles 2-4  · Resumed chemotherapy with FOLFOX and bevacizumab 2018, received 3 cycles, then stopped on 10/23/18 due to patient preference for family emergency. Avastin held on 18 with cycle 10    History of Present Illness:   Presents for follow up to for treatment. Numbness/tingling is much improved. Mostly in fingers. Occasionally in right foot, this is very intermittent. Appetite has been stable. No mouth sores. No weight changes. Has been drinking more alcohol, drinking up to 12 shots at a time over the weekends. Gaining weight, hasn't been working out. Some SOB with walking up the stairs. This is not a new symptom. Constipation, grade 1. Fatigue, grade 1. He is unaccompanied today, supportive family. PAST HISTORY: The following sections were reviewed and updated in the EMR as appropriate: PMH, SH, FH, Medications, Allergies. No Known Allergies     Review of Systems: A complete review of systems was obtained, reviewed, and scanned into the EMR. Pertinent findings reviewed above.       Physical Exam:     Visit Vitals  BP (!) 133/93 (BP 1 Location: Left arm, BP Patient Position: Sitting)   Pulse 72   Resp 18   Ht 6' (1.829 m)   Wt 288 lb 6.4 oz (130.8 kg)   SpO2 98%   BMI 39.11 kg/m²     ECOG PS: 1  General: No distress, obese  Eyes: PERRLA, anicteric sclerae  HENT: Atraumatic, OP clear  Neck: Supple  Lymphatic: No cervical, supraclavicular, or inguinal adenopathy  Respiratory: CTAB, normal respiratory effort  CV: Normal rate, regular rhythm, no murmurs, no peripheral edema  GI: Soft, nontender, nondistended, no masses, no hepatomegaly, no splenomegaly  MS: Normal gait and station. Digits without clubbing or cyanosis. Skin: No rashes, ecchymoses, or petechiae. Normal temperature, turgor, and texture. Psych: Alert, oriented, appropriate affect, normal judgment/insight    Results:     Lab Results   Component Value Date/Time    WBC 8.9 12/04/2018 08:30 AM    HGB 14.2 12/04/2018 08:30 AM    HCT 44.3 12/04/2018 08:30 AM    PLATELET 443 32/50/1349 08:30 AM    MCV 75.6 (L) 12/04/2018 08:30 AM    ABS. NEUTROPHILS 4.8 12/04/2018 08:30 AM    Hemoglobin (POC) 15.2 11/12/2018 12:58 PM     Lab Results   Component Value Date/Time    Sodium 137 12/04/2018 08:30 AM    Potassium 3.9 12/04/2018 08:30 AM    Chloride 104 12/04/2018 08:30 AM    CO2 25 12/04/2018 08:30 AM    Glucose 108 (H) 12/04/2018 08:30 AM    BUN 14 12/04/2018 08:30 AM    Creatinine 0.96 12/04/2018 08:30 AM    GFR est AA >60 12/04/2018 08:30 AM    GFR est non-AA >60 12/04/2018 08:30 AM    Calcium 8.8 12/04/2018 08:30 AM     Lab Results   Component Value Date/Time    Bilirubin, total 0.3 12/04/2018 08:30 AM    ALT (SGPT) 33 12/04/2018 08:30 AM    AST (SGOT) 20 12/04/2018 08:30 AM    Alk. phosphatase 51 12/04/2018 08:30 AM    Protein, total 7.6 12/04/2018 08:30 AM    Albumin 3.3 (L) 12/04/2018 08:30 AM    Globulin 4.3 (H) 12/04/2018 08:30 AM       CT C/A/P with triphasic liver 7/19/2018: There is no axilla, mediastinal or hilar adenopathy.  There is no pericardial pleural effusion no shift or pneumothorax, infusion catheter is in place. No parenchymal mass. The liver lesion and decreased in size, a dome of the liver lesion measures 11 x 14 mm, it was 15 x 20. An additional right lobe of the liver lower lesion is also decreased in size measuring 12 x 7 mm it was 16 x 12. No new lesion is seen. Liver and spleen remain normal in size, the gallbladder is not distended. The kidneys are unobstructed. There is no bowel wall thickening or obstruction. Bowel wall thickening in the ascending colon appear stable some of this may be related to fecal stasis up. There is no free air or free fluid. There is no adenopathy in the abdomen or pelvis. The bladder is midline. Review of bone windows does not suggest metastases. There is no inguinal adenopathy. Major vessels do appear patent. Assessment:   1) Metastatic Colon Cancer  Stage IV, pMMR, KRAS/NRAS wild type    He has metastatic disease within his liver. His primary tumor has been resected. He is currently receiving chemotherapy with FOLFOX and Bevacizumab. Break from therapy since July 2018 due to evaluation of surgical resection with Dr. Albino Smith (surgical oncology). He ordered an MRI abdomen and PET/CT which look excellent, no measurable disease on these studies. However, he also reviewed our CT scans with his tumor board. Based on these results, he feels there is more disease present then is reported by the radiologist.  Unfortunately, he does not think that Mr. Terri Medrano disease is amenable to a definitive resection. Second opinion at [address] [place] [address] with Dr. Sergei Olson. Discussed case with Dr. Sabrina Lindsay. Plan to proceed on chemotherapy, holding Avastin. Abdominal MRI scheduled 12/12/18, planned surgical resection of liver early Jan.     Resumed treatment 9/25/2018, had 3 cycles, then took a break from treatment due to concerns with health of friend. Resuming today with cycle 10. HOLD Avastin due to preparation of upcoming surgery.   Proceed with treatment today as ordered. 2) Risk of infertility  We have previously reviewed that chemotherapy can potentially cause infertility. He has undergone sperm cryopreservation. 3) Genetic risk  Seen by genetics at [address] [place] [address]. Notes reviewed. No genetic alteration identified. Risk thought to be due to environmental exposure. No further follow up.      4) Hypertension  Encouraged continuation of Metoprolol. 5) Chemotherapy induced neuropathy. Grade 1 currently. Improved since break from treatment. Plan:     Proceed to proceed today with C10 of mFOLFOX6, given every 2 weeks. Hold avastin. Labs: CBC, CMP prior to each treatment, CEA every 4 weeks  Prophylactic antiemetics: Palonosetron and dexamethasone on the day of each chemotherapy infusion, not taking Dexamethasone at home dosing. PRN antiemetics: Prochlorperazine and Ondansetron  Continue Metoprolol. Return to clinic every 2 weeks on therapy. Patient was seen in conjunction with Atul Leal NP.     Signed By: Flip Matute MD

## 2018-12-04 NOTE — PROGRESS NOTES
Lela Byrne is a 25 y.o. male    States constipation with blood in stool and was seen pcp. Chief Complaint   Patient presents with    Colon Cancer Screening    Results       1. Have you been to the ER, urgent care clinic since your last visit? Hospitalized since your last visit? No  M  2. Have you seen or consulted any other health care providers outside of the [facility] since your last visit? Include any pap smears or colon screening. No      Visit Vitals  BP (!) 133/93 (BP 1 Location: Left arm, BP Patient Position: Sitting)   Pulse 72   Resp 18   Ht 6' (1.829 m)   Wt 288 lb 6.4 oz (130.8 kg)   SpO2 98%   BMI 39.11 kg/m²           Health Maintenance Due   Topic Date Due    Pneumococcal 19-64 Highest Risk (1 of 3 - PCV13) 07/26/2007    DTaP/Tdap/Td series (1 - Tdap) 07/26/2009    Influenza Age 5 to Adult  08/01/2018         Medication Reconciliation completed, changes noted.   Please  Update medication list.

## 2018-12-04 NOTE — PROGRESS NOTES
[facility] VISIT NOTE    Pt arrived at [facility] ambulatory and in no distress for C10 Folfox/Avastin. Assessment completed, pt had no complaints . Patient Vitals for the past 12 hrs:   Temp Pulse Resp BP SpO2   12/04/18 1413 97.5 °F (36.4 °C) 78 16 129/85 --   12/04/18 0812 98.2 °F (36.8 °C) 81 18 127/85 99 %     Right chest port accessed with . 75   in costello no difficulty. Positive blood return noted and labs drawn. Recent Results (from the past 12 hour(s))   CBC WITH AUTOMATED DIFF    Collection Time: 12/04/18  8:30 AM   Result Value Ref Range    WBC 8.9 4.1 - 11.1 K/uL    RBC 5.86 (H) 4.10 - 5.70 M/uL    HGB 14.2 12.1 - 17.0 g/dL    HCT 44.3 36.6 - 50.3 %    MCV 75.6 (L) 80.0 - 99.0 FL    MCH 24.2 (L) 26.0 - 34.0 PG    MCHC 32.1 30.0 - 36.5 g/dL    RDW 17.7 (H) 11.5 - 14.5 %    PLATELET 693 297 - 189 K/uL    MPV 9.5 8.9 - 12.9 FL    NRBC 0.0 0  WBC    ABSOLUTE NRBC 0.00 0.00 - 0.01 K/uL    NEUTROPHILS 54 32 - 75 %    LYMPHOCYTES 25 12 - 49 %    MONOCYTES 7 5 - 13 %    EOSINOPHILS 12 (H) 0 - 7 %    BASOPHILS 1 0 - 1 %    IMMATURE GRANULOCYTES 1 (H) 0.0 - 0.5 %    ABS. NEUTROPHILS 4.8 1.8 - 8.0 K/UL    ABS. LYMPHOCYTES 2.2 0.8 - 3.5 K/UL    ABS. MONOCYTES 0.6 0.0 - 1.0 K/UL    ABS. EOSINOPHILS 1.1 (H) 0.0 - 0.4 K/UL    ABS. BASOPHILS 0.1 0.0 - 0.1 K/UL    ABS. IMM.  GRANS. 0.1 (H) 0.00 - 0.04 K/UL    DF SMEAR SCANNED      RBC COMMENTS ANISOCYTOSIS  1+        RBC COMMENTS MICROCYTOSIS  1+        WBC COMMENTS ATYPICAL LYMPHOCYTES PRESENT     METABOLIC PANEL, COMPREHENSIVE    Collection Time: 12/04/18  8:30 AM   Result Value Ref Range    Sodium 137 136 - 145 mmol/L    Potassium 3.9 3.5 - 5.1 mmol/L    Chloride 104 97 - 108 mmol/L    CO2 25 21 - 32 mmol/L    Anion gap 8 5 - 15 mmol/L    Glucose 108 (H) 65 - 100 mg/dL    BUN 14 6 - 20 MG/DL    Creatinine 0.96 0.70 - 1.30 MG/DL    BUN/Creatinine ratio 15 12 - 20      GFR est AA >60 >60 ml/min/1.73m2    GFR est non-AA >60 >60 ml/min/1.73m2    Calcium 8.8 8.5 - 10.1 MG/DL    Bilirubin, total 0.3 0.2 - 1.0 MG/DL    ALT (SGPT) 33 12 - 78 U/L    AST (SGOT) 20 15 - 37 U/L    Alk. phosphatase 51 45 - 117 U/L    Protein, total 7.6 6.4 - 8.2 g/dL    Albumin 3.3 (L) 3.5 - 5.0 g/dL    Globulin 4.3 (H) 2.0 - 4.0 g/dL    A-G Ratio 0.8 (L) 1.1 - 2.2     CEA    Collection Time: 12/04/18  8:30 AM   Result Value Ref Range    CEA 2.1 ng/mL   URINALYSIS W/ REFLEX CULTURE    Collection Time: 12/04/18  8:30 AM   Result Value Ref Range    Color YELLOW/STRAW      Appearance CLEAR CLEAR      Specific gravity 1.025 1.003 - 1.030      pH (UA) 5.0 5.0 - 8.0      Protein NEGATIVE  NEG mg/dL    Glucose NEGATIVE  NEG mg/dL    Ketone NEGATIVE  NEG mg/dL    Bilirubin NEGATIVE  NEG      Blood NEGATIVE  NEG      Urobilinogen 0.2 0.2 - 1.0 EU/dL    Nitrites NEGATIVE  NEG      Leukocyte Esterase NEGATIVE  NEG      WBC 0-4 0 - 4 /hpf    RBC 0-5 0 - 5 /hpf    Epithelial cells FEW FEW /lpf    Bacteria NEGATIVE  NEG /hpf    UA:UC IF INDICATED CULTURE NOT INDICATED BY UA RESULT CNI      Hyaline cast 0-2 0 - 5 /lpf     Medications received:  Aloxi IV  Decadron IV  Leukovorin IV  Oxaliplatin IV  5FU IVP  5FU CIV    Avastin held per MD.    Tolerated treatment well, no adverse reaction noted. Port de-accessed and flushed per protocol. Positive blood return noted. D/C'd from [facility] ambulatory and in no distress. Next appointment is 12/6/18 at 4:00.

## 2018-12-06 ENCOUNTER — HOSPITAL ENCOUNTER (OUTPATIENT)
Dept: INFUSION THERAPY | Age: 28
Discharge: HOME | End: 2018-12-06
Payer: COMMERCIAL

## 2018-12-06 VITALS
RESPIRATION RATE: 18 BRPM | SYSTOLIC BLOOD PRESSURE: 125 MMHG | DIASTOLIC BLOOD PRESSURE: 82 MMHG | TEMPERATURE: 97.7 F | HEART RATE: 82 BPM

## 2018-12-06 DIAGNOSIS — C18.9 COLON CANCER METASTASIZED TO LIVER (HCC): Primary | ICD-10-CM

## 2018-12-06 DIAGNOSIS — C78.7 COLON CANCER METASTASIZED TO LIVER (HCC): Primary | ICD-10-CM

## 2018-12-06 PROCEDURE — 74011250636 HC RX REV CODE- 250/636: Performed by: INTERNAL MEDICINE

## 2018-12-06 PROCEDURE — 96523 IRRIG DRUG DELIVERY DEVICE: CPT

## 2018-12-06 RX ORDER — HEPARIN 100 UNIT/ML
300-500 SYRINGE INTRAVENOUS AS NEEDED
Status: ACTIVE | OUTPATIENT
Start: 2018-12-06 | End: 2018-12-07

## 2018-12-06 RX ORDER — SODIUM CHLORIDE 0.9 % (FLUSH) 0.9 %
10 SYRINGE (ML) INJECTION AS NEEDED
Status: ACTIVE | OUTPATIENT
Start: 2018-12-06 | End: 2018-12-07

## 2018-12-06 RX ORDER — SODIUM CHLORIDE 9 MG/ML
10 INJECTION INTRAMUSCULAR; INTRAVENOUS; SUBCUTANEOUS AS NEEDED
Status: ACTIVE | OUTPATIENT
Start: 2018-12-06 | End: 2018-12-07

## 2018-12-06 RX ADMIN — Medication 500 UNITS: at 13:06

## 2018-12-06 RX ADMIN — Medication 10 ML: at 13:05

## 2018-12-06 NOTE — PROGRESS NOTES
[facility]fusion [facility] Short Visit Progress Note  1300  Patient admitted to [facility] for pump d/c ambulatory in stable condition. Assessment completed. No new concerns voiced. PAC with positive blood return. CADD medication completed. 1315  Patient tolerated treatment well.  Patient discharged from [facility] 58 ambulatory in no distress at 1315  Patient aware of next appointment scheduled for 12/18/18 @ 9 am

## 2018-12-18 ENCOUNTER — OFFICE VISIT (OUTPATIENT)
Dept: ONCOLOGY | Age: 28
End: 2018-12-18

## 2018-12-18 ENCOUNTER — HOSPITAL ENCOUNTER (OUTPATIENT)
Dept: INFUSION THERAPY | Age: 28
Discharge: HOME | End: 2018-12-18
Payer: COMMERCIAL

## 2018-12-18 VITALS
HEART RATE: 81 BPM | TEMPERATURE: 98.9 F | BODY MASS INDEX: 39.05 KG/M2 | WEIGHT: 288.3 LBS | OXYGEN SATURATION: 99 % | SYSTOLIC BLOOD PRESSURE: 118 MMHG | HEIGHT: 72 IN | DIASTOLIC BLOOD PRESSURE: 74 MMHG | RESPIRATION RATE: 16 BRPM

## 2018-12-18 VITALS
RESPIRATION RATE: 16 BRPM | OXYGEN SATURATION: 92 % | WEIGHT: 288 LBS | TEMPERATURE: 95.6 F | HEART RATE: 65 BPM | SYSTOLIC BLOOD PRESSURE: 138 MMHG | HEIGHT: 72 IN | DIASTOLIC BLOOD PRESSURE: 99 MMHG | BODY MASS INDEX: 39.01 KG/M2

## 2018-12-18 DIAGNOSIS — C78.7 COLON CANCER METASTASIZED TO LIVER (HCC): Primary | ICD-10-CM

## 2018-12-18 DIAGNOSIS — C18.9 COLON CANCER METASTASIZED TO LIVER (HCC): Primary | ICD-10-CM

## 2018-12-18 LAB
ALBUMIN SERPL-MCNC: 3.4 G/DL (ref 3.5–5)
ALBUMIN/GLOB SERPL: 0.7 {RATIO} (ref 1.1–2.2)
ALP SERPL-CCNC: 61 U/L (ref 45–117)
ALT SERPL-CCNC: 35 U/L (ref 12–78)
ANION GAP SERPL CALC-SCNC: 9 MMOL/L (ref 5–15)
APPEARANCE UR: CLEAR
AST SERPL-CCNC: 21 U/L (ref 15–37)
BACTERIA URNS QL MICRO: NEGATIVE /HPF
BASOPHILS # BLD: 0 K/UL (ref 0–0.1)
BASOPHILS NFR BLD: 0 % (ref 0–1)
BILIRUB SERPL-MCNC: 0.4 MG/DL (ref 0.2–1)
BILIRUB UR QL: NEGATIVE
BUN SERPL-MCNC: 14 MG/DL (ref 6–20)
BUN/CREAT SERPL: 15 (ref 12–20)
CALCIUM SERPL-MCNC: 9.3 MG/DL (ref 8.5–10.1)
CHLORIDE SERPL-SCNC: 102 MMOL/L (ref 97–108)
CO2 SERPL-SCNC: 26 MMOL/L (ref 21–32)
COLOR UR: NORMAL
CREAT SERPL-MCNC: 0.96 MG/DL (ref 0.7–1.3)
DIFFERENTIAL METHOD BLD: ABNORMAL
EOSINOPHIL # BLD: 0.4 K/UL (ref 0–0.4)
EOSINOPHIL NFR BLD: 6 % (ref 0–7)
EPITH CASTS URNS QL MICRO: NORMAL /LPF
ERYTHROCYTE [DISTWIDTH] IN BLOOD BY AUTOMATED COUNT: 17.6 % (ref 11.5–14.5)
GLOBULIN SER CALC-MCNC: 4.6 G/DL (ref 2–4)
GLUCOSE SERPL-MCNC: 100 MG/DL (ref 65–100)
GLUCOSE UR STRIP.AUTO-MCNC: NEGATIVE MG/DL
HCT VFR BLD AUTO: 44.3 % (ref 36.6–50.3)
HGB BLD-MCNC: 14.6 G/DL (ref 12.1–17)
HGB UR QL STRIP: NEGATIVE
HYALINE CASTS URNS QL MICRO: NORMAL /LPF (ref 0–5)
IMM GRANULOCYTES # BLD: 0 K/UL (ref 0–0.04)
IMM GRANULOCYTES NFR BLD AUTO: 0 % (ref 0–0.5)
KETONES UR QL STRIP.AUTO: NEGATIVE MG/DL
LEUKOCYTE ESTERASE UR QL STRIP.AUTO: NEGATIVE
LYMPHOCYTES # BLD: 2 K/UL (ref 0.8–3.5)
LYMPHOCYTES NFR BLD: 29 % (ref 12–49)
MCH RBC QN AUTO: 24.5 PG (ref 26–34)
MCHC RBC AUTO-ENTMCNC: 33 G/DL (ref 30–36.5)
MCV RBC AUTO: 74.2 FL (ref 80–99)
MONOCYTES # BLD: 0.7 K/UL (ref 0–1)
MONOCYTES NFR BLD: 11 % (ref 5–13)
NEUTS SEG # BLD: 3.8 K/UL (ref 1.8–8)
NEUTS SEG NFR BLD: 55 % (ref 32–75)
NITRITE UR QL STRIP.AUTO: NEGATIVE
NRBC # BLD: 0 K/UL (ref 0–0.01)
NRBC BLD-RTO: 0 PER 100 WBC
PH UR STRIP: 5 [PH] (ref 5–8)
PLATELET # BLD AUTO: 261 K/UL (ref 150–400)
PMV BLD AUTO: 9.3 FL (ref 8.9–12.9)
POTASSIUM SERPL-SCNC: 4 MMOL/L (ref 3.5–5.1)
PROT SERPL-MCNC: 8 G/DL (ref 6.4–8.2)
PROT UR STRIP-MCNC: NEGATIVE MG/DL
RBC # BLD AUTO: 5.97 M/UL (ref 4.1–5.7)
RBC #/AREA URNS HPF: NORMAL /HPF (ref 0–5)
SODIUM SERPL-SCNC: 137 MMOL/L (ref 136–145)
SP GR UR REFRACTOMETRY: 1.02 (ref 1–1.03)
UA: UC IF INDICATED,UAUC: NORMAL
UROBILINOGEN UR QL STRIP.AUTO: 0.2 EU/DL (ref 0.2–1)
WBC # BLD AUTO: 7 K/UL (ref 4.1–11.1)
WBC URNS QL MICRO: NORMAL /HPF (ref 0–4)

## 2018-12-18 PROCEDURE — 74011250636 HC RX REV CODE- 250/636: Performed by: INTERNAL MEDICINE

## 2018-12-18 PROCEDURE — 74011250636 HC RX REV CODE- 250/636

## 2018-12-18 PROCEDURE — 74011000258 HC RX REV CODE- 258: Performed by: INTERNAL MEDICINE

## 2018-12-18 PROCEDURE — 96416 CHEMO PROLONG INFUSE W/PUMP: CPT

## 2018-12-18 PROCEDURE — 96411 CHEMO IV PUSH ADDL DRUG: CPT

## 2018-12-18 PROCEDURE — 96413 CHEMO IV INFUSION 1 HR: CPT

## 2018-12-18 PROCEDURE — 77030012965 HC NDL HUBR BBMI -A

## 2018-12-18 PROCEDURE — 96367 TX/PROPH/DG ADDL SEQ IV INF: CPT

## 2018-12-18 PROCEDURE — 96375 TX/PRO/DX INJ NEW DRUG ADDON: CPT

## 2018-12-18 PROCEDURE — 36415 COLL VENOUS BLD VENIPUNCTURE: CPT

## 2018-12-18 PROCEDURE — 85025 COMPLETE CBC W/AUTO DIFF WBC: CPT

## 2018-12-18 PROCEDURE — 81001 URINALYSIS AUTO W/SCOPE: CPT

## 2018-12-18 PROCEDURE — 80053 COMPREHEN METABOLIC PANEL: CPT

## 2018-12-18 RX ORDER — DIPHENHYDRAMINE HYDROCHLORIDE 50 MG/ML
INJECTION, SOLUTION INTRAMUSCULAR; INTRAVENOUS
Status: COMPLETED
Start: 2018-12-18 | End: 2018-12-18

## 2018-12-18 RX ORDER — SODIUM CHLORIDE 9 MG/ML
500 INJECTION, SOLUTION INTRAVENOUS CONTINUOUS
Status: DISPENSED | OUTPATIENT
Start: 2018-12-18 | End: 2018-12-18

## 2018-12-18 RX ORDER — DEXTROSE MONOHYDRATE 50 MG/ML
25 INJECTION, SOLUTION INTRAVENOUS CONTINUOUS
Status: DISPENSED | OUTPATIENT
Start: 2018-12-18 | End: 2018-12-18

## 2018-12-18 RX ORDER — SODIUM CHLORIDE 9 MG/ML
10 INJECTION INTRAMUSCULAR; INTRAVENOUS; SUBCUTANEOUS AS NEEDED
Status: ACTIVE | OUTPATIENT
Start: 2018-12-18 | End: 2018-12-18

## 2018-12-18 RX ORDER — PALONOSETRON 0.05 MG/ML
0.25 INJECTION, SOLUTION INTRAVENOUS ONCE
Status: COMPLETED | OUTPATIENT
Start: 2018-12-18 | End: 2018-12-18

## 2018-12-18 RX ORDER — EPINEPHRINE 1 MG/ML
0.3 INJECTION, SOLUTION, CONCENTRATE INTRAVENOUS AS NEEDED
Status: ACTIVE | OUTPATIENT
Start: 2018-12-18 | End: 2018-12-18

## 2018-12-18 RX ORDER — HYDROCORTISONE SODIUM SUCCINATE 100 MG/2ML
100 INJECTION, POWDER, FOR SOLUTION INTRAMUSCULAR; INTRAVENOUS AS NEEDED
Status: ACTIVE | OUTPATIENT
Start: 2018-12-18 | End: 2018-12-18

## 2018-12-18 RX ORDER — ALBUTEROL SULFATE 0.83 MG/ML
2.5 SOLUTION RESPIRATORY (INHALATION) AS NEEDED
Status: ACTIVE | OUTPATIENT
Start: 2018-12-18 | End: 2018-12-18

## 2018-12-18 RX ORDER — ONDANSETRON 2 MG/ML
8 INJECTION INTRAMUSCULAR; INTRAVENOUS AS NEEDED
Status: ACTIVE | OUTPATIENT
Start: 2018-12-18 | End: 2018-12-18

## 2018-12-18 RX ORDER — SODIUM CHLORIDE 0.9 % (FLUSH) 0.9 %
10 SYRINGE (ML) INJECTION AS NEEDED
Status: ACTIVE | OUTPATIENT
Start: 2018-12-18 | End: 2018-12-18

## 2018-12-18 RX ORDER — ACETAMINOPHEN 325 MG/1
650 TABLET ORAL AS NEEDED
Status: ACTIVE | OUTPATIENT
Start: 2018-12-18 | End: 2018-12-18

## 2018-12-18 RX ORDER — DIPHENHYDRAMINE HYDROCHLORIDE 50 MG/ML
50 INJECTION, SOLUTION INTRAMUSCULAR; INTRAVENOUS AS NEEDED
Status: ACTIVE | OUTPATIENT
Start: 2018-12-18 | End: 2018-12-18

## 2018-12-18 RX ORDER — FAMOTIDINE 10 MG/ML
INJECTION INTRAVENOUS
Status: COMPLETED
Start: 2018-12-18 | End: 2018-12-18

## 2018-12-18 RX ORDER — HEPARIN 100 UNIT/ML
300-500 SYRINGE INTRAVENOUS AS NEEDED
Status: ACTIVE | OUTPATIENT
Start: 2018-12-18 | End: 2018-12-18

## 2018-12-18 RX ORDER — FLUOROURACIL 50 MG/ML
400 INJECTION, SOLUTION INTRAVENOUS ONCE
Status: COMPLETED | OUTPATIENT
Start: 2018-12-18 | End: 2018-12-18

## 2018-12-18 RX ADMIN — LEUCOVORIN CALCIUM 1020 MG: 350 INJECTION, POWDER, LYOPHILIZED, FOR SOLUTION INTRAMUSCULAR; INTRAVENOUS at 12:13

## 2018-12-18 RX ADMIN — FLUOROURACIL 6120 MG: 50 INJECTION, SOLUTION INTRAVENOUS at 13:36

## 2018-12-18 RX ADMIN — DEXAMETHASONE SODIUM PHOSPHATE 12 MG: 4 INJECTION, SOLUTION INTRA-ARTICULAR; INTRALESIONAL; INTRAMUSCULAR; INTRAVENOUS; SOFT TISSUE at 11:05

## 2018-12-18 RX ADMIN — FAMOTIDINE: 10 INJECTION, SOLUTION INTRAVENOUS at 12:37

## 2018-12-18 RX ADMIN — FLUOROURACIL 1020 MG: 50 INJECTION, SOLUTION INTRAVENOUS at 13:35

## 2018-12-18 RX ADMIN — DIPHENHYDRAMINE HYDROCHLORIDE 50 MG: 50 INJECTION INTRAMUSCULAR; INTRAVENOUS at 12:37

## 2018-12-18 RX ADMIN — METHYLPREDNISOLONE SODIUM SUCCINATE 125 MG: 125 INJECTION, POWDER, FOR SOLUTION INTRAMUSCULAR; INTRAVENOUS at 12:37

## 2018-12-18 RX ADMIN — OXALIPLATIN 217 MG: 5 INJECTION, SOLUTION INTRAVENOUS at 12:13

## 2018-12-18 RX ADMIN — DEXTROSE MONOHYDRATE 25 ML/HR: 5 INJECTION, SOLUTION INTRAVENOUS at 10:53

## 2018-12-18 RX ADMIN — PALONOSETRON 0.25 MG: 0.05 INJECTION, SOLUTION INTRAVENOUS at 10:57

## 2018-12-18 NOTE — PROGRESS NOTES
[facility] [place] at [place] [address] [address] [facility] [facility] [address] [facility] [place]: [phone]  F: [phone]     Reason for Visit:   Konrad Marvin is a 25 y.o. male who is seen for follow up of metastatic colon cancer. Treatment History:   · CT A/P 3/25/2018: Indeterminate 2 x 1 cm low-density liver lesion. · CT C/A/P with triphase liver 3/27/2018: No evidence of metastatic disease to the chest. Multiple ill-defined hepatic lesions. These do not represent simple cyst.  · CT guided liver biopsy 3/27/2018: metastatic adenocarcinoma, KRAS wild type, NRAS wild type  · Colectomy by Dr. Chano Dudley 3/29/2018: Adenocarcinoma, moderately differentiated. Negative margins. 3/16 nodes involved. pMMR  · Stage IV (pT3 pN1b pM1) Colon Cancer  · Chemotherapy with FOLFOX and bevacizumab 5/1/2018 - 7/12/2018 for a total of 6 cycles, dany administered with cycles 2-4  · Resumed chemotherapy with FOLFOX and bevacizumab 9/25/2018, received 3 cycles, then stopped on 10/23/18 due to patient preference for family emergency. Avastin held on 12/4/18 with cycle 10    History of Present Illness: Tolerated resumption of therapy well. Some fatigue, resolved now. Neuropathy mild, better than it had been previously. No mouth sores. No nausea, vomiting, diarrhea. Some constipation, but miralax helps. He had an MRI since last here, saw Dr. Sabrina Lindsay and waiting to hear back. He is unaccompanied today, supportive family. Working two jobs, [employer] and [employer]. PAST HISTORY: The following sections were reviewed and updated in the EMR as appropriate: PMH, SH, FH, Medications, Allergies. No Known Allergies     Review of Systems: A complete review of systems was obtained, reviewed, and scanned into the EMR. Pertinent findings reviewed above.       Physical Exam:     Visit Vitals  BP (!) 138/99 (BP 1 Location: Left arm, BP Patient Position: Sitting)   Pulse 65   Temp 95.6 °F (35.3 °C) (Oral)   Resp 16   Ht 6' (1.829 m)   Wt 288 lb (130.6 kg)   SpO2 92%   BMI 39.06 kg/m²     ECOG PS: 0  General: No distress, obese  Eyes: PERRLA, anicteric sclerae  HENT: Atraumatic, OP clear  Neck: Supple  Lymphatic: No cervical, supraclavicular, or inguinal adenopathy  Respiratory: CTAB, normal respiratory effort  CV: Normal rate, regular rhythm, no murmurs, no peripheral edema  GI: Soft, nontender, nondistended, no masses, no hepatomegaly, no splenomegaly  MS: Normal gait and station. Digits without clubbing or cyanosis. Skin: No rashes, ecchymoses, or petechiae. Normal temperature, turgor, and texture. Psych: Alert, oriented, appropriate affect, normal judgment/insight    Results:     Lab Results   Component Value Date/Time    WBC 7.0 12/18/2018 09:27 AM    HGB 14.6 12/18/2018 09:27 AM    HCT 44.3 12/18/2018 09:27 AM    PLATELET 294 97/94/1295 09:27 AM    MCV 74.2 (L) 12/18/2018 09:27 AM    ABS. NEUTROPHILS 3.8 12/18/2018 09:27 AM    Hemoglobin (POC) 15.2 11/12/2018 12:58 PM     Lab Results   Component Value Date/Time    Sodium 137 12/04/2018 08:30 AM    Potassium 3.9 12/04/2018 08:30 AM    Chloride 104 12/04/2018 08:30 AM    CO2 25 12/04/2018 08:30 AM    Glucose 108 (H) 12/04/2018 08:30 AM    BUN 14 12/04/2018 08:30 AM    Creatinine 0.96 12/04/2018 08:30 AM    GFR est AA >60 12/04/2018 08:30 AM    GFR est non-AA >60 12/04/2018 08:30 AM    Calcium 8.8 12/04/2018 08:30 AM     Lab Results   Component Value Date/Time    Bilirubin, total 0.3 12/04/2018 08:30 AM    ALT (SGPT) 33 12/04/2018 08:30 AM    AST (SGOT) 20 12/04/2018 08:30 AM    Alk. phosphatase 51 12/04/2018 08:30 AM    Protein, total 7.6 12/04/2018 08:30 AM    Albumin 3.3 (L) 12/04/2018 08:30 AM    Globulin 4.3 (H) 12/04/2018 08:30 AM       CT C/A/P with triphasic liver 7/19/2018: There is no axilla, mediastinal or hilar adenopathy. There is no pericardial pleural effusion no shift or pneumothorax, infusion catheter is in place. No parenchymal mass.  The liver lesion and decreased in size, a dome of the liver lesion measures 11 x 14 mm, it was 15 x 20. An additional right lobe of the liver lower lesion is also decreased in size measuring 12 x 7 mm it was 16 x 12. No new lesion is seen. Liver and spleen remain normal in size, the gallbladder is not distended. The kidneys are unobstructed. There is no bowel wall thickening or obstruction. Bowel wall thickening in the ascending colon appear stable some of this may be related to fecal stasis up. There is no free air or free fluid. There is no adenopathy in the abdomen or pelvis. The bladder is midline. Review of bone windows does not suggest metastases. There is no inguinal adenopathy. Major vessels do appear patent. Assessment:   1) Metastatic Colon Cancer  Stage IV, pMMR, KRAS/NRAS wild type    He has metastatic disease within his liver. His primary tumor has been resected. He is currently receiving chemotherapy with FOLFOX (Bevacizumab on hold starting with C#10). He is following with Dr. Qamar Romero at [facility] for consideration of liver resection vs liver directed therapy. He is tolerating therapy well. We will proceed today with his next cycle, holding avastin given plans for possible surgery. 2) Risk of infertility  We have previously reviewed that chemotherapy can potentially cause infertility. He has undergone sperm cryopreservation. 3) Genetic risk  Testing at [facility] negative. 4) Hypertension  Continue Metoprolol. 5) Chemotherapy induced neuropathy. Grade 1 currently. Improved since break from treatment. Monitor. Plan:     Proceed to proceed today with C11 of mFOLFOX6, given every 2 weeks. Hold avastin. Labs: CBC, CMP prior to each treatment, CEA every 4 weeks  Prophylactic antiemetics: Palonosetron and dexamethasone on the day of each chemotherapy infusion, not taking Dexamethasone at home dosing.    PRN antiemetics: Prochlorperazine and Ondansetron  Follow up with Dr. Qamar Romero  Return to clinic every 2 weeks on therapy.       Signed By: Helen Pinedo MD

## 2018-12-18 NOTE — PROGRESS NOTES
[facility] VISIT NOTE    Pt arrived at [facility] ambulatory and in no distress for C11 Follfox 6 Modified. Assessment completed, pt had no complaints . Patient Vitals for the past 12 hrs:   Temp Pulse Resp BP SpO2   12/18/18 1350 98.9 °F (37.2 °C) 81 16 118/74 --   12/18/18 1240 -- 67 -- 117/77 99 %   12/18/18 1230 -- (!) 59 -- 106/86 93 %   12/18/18 0921 -- -- -- (!) 144/97 --   12/18/18 0908 98.6 °F (37 °C) 89 18 (!) 153/98 98 %     Right chest port accessed with . 75  in costello no difficulty. Positive blood return noted and labs drawn. Medications received:  Aloxi IV  Decadron IV  Leukovorin IV  5FU IVP  5FU CIV    Pt c/o severe abdominal pain and burning after approximately 10 minutes of Oxaliplatin/leukovorin. 12:25 Chemo stopped  12:30 Benedryl and Pepcid given. Pt continues to have pain. Solumedrol given. MD notified. NP came down to see patient    Ordered not to retry oxaliiplatin. Give leukovorin over 30 minutes and give the 5FU push and CIV. Called patient's sister to ask if she could give him a ride because he was extremely drowsy from all the medications given for the reaction. Patient awakened and was suitable to leave. Sister was called to pick him up in 30 minutes. 5FU CIV hooked up to patient port. Good blood return noted. D/C'd from [facility] ambulatory and in no distress accompanied by sister. Next appointment is 12/20/18 at 4:00.

## 2018-12-18 NOTE — PROGRESS NOTES
Inés Zambrano is a 25 y.o. male follow up for colon cancer. 1. Have you been to the ER, urgent care clinic since your last visit? Hospitalized since your last visit? No    2. Have you seen or consulted any other health care providers outside of the [address] [place] [address] since your last visit? Include any pap smears or colon screening.  NO

## 2018-12-20 ENCOUNTER — HOSPITAL ENCOUNTER (OUTPATIENT)
Dept: INFUSION THERAPY | Age: 28
Discharge: HOME | End: 2018-12-20
Payer: COMMERCIAL

## 2018-12-20 VITALS
HEART RATE: 87 BPM | TEMPERATURE: 97.5 F | SYSTOLIC BLOOD PRESSURE: 138 MMHG | DIASTOLIC BLOOD PRESSURE: 78 MMHG | RESPIRATION RATE: 16 BRPM

## 2018-12-20 DIAGNOSIS — C18.9 COLON CANCER METASTASIZED TO LIVER (HCC): Primary | ICD-10-CM

## 2018-12-20 DIAGNOSIS — C78.7 COLON CANCER METASTASIZED TO LIVER (HCC): Primary | ICD-10-CM

## 2018-12-20 PROCEDURE — 74011250636 HC RX REV CODE- 250/636: Performed by: INTERNAL MEDICINE

## 2018-12-20 PROCEDURE — 96523 IRRIG DRUG DELIVERY DEVICE: CPT

## 2018-12-20 RX ORDER — HEPARIN 100 UNIT/ML
300-500 SYRINGE INTRAVENOUS AS NEEDED
Status: ACTIVE | OUTPATIENT
Start: 2018-12-20 | End: 2018-12-21

## 2018-12-20 RX ORDER — SODIUM CHLORIDE 0.9 % (FLUSH) 0.9 %
10 SYRINGE (ML) INJECTION AS NEEDED
Status: ACTIVE | OUTPATIENT
Start: 2018-12-20 | End: 2018-12-21

## 2018-12-20 RX ORDER — SODIUM CHLORIDE 9 MG/ML
10 INJECTION INTRAMUSCULAR; INTRAVENOUS; SUBCUTANEOUS AS NEEDED
Status: ACTIVE | OUTPATIENT
Start: 2018-12-20 | End: 2018-12-21

## 2018-12-20 RX ADMIN — Medication 500 UNITS: at 13:01

## 2018-12-20 RX ADMIN — Medication 10 ML: at 13:01

## 2018-12-20 NOTE — PROGRESS NOTES
[facility] VISIT NOTE    3180. Pt arrived at [address] [place] [address] and in no distress for Pump Removal.  Assessment completed, pt states no new complaints or concerns. RCW chest port already accessed with 5fu pump attached. Verified pump finished infusing. Tolerated treatment well, no adverse reaction noted. Port de-accessed and flushed per protocol. Positive blood return noted. Patient Vitals for the past 12 hrs:   Temp Pulse Resp BP   12/20/18 1258 97.5 °F (36.4 °C) 87 16 138/78     1305. D/C'd from [address] [place] [address] and in no distress.  Next appointment is 1/2/19 at 9:30 am.

## 2018-12-31 NOTE — ROUTINE PROCESS
36  Patient stated he felt he needed to have a bowel movement. He attempted but has not gone. Family at bedside. 1230  Patient complained of GI pain of 4/10 after drinking an ensure that was milk based. Given simethicone and educated on pressing his PCA pump button for pain. Will continue to assess. 1400  Patient had a bowel movement. He is requesting an advance in his diet. Paged Dr. Brien Morales, awaiting return phone call. Dr. Brien Morales would like to advance his diet to a full liquid today and tomorrow will be GI lite.
Bedside and Verbal shift change report given to ANKUSH Rahman (oncoming nurse) by Josephine Armenta RN (offgoing nurse). Report included the following information SBAR, Kardex, Intake/Output, MAR and Recent Results.
Bedside and Verbal shift change report given to Baljinder Balderas RN (oncoming nurse) by Imani Mena (offgoing nurse).  Report included the following information SBAR, Kardex, Intake/Output, MAR, Recent Results and Med Rec Status
Bedside and Verbal shift change report given to Dao Davis RN (oncoming nurse) by Ana Lilia Mendoza RN (offgoing nurse). Report included the following information SBAR, Kardex, Intake/Output and Recent Results.
Bedside shift change report given to Haydee Roche (oncoming nurse) by Cathy Manning (offgoing nurse). Report included the following information SBAR, Kardex and MAR.
I have reviewed discharge instructions including medications with the patient. The patient verbalized understanding.
Notified Dr. Ana Neville face to face that pt HR in 80s-90s when resting, but in low 100s when up and moving. BP seems to be slightly better with PO metoprolol. No new orders at this time. Also notified him that pt O2 sats dropped into 80s several times again last night. Per Dr. Ana Neville- talked to pt about O2 sats dropping yesterday and pt stated that he does have a hx of FAVIO.
Sharmila Stager  1990  [id number]    Situation:  Verbal report received from: Yancy Mendoza  Procedure: Procedure(s):  COLONOSCOPY  COLON BIOPSY    Background:    Preoperative diagnosis: abnormal CT  Postoperative diagnosis: descending colon mass      :  Dr. Lennox Catalan  Assistant(s): Endoscopy Technician-1: Veronica Colunga  Endoscopy RN-1: Linda Nieto RN    Specimens:   ID Type Source Tests Collected by Time Destination   1 : biopsy descending colon mass Preservative Colon, Descending  Mrailyn Akers MD 3/27/2018 0232 Pathology     H. Pylori  no    Assessment:  Intra-procedure medications   Anesthesia gave intra-procedure sedation and medications, see anesthesia flow sheet yes    Intravenous fluids: NS@ KVO     Vital signs stable     Abdominal assessment: round and soft     Recommendation:  Discharge patient per MD order.   Return to floor  Family or Friend   Permission to share finding with family or friend yes
TRANSFER - IN REPORT:    Verbal report received from [facility], RN(name) on [facility]  being received from 511(unit) for routine progression of care      Report consisted of patients Situation, Background, Assessment and   Recommendations(SBAR). Information from the following report(s) SBAR and Kardex was reviewed with the receiving nurse. Opportunity for questions and clarification was provided. Assessment completed upon patients arrival to unit and care assumed.
Warm

## 2019-01-02 ENCOUNTER — HOSPITAL ENCOUNTER (OUTPATIENT)
Dept: INFUSION THERAPY | Age: 29
Discharge: HOME | End: 2019-01-02

## 2019-01-02 RX ORDER — DIPHENHYDRAMINE HCL 25 MG
50 CAPSULE ORAL ONCE
Status: CANCELLED
Start: 2019-01-02 | End: 2019-01-02

## 2019-01-02 NOTE — PROGRESS NOTES
[facility] Progress Note    Date: 2019    Name: Sunny Garza    MRN: [id number]         : 1990    Mr. Gonsales got a flat tire on his way to his appointment today- MD office aware. Patient will call to reschedule. PSR aware.      Rojas Hickman RN  2019

## 2019-01-04 ENCOUNTER — HOSPITAL ENCOUNTER (OUTPATIENT)
Dept: INFUSION THERAPY | Age: 29
End: 2019-01-04

## 2019-01-04 ENCOUNTER — TELEPHONE (OUTPATIENT)
Dept: ONCOLOGY | Age: 29
End: 2019-01-04

## 2019-01-04 NOTE — TELEPHONE ENCOUNTER
[address] at [facility] [address]  [phone]  01/04/19-Spoke to patient he reported he would like to delay treatment until next week due to work, \" we are very short staffed at [facility]". Explained that  would rather him try to get treatment next week if possible and that if he needs letter or paperwork to help for his work we can assist. He will talk to his boss again and see if he can rescheduled for next week. He reported Noemi Dutta called him last night and explained he can go forward with surgery and that someone will be calling him in the next few days to schedule appointments. He will keep our office in the loop. NO further questions or concerns.

## 2019-01-11 DIAGNOSIS — I10 ESSENTIAL HYPERTENSION: ICD-10-CM

## 2019-01-11 RX ORDER — DIPHENHYDRAMINE HYDROCHLORIDE 50 MG/ML
50 INJECTION, SOLUTION INTRAMUSCULAR; INTRAVENOUS AS NEEDED
Status: CANCELLED
Start: 2019-05-28

## 2019-01-11 RX ORDER — SODIUM CHLORIDE 0.9 % (FLUSH) 0.9 %
10 SYRINGE (ML) INJECTION AS NEEDED
Status: CANCELLED
Start: 2019-01-16

## 2019-01-11 RX ORDER — DIPHENHYDRAMINE HYDROCHLORIDE 50 MG/ML
50 INJECTION, SOLUTION INTRAMUSCULAR; INTRAVENOUS AS NEEDED
Status: CANCELLED
Start: 2019-05-14

## 2019-01-11 RX ORDER — ONDANSETRON 2 MG/ML
8 INJECTION INTRAMUSCULAR; INTRAVENOUS AS NEEDED
Status: CANCELLED | OUTPATIENT
Start: 2019-06-18

## 2019-01-11 RX ORDER — HEPARIN 100 UNIT/ML
300-500 SYRINGE INTRAVENOUS AS NEEDED
Status: CANCELLED
Start: 2019-05-30

## 2019-01-11 RX ORDER — DIPHENHYDRAMINE HYDROCHLORIDE 50 MG/ML
50 INJECTION, SOLUTION INTRAMUSCULAR; INTRAVENOUS AS NEEDED
Status: CANCELLED
Start: 2019-01-16

## 2019-01-11 RX ORDER — ACETAMINOPHEN 325 MG/1
650 TABLET ORAL AS NEEDED
Status: CANCELLED
Start: 2019-06-18

## 2019-01-11 RX ORDER — EPINEPHRINE 1 MG/ML
0.3 INJECTION, SOLUTION, CONCENTRATE INTRAVENOUS AS NEEDED
Status: CANCELLED | OUTPATIENT
Start: 2019-05-14

## 2019-01-11 RX ORDER — HYDROCORTISONE SODIUM SUCCINATE 100 MG/2ML
100 INJECTION, POWDER, FOR SOLUTION INTRAMUSCULAR; INTRAVENOUS AS NEEDED
Status: CANCELLED | OUTPATIENT
Start: 2019-06-18

## 2019-01-11 RX ORDER — SODIUM CHLORIDE 9 MG/ML
10 INJECTION INTRAMUSCULAR; INTRAVENOUS; SUBCUTANEOUS AS NEEDED
Status: CANCELLED | OUTPATIENT
Start: 2019-05-30

## 2019-01-11 RX ORDER — DEXTROSE MONOHYDRATE 50 MG/ML
25 INJECTION, SOLUTION INTRAVENOUS CONTINUOUS
Status: CANCELLED
Start: 2019-01-16

## 2019-01-11 RX ORDER — EPINEPHRINE 1 MG/ML
0.3 INJECTION, SOLUTION, CONCENTRATE INTRAVENOUS AS NEEDED
Status: CANCELLED | OUTPATIENT
Start: 2019-01-16

## 2019-01-11 RX ORDER — SODIUM CHLORIDE 9 MG/ML
10 INJECTION INTRAMUSCULAR; INTRAVENOUS; SUBCUTANEOUS AS NEEDED
Status: CANCELLED | OUTPATIENT
Start: 2019-05-14

## 2019-01-11 RX ORDER — DIPHENHYDRAMINE HCL 25 MG
50 CAPSULE ORAL ONCE
Status: CANCELLED
Start: 2019-05-28 | End: 2019-02-13

## 2019-01-11 RX ORDER — DEXTROSE MONOHYDRATE 50 MG/ML
25 INJECTION, SOLUTION INTRAVENOUS CONTINUOUS
Status: CANCELLED
Start: 2019-05-14

## 2019-01-11 RX ORDER — SODIUM CHLORIDE 0.9 % (FLUSH) 0.9 %
10 SYRINGE (ML) INJECTION AS NEEDED
Status: CANCELLED
Start: 2019-06-18

## 2019-01-11 RX ORDER — FLUOROURACIL 50 MG/ML
400 INJECTION, SOLUTION INTRAVENOUS ONCE
Status: CANCELLED
Start: 2019-05-14 | End: 2019-05-14

## 2019-01-11 RX ORDER — PALONOSETRON 0.05 MG/ML
0.25 INJECTION, SOLUTION INTRAVENOUS ONCE
Status: CANCELLED | OUTPATIENT
Start: 2019-05-28 | End: 2019-02-13

## 2019-01-11 RX ORDER — ALBUTEROL SULFATE 0.83 MG/ML
2.5 SOLUTION RESPIRATORY (INHALATION) AS NEEDED
Status: CANCELLED
Start: 2019-06-18

## 2019-01-11 RX ORDER — HYDROCORTISONE SODIUM SUCCINATE 100 MG/2ML
100 INJECTION, POWDER, FOR SOLUTION INTRAMUSCULAR; INTRAVENOUS AS NEEDED
Status: CANCELLED | OUTPATIENT
Start: 2019-05-14

## 2019-01-11 RX ORDER — ONDANSETRON 2 MG/ML
8 INJECTION INTRAMUSCULAR; INTRAVENOUS AS NEEDED
Status: CANCELLED | OUTPATIENT
Start: 2019-05-28

## 2019-01-11 RX ORDER — HEPARIN 100 UNIT/ML
300-500 SYRINGE INTRAVENOUS AS NEEDED
Status: CANCELLED
Start: 2019-01-16

## 2019-01-11 RX ORDER — SODIUM CHLORIDE 0.9 % (FLUSH) 0.9 %
10 SYRINGE (ML) INJECTION AS NEEDED
Status: CANCELLED
Start: 2019-05-28

## 2019-01-11 RX ORDER — SODIUM CHLORIDE 0.9 % (FLUSH) 0.9 %
10 SYRINGE (ML) INJECTION AS NEEDED
Status: CANCELLED
Start: 2019-05-16

## 2019-01-11 RX ORDER — DIPHENHYDRAMINE HCL 25 MG
50 CAPSULE ORAL ONCE
Status: CANCELLED
Start: 2019-05-14 | End: 2019-01-30

## 2019-01-11 RX ORDER — METOPROLOL TARTRATE 25 MG/1
TABLET, FILM COATED ORAL
Qty: 60 TAB | Refills: 3 | Status: SHIPPED | OUTPATIENT
Start: 2019-01-11 | End: 2019-04-14 | Stop reason: SDUPTHER

## 2019-01-11 RX ORDER — DEXTROSE MONOHYDRATE 50 MG/ML
25 INJECTION, SOLUTION INTRAVENOUS CONTINUOUS
Status: CANCELLED
Start: 2019-05-28

## 2019-01-11 RX ORDER — SODIUM CHLORIDE 0.9 % (FLUSH) 0.9 %
10 SYRINGE (ML) INJECTION AS NEEDED
Status: CANCELLED
Start: 2019-05-14

## 2019-01-11 RX ORDER — SODIUM CHLORIDE 9 MG/ML
500 INJECTION, SOLUTION INTRAVENOUS CONTINUOUS
Status: CANCELLED | OUTPATIENT
Start: 2019-05-14

## 2019-01-11 RX ORDER — SODIUM CHLORIDE 9 MG/ML
10 INJECTION INTRAMUSCULAR; INTRAVENOUS; SUBCUTANEOUS AS NEEDED
Status: CANCELLED | OUTPATIENT
Start: 2019-01-16

## 2019-01-11 RX ORDER — FLUOROURACIL 50 MG/ML
400 INJECTION, SOLUTION INTRAVENOUS ONCE
Status: CANCELLED
Start: 2019-05-28 | End: 2019-05-28

## 2019-01-11 RX ORDER — ONDANSETRON 2 MG/ML
8 INJECTION INTRAMUSCULAR; INTRAVENOUS AS NEEDED
Status: CANCELLED | OUTPATIENT
Start: 2019-05-14

## 2019-01-11 RX ORDER — DEXTROSE MONOHYDRATE 50 MG/ML
25 INJECTION, SOLUTION INTRAVENOUS CONTINUOUS
Status: CANCELLED
Start: 2019-06-18

## 2019-01-11 RX ORDER — SODIUM CHLORIDE 9 MG/ML
10 INJECTION INTRAMUSCULAR; INTRAVENOUS; SUBCUTANEOUS AS NEEDED
Status: CANCELLED | OUTPATIENT
Start: 2019-06-18

## 2019-01-11 RX ORDER — DIPHENHYDRAMINE HCL 25 MG
50 CAPSULE ORAL ONCE
Status: CANCELLED
Start: 2019-01-16 | End: 2019-01-16

## 2019-01-11 RX ORDER — ACETAMINOPHEN 325 MG/1
650 TABLET ORAL AS NEEDED
Status: CANCELLED
Start: 2019-05-28

## 2019-01-11 RX ORDER — FLUOROURACIL 50 MG/ML
400 INJECTION, SOLUTION INTRAVENOUS ONCE
Status: CANCELLED
Start: 2019-06-18 | End: 2019-06-18

## 2019-01-11 RX ORDER — SODIUM CHLORIDE 0.9 % (FLUSH) 0.9 %
10 SYRINGE (ML) INJECTION AS NEEDED
Status: CANCELLED
Start: 2019-05-30

## 2019-01-11 RX ORDER — EPINEPHRINE 1 MG/ML
0.3 INJECTION, SOLUTION, CONCENTRATE INTRAVENOUS AS NEEDED
Status: CANCELLED | OUTPATIENT
Start: 2019-05-28

## 2019-01-11 RX ORDER — ACETAMINOPHEN 325 MG/1
650 TABLET ORAL AS NEEDED
Status: CANCELLED
Start: 2019-05-14

## 2019-01-11 RX ORDER — HEPARIN 100 UNIT/ML
300-500 SYRINGE INTRAVENOUS AS NEEDED
Status: CANCELLED
Start: 2019-06-20

## 2019-01-11 RX ORDER — DIPHENHYDRAMINE HCL 25 MG
50 CAPSULE ORAL ONCE
Status: CANCELLED
Start: 2019-06-18 | End: 2019-02-27

## 2019-01-11 RX ORDER — SODIUM CHLORIDE 9 MG/ML
10 INJECTION INTRAMUSCULAR; INTRAVENOUS; SUBCUTANEOUS AS NEEDED
Status: CANCELLED | OUTPATIENT
Start: 2019-05-28

## 2019-01-11 RX ORDER — HEPARIN 100 UNIT/ML
300-500 SYRINGE INTRAVENOUS AS NEEDED
Status: CANCELLED
Start: 2019-05-28

## 2019-01-11 RX ORDER — HEPARIN 100 UNIT/ML
300-500 SYRINGE INTRAVENOUS AS NEEDED
Status: CANCELLED
Start: 2019-05-14

## 2019-01-11 RX ORDER — SODIUM CHLORIDE 9 MG/ML
500 INJECTION, SOLUTION INTRAVENOUS CONTINUOUS
Status: CANCELLED | OUTPATIENT
Start: 2019-06-18

## 2019-01-11 RX ORDER — PALONOSETRON 0.05 MG/ML
0.25 INJECTION, SOLUTION INTRAVENOUS ONCE
Status: CANCELLED | OUTPATIENT
Start: 2019-06-18 | End: 2019-02-27

## 2019-01-11 RX ORDER — PALONOSETRON 0.05 MG/ML
0.25 INJECTION, SOLUTION INTRAVENOUS ONCE
Status: CANCELLED | OUTPATIENT
Start: 2019-05-14 | End: 2019-01-30

## 2019-01-11 RX ORDER — SODIUM CHLORIDE 9 MG/ML
500 INJECTION, SOLUTION INTRAVENOUS CONTINUOUS
Status: CANCELLED | OUTPATIENT
Start: 2019-01-16

## 2019-01-11 RX ORDER — SODIUM CHLORIDE 9 MG/ML
10 INJECTION INTRAMUSCULAR; INTRAVENOUS; SUBCUTANEOUS AS NEEDED
Status: CANCELLED | OUTPATIENT
Start: 2019-06-20

## 2019-01-11 RX ORDER — ACETAMINOPHEN 325 MG/1
650 TABLET ORAL AS NEEDED
Status: CANCELLED
Start: 2019-01-16

## 2019-01-11 RX ORDER — HYDROCORTISONE SODIUM SUCCINATE 100 MG/2ML
100 INJECTION, POWDER, FOR SOLUTION INTRAMUSCULAR; INTRAVENOUS AS NEEDED
Status: CANCELLED | OUTPATIENT
Start: 2019-01-16

## 2019-01-11 RX ORDER — ALBUTEROL SULFATE 0.83 MG/ML
2.5 SOLUTION RESPIRATORY (INHALATION) AS NEEDED
Status: CANCELLED
Start: 2019-01-16

## 2019-01-11 RX ORDER — SODIUM CHLORIDE 0.9 % (FLUSH) 0.9 %
10 SYRINGE (ML) INJECTION AS NEEDED
Status: CANCELLED
Start: 2019-06-20

## 2019-01-11 RX ORDER — FLUOROURACIL 50 MG/ML
400 INJECTION, SOLUTION INTRAVENOUS ONCE
Status: CANCELLED
Start: 2019-01-16 | End: 2019-01-16

## 2019-01-11 RX ORDER — HYDROCORTISONE SODIUM SUCCINATE 100 MG/2ML
100 INJECTION, POWDER, FOR SOLUTION INTRAMUSCULAR; INTRAVENOUS AS NEEDED
Status: CANCELLED | OUTPATIENT
Start: 2019-05-28

## 2019-01-11 RX ORDER — PALONOSETRON 0.05 MG/ML
0.25 INJECTION, SOLUTION INTRAVENOUS ONCE
Status: CANCELLED | OUTPATIENT
Start: 2019-01-16 | End: 2019-01-16

## 2019-01-11 RX ORDER — ALBUTEROL SULFATE 0.83 MG/ML
2.5 SOLUTION RESPIRATORY (INHALATION) AS NEEDED
Status: CANCELLED
Start: 2019-05-28

## 2019-01-11 RX ORDER — DIPHENHYDRAMINE HYDROCHLORIDE 50 MG/ML
50 INJECTION, SOLUTION INTRAMUSCULAR; INTRAVENOUS AS NEEDED
Status: CANCELLED
Start: 2019-06-18

## 2019-01-11 RX ORDER — HEPARIN 100 UNIT/ML
300-500 SYRINGE INTRAVENOUS AS NEEDED
Status: CANCELLED
Start: 2019-06-18

## 2019-01-11 RX ORDER — ALBUTEROL SULFATE 0.83 MG/ML
2.5 SOLUTION RESPIRATORY (INHALATION) AS NEEDED
Status: CANCELLED
Start: 2019-05-14

## 2019-01-11 RX ORDER — ONDANSETRON 2 MG/ML
8 INJECTION INTRAMUSCULAR; INTRAVENOUS AS NEEDED
Status: CANCELLED | OUTPATIENT
Start: 2019-01-16

## 2019-01-11 RX ORDER — SODIUM CHLORIDE 9 MG/ML
500 INJECTION, SOLUTION INTRAVENOUS CONTINUOUS
Status: CANCELLED | OUTPATIENT
Start: 2019-05-28

## 2019-01-11 RX ORDER — EPINEPHRINE 1 MG/ML
0.3 INJECTION, SOLUTION, CONCENTRATE INTRAVENOUS AS NEEDED
Status: CANCELLED | OUTPATIENT
Start: 2019-06-18

## 2019-01-11 RX ORDER — SODIUM CHLORIDE 9 MG/ML
10 INJECTION INTRAMUSCULAR; INTRAVENOUS; SUBCUTANEOUS AS NEEDED
Status: CANCELLED | OUTPATIENT
Start: 2019-05-16

## 2019-01-11 RX ORDER — HEPARIN 100 UNIT/ML
300-500 SYRINGE INTRAVENOUS AS NEEDED
Status: CANCELLED
Start: 2019-05-16

## 2019-01-15 ENCOUNTER — TELEPHONE (OUTPATIENT)
Dept: ONCOLOGY | Age: 29
End: 2019-01-15

## 2019-01-15 NOTE — TELEPHONE ENCOUNTER
[address] at [place]  [phone]    01/15/19- Patient called the office yesterday, stated his surgery is scheduled for 1/30 with Dr. Stevan Gonzalez and doesn't need chemo this week. Voicemail left for patient, we can cancel \[facility]\"" appointment for tomorrow, but would like to keep his office follow up. Requested a return call with any questions or concerns.

## 2019-01-16 ENCOUNTER — OFFICE VISIT (OUTPATIENT)
Dept: ONCOLOGY | Age: 29
End: 2019-01-16

## 2019-01-16 ENCOUNTER — HOSPITAL ENCOUNTER (OUTPATIENT)
Dept: INFUSION THERAPY | Age: 29
End: 2019-01-16

## 2019-01-16 VITALS
TEMPERATURE: 97.3 F | HEART RATE: 93 BPM | OXYGEN SATURATION: 97 % | WEIGHT: 301 LBS | DIASTOLIC BLOOD PRESSURE: 99 MMHG | SYSTOLIC BLOOD PRESSURE: 155 MMHG | RESPIRATION RATE: 16 BRPM | BODY MASS INDEX: 40.77 KG/M2 | HEIGHT: 72 IN

## 2019-01-16 DIAGNOSIS — C78.7 COLON CANCER METASTASIZED TO LIVER (HCC): Primary | ICD-10-CM

## 2019-01-16 DIAGNOSIS — C18.9 COLON CANCER METASTASIZED TO LIVER (HCC): Primary | ICD-10-CM

## 2019-01-16 RX ORDER — SODIUM CHLORIDE 0.9 % (FLUSH) 0.9 %
5-10 SYRINGE (ML) INJECTION AS NEEDED
Status: CANCELLED | OUTPATIENT
Start: 2019-02-27

## 2019-01-16 RX ORDER — HEPARIN 100 UNIT/ML
500 SYRINGE INTRAVENOUS AS NEEDED
Status: CANCELLED | OUTPATIENT
Start: 2019-05-08

## 2019-01-16 RX ORDER — SODIUM CHLORIDE 0.9 % (FLUSH) 0.9 %
5-10 SYRINGE (ML) INJECTION AS NEEDED
Status: CANCELLED | OUTPATIENT
Start: 2019-04-10

## 2019-01-16 RX ORDER — SODIUM CHLORIDE 9 MG/ML
10 INJECTION INTRAMUSCULAR; INTRAVENOUS; SUBCUTANEOUS AS NEEDED
Status: CANCELLED | OUTPATIENT
Start: 2019-02-27

## 2019-01-16 RX ORDER — SODIUM CHLORIDE 9 MG/ML
10 INJECTION INTRAMUSCULAR; INTRAVENOUS; SUBCUTANEOUS AS NEEDED
Status: CANCELLED | OUTPATIENT
Start: 2019-05-08

## 2019-01-16 RX ORDER — SODIUM CHLORIDE 9 MG/ML
10 INJECTION INTRAMUSCULAR; INTRAVENOUS; SUBCUTANEOUS AS NEEDED
Status: CANCELLED | OUTPATIENT
Start: 2019-02-28

## 2019-01-16 RX ORDER — HEPARIN 100 UNIT/ML
500 SYRINGE INTRAVENOUS AS NEEDED
Status: CANCELLED | OUTPATIENT
Start: 2019-04-10

## 2019-01-16 RX ORDER — SODIUM CHLORIDE 0.9 % (FLUSH) 0.9 %
5-10 SYRINGE (ML) INJECTION AS NEEDED
Status: CANCELLED | OUTPATIENT
Start: 2019-05-08

## 2019-01-16 RX ORDER — SODIUM CHLORIDE 9 MG/ML
10 INJECTION INTRAMUSCULAR; INTRAVENOUS; SUBCUTANEOUS AS NEEDED
Status: CANCELLED | OUTPATIENT
Start: 2019-04-10

## 2019-01-16 RX ORDER — HEPARIN 100 UNIT/ML
500 SYRINGE INTRAVENOUS AS NEEDED
Status: CANCELLED | OUTPATIENT
Start: 2019-02-28

## 2019-01-16 RX ORDER — HEPARIN 100 UNIT/ML
500 SYRINGE INTRAVENOUS AS NEEDED
Status: CANCELLED | OUTPATIENT
Start: 2019-02-27

## 2019-01-16 RX ORDER — SODIUM CHLORIDE 0.9 % (FLUSH) 0.9 %
5-10 SYRINGE (ML) INJECTION AS NEEDED
Status: CANCELLED | OUTPATIENT
Start: 2019-02-28

## 2019-01-16 NOTE — PROGRESS NOTES
Jian Joshua is a 25 y.o. male follow up for colon cancer. 1. Have you been to the ER, urgent care clinic since your last visit? Hospitalized since your last visit? No    2. Have you seen or consulted any other health care providers outside of the [address] [place] [address] since your last visit? Include any pap smears or colon screening.  NO

## 2019-01-16 NOTE — PROGRESS NOTES
[facility] [place] at [address] [facility]  [place]: [phone]  F: [phone]     Reason for Visit:   Hunter Robert is a 25 y.o. male who is seen for follow up of metastatic colon cancer. Treatment History:   · CT A/P 3/25/2018: Indeterminate 2 x 1 cm low-density liver lesion. · CT C/A/P with triphase liver 3/27/2018: No evidence of metastatic disease to the chest. Multiple ill-defined hepatic lesions. These do not represent simple cyst.  · CT guided liver biopsy 3/27/2018: metastatic adenocarcinoma, KRAS wild type, NRAS wild type  · Colectomy by Dr. Cayetano Michel 3/29/2018: Adenocarcinoma, moderately differentiated. Negative margins. 3/16 nodes involved. pMMR  · Stage IV (pT3 pN1b pM1) Colon Cancer  · Chemotherapy with FOLFOX and bevacizumab 5/1/2018 - 7/12/2018 for a total of 6 cycles, dany administered with cycles 2-4  · Resumed chemotherapy with FOLFOX and bevacizumab from 9/25/2018 to 12/20/2018. Avastin held after 10/23/2018 in preparation for surgery. History of Present Illness:   Presents today for follow up. Denies pain. Some aches in right side of abdomen. Eating and drinking well. Trying to drink more water, not as many energy drinks or sodas. No mouth sores. Neuropathy has been much improved. Present just in fingertips now, not interfering in function. Mild fatigue. Continues to work full time currently, plans to quick work on 1/25/19. Surgery planned on 1/30/19. He is unaccompanied today, supportive family. Working two jobs, [employer] and [employer]. PAST HISTORY: The following sections were reviewed and updated in the EMR as appropriate: PMH, SH, FH, Medications, Allergies. No Known Allergies     Review of Systems: A complete review of systems was obtained, reviewed, and scanned into the EMR. Pertinent findings reviewed above.       Physical Exam:     Visit Vitals  BP (!) 155/99 (BP 1 Location: Left arm, BP Patient Position: Sitting) Comment: . Pulse 93   Temp 97.3 °F (36.3 °C) (Oral)   Resp 16   Ht 6' (1.829 m)   Wt 301 lb (136.5 kg)   SpO2 97%   BMI 40.82 kg/m²     ECOG PS: 0  General: No distress, obese  Eyes: PERRLA, anicteric sclerae  HENT: Atraumatic, OP clear  Neck: Supple  Lymphatic: No cervical, supraclavicular, or inguinal adenopathy  Respiratory: CTAB, normal respiratory effort  CV: Normal rate, regular rhythm, no murmurs, no peripheral edema  GI: Soft, nontender, nondistended, no masses, no hepatomegaly, no splenomegaly  MS: Normal gait and station. Digits without clubbing or cyanosis. Skin: No rashes, ecchymoses, or petechiae. Normal temperature, turgor, and texture. Psych: Alert, oriented, appropriate affect, normal judgment/insight    Results:     Lab Results   Component Value Date/Time    WBC 7.0 12/18/2018 09:27 AM    HGB 14.6 12/18/2018 09:27 AM    HCT 44.3 12/18/2018 09:27 AM    PLATELET 597 53/33/8384 09:27 AM    MCV 74.2 (L) 12/18/2018 09:27 AM    ABS. NEUTROPHILS 3.8 12/18/2018 09:27 AM    Hemoglobin (POC) 15.2 11/12/2018 12:58 PM     Lab Results   Component Value Date/Time    Sodium 137 12/18/2018 09:27 AM    Potassium 4.0 12/18/2018 09:27 AM    Chloride 102 12/18/2018 09:27 AM    CO2 26 12/18/2018 09:27 AM    Glucose 100 12/18/2018 09:27 AM    BUN 14 12/18/2018 09:27 AM    Creatinine 0.96 12/18/2018 09:27 AM    GFR est AA >60 12/18/2018 09:27 AM    GFR est non-AA >60 12/18/2018 09:27 AM    Calcium 9.3 12/18/2018 09:27 AM     Lab Results   Component Value Date/Time    Bilirubin, total 0.4 12/18/2018 09:27 AM    ALT (SGPT) 35 12/18/2018 09:27 AM    AST (SGOT) 21 12/18/2018 09:27 AM    Alk. phosphatase 61 12/18/2018 09:27 AM    Protein, total 8.0 12/18/2018 09:27 AM    Albumin 3.4 (L) 12/18/2018 09:27 AM    Globulin 4.6 (H) 12/18/2018 09:27 AM       CT C/A/P with triphasic liver 7/19/2018: There is no axilla, mediastinal or hilar adenopathy.  There is no pericardial pleural effusion no shift or pneumothorax, infusion catheter is in place. No parenchymal mass. The liver lesion and decreased in size, a dome of the liver lesion measures 11 x 14 mm, it was 15 x 20. An additional right lobe of the liver lower lesion is also decreased in size measuring 12 x 7 mm it was 16 x 12. No new lesion is seen. Liver and spleen remain normal in size, the gallbladder is not distended. The kidneys are unobstructed. There is no bowel wall thickening or obstruction. Bowel wall thickening in the ascending colon appear stable some of this may be related to fecal stasis up. There is no free air or free fluid. There is no adenopathy in the abdomen or pelvis. The bladder is midline. Review of bone windows does not suggest metastases. There is no inguinal adenopathy. Major vessels do appear patent. Assessment:   1) Metastatic Colon Cancer  Stage IV, pMMR, KRAS/NRAS wild type    He has metastatic disease within his liver. His primary tumor has been resected. He is currently receiving chemotherapy with FOLFOX (Bevacizumab on hold starting with C10). He is following with Dr. Orvil Peabody at [facility] for consideration of liver resection vs liver directed therapy. MRI completed at [facility] per patient report had over 20 lesions in liver. Will obtain these records. Dr. Aniket Gonzalez note reviewed. Planning resection to one lobe of liver and ablation to other lesions identified. He will meet with Dr. Orvil Peabody again on 1/18/19 with plans to undergo resection on 1/30/19. Last FOLFOX therapy on 12/18/18, last Avastin on 10/23/18. 2) Risk of infertility  We have previously reviewed that chemotherapy can potentially cause infertility. He has undergone sperm cryopreservation. 3) Genetic risk  Testing at [facility] negative. 4) Hypertension  Continue Metoprolol. 5) Chemotherapy induced neuropathy. Grade 1 currently. Improved since break from treatment. Monitor. Plan:     HOLD chemotherapy today on preparation for surgery.    Follow up with Dr. Orvil Peabody as scheduled. Continue Metoprolol  Planned liver resection on 1/30/19. Return to clinic in 5-6 weeks     Patient was seen in conjunction with Bishop Miller NP.     Signed By: Miquel Betancourt MD

## 2019-01-18 ENCOUNTER — HOSPITAL ENCOUNTER (OUTPATIENT)
Dept: INFUSION THERAPY | Age: 29
End: 2019-01-18

## 2019-01-24 ENCOUNTER — TELEPHONE (OUTPATIENT)
Dept: ONCOLOGY | Age: 29
End: 2019-01-24

## 2019-01-30 ENCOUNTER — APPOINTMENT (OUTPATIENT)
Dept: INFUSION THERAPY | Age: 29
End: 2019-01-30

## 2019-02-01 ENCOUNTER — HOSPITAL ENCOUNTER (OUTPATIENT)
Dept: INFUSION THERAPY | Age: 29
End: 2019-02-01
Payer: COMMERCIAL

## 2019-02-13 ENCOUNTER — APPOINTMENT (OUTPATIENT)
Dept: INFUSION THERAPY | Age: 29
End: 2019-02-13
Payer: COMMERCIAL

## 2019-02-15 ENCOUNTER — APPOINTMENT (OUTPATIENT)
Dept: INFUSION THERAPY | Age: 29
End: 2019-02-15
Payer: COMMERCIAL

## 2019-02-19 ENCOUNTER — TELEPHONE (OUTPATIENT)
Dept: ONCOLOGY | Age: 29
End: 2019-02-19

## 2019-02-19 NOTE — TELEPHONE ENCOUNTER
Patient called and stated that for the past week anytime he scratches he breaks out in hives where he scratched. Patient stated that this morning the side of his face was itching so he scratched it, he then had hives where he scratched.  # [phone]

## 2019-02-19 NOTE — TELEPHONE ENCOUNTER
[address] at [facility]  [phone]    02/19/19- Patient stated he's breaking out in hives anywhere he scratches. Denies any new medications. Had liver ablation with Dr. Brittany Green on 1/30 and recovering well from that. Scheduled for embolization with IR on Thursday. Recommended that patient take benadryl at night and zyrtec during the day for skin itching/hives, per Hipolito Crockett. Patient has follow up with us next week. He denies further questions or concerns at this time.

## 2019-02-26 NOTE — PROGRESS NOTES
[facility] [place] [facility] [place] [address] [address]  Rowena Shy: [phone]  F: [phone]     Reason for Visit:   Sharmila Magaña is a 25 y.o. male who is seen for follow up of metastatic colon cancer. Treatment History:   · CT A/P 3/25/2018: Indeterminate 2 x 1 cm low-density liver lesion. · CT C/A/P with triphase liver 3/27/2018: No evidence of metastatic disease to the chest. Multiple ill-defined hepatic lesions. These do not represent simple cyst.  · CT guided liver biopsy 3/27/2018: metastatic adenocarcinoma, KRAS wild type, NRAS wild type  · Colectomy by Dr. Graceann Jeans 3/29/2018: Adenocarcinoma, moderately differentiated. Negative margins. 3/16 nodes involved. pMMR  · Stage IV (pT3 pN1b pM1) Colon Cancer  · Chemotherapy with FOLFOX and bevacizumab 5/1/2018 - 7/12/2018 for a total of 6 cycles, dany administered with cycles 2-4  · Resumed chemotherapy with FOLFOX and bevacizumab from 9/25/2018 to 12/20/2018. Avastin held after 10/23/2018 in preparation for surgery. · Underwent diagnostic laparoscopy with microwave liver ablation to right hepatic lobe 1/30/2019    History of Present Illness:   Presents today for follow up. Feeling well. Went to [place] last night. Underwent PVE on 2/21/2019. Had some trouble with anesthesia. Woke up feeling nauseated with vomiting. No fever, chills, SOB or chest pain. Appetite has been great. No belly pain. Some soreness to right side if he sits for long periods. BP up today, did take his Metoprolol dose. He is unaccompanied today, supportive family. Working two jobs, [employer] and [employer]. He is on a leave of absence currently. Planing to be go back to work March 11th. PAST HISTORY: The following sections were reviewed and updated in the EMR as appropriate: PMH, SH, FH, Medications, Allergies.     No Known Allergies     Review of Systems: A complete review of systems was obtained, reviewed, and scanned into the EMR. Pertinent findings reviewed above. Physical Exam:     Visit Vitals  BP (!) 141/98 (BP 1 Location: Right arm, BP Patient Position: Sitting)   Pulse 88   Temp 97.3 °F (36.3 °C) (Temporal)   Resp 16   Ht 6' (1.829 m)   Wt 291 lb (132 kg)   SpO2 97%   BMI 39.47 kg/m²     ECOG PS: 0  General: No distress, obese  Eyes: PERRLA, anicteric sclerae  HENT: Atraumatic, OP clear  Neck: Supple  Lymphatic: No cervical, supraclavicular, or inguinal adenopathy  Respiratory: CTAB, normal respiratory effort  CV: Normal rate, regular rhythm, no murmurs, no peripheral edema  GI: Soft, nontender, nondistended, no masses, no hepatomegaly, no splenomegaly  MS: Normal gait and station. Digits without clubbing or cyanosis. Skin: No rashes, ecchymoses, or petechiae. Normal temperature, turgor, and texture. Psych: Alert, oriented, appropriate affect, normal judgment/insight    Results:     Lab Results   Component Value Date/Time    WBC 7.0 12/18/2018 09:27 AM    HGB 14.6 12/18/2018 09:27 AM    HCT 44.3 12/18/2018 09:27 AM    PLATELET 518 49/33/7473 09:27 AM    MCV 74.2 (L) 12/18/2018 09:27 AM    ABS. NEUTROPHILS 3.8 12/18/2018 09:27 AM    Hemoglobin (POC) 15.2 11/12/2018 12:58 PM     Lab Results   Component Value Date/Time    Sodium 137 12/18/2018 09:27 AM    Potassium 4.0 12/18/2018 09:27 AM    Chloride 102 12/18/2018 09:27 AM    CO2 26 12/18/2018 09:27 AM    Glucose 100 12/18/2018 09:27 AM    BUN 14 12/18/2018 09:27 AM    Creatinine 0.96 12/18/2018 09:27 AM    GFR est AA >60 12/18/2018 09:27 AM    GFR est non-AA >60 12/18/2018 09:27 AM    Calcium 9.3 12/18/2018 09:27 AM     Lab Results   Component Value Date/Time    Bilirubin, total 0.4 12/18/2018 09:27 AM    ALT (SGPT) 35 12/18/2018 09:27 AM    AST (SGOT) 21 12/18/2018 09:27 AM    Alk.  phosphatase 61 12/18/2018 09:27 AM    Protein, total 8.0 12/18/2018 09:27 AM    Albumin 3.4 (L) 12/18/2018 09:27 AM    Globulin 4.6 (H) 12/18/2018 09:27 AM       CT C/A/P with triphasic liver 7/19/2018: There is no axilla, mediastinal or hilar adenopathy. There is no pericardial pleural effusion no shift or pneumothorax, infusion catheter is in place. No parenchymal mass. The liver lesion and decreased in size, a dome of the liver lesion measures 11 x 14 mm, it was 15 x 20. An additional right lobe of the liver lower lesion is also decreased in size measuring 12 x 7 mm it was 16 x 12. No new lesion is seen. Liver and spleen remain normal in size, the gallbladder is not distended. The kidneys are unobstructed. There is no bowel wall thickening or obstruction. Bowel wall thickening in the ascending colon appear stable some of this may be related to fecal stasis up. There is no free air or free fluid. There is no adenopathy in the abdomen or pelvis. The bladder is midline. Review of bone windows does not suggest metastases. There is no inguinal adenopathy. Major vessels do appear patent. Assessment:   1) Metastatic Colon Cancer  Stage IV, pMMR, KRAS/NRAS wild type    He has metastatic disease within his liver. His primary tumor has been resected. He is received palliative chemotherapy with FOLFOX (Bevacizumab on hold starting with C10). Last FOLFOX therapy on 12/18/18, last Avastin on 10/23/18. Treatment held in preparative for liver directed therapy under the care of Dr. Mari James at [facility]. [facility] records reviewed. Underwent diagnostic laparoscopy with microwave liver ablation x 3 on 1/30/2019. There were at least 8 metastatic lesions in the right hepatic lobe and 5 metastatic lesions in the left lateral hepatic lobe. Lesions in right hepatic lobe were ablated. IR was consulted following procedure for discussion of portal vein embolization to the right lobe in preparation for right hepatectomy. Underwent PVE on 2/21/2019. Plan to repeat MRI or CT 4 weeks post PVE to assess response to therapy. I have placed a call to Dr. Damaris Garza to discuss timing of future surgery.   Additional chemotherapy remains on hold for now. 2) Risk of infertility  We have previously reviewed that chemotherapy can potentially cause infertility. He has undergone sperm cryopreservation. 3) Genetic risk  Testing at [facility] negative. 4) Hypertension  Continue Metoprolol. 5) Chemotherapy induced neuropathy. Grade 1 currently. Improved since break from treatment. Monitor.     Plan:     Port flush today, then every 6 weeks  Continue Metoprolol  Follow up at [facility] in 3 weeks for CT/MRI  Return to clinic in 6 weeks     Signed By: Idalia Mo MD

## 2019-02-27 ENCOUNTER — HOSPITAL ENCOUNTER (OUTPATIENT)
Dept: INFUSION THERAPY | Age: 29
Discharge: HOME | End: 2019-02-27
Payer: COMMERCIAL

## 2019-02-27 ENCOUNTER — OFFICE VISIT (OUTPATIENT)
Dept: ONCOLOGY | Age: 29
End: 2019-02-27

## 2019-02-27 VITALS
SYSTOLIC BLOOD PRESSURE: 141 MMHG | HEART RATE: 88 BPM | TEMPERATURE: 97.3 F | DIASTOLIC BLOOD PRESSURE: 98 MMHG | BODY MASS INDEX: 39.42 KG/M2 | OXYGEN SATURATION: 97 % | HEIGHT: 72 IN | WEIGHT: 291 LBS | RESPIRATION RATE: 16 BRPM

## 2019-02-27 DIAGNOSIS — C78.7 COLON CANCER METASTASIZED TO LIVER (HCC): Primary | ICD-10-CM

## 2019-02-27 DIAGNOSIS — C18.9 COLON CANCER METASTASIZED TO LIVER (HCC): Primary | ICD-10-CM

## 2019-02-27 RX ORDER — SODIUM CHLORIDE 0.9 % (FLUSH) 0.9 %
5-10 SYRINGE (ML) INJECTION AS NEEDED
Status: ACTIVE | OUTPATIENT
Start: 2019-02-27 | End: 2019-02-27

## 2019-02-27 RX ORDER — HEPARIN 100 UNIT/ML
500 SYRINGE INTRAVENOUS AS NEEDED
Status: ACTIVE | OUTPATIENT
Start: 2019-02-27 | End: 2019-02-27

## 2019-02-27 RX ORDER — SODIUM CHLORIDE 9 MG/ML
10 INJECTION INTRAMUSCULAR; INTRAVENOUS; SUBCUTANEOUS AS NEEDED
Status: ACTIVE | OUTPATIENT
Start: 2019-02-27 | End: 2019-02-27

## 2019-02-27 NOTE — PROGRESS NOTES
Leonidas Ellis is a 25 y.o. male follow up for colon cancer. 1. Have you been to the ER, urgent care clinic since your last visit? Hospitalized since your last visit? No    2. Have you seen or consulted any other health care providers outside of the [address] [place] [address] since your last visit? Include any pap smears or colon screening. No

## 2019-02-28 ENCOUNTER — DOCUMENTATION ONLY (OUTPATIENT)
Dept: ONCOLOGY | Age: 29
End: 2019-02-28

## 2019-02-28 ENCOUNTER — HOSPITAL ENCOUNTER (OUTPATIENT)
Dept: INFUSION THERAPY | Age: 29
Discharge: HOME | End: 2019-02-28
Payer: COMMERCIAL

## 2019-02-28 VITALS
TEMPERATURE: 98.2 F | SYSTOLIC BLOOD PRESSURE: 138 MMHG | RESPIRATION RATE: 18 BRPM | HEART RATE: 86 BPM | DIASTOLIC BLOOD PRESSURE: 82 MMHG

## 2019-02-28 DIAGNOSIS — C18.9 COLON CANCER METASTASIZED TO LIVER (HCC): Primary | ICD-10-CM

## 2019-02-28 DIAGNOSIS — C78.7 COLON CANCER METASTASIZED TO LIVER (HCC): Primary | ICD-10-CM

## 2019-02-28 LAB
ALBUMIN SERPL-MCNC: 3.1 G/DL (ref 3.5–5)
ALBUMIN/GLOB SERPL: 0.7 {RATIO} (ref 1.1–2.2)
ALP SERPL-CCNC: 80 U/L (ref 45–117)
ALT SERPL-CCNC: 82 U/L (ref 12–78)
ANION GAP SERPL CALC-SCNC: 5 MMOL/L (ref 5–15)
AST SERPL-CCNC: 44 U/L (ref 15–37)
BASOPHILS # BLD: 0.1 K/UL (ref 0–0.1)
BASOPHILS NFR BLD: 1 % (ref 0–1)
BILIRUB SERPL-MCNC: 0.3 MG/DL (ref 0.2–1)
BUN SERPL-MCNC: 12 MG/DL (ref 6–20)
BUN/CREAT SERPL: 13 (ref 12–20)
CALCIUM SERPL-MCNC: 8.8 MG/DL (ref 8.5–10.1)
CHLORIDE SERPL-SCNC: 106 MMOL/L (ref 97–108)
CO2 SERPL-SCNC: 27 MMOL/L (ref 21–32)
CREAT SERPL-MCNC: 0.93 MG/DL (ref 0.7–1.3)
DIFFERENTIAL METHOD BLD: ABNORMAL
EOSINOPHIL # BLD: 1 K/UL (ref 0–0.4)
EOSINOPHIL NFR BLD: 11 % (ref 0–7)
ERYTHROCYTE [DISTWIDTH] IN BLOOD BY AUTOMATED COUNT: 15 % (ref 11.5–14.5)
GLOBULIN SER CALC-MCNC: 4.7 G/DL (ref 2–4)
GLUCOSE SERPL-MCNC: 155 MG/DL (ref 65–100)
HCT VFR BLD AUTO: 41.7 % (ref 36.6–50.3)
HGB BLD-MCNC: 13.3 G/DL (ref 12.1–17)
IMM GRANULOCYTES # BLD AUTO: 0 K/UL (ref 0–0.04)
IMM GRANULOCYTES NFR BLD AUTO: 0 % (ref 0–0.5)
LYMPHOCYTES # BLD: 2.1 K/UL (ref 0.8–3.5)
LYMPHOCYTES NFR BLD: 23 % (ref 12–49)
MCH RBC QN AUTO: 24.7 PG (ref 26–34)
MCHC RBC AUTO-ENTMCNC: 31.9 G/DL (ref 30–36.5)
MCV RBC AUTO: 77.4 FL (ref 80–99)
MONOCYTES # BLD: 0.6 K/UL (ref 0–1)
MONOCYTES NFR BLD: 6 % (ref 5–13)
NEUTS SEG # BLD: 5.5 K/UL (ref 1.8–8)
NEUTS SEG NFR BLD: 59 % (ref 32–75)
NRBC # BLD: 0 K/UL (ref 0–0.01)
NRBC BLD-RTO: 0 PER 100 WBC
PLATELET # BLD AUTO: 291 K/UL (ref 150–400)
PMV BLD AUTO: 9.4 FL (ref 8.9–12.9)
POTASSIUM SERPL-SCNC: 4.1 MMOL/L (ref 3.5–5.1)
PROT SERPL-MCNC: 7.8 G/DL (ref 6.4–8.2)
RBC # BLD AUTO: 5.39 M/UL (ref 4.1–5.7)
RBC MORPH BLD: ABNORMAL
SODIUM SERPL-SCNC: 138 MMOL/L (ref 136–145)
WBC # BLD AUTO: 9.3 K/UL (ref 4.1–11.1)

## 2019-02-28 PROCEDURE — 96523 IRRIG DRUG DELIVERY DEVICE: CPT

## 2019-02-28 PROCEDURE — 77030012965 HC NDL HUBR BBMI -A

## 2019-02-28 PROCEDURE — 36415 COLL VENOUS BLD VENIPUNCTURE: CPT

## 2019-02-28 PROCEDURE — 74011000250 HC RX REV CODE- 250: Performed by: NURSE PRACTITIONER

## 2019-02-28 PROCEDURE — 80053 COMPREHEN METABOLIC PANEL: CPT

## 2019-02-28 PROCEDURE — 85025 COMPLETE CBC W/AUTO DIFF WBC: CPT

## 2019-02-28 PROCEDURE — 74011250636 HC RX REV CODE- 250/636: Performed by: NURSE PRACTITIONER

## 2019-02-28 RX ORDER — HEPARIN 100 UNIT/ML
500 SYRINGE INTRAVENOUS AS NEEDED
Status: ACTIVE | OUTPATIENT
Start: 2019-02-28 | End: 2019-02-28

## 2019-02-28 RX ORDER — SODIUM CHLORIDE 0.9 % (FLUSH) 0.9 %
5-10 SYRINGE (ML) INJECTION AS NEEDED
Status: ACTIVE | OUTPATIENT
Start: 2019-02-28 | End: 2019-02-28

## 2019-02-28 RX ORDER — SODIUM CHLORIDE 9 MG/ML
10 INJECTION INTRAMUSCULAR; INTRAVENOUS; SUBCUTANEOUS AS NEEDED
Status: ACTIVE | OUTPATIENT
Start: 2019-02-28 | End: 2019-02-28

## 2019-02-28 RX ADMIN — Medication 500 UNITS: at 10:42

## 2019-02-28 RX ADMIN — SODIUM CHLORIDE 10 ML: 9 INJECTION, SOLUTION INTRAMUSCULAR; INTRAVENOUS; SUBCUTANEOUS at 10:42

## 2019-02-28 RX ADMIN — SODIUM CHLORIDE 10 ML: 9 INJECTION, SOLUTION INTRAMUSCULAR; INTRAVENOUS; SUBCUTANEOUS at 10:10

## 2019-02-28 NOTE — PROGRESS NOTES
[facility] Progress Note    Date: 2019    Name: Mikel Kohli    MRN: [id number]         : 1990    1030. Mr. Radha Torres Arrived ambulatory and in no distress for [facility]. Assessment was completed, no acute issues at this time, no new complaints voiced. R chest wall port accessed without difficulty using 1 inch costello, labs drawn and in process. Mr. Ramsay  vitals were reviewed. Patient Vitals for the past 12 hrs:   Temp Pulse Resp BP   19 1031 98.2 °F (36.8 °C) 86 18 138/82       Lab results were obtained and reviewed. Recent Results (from the past 12 hour(s))   CBC WITH AUTOMATED DIFF    Collection Time: 19 10:40 AM   Result Value Ref Range    WBC 9.3 4.1 - 11.1 K/uL    RBC 5.39 4. 10 - 5.70 M/uL    HGB 13.3 12.1 - 17.0 g/dL    HCT 41.7 36.6 - 50.3 %    MCV 77.4 (L) 80.0 - 99.0 FL    MCH 24.7 (L) 26.0 - 34.0 PG    MCHC 31.9 30.0 - 36.5 g/dL    RDW 15.0 (H) 11.5 - 14.5 %    PLATELET 357 048 - 764 K/uL    MPV 9.4 8.9 - 12.9 FL    NRBC 0.0 0  WBC    ABSOLUTE NRBC 0.00 0.00 - 0.01 K/uL    NEUTROPHILS 59 32 - 75 %    LYMPHOCYTES 23 12 - 49 %    MONOCYTES 6 5 - 13 %    EOSINOPHILS 11 (H) 0 - 7 %    BASOPHILS 1 0 - 1 %    IMMATURE GRANULOCYTES 0 0.0 - 0.5 %    ABS. NEUTROPHILS 5.5 1.8 - 8.0 K/UL    ABS. LYMPHOCYTES 2.1 0.8 - 3.5 K/UL    ABS. MONOCYTES 0.6 0.0 - 1.0 K/UL    ABS. EOSINOPHILS 1.0 (H) 0.0 - 0.4 K/UL    ABS. BASOPHILS 0.1 0.0 - 0.1 K/UL    ABS. IMM.  GRANS. 0.0 0.00 - 0.04 K/UL    DF SMEAR SCANNED      RBC COMMENTS NORMOCYTIC, NORMOCHROMIC     METABOLIC PANEL, COMPREHENSIVE    Collection Time: 19 10:40 AM   Result Value Ref Range    Sodium 138 136 - 145 mmol/L    Potassium 4.1 3.5 - 5.1 mmol/L    Chloride 106 97 - 108 mmol/L    CO2 27 21 - 32 mmol/L    Anion gap 5 5 - 15 mmol/L    Glucose 155 (H) 65 - 100 mg/dL    BUN 12 6 - 20 MG/DL    Creatinine 0.93 0.70 - 1.30 MG/DL    BUN/Creatinine ratio 13 12 - 20      GFR est AA >60 >60 ml/min/1.73m2    GFR est non-AA >60 >60 ml/min/1.73m2    Calcium 8.8 8.5 - 10.1 MG/DL    Bilirubin, total 0.3 0.2 - 1.0 MG/DL    ALT (SGPT) 82 (H) 12 - 78 U/L    AST (SGOT) 44 (H) 15 - 37 U/L    Alk. phosphatase 80 45 - 117 U/L    Protein, total 7.8 6.4 - 8.2 g/dL    Albumin 3.1 (L) 3.5 - 5.0 g/dL    Globulin 4.7 (H) 2.0 - 4.0 g/dL    A-G Ratio 0.7 (L) 1.1 - 2.2             1045. Mr. Geeta Encarnacion tolerated treatment well and was discharged from [place] [address] in stable condition. Port de-accessed, flushed & heparinized per protocol. He is to return on 03/28/19 for his next appointment.     Sultana Driscoll RN  February 28, 2019

## 2019-03-01 NOTE — PROGRESS NOTES
[employer] Social Work Navigator Encounter Patient Name:   
 
Medical History:  
 
Advance Directives: 
 
Narrative:  
 
Barriers to Care: Social work services requested by patient. Patient shared with me he is considering apply for Social Security Disability. Provided him with information about the process and criteria. Discussed insurance options while on disability including COBRA, marketplace, and Medicaid. Patient would like to reapply for the Care Card. Completed application and requested patient bring financial documentation required. Patient voiced understanding. All questions answered at this time. Encouraged him to contact me as needed. Patient is actively coping with diagnosis. He remains positive and in good spirits. He tells me he is hopeful he will be able to return to work in a year after surgery and chemotherapy. Plan: 1. Referral to [address] [place] [address] for Medicaid screening 2. Assist patient apply for [place] 3. Provided patient with information about Social Security Disability Thank you, MASOOD López This note will not be viewable in [address]

## 2019-03-05 ENCOUNTER — TELEPHONE (OUTPATIENT)
Dept: ONCOLOGY | Age: 29
End: 2019-03-05

## 2019-03-05 NOTE — LETTER
3/6/2019 8:30 AM 
 
Mr. Cat Larson 
[address] [place] [id number] To Whom It May Concern: 
 
Tomi Gonsales is currently under the care of Mayra Parekh and Selvin Brunner NP at the [employer]. He will return to work on 3/11/19 with restrictions. He can work day shift hours, not working past 5 pm.  He can not work more than 55 hours per month. If there are questions or concerns please have the patient contact our office. Sincerely, Selvin Brunner NP

## 2019-03-05 NOTE — TELEPHONE ENCOUNTER
Patient called and stated he needs a letter to return to work on Monday, would like a call back to discuss.  #[phone]

## 2019-03-06 NOTE — TELEPHONE ENCOUNTER
[employer] at [facility]  [phone]    03/06/19- Letter written and signed by Cesia Luong NP. Patient notified and will pick it up from our office this afternoon. No further questions at this time.

## 2019-03-25 ENCOUNTER — OFFICE VISIT (OUTPATIENT)
Dept: FAMILY MEDICINE CLINIC | Age: 29
End: 2019-03-25

## 2019-03-25 VITALS
SYSTOLIC BLOOD PRESSURE: 132 MMHG | DIASTOLIC BLOOD PRESSURE: 78 MMHG | HEIGHT: 72 IN | WEIGHT: 292 LBS | TEMPERATURE: 97.8 F | OXYGEN SATURATION: 96 % | HEART RATE: 74 BPM | BODY MASS INDEX: 39.55 KG/M2

## 2019-03-25 DIAGNOSIS — T78.40XA ALLERGIC REACTION, INITIAL ENCOUNTER: ICD-10-CM

## 2019-03-25 DIAGNOSIS — L29.9 ITCHING: Primary | ICD-10-CM

## 2019-03-25 NOTE — PROGRESS NOTES
[facility] is an 25 y.o. male who presents for itching all over his body. Hx of stage 4 colon cancer with liver mets, dx 1 years ago. Pt notes that he noted itching all over his body for 1 week. No rashes. No sick contacts with similar symptoms. Started after he began to use new body wash. Denies new medications, food exposures. No tongue swelling or SOB. No medications attempted. Last used body wash was this morning and had itching that is resolving at this time. Allergies - reviewed:   No Known Allergies      Medications - reviewed:   Current Outpatient Medications   Medication Sig    metoprolol tartrate (LOPRESSOR) 25 mg tablet TAKE 1 TAB BY MOUTH EVERY TWELVE (12) HOURS.  docusate sodium (COLACE) 100 mg capsule Take 100 mg by mouth two (2) times a day.  polyethylene glycol (MIRALAX) 17 gram/dose powder Take 17 g by mouth daily.  lidocaine-prilocaine (EMLA) topical cream Apply  to affected area as needed for Pain (Apply 30-60 min. prior to having your port accessed).  dexamethasone (DECADRON) 4 mg tablet TAKE 8MG (TWO TABS) DAILY IN THE MORNING FOR TWO DAYS AFTER CHEMOTHERAPY (WHILE PUMP IS INFUSING).  OTHER Sperm cryopreservation Dx: C18.9-colon cancer    prochlorperazine (COMPAZINE) 10 mg tablet Take 1 Tab by mouth every six (6) hours as needed for Nausea.  ondansetron hcl (ZOFRAN) 8 mg tablet Take 1 Tab by mouth every eight (8) hours as needed for Nausea. No current facility-administered medications for this visit.           Past Medical History - reviewed:  Past Medical History:   Diagnosis Date    Cancer ([facility] [address] 03/2018    colon-Stage 4    Hypertension          Past Surgical History - reviewed:   Past Surgical History:   Procedure Laterality Date    COLONOSCOPY N/A 3/27/2018    COLONOSCOPY performed by Hugo Abbasi MD at [address] [place] [address] History - reviewed:  Social History     Tobacco Use    Smoking status: Never Smoker    Smokeless tobacco: Never Used Substance and Sexual Activity    Alcohol use: No     Comment: socially    Drug use: Yes     Types: Marijuana    Sexual activity: Yes     Partners: Female     Birth control/protection: None         Family History - reviewed:  Family History   Problem Relation Age of Onset    Stroke Mother     Diabetes Mother     Stroke Father     Hypertension Father          Immunizations - reviewed:   Immunization History   Administered Date(s) Administered    Influenza Vaccine (Quad) PF 04/13/2018       ROS  CONSTITUTIONAL: Denies: fever, weakness, fatigue  EYES: Denies: blurry vision, decreased vision  ENT: Denies: sore throat, nasal congestion  CARDIOVASCULAR: Denies: chest pain, orthopnea, edema  RESPIRATORY: Denies: cough, shortness of breath, wheezing  SKIN: itchingDenies: rash, hives    Physical Exam  Visit Vitals  /78   Pulse 74   Temp 97.8 °F (36.6 °C)   Ht 6' (1.829 m)   Wt 292 lb (132.5 kg)   SpO2 96%   BMI 39.60 kg/m²       General appearance - alert, well appearing, and in no distress  Eyes - pupils equal and reactive, extraocular eye movements intact  Ears - bilateral TM's and external ear canals normal  Nose - normal and patent, no erythema, discharge or polyps  Mouth - mucous membranes moist, pharynx normal without lesions  Neck - supple, no significant adenopathy  Chest - clear to auscultation, no wheezes, rales or rhonchi, symmetric air entry  Heart - normal rate, regular rhythm, normal S1, S2, no murmurs, rubs, clicks or gallops  Extremities - peripheral pulses normal, no pedal edema, no clubbing or cyanosis  Skin - normal coloration and turgor, no rashes, no suspicious skin lesions noted    Assessment/Plan    ICD-10-CM ICD-9-CM    1. Itching L29.9 698.9 HEPATIC FUNCTION PANEL   2. Allergic reaction, initial encounter T78.40XA 995.3      Unremarkable PE today. Given hx pt to discontinue new body wash and use non-scented soaps. Use antihistamine, SE profile reviewed.  Will obtain hepatic fxn given metastasis hx and his itching symptoms. All questions answered. Precautions given. Discussed with attending    Follow-up and Dispositions    · Return if symptoms worsen or fail to improve. I have discussed the diagnosis with the patient and the intended plan as seen in the above orders. Patient verbalized understanding of the plan and agrees with the plan. The patient has received an after-visit summary and questions were answered concerning future plans. I have discussed medication side effects and warnings with the patient as well. Informed patient to return to the office if new symptoms arise.         Mirian Gil DO  Family Medicine Resident

## 2019-03-25 NOTE — PATIENT INSTRUCTIONS
Allergic Reaction: Care Instructions  Your Care Instructions    An allergic reaction is an excessive response from your immune system to a medicine, chemical, food, insect bite, or other substance. A reaction can range from mild to life-threatening. Some people have a mild rash, hives, and itching or stomach cramps. In severe reactions, swelling of your tongue and throat can close up your airway so that you cannot breathe. Follow-up care is a key part of your treatment and safety. Be sure to make and go to all appointments, and call your doctor if you are having problems. It's also a good idea to know your test results and keep a list of the medicines you take. How can you care for yourself at home? · If you know what caused your allergic reaction, be sure to avoid it. Your allergy may become more severe each time you have a reaction. · Take an over-the-counter antihistamine, such as cetirizine (Zyrtec) or loratadine (Claritin), to treat mild symptoms. Read and follow directions on the label. Some antihistamines can make you feel sleepy. Do not give antihistamines to a child unless you have checked with your doctor first. Mild symptoms include sneezing or an itchy or runny nose; an itchy mouth; a few hives or mild itching; and mild nausea or stomach discomfort. · Do not scratch hives or a rash. Put a cold, moist towel on them or take cool baths to relieve itching. Put ice packs on hives, swelling, or insect stings for 10 to 15 minutes at a time. Put a thin cloth between the ice pack and your skin. Do not take hot baths or showers. They will make the itching worse. · Your doctor may prescribe a shot of epinephrine to carry with you in case you have a severe reaction. Learn how to give yourself the shot and keep it with you at all times. Make sure it is not . · Go to the emergency room every time you have a severe reaction, even if you have used your shot of epinephrine and are feeling better. Symptoms can come back after a shot. · Wear medical alert jewelry that lists your allergies. You can buy this at most drugstores. · If your child has a severe allergy, make sure that his or her teachers, babysitters, coaches, and other caregivers know about the allergy. They should have an epinephrine shot, know how and when to give it, and have a plan to take your child to the hospital.  When should you call for help? Give an epinephrine shot if:    · You think you are having a severe allergic reaction.     · You have symptoms in more than one body area, such as mild nausea and an itchy mouth.    After giving an epinephrine shot call 911, even if you feel better.   Call 911 if:    · You have symptoms of a severe allergic reaction. These may include:  ? Sudden raised, red areas (hives) all over your body. ? Swelling of the throat, mouth, lips, or tongue. ? Trouble breathing. ? Passing out (losing consciousness). Or you may feel very lightheaded or suddenly feel weak, confused, or restless.     · You have been given an epinephrine shot, even if you feel better.    Call your doctor now or seek immediate medical care if:    · You have symptoms of an allergic reaction, such as:  ? A rash or hives (raised, red areas on the skin). ? Itching. ? Swelling. ? Belly pain, nausea, or vomiting.    Watch closely for changes in your health, and be sure to contact your doctor if:    · You do not get better as expected. Where can you learn more? Go to http://anna-vasquez.info/. Enter J463 in the search box to learn more about \"Allergic Reaction: Care Instructions. \"  Current as of: June 27, 2018  Content Version: 11.9  © 6756-1104 [employer]. Care instructions adapted under license by [employer] (which disclaims liability or warranty for this information).  If you have questions about a medical condition or this instruction, always ask your healthcare professional. [employer] disclaims any warranty or liability for your use of this information.

## 2019-03-26 LAB
ALBUMIN SERPL-MCNC: 4.3 G/DL (ref 3.5–5.5)
ALP SERPL-CCNC: 74 IU/L (ref 39–117)
ALT SERPL-CCNC: 35 IU/L (ref 0–44)
AST SERPL-CCNC: 25 IU/L (ref 0–40)
BILIRUB DIRECT SERPL-MCNC: 0.12 MG/DL (ref 0–0.4)
BILIRUB SERPL-MCNC: 0.6 MG/DL (ref 0–1.2)
PROT SERPL-MCNC: 7.8 G/DL (ref 6–8.5)

## 2019-03-28 ENCOUNTER — APPOINTMENT (OUTPATIENT)
Dept: INFUSION THERAPY | Age: 29
End: 2019-03-28
Payer: COMMERCIAL

## 2019-04-01 ENCOUNTER — TELEPHONE (OUTPATIENT)
Dept: ONCOLOGY | Age: 29
End: 2019-04-01

## 2019-04-01 NOTE — TELEPHONE ENCOUNTER
Spoke to Dr. Kenny Hudson. Patient is s/p CT of abd/pelvis at [facility] for disease monitoring after liver directed therapy. There are growing lung nodules in bilateral lung bases. He will be calling patient to discuss. Patient will need to resume chemotherapy. Has follow up here on 4/10/2019.

## 2019-04-04 ENCOUNTER — TELEPHONE (OUTPATIENT)
Dept: ONCOLOGY | Age: 29
End: 2019-04-04

## 2019-04-04 NOTE — TELEPHONE ENCOUNTER
Attempted to call patient. Message from Dr. Kenny Hudson that he discussed results of imaging with patient today. Call to offer support. No answer, left message to call back. Reminded him of appointment next week in office. If he returns call please inquire if he would like to have CT chest done prior to visit here next week. CT abd/pelvis only done at [facility] concerning for progression of lung metastasis. Would be helpful to have CT chest done here. Will need to resume chemotherapy. Please obtain records from CT done at [facility], will need images uploaded into our system to compare on f/u scans.

## 2019-04-08 ENCOUNTER — TELEPHONE (OUTPATIENT)
Dept: ONCOLOGY | Age: 29
End: 2019-04-08

## 2019-04-08 DIAGNOSIS — C18.9 COLON CANCER METASTASIZED TO LIVER (HCC): Primary | ICD-10-CM

## 2019-04-08 DIAGNOSIS — R91.8 MULTIPLE LUNG NODULES: ICD-10-CM

## 2019-04-08 DIAGNOSIS — C78.7 COLON CANCER METASTASIZED TO LIVER (HCC): Primary | ICD-10-CM

## 2019-04-08 RX ORDER — DIPHENHYDRAMINE HCL 25 MG
50 CAPSULE ORAL ONCE
Status: CANCELLED
Start: 2019-04-16

## 2019-04-08 RX ORDER — ONDANSETRON 2 MG/ML
8 INJECTION INTRAMUSCULAR; INTRAVENOUS AS NEEDED
Status: CANCELLED | OUTPATIENT
Start: 2019-04-16

## 2019-04-08 RX ORDER — EPINEPHRINE 1 MG/ML
0.3 INJECTION, SOLUTION, CONCENTRATE INTRAVENOUS AS NEEDED
Status: CANCELLED | OUTPATIENT
Start: 2019-04-16

## 2019-04-08 RX ORDER — SODIUM CHLORIDE 9 MG/ML
500 INJECTION, SOLUTION INTRAVENOUS CONTINUOUS
Status: CANCELLED | OUTPATIENT
Start: 2019-04-16

## 2019-04-08 RX ORDER — SODIUM CHLORIDE 0.9 % (FLUSH) 0.9 %
10 SYRINGE (ML) INJECTION AS NEEDED
Status: CANCELLED
Start: 2019-04-16

## 2019-04-08 RX ORDER — HYDROCORTISONE SODIUM SUCCINATE 100 MG/2ML
100 INJECTION, POWDER, FOR SOLUTION INTRAMUSCULAR; INTRAVENOUS AS NEEDED
Status: CANCELLED | OUTPATIENT
Start: 2019-04-16

## 2019-04-08 RX ORDER — DIPHENHYDRAMINE HYDROCHLORIDE 50 MG/ML
50 INJECTION, SOLUTION INTRAMUSCULAR; INTRAVENOUS AS NEEDED
Status: CANCELLED
Start: 2019-04-16

## 2019-04-08 RX ORDER — FLUOROURACIL 50 MG/ML
400 INJECTION, SOLUTION INTRAVENOUS ONCE
Status: CANCELLED
Start: 2019-04-16 | End: 2019-04-16

## 2019-04-08 RX ORDER — DEXTROSE MONOHYDRATE 50 MG/ML
25 INJECTION, SOLUTION INTRAVENOUS CONTINUOUS
Status: CANCELLED
Start: 2019-04-16

## 2019-04-08 RX ORDER — HEPARIN 100 UNIT/ML
300-500 SYRINGE INTRAVENOUS AS NEEDED
Status: CANCELLED
Start: 2019-04-16

## 2019-04-08 RX ORDER — PALONOSETRON 0.05 MG/ML
0.25 INJECTION, SOLUTION INTRAVENOUS ONCE
Status: CANCELLED | OUTPATIENT
Start: 2019-04-16

## 2019-04-08 RX ORDER — ACETAMINOPHEN 325 MG/1
650 TABLET ORAL AS NEEDED
Status: CANCELLED
Start: 2019-04-16

## 2019-04-08 RX ORDER — SODIUM CHLORIDE 9 MG/ML
10 INJECTION INTRAMUSCULAR; INTRAVENOUS; SUBCUTANEOUS AS NEEDED
Status: CANCELLED | OUTPATIENT
Start: 2019-04-16

## 2019-04-08 RX ORDER — ALBUTEROL SULFATE 0.83 MG/ML
2.5 SOLUTION RESPIRATORY (INHALATION) AS NEEDED
Status: CANCELLED
Start: 2019-04-16

## 2019-04-08 NOTE — TELEPHONE ENCOUNTER
[address] at [facility]  [phone]    04/08/19- Images from [facility] requested to be pushed to our system via rad connect or power share from [address] All [address] from 2018 to present. Per [facility] path department- No path completed on most recent liver resection surgery. 10:53 AM- Mendel Blacker from [facility] radiology [phone] called to report images have been pushed to our system. Phone call placed to [facility] radiology spoke to [facility] [phone] she confirmed imaging received and are viewable in our PACS.

## 2019-04-08 NOTE — TELEPHONE ENCOUNTER
Call to patient again, left message to return call. Hoping to get CT chest done prior to f/u here on Wed if able to get this scheduled.

## 2019-04-08 NOTE — PROGRESS NOTES
[facility] [place] at [place] [address] [address] [facility]  Joycelyn Flash: [phone]  F: [phone]     Reason for Visit:   Inocencia Palacios is a 25 y.o. male who is seen for follow up of metastatic colon cancer. Treatment History:   · CT A/P 3/25/2018: Indeterminate 2 x 1 cm low-density liver lesion. · CT C/A/P with triphase liver 3/27/2018: No evidence of metastatic disease to the chest. Multiple ill-defined hepatic lesions. These do not represent simple cyst.  · CT guided liver biopsy 3/27/2018: metastatic adenocarcinoma, KRAS wild type, NRAS wild type  · Colectomy by Dr. Omid Delgadillo 3/29/2018: Adenocarcinoma, moderately differentiated. Negative margins. 3/16 nodes involved. pMMR  · Stage IV (pT3 pN1b pM1) Colon Cancer  · Chemotherapy with FOLFOX and bevacizumab 5/1/2018 - 7/12/2018 for a total of 6 cycles, dany administered with cycles 2-4  · Resumed chemotherapy with FOLFOX and bevacizumab from 9/25/2018 to 12/20/2018. Avastin held after 10/23/2018 in preparation for surgery. · Underwent diagnostic laparoscopy with microwave liver ablation to right hepatic lobe 1/30/2019  · CT Chest 4/9/2019: Several new subcentimeter pulmonary nodules in the bilateral lungs worrisome for pulmonary metastases. Status post interval right hepatic lobe embolization. Several increased areas of hypodensity in the right hepatic lobe are worrisome for progression of hepatic metastases. There is a new tract of hypodensity in the left lateral hepatic lobe extending to a hypodense lesion. History of Present Illness:   He had follow up imaging at [facility] which revealed multiple lung nodules. We obtained a dedicated Chest CT here which confirmed this finding, and he is here to review results. He is feeling well, though obviously dissappointed by the progression. Unaccompanied today.     PAST HISTORY: The following sections were reviewed and updated in the EMR as appropriate: PMH, SH, FH, Medications, Allergies. No Known Allergies     Review of Systems: A complete review of systems was obtained, reviewed, and scanned into the EMR. Pertinent findings reviewed above. Physical Exam:     Visit Vitals  /76   Pulse 79   Temp 98.1 °F (36.7 °C) (Oral)   Resp 16   Ht 6' (1.829 m)   Wt 291 lb (132 kg)   SpO2 99%   BMI 39.47 kg/m²     ECOG PS: 0  General: No distress, obese  Eyes: PERRLA, anicteric sclerae  HENT: Atraumatic, OP clear  Neck: Supple  Lymphatic: No cervical, supraclavicular, or inguinal adenopathy  Respiratory: CTAB, normal respiratory effort  CV: Normal rate, regular rhythm, no murmurs, no peripheral edema  GI: Soft, nontender, nondistended, no masses, no hepatomegaly, no splenomegaly  MS: Normal gait and station. Digits without clubbing or cyanosis. Skin: No rashes, ecchymoses, or petechiae. Normal temperature, turgor, and texture. Psych: Alert, oriented, appropriate affect, normal judgment/insight    Results:     Lab Results   Component Value Date/Time    WBC 9.0 04/10/2019 11:03 AM    HGB 14.7 04/10/2019 11:03 AM    HCT 45.6 04/10/2019 11:03 AM    PLATELET 136 42/49/1649 11:03 AM    MCV 76.3 (L) 04/10/2019 11:03 AM    ABS. NEUTROPHILS 6.0 04/10/2019 11:03 AM    Hemoglobin (POC) 15.2 11/12/2018 12:58 PM     Lab Results   Component Value Date/Time    Sodium 138 04/10/2019 11:03 AM    Potassium 4.3 04/10/2019 11:03 AM    Chloride 105 04/10/2019 11:03 AM    CO2 28 04/10/2019 11:03 AM    Glucose 90 04/10/2019 11:03 AM    BUN 13 04/10/2019 11:03 AM    Creatinine 0.84 04/10/2019 11:03 AM    GFR est AA >60 04/10/2019 11:03 AM    GFR est non-AA >60 04/10/2019 11:03 AM    Calcium 9.0 04/10/2019 11:03 AM     Lab Results   Component Value Date/Time    Bilirubin, total 0.2 04/10/2019 11:03 AM    ALT (SGPT) 40 04/10/2019 11:03 AM    AST (SGOT) 19 04/10/2019 11:03 AM    Alk.  phosphatase 73 04/10/2019 11:03 AM    Protein, total 8.2 04/10/2019 11:03 AM    Albumin 3.8 04/10/2019 11:03 AM    Globulin 4.4 (H) 04/10/2019 11:03 AM       CT C/A/P with triphasic liver 7/19/2018: There is no axilla, mediastinal or hilar adenopathy. There is no pericardial pleural effusion no shift or pneumothorax, infusion catheter is in place. No parenchymal mass. The liver lesion and decreased in size, a dome of the liver lesion measures 11 x 14 mm, it was 15 x 20. An additional right lobe of the liver lower lesion is also decreased in size measuring 12 x 7 mm it was 16 x 12. No new lesion is seen. Liver and spleen remain normal in size, the gallbladder is not distended. The kidneys are unobstructed. There is no bowel wall thickening or obstruction. Bowel wall thickening in the ascending colon appear stable some of this may be related to fecal stasis up. There is no free air or free fluid. There is no adenopathy in the abdomen or pelvis. The bladder is midline. Review of bone windows does not suggest metastases. There is no inguinal adenopathy. Major vessels do appear patent. [facility] 8/6/2018: No evidence of FDG avid tumor identified. Questionable focus in the liver. Mucosal associated lymphoid tissue in the pharynx demonstrates overall increased symmetric activity. CT abd/pelvis at [facility] on 3/19/2019: Interval laparoscopic microscope ablation of several left hepatic lobe lesions. Interval right portal vein embolizations. Increase in size of several right hepatic lobe lesions. Development of multiple pulmonary nodules in the imaged lung bases highly concerning for metastatic disease. Enlarged portal caval node, metastatic diease is not excluded. Stranding in the subcutaneous soft tissue related to laparoscopy. Assessment:   1) Metastatic Colon Cancer  Stage IV, pMMR, KRAS/NRAS wild type  He has metastatic disease within his liver. His primary tumor has been resected. He has received palliative chemotherapy with FOLFOX (Bevacizumab on hold starting with C10).  Last FOLFOX therapy on 12/18/18, last Avastin on 10/23/18. Treatment held in preparative for liver directed therapy under the care of Dr. Guy Perez at [facility]. However, he now has evidence of new lung metastases. We reviewed the significance of these findings at length today. His cancer is not curable and management is with palliative intent. My recommendation is to resume his chemotherapy with FOLFOX and Avastin. He would like to start next week. We will see him with each cycle of therapy. At progression, we can consider FOLFIRI and Panitumumab. We also discussed referral for consideration of clinical trials, which he is interested in. We will review this further once he is back on treatment. Send [employer] on prior liver biopsy specimen. 2) Risk of infertility  We have previously reviewed that chemotherapy can potentially cause infertility. He has undergone sperm cryopreservation. 3) Genetic risk  Testing at [facility] negative. 4) Hypertension  Continue Metoprolol. 5) Chemotherapy induced neuropathy. Grade 1 currently. Improved since break from treatment. Monitor.     Plan:     Resume FOLFOX with Avastin next week  [facility] testing  Return to clinic with therapy      Signed By: Socorro Zaldivar MD

## 2019-04-09 ENCOUNTER — HOSPITAL ENCOUNTER (OUTPATIENT)
Dept: CT IMAGING | Age: 29
Discharge: HOME | End: 2019-04-09
Attending: NURSE PRACTITIONER
Payer: COMMERCIAL

## 2019-04-09 DIAGNOSIS — R91.8 MULTIPLE LUNG NODULES: ICD-10-CM

## 2019-04-09 DIAGNOSIS — C18.9 COLON CANCER METASTASIZED TO LIVER (HCC): ICD-10-CM

## 2019-04-09 DIAGNOSIS — C78.7 COLON CANCER METASTASIZED TO LIVER (HCC): ICD-10-CM

## 2019-04-09 PROCEDURE — 74011636320 HC RX REV CODE- 636/320: Performed by: RADIOLOGY

## 2019-04-09 PROCEDURE — 71260 CT THORAX DX C+: CPT

## 2019-04-09 RX ADMIN — IOPAMIDOL 99 ML: 612 INJECTION, SOLUTION INTRAVENOUS at 09:29

## 2019-04-10 ENCOUNTER — OFFICE VISIT (OUTPATIENT)
Dept: ONCOLOGY | Age: 29
End: 2019-04-10

## 2019-04-10 ENCOUNTER — HOSPITAL ENCOUNTER (OUTPATIENT)
Dept: INFUSION THERAPY | Age: 29
Discharge: HOME | End: 2019-04-10
Payer: COMMERCIAL

## 2019-04-10 VITALS
HEART RATE: 79 BPM | SYSTOLIC BLOOD PRESSURE: 131 MMHG | TEMPERATURE: 97.4 F | RESPIRATION RATE: 18 BRPM | DIASTOLIC BLOOD PRESSURE: 76 MMHG | OXYGEN SATURATION: 99 %

## 2019-04-10 VITALS
RESPIRATION RATE: 16 BRPM | SYSTOLIC BLOOD PRESSURE: 131 MMHG | DIASTOLIC BLOOD PRESSURE: 76 MMHG | TEMPERATURE: 98.1 F | OXYGEN SATURATION: 99 % | WEIGHT: 291 LBS | BODY MASS INDEX: 39.42 KG/M2 | HEART RATE: 79 BPM | HEIGHT: 72 IN

## 2019-04-10 DIAGNOSIS — C18.9 COLON CANCER METASTASIZED TO LIVER (HCC): Primary | ICD-10-CM

## 2019-04-10 DIAGNOSIS — C78.7 COLON CANCER METASTASIZED TO LIVER (HCC): Primary | ICD-10-CM

## 2019-04-10 DIAGNOSIS — R91.8 MULTIPLE LUNG NODULES ON CT: ICD-10-CM

## 2019-04-10 LAB
ALBUMIN SERPL-MCNC: 3.8 G/DL (ref 3.5–5)
ALBUMIN/GLOB SERPL: 0.9 {RATIO} (ref 1.1–2.2)
ALP SERPL-CCNC: 73 U/L (ref 45–117)
ALT SERPL-CCNC: 40 U/L (ref 12–78)
ANION GAP SERPL CALC-SCNC: 5 MMOL/L (ref 5–15)
AST SERPL-CCNC: 19 U/L (ref 15–37)
BASOPHILS # BLD: 0 K/UL (ref 0–0.1)
BASOPHILS NFR BLD: 0 % (ref 0–1)
BILIRUB SERPL-MCNC: 0.2 MG/DL (ref 0.2–1)
BUN SERPL-MCNC: 13 MG/DL (ref 6–20)
BUN/CREAT SERPL: 15 (ref 12–20)
CALCIUM SERPL-MCNC: 9 MG/DL (ref 8.5–10.1)
CHLORIDE SERPL-SCNC: 105 MMOL/L (ref 97–108)
CO2 SERPL-SCNC: 28 MMOL/L (ref 21–32)
CREAT SERPL-MCNC: 0.84 MG/DL (ref 0.7–1.3)
DIFFERENTIAL METHOD BLD: ABNORMAL
EOSINOPHIL # BLD: 0.3 K/UL (ref 0–0.4)
EOSINOPHIL NFR BLD: 3 % (ref 0–7)
ERYTHROCYTE [DISTWIDTH] IN BLOOD BY AUTOMATED COUNT: 14.9 % (ref 11.5–14.5)
GLOBULIN SER CALC-MCNC: 4.4 G/DL (ref 2–4)
GLUCOSE SERPL-MCNC: 90 MG/DL (ref 65–100)
HCT VFR BLD AUTO: 45.6 % (ref 36.6–50.3)
HGB BLD-MCNC: 14.7 G/DL (ref 12.1–17)
IMM GRANULOCYTES # BLD AUTO: 0.1 K/UL (ref 0–0.04)
IMM GRANULOCYTES NFR BLD AUTO: 1 % (ref 0–0.5)
LYMPHOCYTES # BLD: 2.2 K/UL (ref 0.8–3.5)
LYMPHOCYTES NFR BLD: 24 % (ref 12–49)
MCH RBC QN AUTO: 24.6 PG (ref 26–34)
MCHC RBC AUTO-ENTMCNC: 32.2 G/DL (ref 30–36.5)
MCV RBC AUTO: 76.3 FL (ref 80–99)
MONOCYTES # BLD: 0.5 K/UL (ref 0–1)
MONOCYTES NFR BLD: 5 % (ref 5–13)
NEUTS SEG # BLD: 6 K/UL (ref 1.8–8)
NEUTS SEG NFR BLD: 67 % (ref 32–75)
NRBC # BLD: 0 K/UL (ref 0–0.01)
NRBC BLD-RTO: 0 PER 100 WBC
PLATELET # BLD AUTO: 358 K/UL (ref 150–400)
PMV BLD AUTO: 9.5 FL (ref 8.9–12.9)
POTASSIUM SERPL-SCNC: 4.3 MMOL/L (ref 3.5–5.1)
PROT SERPL-MCNC: 8.2 G/DL (ref 6.4–8.2)
RBC # BLD AUTO: 5.98 M/UL (ref 4.1–5.7)
SODIUM SERPL-SCNC: 138 MMOL/L (ref 136–145)
WBC # BLD AUTO: 9 K/UL (ref 4.1–11.1)

## 2019-04-10 PROCEDURE — 36591 DRAW BLOOD OFF VENOUS DEVICE: CPT

## 2019-04-10 PROCEDURE — 36415 COLL VENOUS BLD VENIPUNCTURE: CPT

## 2019-04-10 PROCEDURE — 80053 COMPREHEN METABOLIC PANEL: CPT

## 2019-04-10 PROCEDURE — 77030012965 HC NDL HUBR BBMI -A

## 2019-04-10 PROCEDURE — 74011250636 HC RX REV CODE- 250/636: Performed by: NURSE PRACTITIONER

## 2019-04-10 PROCEDURE — 85025 COMPLETE CBC W/AUTO DIFF WBC: CPT

## 2019-04-10 RX ORDER — SODIUM CHLORIDE 9 MG/ML
500 INJECTION, SOLUTION INTRAVENOUS CONTINUOUS
Status: CANCELLED | OUTPATIENT
Start: 2019-07-17

## 2019-04-10 RX ORDER — SODIUM CHLORIDE 9 MG/ML
10 INJECTION INTRAMUSCULAR; INTRAVENOUS; SUBCUTANEOUS AS NEEDED
Status: CANCELLED | OUTPATIENT
Start: 2019-07-05

## 2019-04-10 RX ORDER — HEPARIN 100 UNIT/ML
300-500 SYRINGE INTRAVENOUS AS NEEDED
Status: CANCELLED
Start: 2019-07-03

## 2019-04-10 RX ORDER — HYDROCORTISONE SODIUM SUCCINATE 100 MG/2ML
100 INJECTION, POWDER, FOR SOLUTION INTRAMUSCULAR; INTRAVENOUS AS NEEDED
Status: CANCELLED | OUTPATIENT
Start: 2019-07-17

## 2019-04-10 RX ORDER — DIPHENHYDRAMINE HYDROCHLORIDE 50 MG/ML
50 INJECTION, SOLUTION INTRAMUSCULAR; INTRAVENOUS AS NEEDED
Status: CANCELLED
Start: 2019-07-17

## 2019-04-10 RX ORDER — DIPHENHYDRAMINE HYDROCHLORIDE 50 MG/ML
50 INJECTION, SOLUTION INTRAMUSCULAR; INTRAVENOUS AS NEEDED
Status: CANCELLED
Start: 2019-07-03

## 2019-04-10 RX ORDER — FLUOROURACIL 50 MG/ML
400 INJECTION, SOLUTION INTRAVENOUS ONCE
Status: CANCELLED
Start: 2019-07-03 | End: 2019-07-03

## 2019-04-10 RX ORDER — HEPARIN 100 UNIT/ML
300-500 SYRINGE INTRAVENOUS AS NEEDED
Status: CANCELLED
Start: 2019-07-19

## 2019-04-10 RX ORDER — ACETAMINOPHEN 325 MG/1
650 TABLET ORAL AS NEEDED
Status: CANCELLED
Start: 2019-07-03

## 2019-04-10 RX ORDER — HEPARIN 100 UNIT/ML
300-500 SYRINGE INTRAVENOUS AS NEEDED
Status: CANCELLED
Start: 2019-07-05

## 2019-04-10 RX ORDER — DIPHENHYDRAMINE HCL 25 MG
50 CAPSULE ORAL ONCE
Status: CANCELLED
Start: 2019-07-03

## 2019-04-10 RX ORDER — SODIUM CHLORIDE 0.9 % (FLUSH) 0.9 %
10 SYRINGE (ML) INJECTION AS NEEDED
Status: CANCELLED
Start: 2019-07-03

## 2019-04-10 RX ORDER — ONDANSETRON 2 MG/ML
8 INJECTION INTRAMUSCULAR; INTRAVENOUS AS NEEDED
Status: CANCELLED | OUTPATIENT
Start: 2019-07-17

## 2019-04-10 RX ORDER — SODIUM CHLORIDE 0.9 % (FLUSH) 0.9 %
10 SYRINGE (ML) INJECTION AS NEEDED
Status: CANCELLED
Start: 2019-07-05

## 2019-04-10 RX ORDER — DIPHENHYDRAMINE HCL 25 MG
50 CAPSULE ORAL ONCE
Status: CANCELLED
Start: 2019-07-17

## 2019-04-10 RX ORDER — SODIUM CHLORIDE 9 MG/ML
10 INJECTION INTRAMUSCULAR; INTRAVENOUS; SUBCUTANEOUS AS NEEDED
Status: CANCELLED | OUTPATIENT
Start: 2019-07-17

## 2019-04-10 RX ORDER — SODIUM CHLORIDE 0.9 % (FLUSH) 0.9 %
10 SYRINGE (ML) INJECTION AS NEEDED
Status: CANCELLED
Start: 2019-07-19

## 2019-04-10 RX ORDER — ONDANSETRON 2 MG/ML
8 INJECTION INTRAMUSCULAR; INTRAVENOUS AS NEEDED
Status: CANCELLED | OUTPATIENT
Start: 2019-07-03

## 2019-04-10 RX ORDER — ACETAMINOPHEN 325 MG/1
650 TABLET ORAL AS NEEDED
Status: CANCELLED
Start: 2019-07-17

## 2019-04-10 RX ORDER — HEPARIN 100 UNIT/ML
300-500 SYRINGE INTRAVENOUS AS NEEDED
Status: CANCELLED
Start: 2019-07-17

## 2019-04-10 RX ORDER — HEPARIN 100 UNIT/ML
500 SYRINGE INTRAVENOUS AS NEEDED
Status: ACTIVE | OUTPATIENT
Start: 2019-04-10 | End: 2019-04-10

## 2019-04-10 RX ORDER — SODIUM CHLORIDE 0.9 % (FLUSH) 0.9 %
5-10 SYRINGE (ML) INJECTION AS NEEDED
Status: ACTIVE | OUTPATIENT
Start: 2019-04-10 | End: 2019-04-10

## 2019-04-10 RX ORDER — SODIUM CHLORIDE 9 MG/ML
500 INJECTION, SOLUTION INTRAVENOUS CONTINUOUS
Status: CANCELLED | OUTPATIENT
Start: 2019-07-03

## 2019-04-10 RX ORDER — EPINEPHRINE 1 MG/ML
0.3 INJECTION, SOLUTION, CONCENTRATE INTRAVENOUS AS NEEDED
Status: CANCELLED | OUTPATIENT
Start: 2019-07-17

## 2019-04-10 RX ORDER — ALBUTEROL SULFATE 0.83 MG/ML
2.5 SOLUTION RESPIRATORY (INHALATION) AS NEEDED
Status: CANCELLED
Start: 2019-07-17

## 2019-04-10 RX ORDER — HYDROCORTISONE SODIUM SUCCINATE 100 MG/2ML
100 INJECTION, POWDER, FOR SOLUTION INTRAMUSCULAR; INTRAVENOUS AS NEEDED
Status: CANCELLED | OUTPATIENT
Start: 2019-07-03

## 2019-04-10 RX ORDER — PALONOSETRON 0.05 MG/ML
0.25 INJECTION, SOLUTION INTRAVENOUS ONCE
Status: CANCELLED | OUTPATIENT
Start: 2019-07-03

## 2019-04-10 RX ORDER — SODIUM CHLORIDE 9 MG/ML
10 INJECTION INTRAMUSCULAR; INTRAVENOUS; SUBCUTANEOUS AS NEEDED
Status: ACTIVE | OUTPATIENT
Start: 2019-04-10 | End: 2019-04-10

## 2019-04-10 RX ORDER — EPINEPHRINE 1 MG/ML
0.3 INJECTION, SOLUTION, CONCENTRATE INTRAVENOUS AS NEEDED
Status: CANCELLED | OUTPATIENT
Start: 2019-07-03

## 2019-04-10 RX ORDER — SODIUM CHLORIDE 9 MG/ML
10 INJECTION INTRAMUSCULAR; INTRAVENOUS; SUBCUTANEOUS AS NEEDED
Status: CANCELLED | OUTPATIENT
Start: 2019-07-03

## 2019-04-10 RX ORDER — FLUOROURACIL 50 MG/ML
400 INJECTION, SOLUTION INTRAVENOUS ONCE
Status: CANCELLED
Start: 2019-07-17

## 2019-04-10 RX ORDER — SODIUM CHLORIDE 9 MG/ML
10 INJECTION INTRAMUSCULAR; INTRAVENOUS; SUBCUTANEOUS AS NEEDED
Status: CANCELLED | OUTPATIENT
Start: 2019-07-19

## 2019-04-10 RX ORDER — SODIUM CHLORIDE 0.9 % (FLUSH) 0.9 %
10 SYRINGE (ML) INJECTION AS NEEDED
Status: CANCELLED
Start: 2019-07-17

## 2019-04-10 RX ORDER — PALONOSETRON 0.05 MG/ML
0.25 INJECTION, SOLUTION INTRAVENOUS ONCE
Status: CANCELLED | OUTPATIENT
Start: 2019-07-17

## 2019-04-10 RX ORDER — DEXTROSE MONOHYDRATE 50 MG/ML
25 INJECTION, SOLUTION INTRAVENOUS CONTINUOUS
Status: CANCELLED
Start: 2019-07-17

## 2019-04-10 RX ORDER — ALBUTEROL SULFATE 0.83 MG/ML
2.5 SOLUTION RESPIRATORY (INHALATION) AS NEEDED
Status: CANCELLED
Start: 2019-07-03

## 2019-04-10 RX ORDER — DEXTROSE MONOHYDRATE 50 MG/ML
25 INJECTION, SOLUTION INTRAVENOUS CONTINUOUS
Status: CANCELLED
Start: 2019-07-03

## 2019-04-10 RX ADMIN — Medication 500 UNITS: at 11:04

## 2019-04-10 RX ADMIN — SODIUM CHLORIDE 10 ML: 9 INJECTION INTRAMUSCULAR; INTRAVENOUS; SUBCUTANEOUS at 11:04

## 2019-04-10 NOTE — PROGRESS NOTES
[facility]patient [facility]fusion [facility] - Chemotherapy Progress Note    1456 Pt admit to [facility] for labs/PF ambulatory in stable condition. Assessment completed. No new concerns voiced. Port accessed without difficulty with positive blood return. Labs drawn. Chemotherapy Flowsheet 1/2/2019   Cycle C12   Date 1/2/2019   Drug / Regimen Foflox   Pre Meds -   Notes -       Visit Vitals  /76   Pulse 79   Temp 97.4 °F (36.3 °C)   Resp 18   SpO2 99%     Medications:  IV NS  IV Heparin    1105 Pt tolerated treatment well. Port flushed per protocol and costelol removed prior to DC. D/c home ambulatory in no distress. Pt aware of next appointment scheduled for 4/16 @ 0730. Recent Results (from the past 12 hour(s))   CBC WITH AUTOMATED DIFF    Collection Time: 04/10/19 11:03 AM   Result Value Ref Range    WBC 9.0 4.1 - 11.1 K/uL    RBC 5.98 (H) 4.10 - 5.70 M/uL    HGB 14.7 12.1 - 17.0 g/dL    HCT 45.6 36.6 - 50.3 %    MCV 76.3 (L) 80.0 - 99.0 FL    MCH 24.6 (L) 26.0 - 34.0 PG    MCHC 32.2 30.0 - 36.5 g/dL    RDW 14.9 (H) 11.5 - 14.5 %    PLATELET 377 653 - 659 K/uL    MPV 9.5 8.9 - 12.9 FL    NRBC 0.0 0  WBC    ABSOLUTE NRBC 0.00 0.00 - 0.01 K/uL    NEUTROPHILS 67 32 - 75 %    LYMPHOCYTES 24 12 - 49 %    MONOCYTES 5 5 - 13 %    EOSINOPHILS 3 0 - 7 %    BASOPHILS 0 0 - 1 %    IMMATURE GRANULOCYTES 1 (H) 0.0 - 0.5 %    ABS. NEUTROPHILS 6.0 1.8 - 8.0 K/UL    ABS. LYMPHOCYTES 2.2 0.8 - 3.5 K/UL    ABS. MONOCYTES 0.5 0.0 - 1.0 K/UL    ABS. EOSINOPHILS 0.3 0.0 - 0.4 K/UL    ABS. BASOPHILS 0.0 0.0 - 0.1 K/UL    ABS. IMM.  GRANS. 0.1 (H) 0.00 - 0.04 K/UL    DF AUTOMATED     METABOLIC PANEL, COMPREHENSIVE    Collection Time: 04/10/19 11:03 AM   Result Value Ref Range    Sodium 138 136 - 145 mmol/L    Potassium 4.3 3.5 - 5.1 mmol/L    Chloride 105 97 - 108 mmol/L    CO2 28 21 - 32 mmol/L    Anion gap 5 5 - 15 mmol/L    Glucose 90 65 - 100 mg/dL    BUN 13 6 - 20 MG/DL    Creatinine 0.84 0.70 - 1.30 MG/DL    BUN/Creatinine ratio 15 12 - 20      GFR est AA >60 >60 ml/min/1.73m2    GFR est non-AA >60 >60 ml/min/1.73m2    Calcium 9.0 8.5 - 10.1 MG/DL    Bilirubin, total 0.2 0.2 - 1.0 MG/DL    ALT (SGPT) 40 12 - 78 U/L    AST (SGOT) 19 15 - 37 U/L    Alk.  phosphatase 73 45 - 117 U/L    Protein, total 8.2 6.4 - 8.2 g/dL    Albumin 3.8 3.5 - 5.0 g/dL    Globulin 4.4 (H) 2.0 - 4.0 g/dL    A-G Ratio 0.9 (L) 1.1 - 2.2

## 2019-04-12 PROBLEM — C78.00 MALIGNANT NEOPLASM METASTATIC TO LUNG (HCC): Status: ACTIVE | Noted: 2019-04-12

## 2019-04-14 DIAGNOSIS — I10 ESSENTIAL HYPERTENSION: ICD-10-CM

## 2019-04-15 RX ORDER — METOPROLOL TARTRATE 25 MG/1
TABLET, FILM COATED ORAL
Qty: 60 TAB | Refills: 2 | Status: SHIPPED | OUTPATIENT
Start: 2019-04-15 | End: 2019-09-22 | Stop reason: SDUPTHER

## 2019-04-16 ENCOUNTER — TELEPHONE (OUTPATIENT)
Dept: ONCOLOGY | Age: 29
End: 2019-04-16

## 2019-04-16 ENCOUNTER — HOSPITAL ENCOUNTER (OUTPATIENT)
Dept: INFUSION THERAPY | Age: 29
Discharge: HOME | End: 2019-04-16
Payer: COMMERCIAL

## 2019-04-16 NOTE — PROGRESS NOTES
[facility] Progress Note    Date: 2019    Name: Zuhair Payton    MRN: [id number]         : 1990    Mr. Gonsales Arrived ambulatory and in no distress for cycle 12 day 1 of Folfox regimen. Assessment was completed, no acute issues at this time, no new complaints voiced. Port accessed without difficulty, labs drawn and in process. Chemotherapy Flowsheet 2019   Cycle C12   Date 2019   Drug / Regimen Foflox   Pre Meds -   Notes -         Proceeded to appt with . Mr. Jesse Nelson vitals were reviewed. Lab results were obtained and reviewed. Medications:  Benadryl PO  Aloxi IV  Decadron IV  Leucovorin IV  Avastin IV  Oxaplatin IV  Aduracil IV  Aduracil CIV      Mr. Gentry Schilder tolerated treatment well and was discharged from [place] [address] in stable condition. Port de-accessed, flushed & heparinized per protocol. He is to return on 19 at 2:30 for his next appointment.     Greg Styles RN  2019

## 2019-04-16 NOTE — TELEPHONE ENCOUNTER
Pt called and left a voicemail stating he was sick last night and was sorry he couldn't make it in today. He stated he would like to know if he could be put on the schedule for tomorrow.  Call back number [phone]

## 2019-04-18 ENCOUNTER — HOSPITAL ENCOUNTER (OUTPATIENT)
Dept: INFUSION THERAPY | Age: 29
End: 2019-04-18
Payer: COMMERCIAL

## 2019-04-24 RX ORDER — DIPHENHYDRAMINE HYDROCHLORIDE 50 MG/ML
50 INJECTION, SOLUTION INTRAMUSCULAR; INTRAVENOUS AS NEEDED
Status: CANCELLED
Start: 2019-04-30

## 2019-04-24 RX ORDER — PALONOSETRON 0.05 MG/ML
0.25 INJECTION, SOLUTION INTRAVENOUS ONCE
Status: CANCELLED | OUTPATIENT
Start: 2019-04-30

## 2019-04-24 RX ORDER — HEPARIN 100 UNIT/ML
300-500 SYRINGE INTRAVENOUS AS NEEDED
Status: CANCELLED
Start: 2019-04-30

## 2019-04-24 RX ORDER — DEXTROSE MONOHYDRATE 50 MG/ML
25 INJECTION, SOLUTION INTRAVENOUS CONTINUOUS
Status: CANCELLED
Start: 2019-04-30

## 2019-04-24 RX ORDER — ALBUTEROL SULFATE 0.83 MG/ML
2.5 SOLUTION RESPIRATORY (INHALATION) AS NEEDED
Status: CANCELLED
Start: 2019-04-30

## 2019-04-24 RX ORDER — SODIUM CHLORIDE 0.9 % (FLUSH) 0.9 %
10 SYRINGE (ML) INJECTION AS NEEDED
Status: CANCELLED
Start: 2019-04-30

## 2019-04-24 RX ORDER — EPINEPHRINE 1 MG/ML
0.3 INJECTION, SOLUTION, CONCENTRATE INTRAVENOUS AS NEEDED
Status: CANCELLED | OUTPATIENT
Start: 2019-04-30

## 2019-04-24 RX ORDER — HYDROCORTISONE SODIUM SUCCINATE 100 MG/2ML
100 INJECTION, POWDER, FOR SOLUTION INTRAMUSCULAR; INTRAVENOUS AS NEEDED
Status: CANCELLED | OUTPATIENT
Start: 2019-04-30

## 2019-04-24 RX ORDER — ONDANSETRON 2 MG/ML
8 INJECTION INTRAMUSCULAR; INTRAVENOUS AS NEEDED
Status: CANCELLED | OUTPATIENT
Start: 2019-04-30

## 2019-04-24 RX ORDER — SODIUM CHLORIDE 9 MG/ML
500 INJECTION, SOLUTION INTRAVENOUS CONTINUOUS
Status: CANCELLED | OUTPATIENT
Start: 2019-04-30

## 2019-04-24 RX ORDER — ACETAMINOPHEN 325 MG/1
650 TABLET ORAL AS NEEDED
Status: CANCELLED
Start: 2019-04-30

## 2019-04-24 RX ORDER — SODIUM CHLORIDE 9 MG/ML
10 INJECTION INTRAMUSCULAR; INTRAVENOUS; SUBCUTANEOUS AS NEEDED
Status: CANCELLED | OUTPATIENT
Start: 2019-04-30

## 2019-04-24 RX ORDER — FLUOROURACIL 50 MG/ML
400 INJECTION, SOLUTION INTRAVENOUS ONCE
Status: CANCELLED
Start: 2019-04-30 | End: 2019-04-30

## 2019-04-24 RX ORDER — DIPHENHYDRAMINE HCL 25 MG
50 CAPSULE ORAL ONCE
Status: CANCELLED
Start: 2019-04-30

## 2019-04-26 ENCOUNTER — TELEPHONE (OUTPATIENT)
Dept: ONCOLOGY | Age: 29
End: 2019-04-26

## 2019-04-26 NOTE — TELEPHONE ENCOUNTER
[address] at [place]  [phone]    04/26/19- Called patient to review upcoming treatment appointment on 4/30 at 7:30 am.  We will plan to see him in the office that day as well. Voicemail left with this information and requested that patient call back to verify he's coming.

## 2019-04-29 NOTE — PROGRESS NOTES
Can[facility] [place] at [place] [address] [address] [facility] W: [phone]  F: [phone] Reason for Visit:  
Deshawn Vuong is a 25 y.o. male who is seen for follow up of metastatic colon cancer. Treatment History: · CT A/P 3/25/2018: Indeterminate 2 x 1 cm low-density liver lesion. · CT C/A/P with triphase liver 3/27/2018: No evidence of metastatic disease to the chest. Multiple ill-defined hepatic lesions. These do not represent simple cyst. 
· CT guided liver biopsy 3/27/2018: metastatic adenocarcinoma, KRAS wild type, NRAS wild type · Colectomy by Dr. Humberto Banks 3/29/2018: Adenocarcinoma, moderately differentiated. Negative margins. 3/16 nodes involved. pMMR · Stage IV (pT3 pN1b pM1) Colon Cancer · Chemotherapy with FOLFOX and bevacizumab 5/1/2018 - 7/12/2018 for a total of 6 cycles, dany administered with cycles 2-4 · Resumed chemotherapy with FOLFOX and bevacizumab from 9/25/2018 to 12/20/2018. Avastin held after 10/23/2018 in preparation for surgery. · Underwent diagnostic laparoscopy with microwave liver ablation to right hepatic lobe 1/30/2019 · CT Chest 4/9/2019: Several new subcentimeter pulmonary nodules in the bilateral lungs worrisome for pulmonary metastases. Status post interval right hepatic lobe embolization. Several increased areas of hypodensity in the right hepatic lobe are worrisome for progression of hepatic metastases. There is a new tract of hypodensity in the left lateral hepatic lobe extending to a hypodense lesion. · Resumed chemotherapy with FOLFOX and bevacizumab 4/29/2019 History of Present Illness:  
Presents to initiate therapy. Missed treatment 2 weeks ago due to feeling sick. States he was out of it, didn't feel up to coming in. Unsure if there was fever, more cold symptoms. Nasal congestion and cough. Symptoms have improved. He is taking allergy medication daily. Some mild residual nasal congestion. Yesterday started with abdominal pain to right side. Happened after he was cleaning out his car, twisting and bending. Did not notice pain at first started gradually in the evening. Difficulty sleeping due to pain. Hurts to stand up, stretch out or cough. Feels aching in nature. Resting improves pain, laying still. Worried about receiving Oxaliplatin again as he had a reaction to it last time. States he got nauseated and had abdominal pain during infusion. Wants to take July off. Would like to have treatment until July 9th and then take a month off until the second week of August. Is planning a vacation for [facility] late July. PAST HISTORY: The following sections were reviewed and updated in the EMR as appropriate: PMH, SH, FH, Medications, Allergies. No Known Allergies Review of Systems: A complete review of systems was obtained, reviewed, and scanned into the EMR. Pertinent findings reviewed above. Physical Exam:  
 
Visit Vitals BP (!) 135/94 (BP 1 Location: Right arm, BP Patient Position: Sitting) Pulse 90 Temp 97.8 °F (36.6 °C) (Temporal) Resp 20 Ht 6' (1.829 m) Wt 291 lb (132 kg) SpO2 96% BMI 39.47 kg/m² ECOG PS: 0 General: No distress, obese Eyes: PERRLA, anicteric sclerae HENT: Atraumatic, OP clear Neck: Supple Lymphatic: No cervical, supraclavicular, or inguinal adenopathy Respiratory: CTAB, normal respiratory effort CV: Normal rate, regular rhythm, no murmurs, no peripheral edema GI: Soft, tender to light palpation right lower to mid quadrant of abdomen, nondistended, no masses, unable to assess for hepatomegaly, no splenomegaly MS: Normal gait and station. Digits without clubbing or cyanosis. Skin: No rashes, ecchymoses, or petechiae. Normal temperature, turgor, and texture. Psych: Alert, oriented, appropriate affect, normal judgment/insight Results:  
 
Lab Results Component Value Date/Time  WBC 11.6 (H) 04/30/2019 07:58 AM  
 HGB 14.6 04/30/2019 07:58 AM  
 HCT 45.2 04/30/2019 07:58 AM  
 PLATELET 772 84/23/3548 07:58 AM  
 MCV 75.5 (L) 04/30/2019 07:58 AM  
 ABS. NEUTROPHILS 8.0 04/30/2019 07:58 AM  
 Hemoglobin (POC) 15.2 11/12/2018 12:58 PM  
 
Lab Results Component Value Date/Time Sodium 137 04/30/2019 07:58 AM  
 Potassium 4.0 04/30/2019 07:58 AM  
 Chloride 106 04/30/2019 07:58 AM  
 CO2 27 04/30/2019 07:58 AM  
 Glucose 94 04/30/2019 07:58 AM  
 BUN 16 04/30/2019 07:58 AM  
 Creatinine 0.79 04/30/2019 07:58 AM  
 GFR est AA >60 04/30/2019 07:58 AM  
 GFR est non-AA >60 04/30/2019 07:58 AM  
 Calcium 9.0 04/30/2019 07:58 AM  
 
Lab Results Component Value Date/Time Bilirubin, total 0.5 04/30/2019 07:58 AM  
 ALT (SGPT) 43 04/30/2019 07:58 AM  
 AST (SGOT) 23 04/30/2019 07:58 AM  
 Alk. phosphatase 79 04/30/2019 07:58 AM  
 Protein, total 8.2 04/30/2019 07:58 AM  
 Albumin 3.7 04/30/2019 07:58 AM  
 Globulin 4.5 (H) 04/30/2019 07:58 AM  
 
 
CT C/A/P with triphasic liver 7/19/2018: There is no axilla, mediastinal or hilar adenopathy. There is no pericardial pleural effusion no shift or pneumothorax, infusion catheter is in place. No parenchymal mass. The liver lesion and decreased in size, a dome of the liver lesion measures 11 x 14 mm, it was 15 x 20. An additional right lobe of the liver lower lesion is also decreased in size measuring 12 x 7 mm it was 16 x 12. No new lesion is seen. Liver and spleen remain normal in size, the gallbladder is not distended. The kidneys are unobstructed. There is no bowel wall thickening or obstruction. Bowel wall thickening in the ascending colon appear stable some of this may be related to fecal stasis up. There is no free air or free fluid. There is no adenopathy in the abdomen or pelvis. The bladder is midline. Review of bone windows does not suggest metastases. There is no inguinal adenopathy. Major vessels do appear patent. PET at [facility] 8/6/2018: No evidence of FDG avid tumor identified. Questionable focus in the liver. Mucosal associated lymphoid tissue in the pharynx demonstrates overall increased symmetric activity. CT abd/pelvis at [facility] on 3/19/2019: Interval laparoscopic microscope ablation of several left hepatic lobe lesions. Interval right portal vein embolizations. Increase in size of several right hepatic lobe lesions. Development of multiple pulmonary nodules in the imaged lung bases highly concerning for metastatic disease. Enlarged portal caval node, metastatic diease is not excluded. Stranding in the subcutaneous soft tissue related to laparoscopy. CT chest 4/9/2019:  
1. Several new subcentimeter pulmonary nodules in the bilateral lungs worrisome for pulmonary metastases. 2. Status post interval right hepatic lobe embolization. Several increased areas of hypodensity in the right hepatic lobe are worrisome for progression of hepatic metastases. There is a new tract of hypodensity in the left lateral hepatic lobe extending to a hypodense lesion. This may represent interval intervention. Recommend correlation with prior procedural history and studies. Dedicated multiphase liver imaging could be done for further evaluation. Assessment:  
1) Metastatic Colon Cancer Stage IV, pMMR, KRAS/NRAS wild type He has metastatic disease within his liver. His primary tumor has been resected. He has received palliative chemotherapy with FOLFOX and Bevacizumab, followed by a break to undergo liver directed therapy. Unfortunately, he developed pulmonary metastases during this break. We are now resuming chemotherapy FOLFOX and Avastin with plans to continue therapy for as long as he is tolerating treatment and there is no evidence of progresion. Planned to start treatment back on 4/16/2019, however patient did not show for treatment or return numerous subsequent phone calls to reschedule. Discussed importance of maintaining treatment on schedule. Encouraged him to notify us if he plans to miss therapy so this can be rescheduled. Proceed with cycle 12 today as ordered. Will premedication with Diphenhydramine due to acute nausea/abdominal pain associated with cycle 11. Discussed addition of Fosaprepitant to therapy if nausea/vomiting recurs. We will see him with each cycle of therapy. At progression, we can consider FOLFIRI and Panitumumab. We also discussed referral for consideration of clinical trials, which he is interested in. We will review this further once he is back on treatment. Send [employer] on prior liver biopsy specimen. Financial counselor to meet with him today to discuss financial assistance for this testing. 2) Abdominal pain Seems muscular in nature. Will assess LFTs today. Provide short course Oxycodone. If pain does not resolve in typical course or LFTs are abnormal will repeat abdominal imaging. 3) Risk of infertility We have previously reviewed that chemotherapy can potentially cause infertility. He has undergone sperm cryopreservation. 4) Genetic risk Testing at [facility] negative. 5) Hypertension Continue Metoprolol. 6) Chemotherapy induced neuropathy. Grade 1 currently. Improved since break from treatment. Monitor. 7) Emotional Well Being No psychosocial concerns identified today. Patient has adequate support. He has met with financial counselor today regarding application for [employer]. Plan:  
 
Resume FOLFOX with Avastin C12 today (5FU bolus 400mg/m2, 5FU pump 2400mg/m2, Leucovorin 400mg/m2, oxaliplatin 85mg/m2 and bevacizumab 5mg/kg) every 2 weeks Premedication with Palonosetron, Dexamethasone and Diphenhydramine [facility] testing application today Return to clinic every 2 weeks Patient was seen in conjunction with Marley Hagen NP.  
 
Signed By: Alfreda Gotti MD

## 2019-04-30 ENCOUNTER — DOCUMENTATION ONLY (OUTPATIENT)
Dept: ONCOLOGY | Age: 29
End: 2019-04-30

## 2019-04-30 ENCOUNTER — TELEPHONE (OUTPATIENT)
Dept: ONCOLOGY | Age: 29
End: 2019-04-30

## 2019-04-30 ENCOUNTER — OFFICE VISIT (OUTPATIENT)
Dept: ONCOLOGY | Age: 29
End: 2019-04-30

## 2019-04-30 ENCOUNTER — HOSPITAL ENCOUNTER (OUTPATIENT)
Dept: INFUSION THERAPY | Age: 29
Discharge: HOME | End: 2019-04-30
Payer: COMMERCIAL

## 2019-04-30 VITALS
DIASTOLIC BLOOD PRESSURE: 94 MMHG | OXYGEN SATURATION: 96 % | TEMPERATURE: 97.8 F | HEART RATE: 90 BPM | RESPIRATION RATE: 20 BRPM | SYSTOLIC BLOOD PRESSURE: 135 MMHG | HEIGHT: 72 IN | WEIGHT: 291 LBS | BODY MASS INDEX: 39.42 KG/M2

## 2019-04-30 VITALS
HEIGHT: 72 IN | TEMPERATURE: 97.4 F | DIASTOLIC BLOOD PRESSURE: 72 MMHG | BODY MASS INDEX: 39.45 KG/M2 | SYSTOLIC BLOOD PRESSURE: 109 MMHG | RESPIRATION RATE: 18 BRPM | WEIGHT: 291.3 LBS | HEART RATE: 88 BPM

## 2019-04-30 DIAGNOSIS — C78.00 MALIGNANT NEOPLASM METASTATIC TO LUNG, UNSPECIFIED LATERALITY (HCC): ICD-10-CM

## 2019-04-30 DIAGNOSIS — C18.9 COLON CANCER METASTASIZED TO LIVER (HCC): Primary | ICD-10-CM

## 2019-04-30 DIAGNOSIS — C78.7 COLON CANCER METASTASIZED TO LIVER (HCC): Primary | ICD-10-CM

## 2019-04-30 LAB
ALBUMIN SERPL-MCNC: 3.7 G/DL (ref 3.5–5)
ALBUMIN/GLOB SERPL: 0.8 {RATIO} (ref 1.1–2.2)
ALP SERPL-CCNC: 79 U/L (ref 45–117)
ALT SERPL-CCNC: 43 U/L (ref 12–78)
ANION GAP SERPL CALC-SCNC: 4 MMOL/L (ref 5–15)
APPEARANCE UR: CLEAR
AST SERPL-CCNC: 23 U/L (ref 15–37)
BASOPHILS # BLD: 0.1 K/UL (ref 0–0.1)
BASOPHILS NFR BLD: 1 % (ref 0–1)
BILIRUB SERPL-MCNC: 0.5 MG/DL (ref 0.2–1)
BILIRUB UR QL: NEGATIVE
BUN SERPL-MCNC: 16 MG/DL (ref 6–20)
BUN/CREAT SERPL: 20 (ref 12–20)
CALCIUM SERPL-MCNC: 9 MG/DL (ref 8.5–10.1)
CEA SERPL-MCNC: 47.6 NG/ML
CHLORIDE SERPL-SCNC: 106 MMOL/L (ref 97–108)
CO2 SERPL-SCNC: 27 MMOL/L (ref 21–32)
COLOR UR: NORMAL
CREAT SERPL-MCNC: 0.79 MG/DL (ref 0.7–1.3)
DIFFERENTIAL METHOD BLD: ABNORMAL
EOSINOPHIL # BLD: 0.4 K/UL (ref 0–0.4)
EOSINOPHIL NFR BLD: 3 % (ref 0–7)
ERYTHROCYTE [DISTWIDTH] IN BLOOD BY AUTOMATED COUNT: 15.6 % (ref 11.5–14.5)
GLOBULIN SER CALC-MCNC: 4.5 G/DL (ref 2–4)
GLUCOSE SERPL-MCNC: 94 MG/DL (ref 65–100)
GLUCOSE UR STRIP.AUTO-MCNC: NEGATIVE MG/DL
HCT VFR BLD AUTO: 45.2 % (ref 36.6–50.3)
HGB BLD-MCNC: 14.6 G/DL (ref 12.1–17)
HGB UR QL STRIP: NEGATIVE
IMM GRANULOCYTES # BLD AUTO: 0.1 K/UL (ref 0–0.04)
IMM GRANULOCYTES NFR BLD AUTO: 1 % (ref 0–0.5)
KETONES UR QL STRIP.AUTO: NEGATIVE MG/DL
LEUKOCYTE ESTERASE UR QL STRIP.AUTO: NEGATIVE
LYMPHOCYTES # BLD: 2.4 K/UL (ref 0.8–3.5)
LYMPHOCYTES NFR BLD: 20 % (ref 12–49)
MCH RBC QN AUTO: 24.4 PG (ref 26–34)
MCHC RBC AUTO-ENTMCNC: 32.3 G/DL (ref 30–36.5)
MCV RBC AUTO: 75.5 FL (ref 80–99)
MONOCYTES # BLD: 0.8 K/UL (ref 0–1)
MONOCYTES NFR BLD: 7 % (ref 5–13)
NEUTS SEG # BLD: 8 K/UL (ref 1.8–8)
NEUTS SEG NFR BLD: 68 % (ref 32–75)
NITRITE UR QL STRIP.AUTO: NEGATIVE
NRBC # BLD: 0 K/UL (ref 0–0.01)
NRBC BLD-RTO: 0 PER 100 WBC
PH UR STRIP: 5.5 [PH] (ref 5–8)
PLATELET # BLD AUTO: 319 K/UL (ref 150–400)
PMV BLD AUTO: 9.7 FL (ref 8.9–12.9)
POTASSIUM SERPL-SCNC: 4 MMOL/L (ref 3.5–5.1)
PROT SERPL-MCNC: 8.2 G/DL (ref 6.4–8.2)
PROT UR STRIP-MCNC: NEGATIVE MG/DL
RBC # BLD AUTO: 5.99 M/UL (ref 4.1–5.7)
SODIUM SERPL-SCNC: 137 MMOL/L (ref 136–145)
SP GR UR REFRACTOMETRY: 1.03 (ref 1–1.03)
UROBILINOGEN UR QL STRIP.AUTO: 0.2 EU/DL (ref 0.2–1)
WBC # BLD AUTO: 11.6 K/UL (ref 4.1–11.1)

## 2019-04-30 PROCEDURE — 96413 CHEMO IV INFUSION 1 HR: CPT

## 2019-04-30 PROCEDURE — 81003 URINALYSIS AUTO W/O SCOPE: CPT

## 2019-04-30 PROCEDURE — 96416 CHEMO PROLONG INFUSE W/PUMP: CPT

## 2019-04-30 PROCEDURE — 74011000258 HC RX REV CODE- 258: Performed by: NURSE PRACTITIONER

## 2019-04-30 PROCEDURE — 74011250636 HC RX REV CODE- 250/636: Performed by: NURSE PRACTITIONER

## 2019-04-30 PROCEDURE — 77030012965 HC NDL HUBR BBMI -A

## 2019-04-30 PROCEDURE — 74011000250 HC RX REV CODE- 250: Performed by: NURSE PRACTITIONER

## 2019-04-30 PROCEDURE — 96417 CHEMO IV INFUS EACH ADDL SEQ: CPT

## 2019-04-30 PROCEDURE — 96415 CHEMO IV INFUSION ADDL HR: CPT

## 2019-04-30 PROCEDURE — 85025 COMPLETE CBC W/AUTO DIFF WBC: CPT

## 2019-04-30 PROCEDURE — 96368 THER/DIAG CONCURRENT INF: CPT

## 2019-04-30 PROCEDURE — 36415 COLL VENOUS BLD VENIPUNCTURE: CPT

## 2019-04-30 PROCEDURE — 96375 TX/PRO/DX INJ NEW DRUG ADDON: CPT

## 2019-04-30 PROCEDURE — 74011250637 HC RX REV CODE- 250/637: Performed by: NURSE PRACTITIONER

## 2019-04-30 PROCEDURE — 80053 COMPREHEN METABOLIC PANEL: CPT

## 2019-04-30 PROCEDURE — 82378 CARCINOEMBRYONIC ANTIGEN: CPT

## 2019-04-30 PROCEDURE — 74011250636 HC RX REV CODE- 250/636

## 2019-04-30 RX ORDER — ACETAMINOPHEN 325 MG/1
650 TABLET ORAL AS NEEDED
Status: ACTIVE | OUTPATIENT
Start: 2019-04-30 | End: 2019-04-30

## 2019-04-30 RX ORDER — SODIUM CHLORIDE 9 MG/ML
10 INJECTION INTRAMUSCULAR; INTRAVENOUS; SUBCUTANEOUS AS NEEDED
Status: ACTIVE | OUTPATIENT
Start: 2019-04-30 | End: 2019-04-30

## 2019-04-30 RX ORDER — DEXTROSE MONOHYDRATE 50 MG/ML
25 INJECTION, SOLUTION INTRAVENOUS CONTINUOUS
Status: DISPENSED | OUTPATIENT
Start: 2019-04-30 | End: 2019-04-30

## 2019-04-30 RX ORDER — SODIUM CHLORIDE 9 MG/ML
500 INJECTION, SOLUTION INTRAVENOUS CONTINUOUS
Status: DISPENSED | OUTPATIENT
Start: 2019-04-30 | End: 2019-04-30

## 2019-04-30 RX ORDER — HYDROCORTISONE SODIUM SUCCINATE 100 MG/2ML
100 INJECTION, POWDER, FOR SOLUTION INTRAMUSCULAR; INTRAVENOUS AS NEEDED
Status: DISPENSED | OUTPATIENT
Start: 2019-04-30 | End: 2019-04-30

## 2019-04-30 RX ORDER — PALONOSETRON 0.05 MG/ML
0.25 INJECTION, SOLUTION INTRAVENOUS ONCE
Status: COMPLETED | OUTPATIENT
Start: 2019-04-30 | End: 2019-04-30

## 2019-04-30 RX ORDER — DIPHENHYDRAMINE HCL 25 MG
50 CAPSULE ORAL ONCE
Status: COMPLETED | OUTPATIENT
Start: 2019-04-30 | End: 2019-04-30

## 2019-04-30 RX ORDER — HEPARIN 100 UNIT/ML
300-500 SYRINGE INTRAVENOUS AS NEEDED
Status: ACTIVE | OUTPATIENT
Start: 2019-04-30 | End: 2019-04-30

## 2019-04-30 RX ORDER — ONDANSETRON 2 MG/ML
8 INJECTION INTRAMUSCULAR; INTRAVENOUS AS NEEDED
Status: ACTIVE | OUTPATIENT
Start: 2019-04-30 | End: 2019-04-30

## 2019-04-30 RX ORDER — DIPHENHYDRAMINE HYDROCHLORIDE 50 MG/ML
50 INJECTION, SOLUTION INTRAMUSCULAR; INTRAVENOUS AS NEEDED
Status: DISPENSED | OUTPATIENT
Start: 2019-04-30 | End: 2019-04-30

## 2019-04-30 RX ORDER — OXYCODONE HYDROCHLORIDE 5 MG/1
5 TABLET ORAL
Qty: 30 TAB | Refills: 0 | Status: SHIPPED | OUTPATIENT
Start: 2019-04-30 | End: 2019-05-07

## 2019-04-30 RX ORDER — SODIUM CHLORIDE 0.9 % (FLUSH) 0.9 %
10 SYRINGE (ML) INJECTION AS NEEDED
Status: ACTIVE | OUTPATIENT
Start: 2019-04-30 | End: 2019-04-30

## 2019-04-30 RX ORDER — FLUOROURACIL 50 MG/ML
400 INJECTION, SOLUTION INTRAVENOUS ONCE
Status: COMPLETED | OUTPATIENT
Start: 2019-04-30 | End: 2019-04-30

## 2019-04-30 RX ADMIN — PALONOSETRON HYDROCHLORIDE 0.25 MG: 0.25 INJECTION, SOLUTION INTRAVENOUS at 09:20

## 2019-04-30 RX ADMIN — SODIUM CHLORIDE 500 ML: 900 INJECTION, SOLUTION INTRAVENOUS at 09:21

## 2019-04-30 RX ADMIN — DIPHENHYDRAMINE HYDROCHLORIDE 50 MG: 25 CAPSULE ORAL at 09:19

## 2019-04-30 RX ADMIN — DEXTROSE MONOHYDRATE 25 ML/HR: 5 INJECTION, SOLUTION INTRAVENOUS at 10:47

## 2019-04-30 RX ADMIN — LEUCOVORIN CALCIUM 1020 MG: 350 INJECTION, POWDER, LYOPHILIZED, FOR SUSPENSION INTRAMUSCULAR; INTRAVENOUS at 10:47

## 2019-04-30 RX ADMIN — DEXAMETHASONE SODIUM PHOSPHATE 12 MG: 4 INJECTION, SOLUTION INTRA-ARTICULAR; INTRALESIONAL; INTRAMUSCULAR; INTRAVENOUS; SOFT TISSUE at 09:31

## 2019-04-30 RX ADMIN — OXALIPLATIN 217 MG: 5 INJECTION, SOLUTION INTRAVENOUS at 10:47

## 2019-04-30 RX ADMIN — BEVACIZUMAB 643 MG: 400 INJECTION, SOLUTION INTRAVENOUS at 10:05

## 2019-04-30 RX ADMIN — FLUOROURACIL 6120 MG: 50 INJECTION, SOLUTION INTRAVENOUS at 13:05

## 2019-04-30 RX ADMIN — FLUOROURACIL 1020 MG: 50 INJECTION, SOLUTION INTRAVENOUS at 12:55

## 2019-04-30 RX ADMIN — SODIUM CHLORIDE 10 ML: 9 INJECTION, SOLUTION INTRAMUSCULAR; INTRAVENOUS; SUBCUTANEOUS at 07:45

## 2019-04-30 NOTE — PATIENT INSTRUCTIONS
Learning About Safely Storing and Getting Rid of Opioid Pills and Patches Why are opioid pills and patches dangerous? Opioids are medicines used to relieve moderate to severe pain. They may be used for a short time for pain, such as after surgery. Or they may be used to relieve long-term pain. When your doctor prescribes an opioid, you're getting strong medicine. It's important to protect others from its risks. Opioid medicine can cause serious problems, and even death, if it's misused. Children and pets are at high risk when an opioid is kept within their reach. Opioid skin patches, such as fentanyl, are the most dangerous. Even a used patch still has a high dose of medicine in it. Small children have been killed by opioid patches they've found in the trash at home. Opioids can also be abused or stolen. Be sure to store your medicine in a safe and secure place. When you are done using opioid medicine, get rid of it right away, and in the safest way you can. How do you safely store opioid pills and patches? It's important to store opioids safely so that they aren't used by the wrong person. Your pain medicine is only for you to take. If someone else takes your medicine, it can harm that person. You can safely store your medicine. Follow these tips. · Store pills and patches up high and out of sight. ? Keep them away from children and pets. ? Return the container to the same place each time you take your medicine. · Try locking your opioid medicine in a cabinet. · Make sure the bottles are closed tightly. If they have a safety cap, make sure that it's locked. Tighten the cap until you hear a click or can't twist it anymore. · Keep track of how many pills or patches you have left. You may want to keep track in a notebook. · Let the people who live with you know about your medicine. ? Tell them that it is only for you to take. ? If guests have opioid medicine with them, ask them to keep it safe. How do you safely get rid of opioid pills and patches? If you have opioid pills or patches that you are not going to use, get rid of them right away. The U.S. Food and Drug Administration (FDA) recommends that you take your opioid pills and patches to a drop-off box or take-back program that is authorized by the [facility] [place] (NARDA). If you can't get to a NARDA-authorized site right away and your medicine doesn't have specific disposal information (such as flushing), you can dispose of them in your household trash using these steps. · Take the medicine out of its container. · Mix it with something that tastes bad, such as cat litter or coffee grounds. · Place the mixture in a sealed plastic bag, and put the bag in your household trash. Only flush your medicine down the toilet if you can't get to a NARDA-approved site or if your medicine instructions state clearly to flush them. Go to www.fda.gov/Drugs/ResourcesForYou/Consumers/BuyingUsingMedicineSafely/EnsuringSafeUseofMedicine/SafeDisposalofMedicines/lpw236329.htm to see a list of medicines that should be flushed. Take special care with used opioid patches. As soon as you peel a patch off of your skin, fold it in half with the sticky sides together. Immediately take it to a NARDA-authorized site or flush it down the toilet if a NARDA-authorized site isn't available in your area. Do not throw them in the trash. Where can you go to learn more? To learn how and where to get rid of unused medicines in your area, ask your doctor or pharmacist for help. Your local trash and recycle center may have a drop-off site. You can also look online at the [employer] website ([address]end[employer].gov) to find a disposal site near you.  Or you can visit fda.gov and search for \"unused medicine disposal.\" 
 Follow-up care is a key part of your treatment and safety. Be sure to make and go to all appointments, and call your doctor if you are having problems. It's also a good idea to know your test results and keep a list of the medicines you take. Where can you learn more? Go to http://anna-vasquez.info/. Enter Z863 in the search box to learn more about \"Learning About Safely Storing and Getting Rid of Opioid Pills and Patches. \" Current as of: Alisson 3, 2018 Content Version: 11.9 © 0817-6858 [employer]. Care instructions adapted under license by [employer] (which disclaims liability or warranty for this information). If you have questions about a medical condition or this instruction, always ask your healthcare professional. [address] any warranty or liability for your use of this information. Oxycodone, Rapid Release (By mouth) Oxycodone Hydrochloride (xd-j-PXR-done sudeep-droe-KLOR-ain) Treats moderate to severe pain. This medicine is a narcotic pain reliever. Brand Name(s): Oxaydo, Oxy IR, Roxicodone There may be other brand names for this medicine. When This Medicine Should Not Be Used: This medicine is not right for everyone. Do not use it if you had an allergic reaction to oxycodone, codeine, hydrocodone, dihydrocodeine, or morphine, or you have a stomach or bowel blockage. How to Use This Medicine:  
Capsule, Liquid, Tablet · Take your medicine as directed. Your dose may need to be changed several times to find what works best for you. · An overdose can be dangerous. Follow directions carefully so you do not get too much medicine at one time. · Oral liquid: Measure the oral liquid medicine with a marked measuring spoon, oral syringe, or medicine cup. · Oxaydo® tablet: Swallow it whole with enough water to swallow it completely. Do not break, crush, chew, or dissolve it. Do not wet the tablet before you put it in your mouth. · This medicine should come with a Medication Guide. Ask your pharmacist for a copy if you do not have one. · Missed dose: Take a dose as soon as you remember. If it is almost time for your next dose, wait until then and take a regular dose. Do not take extra medicine to make up for a missed dose. · Store the medicine in a closed container at room temperature, away from heat, moisture, and direct light. Store the medicine in a secure place to prevent others from getting it. Ask your pharmacist about the best way to dispose of medicine you do not use. Drugs and Foods to Avoid: Ask your doctor or pharmacist before using any other medicine, including over-the-counter medicines, vitamins, and herbal products. · Do not use this medicine if you are using or have used an MAO inhibitor within the past 14 days. · Some medicines can affect how oxycodone works. Tell your doctor if you are using any of the following: ¨ Amiodarone, carbamazepine, erythromycin, ketoconazole, phenytoin, quinidine, rifampin, ritonavir ¨ Diuretic (water pill) ¨ Medicine to treat depression or anxiety ¨ Medicine to treat migraine headaches ¨ Phenothiazine medicine · Tell your doctor if you use anything else that makes you sleepy. Some examples are allergy medicine, narcotic pain medicine, and alcohol. Tell your doctor if you are using buprenorphine, butorphanol, nalbuphine, pentazocine, or a muscle relaxer. · Do not drink alcohol while you are using this medicine. Warnings While Using This Medicine: · Tell your doctor if you are pregnant or breastfeeding, or if you have kidney disease, liver disease, heart disease, low blood pressure, lung disease or breathing problems (such as asthma, COPD), scoliosis, an enlarged prostate or trouble urinating, an underactive thyroid, [place] disease, gallbladder or pancreas problems, or digestion problems.  Tell your doctor if you have a history of head injury, brain tumor, mental health problems, seizures, or alcohol or drug addiction. · This medicine may cause the following problems: 
¨ High risk of overdose, which can lead to death ¨ Respiratory depression (serious breathing problem that can be life-threatening) ¨ Serotonin syndrome, when used with certain medicines · This medicine may make you dizzy, drowsy, or faint. Do not drive or do anything else that could be dangerous until you know how this medicine affects you. Sit or lie down if you feel dizzy. Stand up carefully. · This medicine can be habit-forming. Do not use more than your prescribed dose. Call your doctor if you think your medicine is not working. · Do not stop using this medicine suddenly. Your doctor will need to slowly decrease your dose before you stop it completely. · This medicine may cause constipation, especially with long-term use. Ask your doctor if you should use a laxative to prevent and treat constipation. Drink plenty of liquids to help avoid constipation. · This medicine could cause infertility. Talk with your doctor before using this medicine if you plan to have children. · Keep all medicine out of the reach of children. Never share your medicine with anyone. Possible Side Effects While Using This Medicine:  
Call your doctor right away if you notice any of these side effects: · Allergic reaction: Itching or hives, swelling in your face or hands, swelling or tingling in your mouth or throat, chest tightness, trouble breathing · Anxiety, restlessness, fast heartbeat, fever, sweating, muscle spasms, twitching, nausea, vomiting, diarrhea, seeing or hearing things that are not there · Blue lips, fingernails, or skin, trouble breathing · Extreme dizziness or weakness, shallow breathing, slow heartbeat, sweating, cold or clammy skin, seizures · Lightheadedness, dizziness, fainting · Severe constipation, stomach pain If you notice these less serious side effects, talk with your doctor: · Mild constipation · Sleepiness, tiredness If you notice other side effects that you think are caused by this medicine, tell your doctor. Call your doctor for medical advice about side effects. You may report side effects to FDA at 5-632-ALB-8923 © 2017 [facility] Information is for End User's use only and may not be sold, redistributed or otherwise used for commercial purposes. The above information is an  only. It is not intended as medical advice for individual conditions or treatments. Talk to your doctor, nurse or pharmacist before following any medical regimen to see if it is safe and effective for you.

## 2019-04-30 NOTE — PROGRESS NOTES
[facility] Progress Note    Date: 2019    Name: Rush Lopez    MRN: [id number]         : 1990    0740. Mr. Eugenio Wright Arrived ambulatory and in no distress for C12 Folfox. Assessment was completed, no acute issues at this time, no new complaints voiced. R chest wall port accessed without difficulty using 1 inch costello needle, labs drawn and in process. Chemotherapy Flowsheet 2019   Cycle C12   Date 2019   Drug / Regimen Folfox   Pre Meds -   Notes -         0815:  Proceeded to appt with Dr. Torin Escamilla. Mr. Imer Jeffrey vitals were reviewed. Patient Vitals for the past 12 hrs:   Temp Pulse Resp BP   19 1000 -- -- -- 109/72   19 0751 97.4 °F (36.3 °C) 88 18 143/72       Lab results were obtained and reviewed. Recent Results (from the past 12 hour(s))   CBC WITH AUTOMATED DIFF    Collection Time: 19  7:58 AM   Result Value Ref Range    WBC 11.6 (H) 4.1 - 11.1 K/uL    RBC 5.99 (H) 4.10 - 5.70 M/uL    HGB 14.6 12.1 - 17.0 g/dL    HCT 45.2 36.6 - 50.3 %    MCV 75.5 (L) 80.0 - 99.0 FL    MCH 24.4 (L) 26.0 - 34.0 PG    MCHC 32.3 30.0 - 36.5 g/dL    RDW 15.6 (H) 11.5 - 14.5 %    PLATELET 241 131 - 079 K/uL    MPV 9.7 8.9 - 12.9 FL    NRBC 0.0 0  WBC    ABSOLUTE NRBC 0.00 0.00 - 0.01 K/uL    NEUTROPHILS 68 32 - 75 %    LYMPHOCYTES 20 12 - 49 %    MONOCYTES 7 5 - 13 %    EOSINOPHILS 3 0 - 7 %    BASOPHILS 1 0 - 1 %    IMMATURE GRANULOCYTES 1 (H) 0.0 - 0.5 %    ABS. NEUTROPHILS 8.0 1.8 - 8.0 K/UL    ABS. LYMPHOCYTES 2.4 0.8 - 3.5 K/UL    ABS. MONOCYTES 0.8 0.0 - 1.0 K/UL    ABS. EOSINOPHILS 0.4 0.0 - 0.4 K/UL    ABS. BASOPHILS 0.1 0.0 - 0.1 K/UL    ABS. IMM.  GRANS. 0.1 (H) 0.00 - 0.04 K/UL    DF AUTOMATED     CEA    Collection Time: 19  7:58 AM   Result Value Ref Range    CEA 49.3 ng/mL   METABOLIC PANEL, COMPREHENSIVE    Collection Time: 19  7:58 AM   Result Value Ref Range    Sodium 137 136 - 145 mmol/L    Potassium 4.0 3.5 - 5.1 mmol/L    Chloride 106 97 - 108 mmol/L    CO2 27 21 - 32 mmol/L    Anion gap 4 (L) 5 - 15 mmol/L    Glucose 94 65 - 100 mg/dL    BUN 16 6 - 20 MG/DL    Creatinine 0.79 0.70 - 1.30 MG/DL    BUN/Creatinine ratio 20 12 - 20      GFR est AA >60 >60 ml/min/1.73m2    GFR est non-AA >60 >60 ml/min/1.73m2    Calcium 9.0 8.5 - 10.1 MG/DL    Bilirubin, total 0.5 0.2 - 1.0 MG/DL    ALT (SGPT) 43 12 - 78 U/L    AST (SGOT) 23 15 - 37 U/L    Alk. phosphatase 79 45 - 117 U/L    Protein, total 8.2 6.4 - 8.2 g/dL    Albumin 3.7 3.5 - 5.0 g/dL    Globulin 4.5 (H) 2.0 - 4.0 g/dL    A-G Ratio 0.8 (L) 1.1 - 2.2     URINALYSIS W/ RFLX MICROSCOPIC    Collection Time: 04/30/19  7:58 AM   Result Value Ref Range    Color YELLOW/STRAW      Appearance CLEAR CLEAR      Specific gravity 1.028 1.003 - 1.030      pH (UA) 5.5 5.0 - 8.0      Protein NEGATIVE  NEG mg/dL    Glucose NEGATIVE  NEG mg/dL    Ketone NEGATIVE  NEG mg/dL    Bilirubin NEGATIVE  NEG      Blood NEGATIVE  NEG      Urobilinogen 0.2 0.2 - 1.0 EU/dL    Nitrites NEGATIVE  NEG      Leukocyte Esterase NEGATIVE  NEG         Pre-medications  were administered as ordered and chemotherapy was initiated. Medications:  Dexamethasone IVPB  Aloxi IV  Benadryl PO  Avastin IV  Leucovorin IV  Oxaliplatin IV   5FU Syringe   5FU CADD   NS KVO  D5W KVO    1305. Mr. Lyn Hogan tolerated treatment well and was discharged from [place] [address] in stable condition. Pump connected to patient & infusing per order. He is to return on 05/02/19 for his next appointment.     Kevin Carmona RN  April 30, 2019

## 2019-04-30 NOTE — TELEPHONE ENCOUNTER
[address] at [facility]  [phone]    04/30/19- [employer] testing add on testing requested on specimen number [id number]. New order placed online.

## 2019-04-30 NOTE — PROGRESS NOTES
[facility]  Financial Navigator Encounter     Met with patient while he was here for a follow up with Dr. Emy Shi to discuss and assist him complete the financial assistance application for [facility]. Also reminded patient that he still needs to submit proof of income for his care card application (3 recent pay stubs, SNAP letter, and bank statement). I have faxed the 1900 [employer] application to [employer] today.

## 2019-04-30 NOTE — PROGRESS NOTES
Kem Koo is a 25 y.o. male follow up for colon cancer. 1. Have you been to the ER, urgent care clinic since your last visit? Hospitalized since your last visit? No 
 
2. Have you seen or consulted any other health care providers outside of the [address] [place] [address] since your last visit? Include any pap smears or colon screening.  No

## 2019-05-01 ENCOUNTER — TELEPHONE (OUTPATIENT)
Dept: ONCOLOGY | Age: 29
End: 2019-05-01

## 2019-05-01 NOTE — TELEPHONE ENCOUNTER
05/01/2019- Called patient to inform him that he is approved for 100% financial assistance through [address] if there is any remaining balance after his insurance pays. Patient verbalized understanding.

## 2019-05-02 ENCOUNTER — HOSPITAL ENCOUNTER (OUTPATIENT)
Dept: INFUSION THERAPY | Age: 29
End: 2019-05-02
Payer: COMMERCIAL

## 2019-05-02 ENCOUNTER — HOSPITAL ENCOUNTER (OUTPATIENT)
Dept: INFUSION THERAPY | Age: 29
Discharge: HOME | End: 2019-05-02
Payer: COMMERCIAL

## 2019-05-02 VITALS
DIASTOLIC BLOOD PRESSURE: 81 MMHG | HEART RATE: 81 BPM | TEMPERATURE: 98.5 F | SYSTOLIC BLOOD PRESSURE: 137 MMHG | RESPIRATION RATE: 18 BRPM

## 2019-05-02 DIAGNOSIS — C18.9 COLON CANCER METASTASIZED TO LIVER (HCC): Primary | ICD-10-CM

## 2019-05-02 DIAGNOSIS — C78.7 COLON CANCER METASTASIZED TO LIVER (HCC): Primary | ICD-10-CM

## 2019-05-02 PROCEDURE — 74011250636 HC RX REV CODE- 250/636: Performed by: INTERNAL MEDICINE

## 2019-05-02 PROCEDURE — 96523 IRRIG DRUG DELIVERY DEVICE: CPT

## 2019-05-02 RX ORDER — HEPARIN 100 UNIT/ML
300-500 SYRINGE INTRAVENOUS AS NEEDED
Status: ACTIVE | OUTPATIENT
Start: 2019-05-02 | End: 2019-05-02

## 2019-05-02 RX ORDER — SODIUM CHLORIDE 9 MG/ML
10 INJECTION INTRAMUSCULAR; INTRAVENOUS; SUBCUTANEOUS AS NEEDED
Status: ACTIVE | OUTPATIENT
Start: 2019-05-02 | End: 2019-05-02

## 2019-05-02 RX ORDER — SODIUM CHLORIDE 0.9 % (FLUSH) 0.9 %
10 SYRINGE (ML) INJECTION AS NEEDED
Status: ACTIVE | OUTPATIENT
Start: 2019-05-02 | End: 2019-05-02

## 2019-05-02 RX ADMIN — Medication 10 ML: at 11:24

## 2019-05-02 RX ADMIN — Medication 500 UNITS: at 11:23

## 2019-05-02 NOTE — PROGRESS NOTES
[facility] VISIT NOTE    1115. Pt arrived at [facility] ambulatory and in no distress for Pump Removal.  Assessment completed, pt with no new complaints or concerns voiced. .    RCW chest port already accessed with 5fu pump attached. Verified pump finished infusing. Tolerated treatment well, no adverse reaction noted. Port de-accessed and flushed per protocol. Positive blood return noted. Patient Vitals for the past 12 hrs:   Temp Pulse Resp BP   05/02/19 1117 98.5 °F (36.9 °C) 81 18 137/81     1125. D/C'd from [facility] ambulatory and in no distress.  Next appointment is 5/14/19 at 7:30 am.

## 2019-05-13 NOTE — PROGRESS NOTES
[facility] [place] [facility] [place] [address] [address] [facility]  Nicolasa N[place]otte: [phone]  F: [phone]     Reason for Visit:   Cat Larson is a 25 y.o. male who is seen for follow up of metastatic colon cancer. Treatment History:   · CT A/P 3/25/2018: Indeterminate 2 x 1 cm low-density liver lesion. · CT C/A/P with triphase liver 3/27/2018: No evidence of metastatic disease to the chest. Multiple ill-defined hepatic lesions. These do not represent simple cyst.  · CT guided liver biopsy 3/27/2018: metastatic adenocarcinoma, KRAS wild type, NRAS wild type  · Colectomy by Dr. Shahbaz Summers 3/29/2018: Adenocarcinoma, moderately differentiated. Negative margins. 3/16 nodes involved. pMMR  · Stage IV (pT3 pN1b pM1) Colon Cancer  · Chemotherapy with FOLFOX and bevacizumab 5/1/2018 - 7/12/2018 for a total of 6 cycles, dany administered with cycles 2-4  · Resumed chemotherapy with FOLFOX and bevacizumab from 9/25/2018 to 12/20/2018. Avastin held after 10/23/2018 in preparation for surgery. · Underwent diagnostic laparoscopy with microwave liver ablation to right hepatic lobe 1/30/2019  · CT Chest 4/9/2019: Several new subcentimeter pulmonary nodules in the bilateral lungs worrisome for pulmonary metastases. Status post interval right hepatic lobe embolization. Several increased areas of hypodensity in the right hepatic lobe are worrisome for progression of hepatic metastases. There is a new tract of hypodensity in the left lateral hepatic lobe extending to a hypodense lesion. · Resumed chemotherapy with FOLFOX and bevacizumab 4/29/2019    History of Present Illness:   Presents today on therapy. Had to leave early from work Monday after treatment and took off on Tuesday due to nausea. 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 Has fatigue Friday after treatment, this typically lasts through the weekend.      Right sided abdominal pain he experienced last cycle has resolved. This only lasted for another day after treatment. Thought to be related to muscle cramp or pulled muscle. No fever or chills. No cough or congestion. No s/s of acute illness. No symptoms of numbness/tingling in fingertips. Wants to take July off. Would like to have treatment until July 9th and then take a month off until the second week of August. Is planning a vacation for [facility] late July. PAST HISTORY: The following sections were reviewed and updated in the EMR as appropriate: PMH, SH, FH, Medications, Allergies. No Known Allergies     Review of Systems: A complete review of systems was obtained, reviewed, and scanned into the EMR. Pertinent findings reviewed above. Physical Exam:     Visit Vitals  BP (!) 133/97 (BP 1 Location: Right arm, BP Patient Position: Sitting)   Pulse 74   Temp 97.3 °F (36.3 °C) (Temporal)   Resp 20   Ht 6' 1\" (1.854 m)   Wt 288 lb (130.6 kg)   SpO2 95%   BMI 38.00 kg/m²     ECOG PS: 0  General: No distress, obese  Eyes: PERRLA, anicteric sclerae  HENT: Atraumatic, OP clear  Neck: Supple  Lymphatic: No cervical, supraclavicular, or inguinal adenopathy  Respiratory: CTAB, normal respiratory effort  CV: Normal rate, regular rhythm, no murmurs, no peripheral edema  GI: Soft, nontender, nondistended, no masses, unable to assess for hepatomegaly, no splenomegaly  MS: Normal gait and station. Digits without clubbing or cyanosis. Skin: No rashes, ecchymoses, or petechiae. Normal temperature, turgor, and texture. Psych: Alert, oriented, appropriate affect, normal judgment/insight    Results:     Lab Results   Component Value Date/Time    WBC 10.9 05/14/2019 07:58 AM    HGB 14.7 05/14/2019 07:58 AM    HCT 45.4 05/14/2019 07:58 AM    PLATELET 846 23/69/0149 07:58 AM    MCV 74.4 (L) 05/14/2019 07:58 AM    ABS.  NEUTROPHILS 6.7 05/14/2019 07:58 AM    Hemoglobin (POC) 15.2 11/12/2018 12:58 PM     Lab Results   Component Value Date/Time    Sodium 138 05/14/2019 07:58 AM    Potassium 3.9 05/14/2019 07:58 AM    Chloride 105 05/14/2019 07:58 AM    CO2 26 05/14/2019 07:58 AM    Glucose 90 05/14/2019 07:58 AM    BUN 14 05/14/2019 07:58 AM    Creatinine 0.96 05/14/2019 07:58 AM    GFR est AA >60 05/14/2019 07:58 AM    GFR est non-AA >60 05/14/2019 07:58 AM    Calcium 9.1 05/14/2019 07:58 AM     Lab Results   Component Value Date/Time    Bilirubin, total 0.4 05/14/2019 07:58 AM    ALT (SGPT) 35 05/14/2019 07:58 AM    AST (SGOT) 23 05/14/2019 07:58 AM    Alk. phosphatase 86 05/14/2019 07:58 AM    Protein, total 8.1 05/14/2019 07:58 AM    Albumin 3.6 05/14/2019 07:58 AM    Globulin 4.5 (H) 05/14/2019 07:58 AM       CT C/A/P with triphasic liver 7/19/2018: There is no axilla, mediastinal or hilar adenopathy. There is no pericardial pleural effusion no shift or pneumothorax, infusion catheter is in place. No parenchymal mass. The liver lesion and decreased in size, a dome of the liver lesion measures 11 x 14 mm, it was 15 x 20. An additional right lobe of the liver lower lesion is also decreased in size measuring 12 x 7 mm it was 16 x 12. No new lesion is seen. Liver and spleen remain normal in size, the gallbladder is not distended. The kidneys are unobstructed. There is no bowel wall thickening or obstruction. Bowel wall thickening in the ascending colon appear stable some of this may be related to fecal stasis up. There is no free air or free fluid. There is no adenopathy in the abdomen or pelvis. The bladder is midline. Review of bone windows does not suggest metastases. There is no inguinal adenopathy. Major vessels do appear patent. PET at [facility] 8/6/2018: No evidence of FDG avid tumor identified. Questionable focus in the liver. Mucosal associated lymphoid tissue in the pharynx demonstrates overall increased symmetric activity. CT abd/pelvis at [facility] on 3/19/2019: Interval laparoscopic microscope ablation of several left hepatic lobe lesions.  Interval right portal vein embolizations. Increase in size of several right hepatic lobe lesions. Development of multiple pulmonary nodules in the imaged lung bases highly concerning for metastatic disease. Enlarged portal caval node, metastatic diease is not excluded. Stranding in the subcutaneous soft tissue related to laparoscopy. CT chest 4/9/2019:   1. Several new subcentimeter pulmonary nodules in the bilateral lungs worrisome for pulmonary metastases. 2. Status post interval right hepatic lobe embolization. Several increased areas of hypodensity in the right hepatic lobe are worrisome for progression of hepatic metastases. There is a new tract of hypodensity in the left lateral hepatic lobe extending to a hypodense lesion. This may represent interval intervention. Recommend correlation with prior procedural history and studies. Dedicated multiphase liver imaging could be done for further evaluation. Assessment:   1) Metastatic Colon Cancer  Stage IV, pMMR, KRAS/NRAS wild type  He has metastatic disease within his liver. His primary tumor has been resected. He has received palliative chemotherapy with FOLFOX and Bevacizumab, followed by a break to undergo liver directed therapy. Unfortunately, he developed pulmonary metastases during this break. We are now resuming chemotherapy FOLFOX and Avastin with plans to continue therapy for as long as he is tolerating treatment and there is no evidence of progresion. Tolerated resumption of therapy with exception of grade 1 fatigue, grade 1 nausea without vomiting. Proceed with cycle 13 today as ordered. Premedication with Diphenhydramine due to acute nausea/abdominal pain associated with cycle 11. Discussed addition of Fosaprepitant to therapy if nausea/vomiting recurs. We will see him with each cycle of therapy. At progression, we can consider FOLFIRI and Panitumumab.   We also discussed referral for consideration of clinical trials, which he is interested in.  We will review this further once he is back on treatment. Sent [facility] on prior liver biopsy specimen. 2) Abdominal pain  Seems muscular in nature. Resolved. 3) Risk of infertility  We have previously reviewed that chemotherapy can potentially cause infertility. He has undergone sperm cryopreservation. 4) Genetic risk  Testing at [facility] negative. 5) Hypertension  Continue Metoprolol. He missed evening dose the last few days. BP elevated today. 6) Chemotherapy induced neuropathy. Grade 1 currently. Improved since break from treatment. Monitor. 7) Emotional Well Being  No psychosocial concerns identified today. Patient has adequate support. Plan:     Proceed with FOLFOX and Avastin C13 today (5FU bolus 400mg/m2, 5FU pump 2400mg/m2, Leucovorin 400mg/m2, oxaliplatin 85mg/m2 and bevacizumab 5mg/kg) every 2 weeks  Premedication with Palonosetron, Dexamethasone and Diphenhydramine  Encouraged use of Metoprolol as ordered  Return to clinic every 2 weeks    Patient was seen in conjunction with Nichole May NP.     Signed By: Steven Dyson MD

## 2019-05-14 ENCOUNTER — OFFICE VISIT (OUTPATIENT)
Dept: ONCOLOGY | Age: 29
End: 2019-05-14

## 2019-05-14 ENCOUNTER — HOSPITAL ENCOUNTER (OUTPATIENT)
Dept: INFUSION THERAPY | Age: 29
Discharge: HOME | End: 2019-05-14
Payer: COMMERCIAL

## 2019-05-14 VITALS
WEIGHT: 288 LBS | SYSTOLIC BLOOD PRESSURE: 133 MMHG | RESPIRATION RATE: 20 BRPM | TEMPERATURE: 97.3 F | HEART RATE: 74 BPM | DIASTOLIC BLOOD PRESSURE: 97 MMHG | BODY MASS INDEX: 38.17 KG/M2 | OXYGEN SATURATION: 95 % | HEIGHT: 73 IN

## 2019-05-14 VITALS
RESPIRATION RATE: 22 BRPM | WEIGHT: 288.9 LBS | HEART RATE: 78 BPM | HEIGHT: 73 IN | OXYGEN SATURATION: 98 % | DIASTOLIC BLOOD PRESSURE: 73 MMHG | TEMPERATURE: 97 F | BODY MASS INDEX: 38.29 KG/M2 | SYSTOLIC BLOOD PRESSURE: 132 MMHG

## 2019-05-14 DIAGNOSIS — C78.00 MALIGNANT NEOPLASM METASTATIC TO LUNG, UNSPECIFIED LATERALITY (HCC): ICD-10-CM

## 2019-05-14 DIAGNOSIS — C78.7 COLON CANCER METASTASIZED TO LIVER (HCC): Primary | ICD-10-CM

## 2019-05-14 DIAGNOSIS — C18.9 COLON CANCER METASTASIZED TO LIVER (HCC): Primary | ICD-10-CM

## 2019-05-14 DIAGNOSIS — E66.01 SEVERE OBESITY WITH BODY MASS INDEX (BMI) OF 35.0 TO 39.9 WITH SERIOUS COMORBIDITY (HCC): ICD-10-CM

## 2019-05-14 LAB
ALBUMIN SERPL-MCNC: 3.6 G/DL (ref 3.5–5)
ALBUMIN/GLOB SERPL: 0.8 {RATIO} (ref 1.1–2.2)
ALP SERPL-CCNC: 86 U/L (ref 45–117)
ALT SERPL-CCNC: 35 U/L (ref 12–78)
ANION GAP SERPL CALC-SCNC: 7 MMOL/L (ref 5–15)
APPEARANCE UR: CLEAR
AST SERPL-CCNC: 23 U/L (ref 15–37)
BASOPHILS # BLD: 0.1 K/UL (ref 0–0.1)
BASOPHILS NFR BLD: 1 % (ref 0–1)
BILIRUB SERPL-MCNC: 0.4 MG/DL (ref 0.2–1)
BILIRUB UR QL: NEGATIVE
BUN SERPL-MCNC: 14 MG/DL (ref 6–20)
BUN/CREAT SERPL: 15 (ref 12–20)
CALCIUM SERPL-MCNC: 9.1 MG/DL (ref 8.5–10.1)
CHLORIDE SERPL-SCNC: 105 MMOL/L (ref 97–108)
CO2 SERPL-SCNC: 26 MMOL/L (ref 21–32)
COLOR UR: NORMAL
CREAT SERPL-MCNC: 0.96 MG/DL (ref 0.7–1.3)
DIFFERENTIAL METHOD BLD: ABNORMAL
EOSINOPHIL # BLD: 0.2 K/UL (ref 0–0.4)
EOSINOPHIL NFR BLD: 2 % (ref 0–7)
ERYTHROCYTE [DISTWIDTH] IN BLOOD BY AUTOMATED COUNT: 17 % (ref 11.5–14.5)
GLOBULIN SER CALC-MCNC: 4.5 G/DL (ref 2–4)
GLUCOSE SERPL-MCNC: 90 MG/DL (ref 65–100)
GLUCOSE UR STRIP.AUTO-MCNC: NEGATIVE MG/DL
HCT VFR BLD AUTO: 45.4 % (ref 36.6–50.3)
HGB BLD-MCNC: 14.7 G/DL (ref 12.1–17)
HGB UR QL STRIP: NEGATIVE
IMM GRANULOCYTES # BLD AUTO: 0 K/UL (ref 0–0.04)
IMM GRANULOCYTES NFR BLD AUTO: 0 % (ref 0–0.5)
KETONES UR QL STRIP.AUTO: NEGATIVE MG/DL
LEUKOCYTE ESTERASE UR QL STRIP.AUTO: NEGATIVE
LYMPHOCYTES # BLD: 2.8 K/UL (ref 0.8–3.5)
LYMPHOCYTES NFR BLD: 26 % (ref 12–49)
MCH RBC QN AUTO: 24.1 PG (ref 26–34)
MCHC RBC AUTO-ENTMCNC: 32.4 G/DL (ref 30–36.5)
MCV RBC AUTO: 74.4 FL (ref 80–99)
MONOCYTES # BLD: 1.1 K/UL (ref 0–1)
MONOCYTES NFR BLD: 10 % (ref 5–13)
NEUTS SEG # BLD: 6.7 K/UL (ref 1.8–8)
NEUTS SEG NFR BLD: 61 % (ref 32–75)
NITRITE UR QL STRIP.AUTO: NEGATIVE
NRBC # BLD: 0 K/UL (ref 0–0.01)
NRBC BLD-RTO: 0 PER 100 WBC
PH UR STRIP: 5.5 [PH] (ref 5–8)
PLATELET # BLD AUTO: 314 K/UL (ref 150–400)
PMV BLD AUTO: 8.8 FL (ref 8.9–12.9)
POTASSIUM SERPL-SCNC: 3.9 MMOL/L (ref 3.5–5.1)
PROT SERPL-MCNC: 8.1 G/DL (ref 6.4–8.2)
PROT UR STRIP-MCNC: NEGATIVE MG/DL
RBC # BLD AUTO: 6.1 M/UL (ref 4.1–5.7)
SODIUM SERPL-SCNC: 138 MMOL/L (ref 136–145)
SP GR UR REFRACTOMETRY: 1.03 (ref 1–1.03)
UROBILINOGEN UR QL STRIP.AUTO: 0.2 EU/DL (ref 0.2–1)
WBC # BLD AUTO: 10.9 K/UL (ref 4.1–11.1)

## 2019-05-14 PROCEDURE — 74011000258 HC RX REV CODE- 258: Performed by: NURSE PRACTITIONER

## 2019-05-14 PROCEDURE — 96375 TX/PRO/DX INJ NEW DRUG ADDON: CPT

## 2019-05-14 PROCEDURE — 74011250636 HC RX REV CODE- 250/636

## 2019-05-14 PROCEDURE — 96417 CHEMO IV INFUS EACH ADDL SEQ: CPT

## 2019-05-14 PROCEDURE — 81003 URINALYSIS AUTO W/O SCOPE: CPT

## 2019-05-14 PROCEDURE — 96416 CHEMO PROLONG INFUSE W/PUMP: CPT

## 2019-05-14 PROCEDURE — 77030012965 HC NDL HUBR BBMI -A

## 2019-05-14 PROCEDURE — 80053 COMPREHEN METABOLIC PANEL: CPT

## 2019-05-14 PROCEDURE — 74011250637 HC RX REV CODE- 250/637: Performed by: NURSE PRACTITIONER

## 2019-05-14 PROCEDURE — 96415 CHEMO IV INFUSION ADDL HR: CPT

## 2019-05-14 PROCEDURE — 74011250636 HC RX REV CODE- 250/636: Performed by: NURSE PRACTITIONER

## 2019-05-14 PROCEDURE — 85025 COMPLETE CBC W/AUTO DIFF WBC: CPT

## 2019-05-14 PROCEDURE — 36415 COLL VENOUS BLD VENIPUNCTURE: CPT

## 2019-05-14 PROCEDURE — 96413 CHEMO IV INFUSION 1 HR: CPT

## 2019-05-14 PROCEDURE — 96411 CHEMO IV PUSH ADDL DRUG: CPT

## 2019-05-14 RX ORDER — HYDROCORTISONE SODIUM SUCCINATE 100 MG/2ML
100 INJECTION, POWDER, FOR SOLUTION INTRAMUSCULAR; INTRAVENOUS AS NEEDED
Status: DISPENSED | OUTPATIENT
Start: 2019-05-14 | End: 2019-05-14

## 2019-05-14 RX ORDER — ONDANSETRON 2 MG/ML
8 INJECTION INTRAMUSCULAR; INTRAVENOUS AS NEEDED
Status: ACTIVE | OUTPATIENT
Start: 2019-05-14 | End: 2019-05-14

## 2019-05-14 RX ORDER — PALONOSETRON 0.05 MG/ML
0.25 INJECTION, SOLUTION INTRAVENOUS ONCE
Status: COMPLETED | OUTPATIENT
Start: 2019-05-14 | End: 2019-05-14

## 2019-05-14 RX ORDER — OXYCODONE AND ACETAMINOPHEN 5; 325 MG/1; MG/1
TABLET ORAL
Refills: 0 | COMMUNITY
Start: 2019-02-22 | End: 2019-08-14 | Stop reason: SDUPTHER

## 2019-05-14 RX ORDER — FAMOTIDINE 10 MG/ML
INJECTION INTRAVENOUS
Status: COMPLETED
Start: 2019-05-14 | End: 2019-05-14

## 2019-05-14 RX ORDER — DEXTROSE MONOHYDRATE 50 MG/ML
25 INJECTION, SOLUTION INTRAVENOUS CONTINUOUS
Status: DISPENSED | OUTPATIENT
Start: 2019-05-14 | End: 2019-05-14

## 2019-05-14 RX ORDER — FLUOROURACIL 50 MG/ML
400 INJECTION, SOLUTION INTRAVENOUS ONCE
Status: COMPLETED | OUTPATIENT
Start: 2019-05-14 | End: 2019-05-14

## 2019-05-14 RX ORDER — DIPHENHYDRAMINE HCL 25 MG
50 CAPSULE ORAL ONCE
Status: COMPLETED | OUTPATIENT
Start: 2019-05-14 | End: 2019-05-14

## 2019-05-14 RX ORDER — SODIUM CHLORIDE 9 MG/ML
500 INJECTION, SOLUTION INTRAVENOUS CONTINUOUS
Status: DISPENSED | OUTPATIENT
Start: 2019-05-14 | End: 2019-05-14

## 2019-05-14 RX ORDER — ACETAMINOPHEN 325 MG/1
650 TABLET ORAL AS NEEDED
Status: ACTIVE | OUTPATIENT
Start: 2019-05-14 | End: 2019-05-14

## 2019-05-14 RX ORDER — SODIUM CHLORIDE 9 MG/ML
10 INJECTION INTRAMUSCULAR; INTRAVENOUS; SUBCUTANEOUS AS NEEDED
Status: ACTIVE | OUTPATIENT
Start: 2019-05-14 | End: 2019-05-14

## 2019-05-14 RX ORDER — DIPHENHYDRAMINE HYDROCHLORIDE 50 MG/ML
50 INJECTION, SOLUTION INTRAMUSCULAR; INTRAVENOUS AS NEEDED
Status: ACTIVE | OUTPATIENT
Start: 2019-05-14 | End: 2019-05-14

## 2019-05-14 RX ORDER — SODIUM CHLORIDE 0.9 % (FLUSH) 0.9 %
10 SYRINGE (ML) INJECTION AS NEEDED
Status: ACTIVE | OUTPATIENT
Start: 2019-05-14 | End: 2019-05-14

## 2019-05-14 RX ORDER — DIPHENHYDRAMINE HYDROCHLORIDE 50 MG/ML
INJECTION, SOLUTION INTRAMUSCULAR; INTRAVENOUS
Status: DISPENSED
Start: 2019-05-14 | End: 2019-05-14

## 2019-05-14 RX ORDER — HEPARIN 100 UNIT/ML
300-500 SYRINGE INTRAVENOUS AS NEEDED
Status: ACTIVE | OUTPATIENT
Start: 2019-05-14 | End: 2019-05-14

## 2019-05-14 RX ADMIN — BEVACIZUMAB 642.5 MG: 400 INJECTION, SOLUTION INTRAVENOUS at 09:50

## 2019-05-14 RX ADMIN — OXALIPLATIN 217 MG: 5 INJECTION, SOLUTION INTRAVENOUS at 11:05

## 2019-05-14 RX ADMIN — HYDROCORTISONE SODIUM SUCCINATE 50 MG: 100 INJECTION, POWDER, FOR SOLUTION INTRAMUSCULAR; INTRAVENOUS at 11:35

## 2019-05-14 RX ADMIN — FLUOROURACIL 6120 MG: 50 INJECTION, SOLUTION INTRAVENOUS at 14:20

## 2019-05-14 RX ADMIN — FLUOROURACIL 1020 MG: 50 INJECTION, SOLUTION INTRAVENOUS at 14:15

## 2019-05-14 RX ADMIN — LEUCOVORIN CALCIUM 1020 MG: 350 INJECTION, POWDER, LYOPHILIZED, FOR SUSPENSION INTRAMUSCULAR; INTRAVENOUS at 11:05

## 2019-05-14 RX ADMIN — PALONOSETRON HYDROCHLORIDE 0.25 MG: 0.25 INJECTION, SOLUTION INTRAVENOUS at 09:26

## 2019-05-14 RX ADMIN — FAMOTIDINE: 10 INJECTION INTRAVENOUS at 11:28

## 2019-05-14 RX ADMIN — DIPHENHYDRAMINE HYDROCHLORIDE 50 MG: 25 CAPSULE ORAL at 09:20

## 2019-05-14 RX ADMIN — DEXTROSE MONOHYDRATE 25 ML/HR: 5 INJECTION, SOLUTION INTRAVENOUS at 11:01

## 2019-05-14 RX ADMIN — DEXAMETHASONE SODIUM PHOSPHATE 12 MG: 4 INJECTION, SOLUTION INTRA-ARTICULAR; INTRALESIONAL; INTRAMUSCULAR; INTRAVENOUS; SOFT TISSUE at 09:30

## 2019-05-14 RX ADMIN — SODIUM CHLORIDE 500 ML: 900 INJECTION, SOLUTION INTRAVENOUS at 09:24

## 2019-05-14 NOTE — PROGRESS NOTES
Shelby Gómez is a 25 y.o. male follow up for colon cancer. 1. Have you been to the ER, urgent care clinic since your last visit? Hospitalized since your last visit? No     2. Have you seen or consulted any other health care providers outside of the [address] [place] [address] since your last visit? Include any pap smears or colon screening.  No

## 2019-05-14 NOTE — PROGRESS NOTES
[facility] Progress Note    Date: May 14, 2019    Name: Jaime Gregory    MRN: [id number]         : 1990    0745. Mr. Angela Pugh Arrived ambulatory and in no distress for C13 Folfox. Assessment was completed, no acute issues at this time, no new complaints voiced. R chest wall port accessed without difficulty using 1 inch costello needle, labs drawn and in process. Chemotherapy Flowsheet 2019   Cycle C13   Date 2019   Drug / Regimen Folfox/Avastin   Pre Meds -   Notes -         0815:  Proceeded to appt with Dr. Anette Finn. 1125. Patient reported abdominal \"burning\" and nausea. Patient vomited x2. Oxaliplatin & leucovorin started 15 minutes prior. Medication immediately stopped. NS bolus run wide open. Pepcid administered. 50 mg of solu-cortef per orders from Niraj Hernandez NP. Ordered to hold off on benadryl at this time. Per Racheal Calvin, administer steroid & restart medication in 30 minutes. Mr. Domenica Doyle vitals were reviewed. Patient Vitals for the past 12 hrs:   Temp Pulse Resp BP SpO2   19 1403 -- 78 -- 132/73 --   19 1247 -- 66 -- 111/71 --   19 1143 97 °F (36.1 °C) 79 22 121/85 98 %   19 1130 96.8 °F (36 °C) 84 -- 131/82 97 %   19 0924 -- -- -- 126/79 --   19 0750 97.1 °F (36.2 °C) 96 18 (!) 126/91 --       Lab results were obtained and reviewed.     Recent Results (from the past 12 hour(s))   CBC WITH AUTOMATED DIFF    Collection Time: 19  7:58 AM   Result Value Ref Range    WBC 10.9 4.1 - 11.1 K/uL    RBC 6.10 (H) 4.10 - 5.70 M/uL    HGB 14.7 12.1 - 17.0 g/dL    HCT 45.4 36.6 - 50.3 %    MCV 74.4 (L) 80.0 - 99.0 FL    MCH 24.1 (L) 26.0 - 34.0 PG    MCHC 32.4 30.0 - 36.5 g/dL    RDW 17.0 (H) 11.5 - 14.5 %    PLATELET 391 668 - 687 K/uL    MPV 8.8 (L) 8.9 - 12.9 FL    NRBC 0.0 0  WBC    ABSOLUTE NRBC 0.00 0.00 - 0.01 K/uL    NEUTROPHILS 61 32 - 75 %    LYMPHOCYTES 26 12 - 49 %    MONOCYTES 10 5 - 13 %    EOSINOPHILS 2 0 - 7 %    BASOPHILS 1 0 - 1 %    IMMATURE GRANULOCYTES 0 0.0 - 0.5 %    ABS. NEUTROPHILS 6.7 1.8 - 8.0 K/UL    ABS. LYMPHOCYTES 2.8 0.8 - 3.5 K/UL    ABS. MONOCYTES 1.1 (H) 0.0 - 1.0 K/UL    ABS. EOSINOPHILS 0.2 0.0 - 0.4 K/UL    ABS. BASOPHILS 0.1 0.0 - 0.1 K/UL    ABS. IMM. GRANS. 0.0 0.00 - 0.04 K/UL    DF AUTOMATED     METABOLIC PANEL, COMPREHENSIVE    Collection Time: 05/14/19  7:58 AM   Result Value Ref Range    Sodium 138 136 - 145 mmol/L    Potassium 3.9 3.5 - 5.1 mmol/L    Chloride 105 97 - 108 mmol/L    CO2 26 21 - 32 mmol/L    Anion gap 7 5 - 15 mmol/L    Glucose 90 65 - 100 mg/dL    BUN 14 6 - 20 MG/DL    Creatinine 0.96 0.70 - 1.30 MG/DL    BUN/Creatinine ratio 15 12 - 20      GFR est AA >60 >60 ml/min/1.73m2    GFR est non-AA >60 >60 ml/min/1.73m2    Calcium 9.1 8.5 - 10.1 MG/DL    Bilirubin, total 0.4 0.2 - 1.0 MG/DL    ALT (SGPT) 35 12 - 78 U/L    AST (SGOT) 23 15 - 37 U/L    Alk. phosphatase 86 45 - 117 U/L    Protein, total 8.1 6.4 - 8.2 g/dL    Albumin 3.6 3.5 - 5.0 g/dL    Globulin 4.5 (H) 2.0 - 4.0 g/dL    A-G Ratio 0.8 (L) 1.1 - 2.2     URINALYSIS W/ RFLX MICROSCOPIC    Collection Time: 05/14/19  7:58 AM   Result Value Ref Range    Color YELLOW/STRAW      Appearance CLEAR CLEAR      Specific gravity 1.027 1.003 - 1.030      pH (UA) 5.5 5.0 - 8.0      Protein NEGATIVE  NEG mg/dL    Glucose NEGATIVE  NEG mg/dL    Ketone NEGATIVE  NEG mg/dL    Bilirubin NEGATIVE  NEG      Blood NEGATIVE  NEG      Urobilinogen 0.2 0.2 - 1.0 EU/dL    Nitrites NEGATIVE  NEG      Leukocyte Esterase NEGATIVE  NEG         Pre-medications  were administered as ordered and chemotherapy was initiated. Medications:  Dexamethasone IVPB  Aloxi IV  Benadryl PO  Pepcid IVP  Solu-cortef IVP  Avastin IV  Leucovorin IV  Oxaliplatin IV   5FU Syringe   5FU CADD   NS KVO  D5W KVO      1420. Mr. Marshall Warren tolerated treatment well and was discharged from [place] [id number] in stable condition. Pump connected to patient & infusing per order.  He is to return on 05/16/19 for his next appointment.     Ludy Ackerman RN  May 14, 2019

## 2019-05-16 ENCOUNTER — HOSPITAL ENCOUNTER (OUTPATIENT)
Dept: INFUSION THERAPY | Age: 29
Discharge: HOME | End: 2019-05-16
Payer: COMMERCIAL

## 2019-05-16 VITALS
RESPIRATION RATE: 18 BRPM | DIASTOLIC BLOOD PRESSURE: 82 MMHG | SYSTOLIC BLOOD PRESSURE: 138 MMHG | TEMPERATURE: 96.6 F | HEART RATE: 78 BPM

## 2019-05-16 DIAGNOSIS — C78.7 COLON CANCER METASTASIZED TO LIVER (HCC): Primary | ICD-10-CM

## 2019-05-16 DIAGNOSIS — C18.9 COLON CANCER METASTASIZED TO LIVER (HCC): Primary | ICD-10-CM

## 2019-05-16 PROCEDURE — 74011250636 HC RX REV CODE- 250/636: Performed by: NURSE PRACTITIONER

## 2019-05-16 PROCEDURE — 96523 IRRIG DRUG DELIVERY DEVICE: CPT

## 2019-05-16 RX ORDER — SODIUM CHLORIDE 0.9 % (FLUSH) 0.9 %
10 SYRINGE (ML) INJECTION AS NEEDED
Status: DISCONTINUED | OUTPATIENT
Start: 2019-05-16 | End: 2019-05-17 | Stop reason: HOSPADM

## 2019-05-16 RX ORDER — SODIUM CHLORIDE 9 MG/ML
10 INJECTION INTRAMUSCULAR; INTRAVENOUS; SUBCUTANEOUS AS NEEDED
Status: DISCONTINUED | OUTPATIENT
Start: 2019-05-16 | End: 2019-05-17 | Stop reason: HOSPADM

## 2019-05-16 RX ORDER — HEPARIN 100 UNIT/ML
300-500 SYRINGE INTRAVENOUS AS NEEDED
Status: DISCONTINUED | OUTPATIENT
Start: 2019-05-16 | End: 2019-05-17 | Stop reason: HOSPADM

## 2019-05-16 RX ADMIN — Medication 10 ML: at 12:54

## 2019-05-16 RX ADMIN — Medication 500 UNITS: at 12:54

## 2019-05-16 NOTE — PROGRESS NOTES
[facility] Progress Note    4938 Pt admit to [place] for pump disconnect ambulatory in stable condition. Assessment completed. No new concerns voiced. Pump completed at 1230 per pt. Pump disconnected from pt. Port flushed with positive blood return, heparinized and de-accessed. Visit Vitals  /82 (BP 1 Location: Right arm, BP Patient Position: Sitting)   Pulse 78   Temp 96.6 °F (35.9 °C)   Resp 18         1255 Pt tolerated treatment well. D/c home ambulatory in no distress. Pt aware of next appointment scheduled for 5/28/19.

## 2019-05-22 ENCOUNTER — APPOINTMENT (OUTPATIENT)
Dept: INFUSION THERAPY | Age: 29
End: 2019-05-22
Payer: COMMERCIAL

## 2019-05-28 ENCOUNTER — TELEPHONE (OUTPATIENT)
Dept: ONCOLOGY | Age: 29
End: 2019-05-28

## 2019-05-28 NOTE — TELEPHONE ENCOUNTER
Call to patient to f/u on missed appointment for chemotherapy scheduled for this morning at 0730. Left message asking for return call so this can be rescheduled at earliest convenience.

## 2019-05-30 ENCOUNTER — HOSPITAL ENCOUNTER (OUTPATIENT)
Dept: INFUSION THERAPY | Age: 29
End: 2019-05-30
Payer: COMMERCIAL

## 2019-06-11 ENCOUNTER — TELEPHONE (OUTPATIENT)
Dept: ONCOLOGY | Age: 29
End: 2019-06-11

## 2019-06-11 DIAGNOSIS — C78.00 MALIGNANT NEOPLASM METASTATIC TO LUNG, UNSPECIFIED LATERALITY (HCC): Primary | ICD-10-CM

## 2019-06-11 NOTE — TELEPHONE ENCOUNTER
Call to patient and patient's emergency contact (sister, Scottie Perez) regarding patients missed appointment for today. This is second missed appointment and second missed chemotherapy. We need to encourage him to follow up and reschedule chemotherapy appointments to get him back on schedule. Nursing: If no return call in 1-2 days please try back.

## 2019-06-11 NOTE — TELEPHONE ENCOUNTER
MICHELLEE Ovalis at [facility]  [phone]    I spoke to the patient's sister, discussed our concerns about his missed appointments. She reports that the patient has been lying to her about coming to the doctor and getting chemotherapy. I informed her that he has only received two doses of chemotherapy since we made the decision over two months ago to resume treatment. (Should have received 5 or 6) He has frequently no-showed for treatments, including the past two treatments. When he reschedules, he makes the appointments for 2 weeks out, rather than right away. We are worried about him, and would like to see him to review his goals of care. Should he not want to proceed with further treatment, we can certainly discuss the option of supportive care alone, including hospice care. If he does want to proceed with treatment, then he needs to do better about making it to his appointments. She will try to reach him.

## 2019-06-12 ENCOUNTER — OFFICE VISIT (OUTPATIENT)
Dept: ONCOLOGY | Age: 29
End: 2019-06-12

## 2019-06-12 VITALS
DIASTOLIC BLOOD PRESSURE: 105 MMHG | BODY MASS INDEX: 39.49 KG/M2 | HEART RATE: 105 BPM | RESPIRATION RATE: 16 BRPM | WEIGHT: 298 LBS | OXYGEN SATURATION: 98 % | TEMPERATURE: 97.6 F | HEIGHT: 73 IN | SYSTOLIC BLOOD PRESSURE: 153 MMHG

## 2019-06-12 DIAGNOSIS — C78.7 COLON CANCER METASTASIZED TO LIVER (HCC): Primary | ICD-10-CM

## 2019-06-12 DIAGNOSIS — C18.9 COLON CANCER METASTASIZED TO LIVER (HCC): Primary | ICD-10-CM

## 2019-06-12 DIAGNOSIS — F33.9 RECURRENT DEPRESSION (HCC): ICD-10-CM

## 2019-06-12 RX ORDER — MIRTAZAPINE 15 MG/1
15 TABLET, FILM COATED ORAL
Qty: 30 TAB | Refills: 11 | Status: SHIPPED | OUTPATIENT
Start: 2019-06-12 | End: 2019-07-03

## 2019-06-12 NOTE — PROGRESS NOTES
[facility] [place] [address] [address] [facility] [address] 100[address] [facility]  Yair Holliday: [phone]  F: [phone]     Reason for Visit:   Radu Bai is a 25 y.o. male who is seen for follow up of metastatic colon cancer. Treatment History:   · CT A/P 3/25/2018: Indeterminate 2 x 1 cm low-density liver lesion. · CT C/A/P with triphase liver 3/27/2018: No evidence of metastatic disease to the chest. Multiple ill-defined hepatic lesions. These do not represent simple cyst.  · CT guided liver biopsy 3/27/2018: metastatic adenocarcinoma, KRAS wild type, NRAS wild type  · Colectomy by Dr. Addie Lindsey 3/29/2018: Adenocarcinoma, moderately differentiated. Negative margins. 3/16 nodes involved. pMMR  · Stage IV (pT3 pN1b pM1) Colon Cancer  · Chemotherapy with FOLFOX and bevacizumab 5/1/2018 - 7/12/2018 for a total of 6 cycles, dany administered with cycles 2-4  · Resumed chemotherapy with FOLFOX and bevacizumab from 9/25/2018 to 12/20/2018. Avastin held after 10/23/2018 in preparation for surgery. · Underwent diagnostic laparoscopy with microwave liver ablation to right hepatic lobe 1/30/2019  · CT Chest 4/9/2019: Several new subcentimeter pulmonary nodules in the bilateral lungs worrisome for pulmonary metastases. Status post interval right hepatic lobe embolization. Several increased areas of hypodensity in the right hepatic lobe are worrisome for progression of hepatic metastases. There is a new tract of hypodensity in the left lateral hepatic lobe extending to a hypodense lesion. · Resumed chemotherapy with FOLFOX and bevacizumab 4/29/2019    History of Present Illness:   He returns today for an urgent visit, after missing his last two appointments for chemotherapy. Here to discuss goals of care and plan moving forward. He is feeling well off therapy. He complains of neuropathy, taste changes, cold sensitivity while on therapy. Fatigue for a few days after every treatment.   He is torn between wanting to be off therapy to \"enjoy my life\" and wanting to continue treatment in order to prolong his life. As a result, he has been missing many of his scheduled appointments. He reports some depression. Sleeping poorly. PAST HISTORY: The following sections were reviewed and updated in the EMR as appropriate: PMH, SH, FH, Medications, Allergies. No Known Allergies     Review of Systems: A complete review of systems was obtained, reviewed, and scanned into the EMR. Pertinent findings reviewed above. Physical Exam:     Visit Vitals  BP (!) 153/105 (BP 1 Location: Left arm, BP Patient Position: Sitting)   Pulse (!) 105   Temp 97.6 °F (36.4 °C) (Temporal)   Resp 16   Ht 6' 1\" (1.854 m)   Wt 298 lb (135.2 kg)   SpO2 98%   BMI 39.32 kg/m²     ECOG PS: 0  General: No distress, obese  Eyes: PERRLA, anicteric sclerae  HENT: Atraumatic, OP clear  Neck: Supple  Lymphatic: No cervical, supraclavicular, or inguinal adenopathy  Respiratory: CTAB, normal respiratory effort  CV: Normal rate, regular rhythm, no murmurs, no peripheral edema  GI: Soft, nontender, nondistended, no masses, unable to assess for hepatomegaly, no splenomegaly  MS: Normal gait and station. Digits without clubbing or cyanosis. Skin: No rashes, ecchymoses, or petechiae. Normal temperature, turgor, and texture. Psych: Alert, oriented, appropriate affect, normal judgment/insight    Results:     Lab Results   Component Value Date/Time    WBC 10.9 05/14/2019 07:58 AM    HGB 14.7 05/14/2019 07:58 AM    HCT 45.4 05/14/2019 07:58 AM    PLATELET 033 60/28/1633 07:58 AM    MCV 74.4 (L) 05/14/2019 07:58 AM    ABS.  NEUTROPHILS 6.7 05/14/2019 07:58 AM    Hemoglobin (POC) 15.2 11/12/2018 12:58 PM     Lab Results   Component Value Date/Time    Sodium 138 05/14/2019 07:58 AM    Potassium 3.9 05/14/2019 07:58 AM    Chloride 105 05/14/2019 07:58 AM    CO2 26 05/14/2019 07:58 AM    Glucose 90 05/14/2019 07:58 AM    BUN 14 05/14/2019 07:58 AM Creatinine 0.96 05/14/2019 07:58 AM    GFR est AA >60 05/14/2019 07:58 AM    GFR est non-AA >60 05/14/2019 07:58 AM    Calcium 9.1 05/14/2019 07:58 AM     Lab Results   Component Value Date/Time    Bilirubin, total 0.4 05/14/2019 07:58 AM    ALT (SGPT) 35 05/14/2019 07:58 AM    AST (SGOT) 23 05/14/2019 07:58 AM    Alk. phosphatase 86 05/14/2019 07:58 AM    Protein, total 8.1 05/14/2019 07:58 AM    Albumin 3.6 05/14/2019 07:58 AM    Globulin 4.5 (H) 05/14/2019 07:58 AM       CT C/A/P with triphasic liver 7/19/2018: There is no axilla, mediastinal or hilar adenopathy. There is no pericardial pleural effusion no shift or pneumothorax, infusion catheter is in place. No parenchymal mass. The liver lesion and decreased in size, a dome of the liver lesion measures 11 x 14 mm, it was 15 x 20. An additional right lobe of the liver lower lesion is also decreased in size measuring 12 x 7 mm it was 16 x 12. No new lesion is seen. Liver and spleen remain normal in size, the gallbladder is not distended. The kidneys are unobstructed. There is no bowel wall thickening or obstruction. Bowel wall thickening in the ascending colon appear stable some of this may be related to fecal stasis up. There is no free air or free fluid. There is no adenopathy in the abdomen or pelvis. The bladder is midline. Review of bone windows does not suggest metastases. There is no inguinal adenopathy. Major vessels do appear patent. [facility] at [facility] 8/6/2018: No evidence of FDG avid tumor identified. Questionable focus in the liver. Mucosal associated lymphoid tissue in the pharynx demonstrates overall increased symmetric activity. CT abd/pelvis at [facility] on 3/19/2019: Interval laparoscopic microscope ablation of several left hepatic lobe lesions. Interval right portal vein embolizations. Increase in size of several right hepatic lobe lesions.  Development of multiple pulmonary nodules in the imaged lung bases highly concerning for metastatic disease. Enlarged portal caval node, metastatic diease is not excluded. Stranding in the subcutaneous soft tissue related to laparoscopy. CT chest 4/9/2019:   1. Several new subcentimeter pulmonary nodules in the bilateral lungs worrisome for pulmonary metastases. 2. Status post interval right hepatic lobe embolization. Several increased areas of hypodensity in the right hepatic lobe are worrisome for progression of hepatic metastases. There is a new tract of hypodensity in the left lateral hepatic lobe extending to a hypodense lesion. This may represent interval intervention. Recommend correlation with prior procedural history and studies. Dedicated multiphase liver imaging could be done for further evaluation. Assessment:   1) Metastatic Colon Cancer  Stage IV, pMMR, KRAS/NRAS wild type  He has metastatic disease within his liver. His primary tumor has been resected. He has received palliative chemotherapy with FOLFOX and Bevacizumab, followed by a break to undergo liver directed therapy. Unfortunately, he developed pulmonary metastases during this break. We are now resuming chemotherapy FOLFOX and Avastin with plans to continue therapy for as long as he is tolerating treatment and there is no evidence of progresion. He has been noncompliant with his treatment, only received 2 of the last 5 planned cycles. This prompted a goals of care discussion today. We reviewed the option of stopping therapy and pursuing supportive care alone, including hospice care. We reviewed the option of resuming palliative chemotherapy with the goal of prolonging his life and controlling cancer related symptoms. After some thought, he would like to resume therapy and he agrees to improve his compliance. We will give him two more cycles and then reimage. We will see him with each cycle of therapy. At progression, we can consider FOLFIRI and Panitumumab.   We have also discussed referral for consideration of clinical trials. Sent [facility] on prior liver biopsy specimen. 2) Abdominal pain  Seems muscular in nature. Resolved. 3) Risk of infertility  We have previously reviewed that chemotherapy can potentially cause infertility. He has undergone sperm cryopreservation. 4) Genetic risk  Testing at [facility] negative. 5) Hypertension  Continue Metoprolol. Follow up with PCP for continued elevated BP. 6) Chemotherapy induced neuropathy. Improved since skipping last few cycles. Monitor. 7) Depression / Insomnia  Discussed remeron, which he would like to try. Discussed referral to a therapist, but he is not interested in this    8) Emotional Well Being  No psychosocial concerns identified today. Patient has adequate support. Plan:     Proceed next week with FOLFOX and Avastin, given every 2 weeks  Start remeron 15mg nightly  Return to clinic in 3 weeks    >50% of this 40 minute visit was spent on counseling/coordination of care regarding the above diagnoses.       Signed By: Charan Bowie MD

## 2019-06-12 NOTE — PROGRESS NOTES
Garrison Macias is a 25 y.o. male follow up for colon cancer. 1. Have you been to the ER, urgent care clinic since your last visit? Hospitalized since your last visit? No     2. Have you seen or consulted any other health care providers outside of the [address] [place] [address] since your last visit? Include any pap smears or colon screening.  No

## 2019-06-13 ENCOUNTER — HOSPITAL ENCOUNTER (OUTPATIENT)
Dept: INFUSION THERAPY | Age: 29
End: 2019-06-13
Payer: COMMERCIAL

## 2019-06-18 ENCOUNTER — HOSPITAL ENCOUNTER (OUTPATIENT)
Dept: INFUSION THERAPY | Age: 29
Discharge: HOME | End: 2019-06-18
Payer: COMMERCIAL

## 2019-06-18 ENCOUNTER — OFFICE VISIT (OUTPATIENT)
Dept: ONCOLOGY | Age: 29
End: 2019-06-18

## 2019-06-18 VITALS
SYSTOLIC BLOOD PRESSURE: 129 MMHG | WEIGHT: 295.4 LBS | RESPIRATION RATE: 18 BRPM | HEIGHT: 73 IN | DIASTOLIC BLOOD PRESSURE: 81 MMHG | OXYGEN SATURATION: 97 % | BODY MASS INDEX: 39.15 KG/M2 | HEART RATE: 73 BPM | TEMPERATURE: 97.6 F

## 2019-06-18 VITALS
WEIGHT: 296 LBS | DIASTOLIC BLOOD PRESSURE: 100 MMHG | HEART RATE: 79 BPM | OXYGEN SATURATION: 98 % | BODY MASS INDEX: 39.23 KG/M2 | TEMPERATURE: 97.5 F | SYSTOLIC BLOOD PRESSURE: 137 MMHG | HEIGHT: 73 IN | RESPIRATION RATE: 20 BRPM

## 2019-06-18 DIAGNOSIS — C18.9 COLON CANCER METASTASIZED TO LIVER (HCC): Primary | ICD-10-CM

## 2019-06-18 DIAGNOSIS — C78.7 COLON CANCER METASTASIZED TO LIVER (HCC): Primary | ICD-10-CM

## 2019-06-18 LAB
ALBUMIN SERPL-MCNC: 3.4 G/DL (ref 3.5–5)
ALBUMIN/GLOB SERPL: 0.7 {RATIO} (ref 1.1–2.2)
ALP SERPL-CCNC: 80 U/L (ref 45–117)
ALT SERPL-CCNC: 31 U/L (ref 12–78)
ANION GAP SERPL CALC-SCNC: 4 MMOL/L (ref 5–15)
APPEARANCE UR: CLEAR
AST SERPL-CCNC: 20 U/L (ref 15–37)
BACTERIA URNS QL MICRO: NEGATIVE /HPF
BASOPHILS # BLD: 0.1 K/UL (ref 0–0.1)
BASOPHILS NFR BLD: 1 % (ref 0–1)
BILIRUB SERPL-MCNC: 0.2 MG/DL (ref 0.2–1)
BILIRUB UR QL: NEGATIVE
BUN SERPL-MCNC: 12 MG/DL (ref 6–20)
BUN/CREAT SERPL: 15 (ref 12–20)
CALCIUM SERPL-MCNC: 9.3 MG/DL (ref 8.5–10.1)
CEA SERPL-MCNC: 19 NG/ML
CHLORIDE SERPL-SCNC: 107 MMOL/L (ref 97–108)
CO2 SERPL-SCNC: 28 MMOL/L (ref 21–32)
COLOR UR: NORMAL
CREAT SERPL-MCNC: 0.81 MG/DL (ref 0.7–1.3)
DIFFERENTIAL METHOD BLD: ABNORMAL
EOSINOPHIL # BLD: 0.6 K/UL (ref 0–0.4)
EOSINOPHIL NFR BLD: 6 % (ref 0–7)
EPITH CASTS URNS QL MICRO: NORMAL /LPF
ERYTHROCYTE [DISTWIDTH] IN BLOOD BY AUTOMATED COUNT: 18.8 % (ref 11.5–14.5)
GLOBULIN SER CALC-MCNC: 4.6 G/DL (ref 2–4)
GLUCOSE SERPL-MCNC: 99 MG/DL (ref 65–100)
GLUCOSE UR STRIP.AUTO-MCNC: NEGATIVE MG/DL
HCT VFR BLD AUTO: 47.5 % (ref 36.6–50.3)
HGB BLD-MCNC: 14.9 G/DL (ref 12.1–17)
HGB UR QL STRIP: NEGATIVE
HYALINE CASTS URNS QL MICRO: NORMAL /LPF (ref 0–5)
IMM GRANULOCYTES # BLD AUTO: 0 K/UL (ref 0–0.04)
IMM GRANULOCYTES NFR BLD AUTO: 0 % (ref 0–0.5)
KETONES UR QL STRIP.AUTO: NEGATIVE MG/DL
LEUKOCYTE ESTERASE UR QL STRIP.AUTO: NEGATIVE
LYMPHOCYTES # BLD: 2.5 K/UL (ref 0.8–3.5)
LYMPHOCYTES NFR BLD: 27 % (ref 12–49)
MCH RBC QN AUTO: 24.1 PG (ref 26–34)
MCHC RBC AUTO-ENTMCNC: 31.4 G/DL (ref 30–36.5)
MCV RBC AUTO: 77 FL (ref 80–99)
MONOCYTES # BLD: 0.6 K/UL (ref 0–1)
MONOCYTES NFR BLD: 7 % (ref 5–13)
NEUTS SEG # BLD: 5.5 K/UL (ref 1.8–8)
NEUTS SEG NFR BLD: 59 % (ref 32–75)
NITRITE UR QL STRIP.AUTO: NEGATIVE
NRBC # BLD: 0 K/UL (ref 0–0.01)
NRBC BLD-RTO: 0 PER 100 WBC
PH UR STRIP: 5.5 [PH] (ref 5–8)
PLATELET # BLD AUTO: 316 K/UL (ref 150–400)
PMV BLD AUTO: 9.7 FL (ref 8.9–12.9)
POTASSIUM SERPL-SCNC: 4.5 MMOL/L (ref 3.5–5.1)
PROT SERPL-MCNC: 8 G/DL (ref 6.4–8.2)
PROT UR STRIP-MCNC: NEGATIVE MG/DL
RBC # BLD AUTO: 6.17 M/UL (ref 4.1–5.7)
RBC #/AREA URNS HPF: NORMAL /HPF (ref 0–5)
SODIUM SERPL-SCNC: 139 MMOL/L (ref 136–145)
SP GR UR REFRACTOMETRY: 1.02 (ref 1–1.03)
UA: UC IF INDICATED,UAUC: NORMAL
UROBILINOGEN UR QL STRIP.AUTO: 0.2 EU/DL (ref 0.2–1)
WBC # BLD AUTO: 9.3 K/UL (ref 4.1–11.1)
WBC URNS QL MICRO: NORMAL /HPF (ref 0–4)

## 2019-06-18 PROCEDURE — 85025 COMPLETE CBC W/AUTO DIFF WBC: CPT

## 2019-06-18 PROCEDURE — 74011250636 HC RX REV CODE- 250/636: Performed by: NURSE PRACTITIONER

## 2019-06-18 PROCEDURE — 82378 CARCINOEMBRYONIC ANTIGEN: CPT

## 2019-06-18 PROCEDURE — 74011000258 HC RX REV CODE- 258: Performed by: INTERNAL MEDICINE

## 2019-06-18 PROCEDURE — 77030012965 HC NDL HUBR BBMI -A

## 2019-06-18 PROCEDURE — 74011000258 HC RX REV CODE- 258: Performed by: NURSE PRACTITIONER

## 2019-06-18 PROCEDURE — 96413 CHEMO IV INFUSION 1 HR: CPT

## 2019-06-18 PROCEDURE — 81001 URINALYSIS AUTO W/SCOPE: CPT

## 2019-06-18 PROCEDURE — 96367 TX/PROPH/DG ADDL SEQ IV INF: CPT

## 2019-06-18 PROCEDURE — 74011250636 HC RX REV CODE- 250/636: Performed by: INTERNAL MEDICINE

## 2019-06-18 PROCEDURE — 74011250636 HC RX REV CODE- 250/636

## 2019-06-18 PROCEDURE — 96416 CHEMO PROLONG INFUSE W/PUMP: CPT

## 2019-06-18 PROCEDURE — 74011250637 HC RX REV CODE- 250/637: Performed by: NURSE PRACTITIONER

## 2019-06-18 PROCEDURE — 96375 TX/PRO/DX INJ NEW DRUG ADDON: CPT

## 2019-06-18 PROCEDURE — 80053 COMPREHEN METABOLIC PANEL: CPT

## 2019-06-18 PROCEDURE — 96411 CHEMO IV PUSH ADDL DRUG: CPT

## 2019-06-18 PROCEDURE — 36415 COLL VENOUS BLD VENIPUNCTURE: CPT

## 2019-06-18 RX ORDER — DIPHENHYDRAMINE HCL 25 MG
50 CAPSULE ORAL ONCE
Status: COMPLETED | OUTPATIENT
Start: 2019-06-18 | End: 2019-06-18

## 2019-06-18 RX ORDER — HEPARIN 100 UNIT/ML
300-500 SYRINGE INTRAVENOUS AS NEEDED
Status: ACTIVE | OUTPATIENT
Start: 2019-06-18 | End: 2019-06-18

## 2019-06-18 RX ORDER — PALONOSETRON 0.05 MG/ML
0.25 INJECTION, SOLUTION INTRAVENOUS ONCE
Status: COMPLETED | OUTPATIENT
Start: 2019-06-18 | End: 2019-06-18

## 2019-06-18 RX ORDER — FLUOROURACIL 50 MG/ML
400 INJECTION, SOLUTION INTRAVENOUS ONCE
Status: COMPLETED | OUTPATIENT
Start: 2019-06-18 | End: 2019-06-18

## 2019-06-18 RX ORDER — SODIUM CHLORIDE 9 MG/ML
10 INJECTION INTRAMUSCULAR; INTRAVENOUS; SUBCUTANEOUS AS NEEDED
Status: ACTIVE | OUTPATIENT
Start: 2019-06-18 | End: 2019-06-18

## 2019-06-18 RX ORDER — DEXTROSE MONOHYDRATE 50 MG/ML
25 INJECTION, SOLUTION INTRAVENOUS CONTINUOUS
Status: DISPENSED | OUTPATIENT
Start: 2019-06-18 | End: 2019-06-18

## 2019-06-18 RX ORDER — SODIUM CHLORIDE 9 MG/ML
500 INJECTION, SOLUTION INTRAVENOUS CONTINUOUS
Status: DISPENSED | OUTPATIENT
Start: 2019-06-18 | End: 2019-06-18

## 2019-06-18 RX ORDER — SODIUM CHLORIDE 0.9 % (FLUSH) 0.9 %
10 SYRINGE (ML) INJECTION AS NEEDED
Status: ACTIVE | OUTPATIENT
Start: 2019-06-18 | End: 2019-06-18

## 2019-06-18 RX ADMIN — DEXAMETHASONE SODIUM PHOSPHATE 12 MG: 4 INJECTION, SOLUTION INTRA-ARTICULAR; INTRALESIONAL; INTRAMUSCULAR; INTRAVENOUS; SOFT TISSUE at 12:20

## 2019-06-18 RX ADMIN — SODIUM CHLORIDE 10 ML: 9 INJECTION INTRAMUSCULAR; INTRAVENOUS; SUBCUTANEOUS at 11:01

## 2019-06-18 RX ADMIN — Medication 10 ML: at 11:01

## 2019-06-18 RX ADMIN — FLUOROURACIL 1020 MG: 50 INJECTION, SOLUTION INTRAVENOUS at 14:30

## 2019-06-18 RX ADMIN — PALONOSETRON HYDROCHLORIDE 0.25 MG: 0.25 INJECTION, SOLUTION INTRAVENOUS at 12:10

## 2019-06-18 RX ADMIN — LEUCOVORIN CALCIUM 1020 MG: 350 INJECTION, POWDER, LYOPHILIZED, FOR SUSPENSION INTRAMUSCULAR; INTRAVENOUS at 13:55

## 2019-06-18 RX ADMIN — BEVACIZUMAB 642.5 MG: 400 INJECTION, SOLUTION INTRAVENOUS at 12:55

## 2019-06-18 RX ADMIN — FLUOROURACIL 6120 MG: 50 INJECTION, SOLUTION INTRAVENOUS at 14:35

## 2019-06-18 RX ADMIN — DEXTROSE MONOHYDRATE 25 ML/HR: 5 INJECTION, SOLUTION INTRAVENOUS at 13:52

## 2019-06-18 RX ADMIN — SODIUM CHLORIDE 250 ML: 900 INJECTION, SOLUTION INTRAVENOUS at 12:10

## 2019-06-18 RX ADMIN — DIPHENHYDRAMINE HYDROCHLORIDE 50 MG: 25 CAPSULE ORAL at 12:06

## 2019-06-18 NOTE — PROGRESS NOTES
[facility] - Chemotherapy Progress Note    0940 Pt admit to [facility] for C15D1 Folfox ambulatory in stable condition. Assessment completed. Pt reports chest pain intermittently x1 week: 3/10. Denies at this time but had some this a.m., denies worsening with activity/exertion. States chest pain may increase with palpation. PAC with positive blood return. Labs drawn and sent for processing. MD office notified of c/o chest pain and pt request to hold or decrease oxaliplatin dose today because it affects his sense of smell and taste and he has a [place] event\" this weekend. Pt went upstairs to see MD.  Per MD office, ok to proceed with treatment. Chemotherapy Flowsheet 6/18/2019   Cycle C15D1   Date 6/18/2019   Drug / Regimen Folfox   Pre Meds -   Notes -       Patient Vitals for the past 12 hrs:   Temp Pulse Resp BP SpO2   06/18/19 1438 97.6 °F (36.4 °C) 73 18 129/81 --   06/18/19 0951 97.9 °F (36.6 °C) 81 18 (!) 127/94 97 %       Medications:  Benadryl PO  Aloxi IVP  Decadron IVP  Avastin IV  Leucovorin IV  Oxaliplatin IV  5Fu IVP  5Fu CADD    1440 Pt tolerated treatment well. PAC maintained positive blood return throughout treatment, flushed with positive blood return and connected to CADD. D/c home ambulatory in no distress. Pt aware of next appointment scheduled for 6/20 at 1:00. Pt declined AVS copy.      Recent Results (from the past 12 hour(s))   CBC WITH AUTOMATED DIFF    Collection Time: 06/18/19 10:01 AM   Result Value Ref Range    WBC 9.3 4.1 - 11.1 K/uL    RBC 6.17 (H) 4.10 - 5.70 M/uL    HGB 14.9 12.1 - 17.0 g/dL    HCT 47.5 36.6 - 50.3 %    MCV 77.0 (L) 80.0 - 99.0 FL    MCH 24.1 (L) 26.0 - 34.0 PG    MCHC 31.4 30.0 - 36.5 g/dL    RDW 18.8 (H) 11.5 - 14.5 %    PLATELET 350 175 - 188 K/uL    MPV 9.7 8.9 - 12.9 FL    NRBC 0.0 0  WBC    ABSOLUTE NRBC 0.00 0.00 - 0.01 K/uL    NEUTROPHILS 59 32 - 75 %    LYMPHOCYTES 27 12 - 49 %    MONOCYTES 7 5 - 13 %    EOSINOPHILS 6 0 - 7 % BASOPHILS 1 0 - 1 %    IMMATURE GRANULOCYTES 0 0.0 - 0.5 %    ABS. NEUTROPHILS 5.5 1.8 - 8.0 K/UL    ABS. LYMPHOCYTES 2.5 0.8 - 3.5 K/UL    ABS. MONOCYTES 0.6 0.0 - 1.0 K/UL    ABS. EOSINOPHILS 0.6 (H) 0.0 - 0.4 K/UL    ABS. BASOPHILS 0.1 0.0 - 0.1 K/UL    ABS. IMM. GRANS. 0.0 0.00 - 0.04 K/UL    DF AUTOMATED     METABOLIC PANEL, COMPREHENSIVE    Collection Time: 06/18/19 10:01 AM   Result Value Ref Range    Sodium 139 136 - 145 mmol/L    Potassium 4.5 3.5 - 5.1 mmol/L    Chloride 107 97 - 108 mmol/L    CO2 28 21 - 32 mmol/L    Anion gap 4 (L) 5 - 15 mmol/L    Glucose 99 65 - 100 mg/dL    BUN 12 6 - 20 MG/DL    Creatinine 0.81 0.70 - 1.30 MG/DL    BUN/Creatinine ratio 15 12 - 20      GFR est AA >60 >60 ml/min/1.73m2    GFR est non-AA >60 >60 ml/min/1.73m2    Calcium 9.3 8.5 - 10.1 MG/DL    Bilirubin, total 0.2 0.2 - 1.0 MG/DL    ALT (SGPT) 31 12 - 78 U/L    AST (SGOT) 20 15 - 37 U/L    Alk.  phosphatase 80 45 - 117 U/L    Protein, total 8.0 6.4 - 8.2 g/dL    Albumin 3.4 (L) 3.5 - 5.0 g/dL    Globulin 4.6 (H) 2.0 - 4.0 g/dL    A-G Ratio 0.7 (L) 1.1 - 2.2     URINALYSIS W/ REFLEX CULTURE    Collection Time: 06/18/19 10:01 AM   Result Value Ref Range    Color YELLOW/STRAW      Appearance CLEAR CLEAR      Specific gravity 1.023 1.003 - 1.030      pH (UA) 5.5 5.0 - 8.0      Protein NEGATIVE  NEG mg/dL    Glucose NEGATIVE  NEG mg/dL    Ketone NEGATIVE  NEG mg/dL    Bilirubin NEGATIVE  NEG      Blood NEGATIVE  NEG      Urobilinogen 0.2 0.2 - 1.0 EU/dL    Nitrites NEGATIVE  NEG      Leukocyte Esterase NEGATIVE  NEG      WBC 0-4 0 - 4 /hpf    RBC 0-5 0 - 5 /hpf    Epithelial cells FEW FEW /lpf    Bacteria NEGATIVE  NEG /hpf    UA:UC IF INDICATED CULTURE NOT INDICATED BY UA RESULT CNI      Hyaline cast 0-2 0 - 5 /lpf   CEA    Collection Time: 06/18/19 11:30 AM   Result Value Ref Range    CEA 19.0 ng/mL

## 2019-06-18 NOTE — PROGRESS NOTES
[facility] [place] [address] [address] [facility] [address] [facility]  Varghese Sonya: [phone]  F: [phone]     Reason for Visit:   Shelby Gómez is a 25 y.o. male who is seen for follow up of metastatic colon cancer. Treatment History:   · CT A/P 3/25/2018: Indeterminate 2 x 1 cm low-density liver lesion. · CT C/A/P with triphase liver 3/27/2018: No evidence of metastatic disease to the chest. Multiple ill-defined hepatic lesions. These do not represent simple cyst.  · CT guided liver biopsy 3/27/2018: metastatic adenocarcinoma, KRAS wild type, NRAS wild type  · Colectomy by Dr. Tati Baker 3/29/2018: Adenocarcinoma, moderately differentiated. Negative margins. 3/16 nodes involved. pMMR  · Stage IV (pT3 pN1b pM1) Colon Cancer  · Chemotherapy with FOLFOX and bevacizumab 5/1/2018 - 7/12/2018 for a total of 6 cycles, dany administered with cycles 2-4  · Resumed chemotherapy with FOLFOX and bevacizumab from 9/25/2018 to 12/20/2018. Avastin held after 10/23/2018 in preparation for surgery. · Underwent diagnostic laparoscopy with microwave liver ablation to right hepatic lobe 1/30/2019  · CT Chest 4/9/2019: Several new subcentimeter pulmonary nodules in the bilateral lungs worrisome for pulmonary metastases. Status post interval right hepatic lobe embolization. Several increased areas of hypodensity in the right hepatic lobe are worrisome for progression of hepatic metastases. There is a new tract of hypodensity in the left lateral hepatic lobe extending to a hypodense lesion. · Resumed chemotherapy with FOLFOX and bevacizumab 4/29/2019    History of Present Illness:   He returns today as an add-on. Having some chest pain below his ribs, bilateral, tender to the touch. 3/10 intensity, \"soreness. \"  Not affected by activity. Not pleuritic. Feels it with stretching. Has been an issue for a week or so. Started remeron last night, feeling fatigued this morning, though he slept well.     He arrived in [facility] today, told the nurses he wanted to skip his oxaliplatin, despite our visit last week where he said he wanted to continue on this therapy. His friend has a graduation dinner coming up, he wants to feel as good as possible for this. PAST HISTORY: The following sections were reviewed and updated in the EMR as appropriate: PMH, SH, FH, Medications, Allergies. No Known Allergies     Review of Systems: A complete review of systems was obtained, reviewed, and scanned into the EMR. Pertinent findings reviewed above. Physical Exam:     Visit Vitals  BP (!) 137/100 (BP 1 Location: Right arm, BP Patient Position: Sitting)   Pulse 79   Temp 97.5 °F (36.4 °C) (Temporal)   Resp 20   Ht 6' 1\" (1.854 m)   Wt 296 lb (134.3 kg)   SpO2 98%   BMI 39.05 kg/m²     ECOG PS: 0  General: No distress, obese  Eyes: PERRLA, anicteric sclerae  HENT: Atraumatic, OP clear  Neck: Supple  Lymphatic: No cervical, supraclavicular, or inguinal adenopathy  Respiratory: CTAB, normal respiratory effort  CV: Normal rate, regular rhythm, no murmurs, no peripheral edema  GI: Soft, nontender, nondistended, no masses, unable to assess for hepatomegaly, no splenomegaly  MS: Normal gait and station. Digits without clubbing or cyanosis. Skin: No rashes, ecchymoses, or petechiae. Normal temperature, turgor, and texture. Psych: Alert, oriented, appropriate affect, normal judgment/insight    Results:     Lab Results   Component Value Date/Time    WBC 9.3 06/18/2019 10:01 AM    HGB 14.9 06/18/2019 10:01 AM    HCT 47.5 06/18/2019 10:01 AM    PLATELET 154 20/77/3459 10:01 AM    MCV 77.0 (L) 06/18/2019 10:01 AM    ABS.  NEUTROPHILS 5.5 06/18/2019 10:01 AM    Hemoglobin (POC) 15.2 11/12/2018 12:58 PM     Lab Results   Component Value Date/Time    Sodium 139 06/18/2019 10:01 AM    Potassium 4.5 06/18/2019 10:01 AM    Chloride 107 06/18/2019 10:01 AM    CO2 28 06/18/2019 10:01 AM    Glucose 99 06/18/2019 10:01 AM    BUN 12 06/18/2019 10:01 AM    Creatinine 0.81 06/18/2019 10:01 AM    GFR est AA >60 06/18/2019 10:01 AM    GFR est non-AA >60 06/18/2019 10:01 AM    Calcium 9.3 06/18/2019 10:01 AM     Lab Results   Component Value Date/Time    Bilirubin, total 0.2 06/18/2019 10:01 AM    ALT (SGPT) 31 06/18/2019 10:01 AM    AST (SGOT) 20 06/18/2019 10:01 AM    Alk. phosphatase 80 06/18/2019 10:01 AM    Protein, total 8.0 06/18/2019 10:01 AM    Albumin 3.4 (L) 06/18/2019 10:01 AM    Globulin 4.6 (H) 06/18/2019 10:01 AM       CT C/A/P with triphasic liver 7/19/2018: There is no axilla, mediastinal or hilar adenopathy. There is no pericardial pleural effusion no shift or pneumothorax, infusion catheter is in place. No parenchymal mass. The liver lesion and decreased in size, a dome of the liver lesion measures 11 x 14 mm, it was 15 x 20. An additional right lobe of the liver lower lesion is also decreased in size measuring 12 x 7 mm it was 16 x 12. No new lesion is seen. Liver and spleen remain normal in size, the gallbladder is not distended. The kidneys are unobstructed. There is no bowel wall thickening or obstruction. Bowel wall thickening in the ascending colon appear stable some of this may be related to fecal stasis up. There is no free air or free fluid. There is no adenopathy in the abdomen or pelvis. The bladder is midline. Review of bone windows does not suggest metastases. There is no inguinal adenopathy. Major vessels do appear patent. PET at [place] [id number] 8/6/2018: No evidence of FDG avid tumor identified. Questionable focus in the liver. Mucosal associated lymphoid tissue in the pharynx demonstrates overall increased symmetric activity. CT abd/pelvis at [facility] on 3/19/2019: Interval laparoscopic microscope ablation of several left hepatic lobe lesions. Interval right portal vein embolizations. Increase in size of several right hepatic lobe lesions.  Development of multiple pulmonary nodules in the imaged lung bases highly concerning for metastatic disease. Enlarged portal caval node, metastatic diease is not excluded. Stranding in the subcutaneous soft tissue related to laparoscopy. CT chest 4/9/2019:   1. Several new subcentimeter pulmonary nodules in the bilateral lungs worrisome for pulmonary metastases. 2. Status post interval right hepatic lobe embolization. Several increased areas of hypodensity in the right hepatic lobe are worrisome for progression of hepatic metastases. There is a new tract of hypodensity in the left lateral hepatic lobe extending to a hypodense lesion. This may represent interval intervention. Recommend correlation with prior procedural history and studies. Dedicated multiphase liver imaging could be done for further evaluation. Assessment:   1) Metastatic Colon Cancer  Stage IV, pMMR, KRAS/NRAS wild type  He has metastatic disease within his liver. His primary tumor has been resected. He has received palliative chemotherapy with FOLFOX and Bevacizumab, followed by a break to undergo liver directed therapy. Unfortunately, he developed pulmonary metastases during this break. We are now resuming chemotherapy FOLFOX and Avastin with plans to continue therapy for as long as he is tolerating treatment and there is no evidence of progresion. He has been noncompliant with his treatment, only received 2 of the last 5 planned cycles. This prompted a goals of care discussion last week, at which time he decided to get back on track with treatment. Today he wants to hold his oxaliplatin so that he can feel well to attend an important graduation dinner for a friend, but he then is willing to resume this treatment. We reviewed the option of changing to FOLFIRI at this time, but he declines for now. We will see him with each cycle of therapy. At progression, we can consider FOLFIRI and Panitumumab. We have also discussed referral for consideration of clinical trials.      Sent [facility] on prior liver biopsy specimen. 2) Risk of infertility  We have previously reviewed that chemotherapy can potentially cause infertility. He has undergone sperm cryopreservation. 3) Genetic risk  Testing at [facility] negative. 4) Hypertension  Continue Metoprolol. Follow up with PCP for continued elevated BP. 5) Chemotherapy induced neuropathy. Improved since skipping last few cycles. Monitor. 6) Depression / Insomnia  Just started remeron. Causing fatigue. Monitor, may need to reduce dose. 7) Chest pain  Seems MS in nature, not cardiac. Monitor. 8) Emotional Well Being  No psychosocial concerns identified today. Patient has adequate support. Plan:     Proceed today with FOLFOX and Avastin, given every 2 weeks. Hold Oxaliplatin today only.   Continue remeron 15mg nightly  Return to clinic in 2 weeks        Signed By: Steven Dyson MD

## 2019-06-18 NOTE — PROGRESS NOTES
Sylvia Rodriguez is a 25 y.o. male follow up for colon cancer. 1. Have you been to the ER, urgent care clinic since your last visit? Hospitalized since your last visit? No     2. Have you seen or consulted any other health care providers outside of the [address] [place] [address] since your last visit? Include any pap smears or colon screening.  No

## 2019-06-20 ENCOUNTER — HOSPITAL ENCOUNTER (OUTPATIENT)
Dept: INFUSION THERAPY | Age: 29
Discharge: HOME | End: 2019-06-20
Payer: COMMERCIAL

## 2019-06-20 ENCOUNTER — HOSPITAL ENCOUNTER (OUTPATIENT)
Dept: INFUSION THERAPY | Age: 29
End: 2019-06-20
Payer: COMMERCIAL

## 2019-06-20 VITALS
SYSTOLIC BLOOD PRESSURE: 134 MMHG | HEART RATE: 81 BPM | RESPIRATION RATE: 18 BRPM | TEMPERATURE: 98.1 F | DIASTOLIC BLOOD PRESSURE: 85 MMHG

## 2019-06-20 DIAGNOSIS — C18.9 COLON CANCER METASTASIZED TO LIVER (HCC): Primary | ICD-10-CM

## 2019-06-20 DIAGNOSIS — C78.7 COLON CANCER METASTASIZED TO LIVER (HCC): Primary | ICD-10-CM

## 2019-06-20 PROCEDURE — 74011250636 HC RX REV CODE- 250/636: Performed by: NURSE PRACTITIONER

## 2019-06-20 PROCEDURE — 96523 IRRIG DRUG DELIVERY DEVICE: CPT

## 2019-06-20 RX ORDER — SODIUM CHLORIDE 9 MG/ML
10 INJECTION INTRAMUSCULAR; INTRAVENOUS; SUBCUTANEOUS AS NEEDED
Status: DISCONTINUED | OUTPATIENT
Start: 2019-06-20 | End: 2019-06-21 | Stop reason: HOSPADM

## 2019-06-20 RX ORDER — HEPARIN 100 UNIT/ML
300-500 SYRINGE INTRAVENOUS AS NEEDED
Status: DISCONTINUED | OUTPATIENT
Start: 2019-06-20 | End: 2019-06-21 | Stop reason: HOSPADM

## 2019-06-20 RX ORDER — SODIUM CHLORIDE 0.9 % (FLUSH) 0.9 %
10 SYRINGE (ML) INJECTION AS NEEDED
Status: DISCONTINUED | OUTPATIENT
Start: 2019-06-20 | End: 2019-06-21 | Stop reason: HOSPADM

## 2019-06-20 RX ADMIN — Medication 500 UNITS: at 13:08

## 2019-06-20 RX ADMIN — Medication 10 ML: at 13:08

## 2019-06-20 NOTE — PROGRESS NOTES
[facility] VISIT NOTE    Pt arrived at [facility] ambulatory and in no distress for Pump Disconnect. Assessment completed, pt had no complaints. Patient Vitals for the past 12 hrs:   Temp Pulse Resp BP   06/20/19 1303 98.1 °F (36.7 °C) 81 18 134/85     5FU CADD removed from patient. Port de-accessed and flushed per protocol. Positive blood return noted. Tolerated treatment well, no adverse reaction noted. D/C'd from [facility] ambulatory and in no distress. Next appointment is 7/3/19 at 0800.

## 2019-06-25 ENCOUNTER — APPOINTMENT (OUTPATIENT)
Dept: INFUSION THERAPY | Age: 29
End: 2019-06-25
Payer: COMMERCIAL

## 2019-06-27 ENCOUNTER — APPOINTMENT (OUTPATIENT)
Dept: INFUSION THERAPY | Age: 29
End: 2019-06-27
Payer: COMMERCIAL

## 2019-07-03 ENCOUNTER — HOSPITAL ENCOUNTER (OUTPATIENT)
Dept: INFUSION THERAPY | Age: 29
Discharge: HOME | End: 2019-07-03
Payer: COMMERCIAL

## 2019-07-03 ENCOUNTER — OFFICE VISIT (OUTPATIENT)
Dept: ONCOLOGY | Age: 29
End: 2019-07-03

## 2019-07-03 ENCOUNTER — APPOINTMENT (OUTPATIENT)
Dept: INFUSION THERAPY | Age: 29
End: 2019-07-03
Payer: COMMERCIAL

## 2019-07-03 VITALS
TEMPERATURE: 98.5 F | SYSTOLIC BLOOD PRESSURE: 146 MMHG | DIASTOLIC BLOOD PRESSURE: 97 MMHG | WEIGHT: 295 LBS | RESPIRATION RATE: 20 BRPM | HEART RATE: 83 BPM | BODY MASS INDEX: 39.1 KG/M2 | OXYGEN SATURATION: 97 % | HEIGHT: 73 IN

## 2019-07-03 VITALS
HEART RATE: 84 BPM | TEMPERATURE: 97.7 F | DIASTOLIC BLOOD PRESSURE: 87 MMHG | HEIGHT: 73 IN | WEIGHT: 295.4 LBS | RESPIRATION RATE: 18 BRPM | SYSTOLIC BLOOD PRESSURE: 137 MMHG | OXYGEN SATURATION: 96 % | BODY MASS INDEX: 39.15 KG/M2

## 2019-07-03 DIAGNOSIS — C78.7 COLON CANCER METASTASIZED TO LIVER (HCC): Primary | ICD-10-CM

## 2019-07-03 DIAGNOSIS — C78.00 MALIGNANT NEOPLASM METASTATIC TO LUNG, UNSPECIFIED LATERALITY (HCC): ICD-10-CM

## 2019-07-03 DIAGNOSIS — C18.9 COLON CANCER METASTASIZED TO LIVER (HCC): Primary | ICD-10-CM

## 2019-07-03 LAB
ALBUMIN SERPL-MCNC: 3.4 G/DL (ref 3.5–5)
ALBUMIN/GLOB SERPL: 0.7 {RATIO} (ref 1.1–2.2)
ALP SERPL-CCNC: 82 U/L (ref 45–117)
ALT SERPL-CCNC: 36 U/L (ref 12–78)
ANION GAP SERPL CALC-SCNC: 6 MMOL/L (ref 5–15)
APPEARANCE UR: CLEAR
AST SERPL-CCNC: 19 U/L (ref 15–37)
BACTERIA URNS QL MICRO: NEGATIVE /HPF
BASOPHILS # BLD: 0.1 K/UL (ref 0–0.1)
BASOPHILS NFR BLD: 1 % (ref 0–1)
BILIRUB SERPL-MCNC: 0.2 MG/DL (ref 0.2–1)
BILIRUB UR QL: NEGATIVE
BUN SERPL-MCNC: 13 MG/DL (ref 6–20)
BUN/CREAT SERPL: 15 (ref 12–20)
CALCIUM SERPL-MCNC: 9.1 MG/DL (ref 8.5–10.1)
CHLORIDE SERPL-SCNC: 107 MMOL/L (ref 97–108)
CO2 SERPL-SCNC: 27 MMOL/L (ref 21–32)
COLOR UR: NORMAL
CREAT SERPL-MCNC: 0.84 MG/DL (ref 0.7–1.3)
DIFFERENTIAL METHOD BLD: ABNORMAL
EOSINOPHIL # BLD: 0.5 K/UL (ref 0–0.4)
EOSINOPHIL NFR BLD: 7 % (ref 0–7)
EPITH CASTS URNS QL MICRO: NORMAL /LPF
ERYTHROCYTE [DISTWIDTH] IN BLOOD BY AUTOMATED COUNT: 18.6 % (ref 11.5–14.5)
GLOBULIN SER CALC-MCNC: 4.8 G/DL (ref 2–4)
GLUCOSE SERPL-MCNC: 115 MG/DL (ref 65–100)
GLUCOSE UR STRIP.AUTO-MCNC: NEGATIVE MG/DL
HCT VFR BLD AUTO: 48.4 % (ref 36.6–50.3)
HGB BLD-MCNC: 15.4 G/DL (ref 12.1–17)
HGB UR QL STRIP: NEGATIVE
HYALINE CASTS URNS QL MICRO: NORMAL /LPF (ref 0–5)
IMM GRANULOCYTES # BLD AUTO: 0 K/UL (ref 0–0.04)
IMM GRANULOCYTES NFR BLD AUTO: 1 % (ref 0–0.5)
KETONES UR QL STRIP.AUTO: NEGATIVE MG/DL
LEUKOCYTE ESTERASE UR QL STRIP.AUTO: NEGATIVE
LYMPHOCYTES # BLD: 2.2 K/UL (ref 0.8–3.5)
LYMPHOCYTES NFR BLD: 34 % (ref 12–49)
MCH RBC QN AUTO: 24.7 PG (ref 26–34)
MCHC RBC AUTO-ENTMCNC: 31.8 G/DL (ref 30–36.5)
MCV RBC AUTO: 77.6 FL (ref 80–99)
MONOCYTES # BLD: 0.6 K/UL (ref 0–1)
MONOCYTES NFR BLD: 9 % (ref 5–13)
NEUTS SEG # BLD: 3.3 K/UL (ref 1.8–8)
NEUTS SEG NFR BLD: 49 % (ref 32–75)
NITRITE UR QL STRIP.AUTO: NEGATIVE
NRBC # BLD: 0 K/UL (ref 0–0.01)
NRBC BLD-RTO: 0 PER 100 WBC
PH UR STRIP: 5.5 [PH] (ref 5–8)
PLATELET # BLD AUTO: 297 K/UL (ref 150–400)
PMV BLD AUTO: 9 FL (ref 8.9–12.9)
POTASSIUM SERPL-SCNC: 4.1 MMOL/L (ref 3.5–5.1)
PROT SERPL-MCNC: 8.2 G/DL (ref 6.4–8.2)
PROT UR STRIP-MCNC: NEGATIVE MG/DL
RBC # BLD AUTO: 6.24 M/UL (ref 4.1–5.7)
RBC #/AREA URNS HPF: NORMAL /HPF (ref 0–5)
SODIUM SERPL-SCNC: 140 MMOL/L (ref 136–145)
SP GR UR REFRACTOMETRY: 1.02 (ref 1–1.03)
UA: UC IF INDICATED,UAUC: NORMAL
UROBILINOGEN UR QL STRIP.AUTO: 0.2 EU/DL (ref 0.2–1)
WBC # BLD AUTO: 6.6 K/UL (ref 4.1–11.1)
WBC URNS QL MICRO: NORMAL /HPF (ref 0–4)

## 2019-07-03 PROCEDURE — 74011000258 HC RX REV CODE- 258: Performed by: NURSE PRACTITIONER

## 2019-07-03 PROCEDURE — 96413 CHEMO IV INFUSION 1 HR: CPT

## 2019-07-03 PROCEDURE — 81001 URINALYSIS AUTO W/SCOPE: CPT

## 2019-07-03 PROCEDURE — 74011000258 HC RX REV CODE- 258: Performed by: INTERNAL MEDICINE

## 2019-07-03 PROCEDURE — 74011250636 HC RX REV CODE- 250/636: Performed by: INTERNAL MEDICINE

## 2019-07-03 PROCEDURE — 74011250636 HC RX REV CODE- 250/636: Performed by: NURSE PRACTITIONER

## 2019-07-03 PROCEDURE — 96417 CHEMO IV INFUS EACH ADDL SEQ: CPT

## 2019-07-03 PROCEDURE — 36415 COLL VENOUS BLD VENIPUNCTURE: CPT

## 2019-07-03 PROCEDURE — 74011250637 HC RX REV CODE- 250/637: Performed by: NURSE PRACTITIONER

## 2019-07-03 PROCEDURE — 80053 COMPREHEN METABOLIC PANEL: CPT

## 2019-07-03 PROCEDURE — 96368 THER/DIAG CONCURRENT INF: CPT

## 2019-07-03 PROCEDURE — 77030012965 HC NDL HUBR BBMI -A

## 2019-07-03 PROCEDURE — 96416 CHEMO PROLONG INFUSE W/PUMP: CPT

## 2019-07-03 PROCEDURE — 85025 COMPLETE CBC W/AUTO DIFF WBC: CPT

## 2019-07-03 PROCEDURE — 96375 TX/PRO/DX INJ NEW DRUG ADDON: CPT

## 2019-07-03 PROCEDURE — 74011250636 HC RX REV CODE- 250/636

## 2019-07-03 RX ORDER — PALONOSETRON 0.05 MG/ML
0.25 INJECTION, SOLUTION INTRAVENOUS ONCE
Status: COMPLETED | OUTPATIENT
Start: 2019-07-03 | End: 2019-07-03

## 2019-07-03 RX ORDER — CITALOPRAM 20 MG/1
20 TABLET, FILM COATED ORAL DAILY
Qty: 30 TAB | Refills: 5 | Status: SHIPPED | OUTPATIENT
Start: 2019-07-03 | End: 2020-01-01

## 2019-07-03 RX ORDER — ONDANSETRON 2 MG/ML
8 INJECTION INTRAMUSCULAR; INTRAVENOUS AS NEEDED
Status: DISCONTINUED | OUTPATIENT
Start: 2019-07-03 | End: 2019-07-04 | Stop reason: HOSPADM

## 2019-07-03 RX ORDER — DIPHENHYDRAMINE HCL 25 MG
50 CAPSULE ORAL ONCE
Status: COMPLETED | OUTPATIENT
Start: 2019-07-03 | End: 2019-07-03

## 2019-07-03 RX ORDER — HYDROCORTISONE SODIUM SUCCINATE 100 MG/2ML
100 INJECTION, POWDER, FOR SOLUTION INTRAMUSCULAR; INTRAVENOUS AS NEEDED
Status: DISCONTINUED | OUTPATIENT
Start: 2019-07-03 | End: 2019-07-04 | Stop reason: HOSPADM

## 2019-07-03 RX ORDER — EPINEPHRINE 1 MG/ML
0.3 INJECTION, SOLUTION, CONCENTRATE INTRAVENOUS AS NEEDED
Status: DISCONTINUED | OUTPATIENT
Start: 2019-07-03 | End: 2019-07-04 | Stop reason: HOSPADM

## 2019-07-03 RX ORDER — SODIUM CHLORIDE 9 MG/ML
500 INJECTION, SOLUTION INTRAVENOUS CONTINUOUS
Status: DISPENSED | OUTPATIENT
Start: 2019-07-03 | End: 2019-07-03

## 2019-07-03 RX ORDER — DEXTROSE MONOHYDRATE 50 MG/ML
25 INJECTION, SOLUTION INTRAVENOUS CONTINUOUS
Status: DISPENSED | OUTPATIENT
Start: 2019-07-03 | End: 2019-07-03

## 2019-07-03 RX ORDER — ACETAMINOPHEN 325 MG/1
650 TABLET ORAL AS NEEDED
Status: DISCONTINUED | OUTPATIENT
Start: 2019-07-03 | End: 2019-07-04 | Stop reason: HOSPADM

## 2019-07-03 RX ORDER — ALBUTEROL SULFATE 0.83 MG/ML
2.5 SOLUTION RESPIRATORY (INHALATION) AS NEEDED
Status: DISCONTINUED | OUTPATIENT
Start: 2019-07-03 | End: 2019-07-04 | Stop reason: HOSPADM

## 2019-07-03 RX ORDER — DIPHENHYDRAMINE HYDROCHLORIDE 50 MG/ML
50 INJECTION, SOLUTION INTRAMUSCULAR; INTRAVENOUS AS NEEDED
Status: DISCONTINUED | OUTPATIENT
Start: 2019-07-03 | End: 2019-07-04 | Stop reason: HOSPADM

## 2019-07-03 RX ADMIN — FLUOROURACIL 6120 MG: 50 INJECTION, SOLUTION INTRAVENOUS at 14:33

## 2019-07-03 RX ADMIN — DEXTROSE MONOHYDRATE 25 ML/HR: 5 INJECTION, SOLUTION INTRAVENOUS at 13:15

## 2019-07-03 RX ADMIN — PALONOSETRON HYDROCHLORIDE 0.25 MG: 0.25 INJECTION, SOLUTION INTRAVENOUS at 11:41

## 2019-07-03 RX ADMIN — ONDANSETRON 8 MG: 2 INJECTION, SOLUTION INTRAMUSCULAR; INTRAVENOUS at 13:48

## 2019-07-03 RX ADMIN — DIPHENHYDRAMINE HYDROCHLORIDE 50 MG: 50 INJECTION INTRAMUSCULAR; INTRAVENOUS at 13:52

## 2019-07-03 RX ADMIN — BEVACIZUMAB 642.5 MG: 400 INJECTION, SOLUTION INTRAVENOUS at 12:35

## 2019-07-03 RX ADMIN — DIPHENHYDRAMINE HYDROCHLORIDE 50 MG: 25 CAPSULE ORAL at 11:34

## 2019-07-03 RX ADMIN — SODIUM CHLORIDE 500 ML: 900 INJECTION, SOLUTION INTRAVENOUS at 13:40

## 2019-07-03 RX ADMIN — FAMOTIDINE 20 MG: 10 INJECTION, SOLUTION INTRAVENOUS at 13:45

## 2019-07-03 RX ADMIN — LEUCOVORIN CALCIUM 1020 MG: 500 INJECTION, POWDER, LYOPHILIZED, FOR SOLUTION INTRAMUSCULAR; INTRAVENOUS at 13:15

## 2019-07-03 RX ADMIN — SODIUM CHLORIDE 500 ML: 900 INJECTION, SOLUTION INTRAVENOUS at 11:39

## 2019-07-03 RX ADMIN — OXALIPLATIN 173.5 MG: 50 INJECTION, SOLUTION, CONCENTRATE INTRAVENOUS at 13:15

## 2019-07-03 RX ADMIN — DEXAMETHASONE SODIUM PHOSPHATE 12 MG: 4 INJECTION, SOLUTION INTRAMUSCULAR; INTRAVENOUS at 11:43

## 2019-07-03 NOTE — PROGRESS NOTES
[facility] VISIT NOTE    0938  Pt arrived at [facility] ambulatory and in no distress for C16 D1 Folfox. Assessment completed, pt c/o general weakness. Right chest port accessed with . 75 in costello with no difficulty. Positive blood return noted and labs drawn. Labs resulted within parameters to proceed with treatment. Medications received:    NS KVO  Benadryl PO  Aloxi IV  Avastin IV  D5 KVO  Leucovorin IV  Oxaliplatin IV    See other notes from day in chart.

## 2019-07-03 NOTE — PROGRESS NOTES
[facility] at [address] [facility]  [place]: [phone]  F: [phone]     Reason for Visit:   Vesta Lawrence is a 25 y.o. male who is seen for follow up of metastatic colon cancer. Treatment History:   · CT A/P 3/25/2018: Indeterminate 2 x 1 cm low-density liver lesion. · CT C/A/P with triphase liver 3/27/2018: No evidence of metastatic disease to the chest. Multiple ill-defined hepatic lesions. These do not represent simple cyst.  · CT guided liver biopsy 3/27/2018: metastatic adenocarcinoma, KRAS wild type, NRAS wild type  · Colectomy by Dr. Chris Stanford 3/29/2018: Adenocarcinoma, moderately differentiated. Negative margins. 3/16 nodes involved. pMMR  · Stage IV (pT3 pN1b pM1) Colon Cancer  · Chemotherapy with FOLFOX and bevacizumab 5/1/2018 - 7/12/2018 for a total of 6 cycles, dany administered with cycles 2-4  · Resumed chemotherapy with FOLFOX and bevacizumab from 9/25/2018 to 12/20/2018. Avastin held after 10/23/2018 in preparation for surgery. · Underwent diagnostic laparoscopy with microwave liver ablation to right hepatic lobe 1/30/2019  · CT Chest 4/9/2019: Several new subcentimeter pulmonary nodules in the bilateral lungs worrisome for pulmonary metastases. Status post interval right hepatic lobe embolization. Several increased areas of hypodensity in the right hepatic lobe are worrisome for progression of hepatic metastases. There is a new tract of hypodensity in the left lateral hepatic lobe extending to a hypodense lesion. · Resumed chemotherapy with FOLFOX and bevacizumab 4/29/2019    History of Present Illness:   He tolerated last cycle of therapy well. Held oxaliplatin at that time at his request.  Some mouth sores. Some constipation. No nausea. Some back pain. Stopped the remeron, gave him bad dreams.       PAST HISTORY: The following sections were reviewed and updated in the EMR as appropriate: PMH, SH, FH, Medications, Allergies. No Known Allergies     Review of Systems: A complete review of systems was obtained, reviewed, and scanned into the EMR. Pertinent findings reviewed above. Physical Exam:     Visit Vitals  BP (!) 146/97 (BP 1 Location: Right arm, BP Patient Position: Sitting)   Pulse 83   Temp 98.5 °F (36.9 °C) (Oral)   Resp 20   Ht 6' 1\" (1.854 m)   Wt 295 lb (133.8 kg)   SpO2 97%   BMI 38.92 kg/m²     ECOG PS: 0  General: No distress, obese  Eyes: PERRLA, anicteric sclerae  HENT: Atraumatic, OP clear  Neck: Supple  Lymphatic: No cervical, supraclavicular, or inguinal adenopathy  Respiratory: CTAB, normal respiratory effort  CV: Normal rate, regular rhythm, no murmurs, no peripheral edema  GI: Soft, nontender, nondistended, no masses, unable to assess for hepatomegaly, no splenomegaly  MS: Normal gait and station. Digits without clubbing or cyanosis. Skin: No rashes, ecchymoses, or petechiae. Normal temperature, turgor, and texture. Psych: Alert, oriented, appropriate affect, normal judgment/insight    Results:     Lab Results   Component Value Date/Time    WBC 9.3 06/18/2019 10:01 AM    HGB 14.9 06/18/2019 10:01 AM    HCT 47.5 06/18/2019 10:01 AM    PLATELET 794 19/24/1645 10:01 AM    MCV 77.0 (L) 06/18/2019 10:01 AM    ABS. NEUTROPHILS 5.5 06/18/2019 10:01 AM    Hemoglobin (POC) 15.2 11/12/2018 12:58 PM     Lab Results   Component Value Date/Time    Sodium 139 06/18/2019 10:01 AM    Potassium 4.5 06/18/2019 10:01 AM    Chloride 107 06/18/2019 10:01 AM    CO2 28 06/18/2019 10:01 AM    Glucose 99 06/18/2019 10:01 AM    BUN 12 06/18/2019 10:01 AM    Creatinine 0.81 06/18/2019 10:01 AM    GFR est AA >60 06/18/2019 10:01 AM    GFR est non-AA >60 06/18/2019 10:01 AM    Calcium 9.3 06/18/2019 10:01 AM     Lab Results   Component Value Date/Time    Bilirubin, total 0.2 06/18/2019 10:01 AM    ALT (SGPT) 31 06/18/2019 10:01 AM    AST (SGOT) 20 06/18/2019 10:01 AM    Alk.  phosphatase 80 06/18/2019 10:01 AM    Protein, total 8.0 06/18/2019 10:01 AM    Albumin 3.4 (L) 06/18/2019 10:01 AM    Globulin 4.6 (H) 06/18/2019 10:01 AM       CT abd/pelvis at [facility] on 3/19/2019: Interval laparoscopic microscope ablation of several left hepatic lobe lesions. Interval right portal vein embolizations. Increase in size of several right hepatic lobe lesions. Development of multiple pulmonary nodules in the imaged lung bases highly concerning for metastatic disease. Enlarged portal caval node, metastatic diease is not excluded. Stranding in the subcutaneous soft tissue related to laparoscopy. CT chest 4/9/2019:   1. Several new subcentimeter pulmonary nodules in the bilateral lungs worrisome for pulmonary metastases. 2. Status post interval right hepatic lobe embolization. Several increased areas of hypodensity in the right hepatic lobe are worrisome for progression of hepatic metastases. There is a new tract of hypodensity in the left lateral hepatic lobe extending to a hypodense lesion. This may represent interval intervention. Recommend correlation with prior procedural history and studies. Dedicated multiphase liver imaging could be done for further evaluation. Assessment:   1) Metastatic Colon Cancer  Stage IV, pMMR, KRAS/NRAS wild type  He has metastatic disease within his liver. His primary tumor has been resected. He has received palliative chemotherapy with FOLFOX and Bevacizumab, followed by a break to undergo liver directed therapy. Unfortunately, he developed pulmonary metastases during this break. We have resumed chemotherapy with FOLFOX and Avastin with plans to continue therapy for as long as he is tolerating treatment and there is no evidence of progresion. Compliance has been an issue with therapy, but he tells me he is committed to getting back on track. We will proceed with therapy today. Dose reduce oxaliplatin as he feels the toxicity from this is too much (smell changes, asthenia).   Drop FU bolus as well.    We will see him with each cycle of therapy. At progression, we can consider FOLFIRI and Panitumumab. We have also discussed referral for consideration of clinical trials. Sent [facility] on prior liver biopsy specimen. 2) Risk of infertility  We have previously reviewed that chemotherapy can potentially cause infertility. He has undergone sperm cryopreservation. 3) Genetic risk  Testing at [facility] negative. 4) Hypertension  Continue Metoprolol. Follow up with PCP for continued elevated BP. 5) Chemotherapy induced neuropathy. Improved since skipping last few cycles. Monitor. 6) Depression / Insomnia  Didn't tolerate remeron and stopped. He is willing to try celexa. 7) Chest pain  Seems MS in nature, not cardiac. Monitor. 8) Mucositis  Magic mouthwash PRN. Drop FU bolus today. 8) Emotional Well Being  No psychosocial concerns identified today. Patient has adequate support. Plan:     Proceed today with FOLFOX and Avastin, given every 2 weeks. DR oxaliplatin by 20% and drop FU bolus beginning 7/3/2019. Start celexa 20mg PO daily  Return to clinic in 2 weeks        Signed By: Patricia Michael MD        Addendum: Call from infusion center. Patient was 10 minutes into Oxaliplatin infusion when he began having abdominal pain, nausea and perfuse sweating. Infusion was stopped. IV maintained. Patient given Diphenhydramine. Symptoms resolved within a few minutes. No change to vital signs. This was patient's 3rd similar reaction to Oxaliplatin and he does not wish to rechallenge. Will proceed with 5FU infusion only. Office f/u in 2 weeks as planned when we will need to discuss transition to FOLFIRI based therapy.

## 2019-07-03 NOTE — PROGRESS NOTES
1340. Patient complaining of nausea,abdominal cramping/pain, and mild itching of hands. Patient receiving Leucovorin and Oxaliplatin at this time. Infusion stopped. VSS. 500 ml bolus of NS started. IV pepcid, zofran, and benadryl given per reaction protocol/orders. Dr. Je Dhaliwal office notified. Patient states mild relief after medications. Patient lying comfortably in bed in stable condition, states he does not 'want this medicine anymore. '     1420. Spoke with MD office. MD would like to re challenge and re start chemo. Spoke with patient and patient stated he did not want to continue with chemo infusion today however he was agreeable to receive 5fu pump today. MD office notified and they were agreeable to this. Vital signs remained stable and patient stated he felt much better at time of discharge. Fluourouracil CIV CADD pump attached to patient and verified pump was infusing. Port with positive blood return. 1440.  Patient discharged in stable condition and expected to return on Friday, July 5, 2019 at 1:00 pm.    Patient Vitals for the past 12 hrs:   Temp Pulse Resp BP SpO2   07/03/19 1428 97.7 °F (36.5 °C) 84 18 137/87 --   07/03/19 1340 -- 94 20 148/76 96 %   07/03/19 0930 98.1 °F (36.7 °C) 90 18 132/87 --

## 2019-07-05 ENCOUNTER — HOSPITAL ENCOUNTER (OUTPATIENT)
Dept: INFUSION THERAPY | Age: 29
Discharge: HOME | End: 2019-07-05
Payer: COMMERCIAL

## 2019-07-05 VITALS
TEMPERATURE: 98 F | SYSTOLIC BLOOD PRESSURE: 138 MMHG | RESPIRATION RATE: 18 BRPM | HEART RATE: 84 BPM | DIASTOLIC BLOOD PRESSURE: 85 MMHG

## 2019-07-05 DIAGNOSIS — C18.9 COLON CANCER METASTASIZED TO LIVER (HCC): Primary | ICD-10-CM

## 2019-07-05 DIAGNOSIS — C78.7 COLON CANCER METASTASIZED TO LIVER (HCC): Primary | ICD-10-CM

## 2019-07-05 PROCEDURE — 74011250636 HC RX REV CODE- 250/636: Performed by: NURSE PRACTITIONER

## 2019-07-05 RX ORDER — SODIUM CHLORIDE 0.9 % (FLUSH) 0.9 %
10 SYRINGE (ML) INJECTION AS NEEDED
Status: DISCONTINUED | OUTPATIENT
Start: 2019-07-05 | End: 2019-07-06 | Stop reason: HOSPADM

## 2019-07-05 RX ORDER — SODIUM CHLORIDE 9 MG/ML
10 INJECTION INTRAMUSCULAR; INTRAVENOUS; SUBCUTANEOUS AS NEEDED
Status: DISCONTINUED | OUTPATIENT
Start: 2019-07-05 | End: 2019-07-06 | Stop reason: HOSPADM

## 2019-07-05 RX ORDER — HEPARIN 100 UNIT/ML
300-500 SYRINGE INTRAVENOUS AS NEEDED
Status: DISCONTINUED | OUTPATIENT
Start: 2019-07-05 | End: 2019-07-06 | Stop reason: HOSPADM

## 2019-07-05 RX ADMIN — Medication 10 ML: at 13:23

## 2019-07-05 RX ADMIN — Medication 500 UNITS: at 13:23

## 2019-07-05 NOTE — PROGRESS NOTES
Outpatient Infusion Center Short Visit Progress Note    1310 Patient admitted to [facility] for pump dc ambulatory in stable condition. Assessment completed. No new concerns voiced. Port flushed per protocol and costello removed. Pump settings verfied with 0ml remaining and 250 ml infused. Vital Signs:  Visit Vitals  /85   Pulse 84   Temp 98 °F (36.7 °C)   Resp 18       Medications:  Medications Administered     heparin (porcine) pf 300-500 Units     Admin Date  07/05/2019 Action  Given Dose  500 Units Route  InterCATHeter Administered By  Estephanie Garcia RN          saline peripheral flush soln 10 mL     Admin Date  07/05/2019 Action  Given Dose  10 mL Route  InterCATHeter Administered By  Estephanie Garcia RN                2908 Patient tolerated treatment well. Patient discharged from [place] [address] ambulatory in no distress. Patient aware of next appointment.     Future Appointments   Date Time Provider Neelam Dwyer   7/16/2019  9:30 AM SS INF6 CH2 >4H [facility]   7/17/2019  9:45 AM Herminio Li NP [facility]   7/18/2019 11:30 AM [facility]   7/30/2019  8:00 AM SS INF2 CH1 >4H [facility]   8/1/2019 11:30 AM SS INF6 CH4 <1H [facility]   8/13/2019  9:00 AM SS INF6 CH1 >4H [facility]   8/27/2019  8:00 AM [facility]

## 2019-07-09 ENCOUNTER — APPOINTMENT (OUTPATIENT)
Dept: INFUSION THERAPY | Age: 29
End: 2019-07-09

## 2019-07-11 ENCOUNTER — APPOINTMENT (OUTPATIENT)
Dept: INFUSION THERAPY | Age: 29
End: 2019-07-11

## 2019-07-11 DIAGNOSIS — F33.9 RECURRENT DEPRESSION (HCC): ICD-10-CM

## 2019-07-11 RX ORDER — MIRTAZAPINE 15 MG/1
15 TABLET, FILM COATED ORAL
Qty: 90 TAB | Refills: 3 | Status: SHIPPED | OUTPATIENT
Start: 2019-07-11 | End: 2019-11-06

## 2019-07-14 RX ORDER — ONDANSETRON 2 MG/ML
8 INJECTION INTRAMUSCULAR; INTRAVENOUS AS NEEDED
Status: CANCELLED | OUTPATIENT
Start: 2019-07-16

## 2019-07-14 RX ORDER — ALBUTEROL SULFATE 0.83 MG/ML
2.5 SOLUTION RESPIRATORY (INHALATION) AS NEEDED
Status: CANCELLED
Start: 2019-07-16

## 2019-07-14 RX ORDER — PALONOSETRON 0.05 MG/ML
0.25 INJECTION, SOLUTION INTRAVENOUS ONCE
Status: CANCELLED | OUTPATIENT
Start: 2019-07-16

## 2019-07-14 RX ORDER — HYDROCORTISONE SODIUM SUCCINATE 100 MG/2ML
100 INJECTION, POWDER, FOR SOLUTION INTRAMUSCULAR; INTRAVENOUS AS NEEDED
Status: CANCELLED | OUTPATIENT
Start: 2019-07-16

## 2019-07-14 RX ORDER — ATROPINE SULFATE 0.4 MG/ML
0.4 INJECTION, SOLUTION ENDOTRACHEAL; INTRAMEDULLARY; INTRAMUSCULAR; INTRAVENOUS; SUBCUTANEOUS
Status: CANCELLED | OUTPATIENT
Start: 2019-07-16

## 2019-07-14 RX ORDER — SODIUM CHLORIDE 0.9 % (FLUSH) 0.9 %
10 SYRINGE (ML) INJECTION AS NEEDED
Status: CANCELLED
Start: 2019-07-18

## 2019-07-14 RX ORDER — DIPHENHYDRAMINE HYDROCHLORIDE 50 MG/ML
50 INJECTION, SOLUTION INTRAMUSCULAR; INTRAVENOUS AS NEEDED
Status: CANCELLED
Start: 2019-07-16

## 2019-07-14 RX ORDER — DEXTROSE MONOHYDRATE 50 MG/ML
25 INJECTION, SOLUTION INTRAVENOUS CONTINUOUS
Status: CANCELLED
Start: 2019-07-16

## 2019-07-14 RX ORDER — FLUOROURACIL 50 MG/ML
400 INJECTION, SOLUTION INTRAVENOUS ONCE
Status: CANCELLED | OUTPATIENT
Start: 2019-07-16 | End: 2019-07-16

## 2019-07-14 RX ORDER — SODIUM CHLORIDE 9 MG/ML
10 INJECTION INTRAMUSCULAR; INTRAVENOUS; SUBCUTANEOUS AS NEEDED
Status: CANCELLED | OUTPATIENT
Start: 2019-07-16

## 2019-07-14 RX ORDER — EPINEPHRINE 1 MG/ML
0.3 INJECTION, SOLUTION, CONCENTRATE INTRAVENOUS AS NEEDED
Status: CANCELLED | OUTPATIENT
Start: 2019-07-16

## 2019-07-14 RX ORDER — ACETAMINOPHEN 325 MG/1
650 TABLET ORAL AS NEEDED
Status: CANCELLED
Start: 2019-07-16

## 2019-07-14 RX ORDER — HEPARIN 100 UNIT/ML
300-500 SYRINGE INTRAVENOUS AS NEEDED
Status: CANCELLED
Start: 2019-07-18

## 2019-07-14 RX ORDER — HEPARIN 100 UNIT/ML
300-500 SYRINGE INTRAVENOUS AS NEEDED
Status: CANCELLED
Start: 2019-07-16

## 2019-07-14 RX ORDER — SODIUM CHLORIDE 0.9 % (FLUSH) 0.9 %
10 SYRINGE (ML) INJECTION AS NEEDED
Status: CANCELLED
Start: 2019-07-16

## 2019-07-14 RX ORDER — SODIUM CHLORIDE 9 MG/ML
25 INJECTION, SOLUTION INTRAVENOUS CONTINUOUS
Status: CANCELLED | OUTPATIENT
Start: 2019-07-16

## 2019-07-14 RX ORDER — SODIUM CHLORIDE 9 MG/ML
10 INJECTION INTRAMUSCULAR; INTRAVENOUS; SUBCUTANEOUS AS NEEDED
Status: CANCELLED | OUTPATIENT
Start: 2019-07-18

## 2019-07-14 NOTE — PROGRESS NOTES
[facility] [place] at [place] [address] [facility]  [place]: [phone]  F: [phone]     Reason for Visit:   Cat Rogers is a 25 y.o. male who is seen for follow up of metastatic colon cancer. Treatment History:   · CT A/P 3/25/2018: Indeterminate 2 x 1 cm low-density liver lesion. · CT C/A/P with triphase liver 3/27/2018: No evidence of metastatic disease to the chest. Multiple ill-defined hepatic lesions. These do not represent simple cyst.  · CT guided liver biopsy 3/27/2018: metastatic adenocarcinoma, KRAS wild type, NRAS wild type  · Colectomy by Dr. Albert Lindsey 3/29/2018: Adenocarcinoma, moderately differentiated. Negative margins. 3/16 nodes involved. pMMR  · Stage IV (pT3 pN1b pM1) Colon Cancer  · Chemotherapy with FOLFOX and bevacizumab 5/1/2018 - 7/12/2018 for a total of 6 cycles, dany administered with cycles 2-4  · Resumed chemotherapy with FOLFOX and bevacizumab from 9/25/2018 to 12/20/2018. Avastin held after 10/23/2018 in preparation for surgery. · Underwent diagnostic laparoscopy with microwave liver ablation to right hepatic lobe 1/30/2019  · CT Chest 4/9/2019: Several new subcentimeter pulmonary nodules in the bilateral lungs worrisome for pulmonary metastases. Status post interval right hepatic lobe embolization. Several increased areas of hypodensity in the right hepatic lobe are worrisome for progression of hepatic metastases. There is a new tract of hypodensity in the left lateral hepatic lobe extending to a hypodense lesion. · Resumed chemotherapy with FOLFOX and bevacizumab 4/29/2019 to 7/5/2019, stopped for oxaliplatin reaction  · Chemotherapy with FOLFIRI and Panitumumab beginning 7/16/2019    History of Present Illness:   When he came for his last FOLFOX treatment, he developed a reaction about 10 minutes into oxaliplatin infusion.   Cold sweats, burning sensation, discomfort in abdomen, feeling like he was going to have fecal incontinence. Remainder of oxaliplatin was not given, but he did receive FU and Avastin. He felt well over the past 2 weeks. Some constipation. Eating ok. Improved neuropathy in his hands. PAST HISTORY: The following sections were reviewed and updated in the EMR as appropriate: PMH, SH, FH, Medications, Allergies. Allergies   Allergen Reactions    Oxaliplatin Other (comments)     Chest pain, abdominal pain, burning sensation, cold sweats        Review of Systems: A complete review of systems was obtained, reviewed, and scanned into the EMR. Pertinent findings reviewed above. Physical Exam:     Visit Vitals  BP (!) 136/91 (BP 1 Location: Left arm, BP Patient Position: Sitting)   Pulse 74   Temp 97 °F (36.1 °C) (Oral)   Resp 17   Ht 6' (1.829 m)   Wt 297 lb (134.7 kg)   SpO2 97%   BMI 40.28 kg/m²     ECOG PS: 0  General: No distress, obese  Eyes: PERRLA, anicteric sclerae  HENT: Atraumatic, OP clear  Neck: Supple  Lymphatic: No cervical, supraclavicular, or inguinal adenopathy  Respiratory: CTAB, normal respiratory effort  CV: Normal rate, regular rhythm, no murmurs, no peripheral edema  GI: Soft, nontender, nondistended, no masses, unable to assess for hepatomegaly, no splenomegaly  MS: Normal gait and station. Digits without clubbing or cyanosis. Skin: No rashes, ecchymoses, or petechiae. Normal temperature, turgor, and texture. Psych: Alert, oriented, appropriate affect, normal judgment/insight    Results:     Lab Results   Component Value Date/Time    WBC 10.0 07/16/2019 10:19 AM    HGB 15.3 07/16/2019 10:19 AM    HCT 46.9 07/16/2019 10:19 AM    PLATELET 582 93/58/9576 10:19 AM    MCV 77.3 (L) 07/16/2019 10:19 AM    ABS.  NEUTROPHILS 6.3 07/16/2019 10:19 AM    Hemoglobin (POC) 15.2 11/12/2018 12:58 PM     Lab Results   Component Value Date/Time    Sodium 140 07/16/2019 10:19 AM    Potassium 4.2 07/16/2019 10:19 AM    Chloride 106 07/16/2019 10:19 AM    CO2 27 07/16/2019 10:19 AM    Glucose 101 (H) 07/16/2019 10:19 AM    BUN 14 07/16/2019 10:19 AM    Creatinine 0.86 07/16/2019 10:19 AM    GFR est AA >60 07/16/2019 10:19 AM    GFR est non-AA >60 07/16/2019 10:19 AM    Calcium 9.0 07/16/2019 10:19 AM     Lab Results   Component Value Date/Time    Bilirubin, total 0.3 07/16/2019 10:19 AM    ALT (SGPT) 30 07/16/2019 10:19 AM    AST (SGOT) 18 07/16/2019 10:19 AM    Alk. phosphatase 81 07/16/2019 10:19 AM    Protein, total 8.0 07/16/2019 10:19 AM    Albumin 3.4 (L) 07/16/2019 10:19 AM    Globulin 4.6 (H) 07/16/2019 10:19 AM       CT abd/pelvis at [facility] on 3/19/2019: Interval laparoscopic microscope ablation of several left hepatic lobe lesions. Interval right portal vein embolizations. Increase in size of several right hepatic lobe lesions. Development of multiple pulmonary nodules in the imaged lung bases highly concerning for metastatic disease. Enlarged portal caval node, metastatic diease is not excluded. Stranding in the subcutaneous soft tissue related to laparoscopy. CT chest 4/9/2019:   1. Several new subcentimeter pulmonary nodules in the bilateral lungs worrisome for pulmonary metastases. 2. Status post interval right hepatic lobe embolization. Several increased areas of hypodensity in the right hepatic lobe are worrisome for progression of hepatic metastases. There is a new tract of hypodensity in the left lateral hepatic lobe extending to a hypodense lesion. This may represent interval intervention. Recommend correlation with prior procedural history and studies. Dedicated multiphase liver imaging could be done for further evaluation. Assessment:   1) Metastatic Colon Cancer  Stage IV, pMMR, KRAS/NRAS wild type  He has metastatic disease within his liver. His primary tumor has been resected. He has received palliative chemotherapy with FOLFOX and Bevacizumab, followed by a break to undergo liver directed therapy. Unfortunately, he developed pulmonary metastases during this break.   We resumed chemotherapy with FOLFOX and Avastin. Therapy was inconsistent due to patient's missed appointments. Oxaliplatin held with cycle 15 due to patient preference. With cycle 16 patient developed concern for allergic reaction to Oxaliplatin with intense abdominal pain, flushing and tachycardia. Similar issues had occurred with prior cycles, but seemed to be worsening with each treatment. He is unable to proceed with Oxaliplatin therapy due to intolerance. There is concern 5FU maintenance therapy will not be enough to control disease. Discussed change to FOLFIRI with Panitumumab today. We discussed the risks and benefits of FOLFIRI chemotherapy, including potential side effects. These include but are not limited to fatigue, nausea, vomiting, diarrhea, taste changes, allergic reactions, alopecia, mucositis, myelosuppression, risk for infection, infertility, and rarely, death. Rarely, a patient may have a condition where they do not metabolize fluorouracil appropriately (called DPD deficiency), and they may have excessive toxicity. A Port-A-Cath will be required in order to deliver the continuous infusion. The patient has consented to beginning therapy. We discussed the risks and benefits of Panitumumab therapy. Potential side effects include, but are not limited to: nausea, vomiting, diarrhea, fatigue, infusion reactions, flu-like symptoms, skin and nail changes, ocular toxicity, pulmonary toxicity, and rarely, death. The patient has consented to beginning therapy. Will proceed with cycle 1 FOLFIRI with Panitumumab today as ordered. Written consent has been obtained. Foundation one obtained and confirms KRAS/NRAS wild type status. No other actionable mutations. Will consider Lonsurf vs Regorafinib vs clinical trial at progression. He will be seen each cycle of therapy. He would benefit from restaging studies, as we begin this next line of therapy.     2) Risk of infertility  We have previously reviewed that chemotherapy can potentially cause infertility. He has undergone sperm cryopreservation. 3) Genetic risk  Testing at [facility] negative. 4) Hypertension  Continue Metoprolol. Follow up with PCP for continued elevated BP. 5) Chemotherapy induced neuropathy. Improved since skipping last few cycles. Monitor. 6) Depression / Insomnia  No longer on Remeron. Med list updated. Has not yet started Celexa. 7) Mucositis  Magic mouthwash PRN. 8) Emotional Well Being  No psychosocial concerns identified today. Patient has adequate support. Plan:     Proceed today with C1 of FOLFIRI with Panitumuab (irinotecan 180mg/m2, leucovorin 400 mg/m2, fluorouracil 400 mg/m2, and a 46 hour infusion of fluorouracil 2400 mg/m2) given every 2 weeks  Labs: CBC, BMP prior to each treatment, hepatic function panel   Prophylactic antiemetics: Palonosetron and dexamethasone on the day of each chemotherapy infusion. Dexamethasone 8mg PO on days 2 and 3 after chemotherapy.   PRN antiemetics: Ondansetron, Prochlorperazine  PRN antidiarrheals: Atropine 0.4mg IV every 2 hours PRN during or immediately after irinotecan infusion, Imodium every 2 hours as needed at home  EMLA cream for port  CT C/A/P ASAP  Return to clinic every 2 weeks on therapy        Signed By: Clay Casillas MD

## 2019-07-16 ENCOUNTER — OFFICE VISIT (OUTPATIENT)
Dept: ONCOLOGY | Age: 29
End: 2019-07-16

## 2019-07-16 ENCOUNTER — HOSPITAL ENCOUNTER (OUTPATIENT)
Dept: INFUSION THERAPY | Age: 29
Discharge: HOME | End: 2019-07-16
Payer: COMMERCIAL

## 2019-07-16 VITALS
TEMPERATURE: 97 F | WEIGHT: 297 LBS | HEART RATE: 74 BPM | OXYGEN SATURATION: 97 % | SYSTOLIC BLOOD PRESSURE: 136 MMHG | BODY MASS INDEX: 40.23 KG/M2 | RESPIRATION RATE: 17 BRPM | DIASTOLIC BLOOD PRESSURE: 91 MMHG | HEIGHT: 72 IN

## 2019-07-16 VITALS
OXYGEN SATURATION: 98 % | WEIGHT: 296 LBS | SYSTOLIC BLOOD PRESSURE: 137 MMHG | BODY MASS INDEX: 40.09 KG/M2 | DIASTOLIC BLOOD PRESSURE: 90 MMHG | RESPIRATION RATE: 18 BRPM | TEMPERATURE: 97 F | HEIGHT: 72 IN | HEART RATE: 69 BPM

## 2019-07-16 DIAGNOSIS — C78.7 COLON CANCER METASTASIZED TO LIVER (HCC): Primary | ICD-10-CM

## 2019-07-16 DIAGNOSIS — C18.9 COLON CANCER METASTASIZED TO LIVER (HCC): Primary | ICD-10-CM

## 2019-07-16 LAB
ALBUMIN SERPL-MCNC: 3.4 G/DL (ref 3.5–5)
ALBUMIN/GLOB SERPL: 0.7 {RATIO} (ref 1.1–2.2)
ALP SERPL-CCNC: 81 U/L (ref 45–117)
ALT SERPL-CCNC: 30 U/L (ref 12–78)
ANION GAP SERPL CALC-SCNC: 7 MMOL/L (ref 5–15)
AST SERPL-CCNC: 18 U/L (ref 15–37)
BASOPHILS # BLD: 0.1 K/UL (ref 0–0.1)
BASOPHILS NFR BLD: 1 % (ref 0–1)
BILIRUB SERPL-MCNC: 0.3 MG/DL (ref 0.2–1)
BUN SERPL-MCNC: 14 MG/DL (ref 6–20)
BUN/CREAT SERPL: 16 (ref 12–20)
CALCIUM SERPL-MCNC: 9 MG/DL (ref 8.5–10.1)
CEA SERPL-MCNC: 19.6 NG/ML
CHLORIDE SERPL-SCNC: 106 MMOL/L (ref 97–108)
CO2 SERPL-SCNC: 27 MMOL/L (ref 21–32)
CREAT SERPL-MCNC: 0.86 MG/DL (ref 0.7–1.3)
DIFFERENTIAL METHOD BLD: ABNORMAL
EOSINOPHIL # BLD: 0.3 K/UL (ref 0–0.4)
EOSINOPHIL NFR BLD: 3 % (ref 0–7)
ERYTHROCYTE [DISTWIDTH] IN BLOOD BY AUTOMATED COUNT: 18.7 % (ref 11.5–14.5)
GLOBULIN SER CALC-MCNC: 4.6 G/DL (ref 2–4)
GLUCOSE SERPL-MCNC: 101 MG/DL (ref 65–100)
HCT VFR BLD AUTO: 46.9 % (ref 36.6–50.3)
HGB BLD-MCNC: 15.3 G/DL (ref 12.1–17)
IMM GRANULOCYTES # BLD AUTO: 0.1 K/UL (ref 0–0.04)
IMM GRANULOCYTES NFR BLD AUTO: 1 % (ref 0–0.5)
LYMPHOCYTES # BLD: 2.4 K/UL (ref 0.8–3.5)
LYMPHOCYTES NFR BLD: 24 % (ref 12–49)
MCH RBC QN AUTO: 25.2 PG (ref 26–34)
MCHC RBC AUTO-ENTMCNC: 32.6 G/DL (ref 30–36.5)
MCV RBC AUTO: 77.3 FL (ref 80–99)
MONOCYTES # BLD: 0.8 K/UL (ref 0–1)
MONOCYTES NFR BLD: 8 % (ref 5–13)
NEUTS SEG # BLD: 6.3 K/UL (ref 1.8–8)
NEUTS SEG NFR BLD: 63 % (ref 32–75)
NRBC # BLD: 0 K/UL (ref 0–0.01)
NRBC BLD-RTO: 0 PER 100 WBC
PLATELET # BLD AUTO: 281 K/UL (ref 150–400)
PMV BLD AUTO: 9.3 FL (ref 8.9–12.9)
POTASSIUM SERPL-SCNC: 4.2 MMOL/L (ref 3.5–5.1)
PROT SERPL-MCNC: 8 G/DL (ref 6.4–8.2)
RBC # BLD AUTO: 6.07 M/UL (ref 4.1–5.7)
SODIUM SERPL-SCNC: 140 MMOL/L (ref 136–145)
WBC # BLD AUTO: 10 K/UL (ref 4.1–11.1)

## 2019-07-16 PROCEDURE — 96375 TX/PRO/DX INJ NEW DRUG ADDON: CPT

## 2019-07-16 PROCEDURE — 96415 CHEMO IV INFUSION ADDL HR: CPT

## 2019-07-16 PROCEDURE — 74011250636 HC RX REV CODE- 250/636: Performed by: INTERNAL MEDICINE

## 2019-07-16 PROCEDURE — 80053 COMPREHEN METABOLIC PANEL: CPT

## 2019-07-16 PROCEDURE — 74011000258 HC RX REV CODE- 258: Performed by: INTERNAL MEDICINE

## 2019-07-16 PROCEDURE — 96416 CHEMO PROLONG INFUSE W/PUMP: CPT

## 2019-07-16 PROCEDURE — 36415 COLL VENOUS BLD VENIPUNCTURE: CPT

## 2019-07-16 PROCEDURE — 96411 CHEMO IV PUSH ADDL DRUG: CPT

## 2019-07-16 PROCEDURE — 96413 CHEMO IV INFUSION 1 HR: CPT

## 2019-07-16 PROCEDURE — 82378 CARCINOEMBRYONIC ANTIGEN: CPT

## 2019-07-16 PROCEDURE — 96417 CHEMO IV INFUS EACH ADDL SEQ: CPT

## 2019-07-16 PROCEDURE — 85025 COMPLETE CBC W/AUTO DIFF WBC: CPT

## 2019-07-16 PROCEDURE — 77030012965 HC NDL HUBR BBMI -A

## 2019-07-16 PROCEDURE — 96368 THER/DIAG CONCURRENT INF: CPT

## 2019-07-16 RX ORDER — DEXTROSE MONOHYDRATE 50 MG/ML
25 INJECTION, SOLUTION INTRAVENOUS CONTINUOUS
Status: CANCELLED
Start: 2019-08-01

## 2019-07-16 RX ORDER — HEPARIN 100 UNIT/ML
300-500 SYRINGE INTRAVENOUS AS NEEDED
Status: CANCELLED
Start: 2019-08-08

## 2019-07-16 RX ORDER — SODIUM CHLORIDE 9 MG/ML
10 INJECTION INTRAMUSCULAR; INTRAVENOUS; SUBCUTANEOUS AS NEEDED
Status: CANCELLED | OUTPATIENT
Start: 2019-08-01

## 2019-07-16 RX ORDER — ONDANSETRON 2 MG/ML
8 INJECTION INTRAMUSCULAR; INTRAVENOUS AS NEEDED
Status: DISCONTINUED | OUTPATIENT
Start: 2019-07-16 | End: 2019-07-17 | Stop reason: HOSPADM

## 2019-07-16 RX ORDER — FLUOROURACIL 50 MG/ML
400 INJECTION, SOLUTION INTRAVENOUS ONCE
Status: COMPLETED | OUTPATIENT
Start: 2019-07-16 | End: 2019-07-16

## 2019-07-16 RX ORDER — ATROPINE SULFATE 0.4 MG/ML
0.4 INJECTION, SOLUTION ENDOTRACHEAL; INTRAMEDULLARY; INTRAMUSCULAR; INTRAVENOUS; SUBCUTANEOUS ONCE
Status: CANCELLED
Start: 2019-08-01

## 2019-07-16 RX ORDER — ATROPINE SULFATE 0.4 MG/ML
0.4 INJECTION, SOLUTION ENDOTRACHEAL; INTRAMEDULLARY; INTRAMUSCULAR; INTRAVENOUS; SUBCUTANEOUS
Status: CANCELLED | OUTPATIENT
Start: 2019-08-01

## 2019-07-16 RX ORDER — SODIUM CHLORIDE 0.9 % (FLUSH) 0.9 %
10 SYRINGE (ML) INJECTION AS NEEDED
Status: CANCELLED
Start: 2019-08-01

## 2019-07-16 RX ORDER — ALBUTEROL SULFATE 0.83 MG/ML
2.5 SOLUTION RESPIRATORY (INHALATION) AS NEEDED
Status: CANCELLED
Start: 2019-08-01

## 2019-07-16 RX ORDER — DIPHENHYDRAMINE HYDROCHLORIDE 50 MG/ML
50 INJECTION, SOLUTION INTRAMUSCULAR; INTRAVENOUS AS NEEDED
Status: DISCONTINUED | OUTPATIENT
Start: 2019-07-16 | End: 2019-07-17 | Stop reason: HOSPADM

## 2019-07-16 RX ORDER — SODIUM CHLORIDE 9 MG/ML
25 INJECTION, SOLUTION INTRAVENOUS CONTINUOUS
Status: CANCELLED | OUTPATIENT
Start: 2019-08-01

## 2019-07-16 RX ORDER — SODIUM CHLORIDE 9 MG/ML
10 INJECTION INTRAMUSCULAR; INTRAVENOUS; SUBCUTANEOUS AS NEEDED
Status: CANCELLED | OUTPATIENT
Start: 2019-08-08

## 2019-07-16 RX ORDER — PALONOSETRON 0.05 MG/ML
0.25 INJECTION, SOLUTION INTRAVENOUS ONCE
Status: CANCELLED | OUTPATIENT
Start: 2019-08-01

## 2019-07-16 RX ORDER — PALONOSETRON 0.05 MG/ML
0.25 INJECTION, SOLUTION INTRAVENOUS ONCE
Status: COMPLETED | OUTPATIENT
Start: 2019-07-16 | End: 2019-07-16

## 2019-07-16 RX ORDER — HYDROCORTISONE SODIUM SUCCINATE 100 MG/2ML
100 INJECTION, POWDER, FOR SOLUTION INTRAMUSCULAR; INTRAVENOUS AS NEEDED
Status: DISCONTINUED | OUTPATIENT
Start: 2019-07-16 | End: 2019-07-17 | Stop reason: HOSPADM

## 2019-07-16 RX ORDER — SODIUM CHLORIDE 0.9 % (FLUSH) 0.9 %
10 SYRINGE (ML) INJECTION AS NEEDED
Status: CANCELLED
Start: 2019-08-08

## 2019-07-16 RX ORDER — FLUOROURACIL 50 MG/ML
400 INJECTION, SOLUTION INTRAVENOUS ONCE
Status: CANCELLED | OUTPATIENT
Start: 2019-08-01 | End: 2019-07-30

## 2019-07-16 RX ORDER — ONDANSETRON 2 MG/ML
8 INJECTION INTRAMUSCULAR; INTRAVENOUS AS NEEDED
Status: CANCELLED | OUTPATIENT
Start: 2019-08-01

## 2019-07-16 RX ORDER — SODIUM CHLORIDE 9 MG/ML
25 INJECTION, SOLUTION INTRAVENOUS CONTINUOUS
Status: DISCONTINUED | OUTPATIENT
Start: 2019-07-16 | End: 2019-07-17 | Stop reason: HOSPADM

## 2019-07-16 RX ORDER — ACETAMINOPHEN 325 MG/1
650 TABLET ORAL AS NEEDED
Status: DISCONTINUED | OUTPATIENT
Start: 2019-07-16 | End: 2019-07-17 | Stop reason: HOSPADM

## 2019-07-16 RX ORDER — DIPHENHYDRAMINE HYDROCHLORIDE 50 MG/ML
50 INJECTION, SOLUTION INTRAMUSCULAR; INTRAVENOUS AS NEEDED
Status: CANCELLED
Start: 2019-08-01

## 2019-07-16 RX ORDER — SODIUM CHLORIDE 9 MG/ML
10 INJECTION INTRAMUSCULAR; INTRAVENOUS; SUBCUTANEOUS AS NEEDED
Status: DISCONTINUED | OUTPATIENT
Start: 2019-07-16 | End: 2019-07-17 | Stop reason: HOSPADM

## 2019-07-16 RX ORDER — DEXTROSE MONOHYDRATE 50 MG/ML
25 INJECTION, SOLUTION INTRAVENOUS CONTINUOUS
Status: DISPENSED | OUTPATIENT
Start: 2019-07-16 | End: 2019-07-16

## 2019-07-16 RX ORDER — ATROPINE SULFATE 0.4 MG/ML
0.4 INJECTION, SOLUTION ENDOTRACHEAL; INTRAMEDULLARY; INTRAMUSCULAR; INTRAVENOUS; SUBCUTANEOUS
Status: DISPENSED | OUTPATIENT
Start: 2019-07-16 | End: 2019-07-16

## 2019-07-16 RX ORDER — ACETAMINOPHEN 325 MG/1
650 TABLET ORAL AS NEEDED
Status: CANCELLED
Start: 2019-08-01

## 2019-07-16 RX ORDER — HYDROCORTISONE SODIUM SUCCINATE 100 MG/2ML
100 INJECTION, POWDER, FOR SOLUTION INTRAMUSCULAR; INTRAVENOUS AS NEEDED
Status: CANCELLED | OUTPATIENT
Start: 2019-08-01

## 2019-07-16 RX ORDER — SODIUM CHLORIDE 0.9 % (FLUSH) 0.9 %
10 SYRINGE (ML) INJECTION AS NEEDED
Status: DISCONTINUED | OUTPATIENT
Start: 2019-07-16 | End: 2019-07-17 | Stop reason: HOSPADM

## 2019-07-16 RX ORDER — HEPARIN 100 UNIT/ML
300-500 SYRINGE INTRAVENOUS AS NEEDED
Status: DISCONTINUED | OUTPATIENT
Start: 2019-07-16 | End: 2019-07-17 | Stop reason: HOSPADM

## 2019-07-16 RX ORDER — HEPARIN 100 UNIT/ML
300-500 SYRINGE INTRAVENOUS AS NEEDED
Status: CANCELLED
Start: 2019-08-01

## 2019-07-16 RX ORDER — EPINEPHRINE 1 MG/ML
0.3 INJECTION, SOLUTION, CONCENTRATE INTRAVENOUS AS NEEDED
Status: CANCELLED | OUTPATIENT
Start: 2019-08-01

## 2019-07-16 RX ADMIN — DEXAMETHASONE SODIUM PHOSPHATE 12 MG: 4 INJECTION, SOLUTION INTRAMUSCULAR; INTRAVENOUS at 12:16

## 2019-07-16 RX ADMIN — FLUOROURACIL 1052 MG: 50 INJECTION, SOLUTION INTRAVENOUS at 16:10

## 2019-07-16 RX ADMIN — SODIUM CHLORIDE 25 ML/HR: 900 INJECTION, SOLUTION INTRAVENOUS at 12:07

## 2019-07-16 RX ADMIN — IRINOTECAN HYDROCHLORIDE 473 MG: 20 INJECTION, SOLUTION INTRAVENOUS at 14:13

## 2019-07-16 RX ADMIN — ATROPINE SULFATE 0.4 MG: 0.4 INJECTION, SOLUTION INTRAMUSCULAR; INTRAVENOUS; SUBCUTANEOUS at 14:52

## 2019-07-16 RX ADMIN — FLUOROURACIL 6312 MG: 50 INJECTION, SOLUTION INTRAVENOUS at 16:05

## 2019-07-16 RX ADMIN — PALONOSETRON HYDROCHLORIDE 0.25 MG: 0.25 INJECTION, SOLUTION INTRAVENOUS at 12:10

## 2019-07-16 RX ADMIN — LEUCOVORIN CALCIUM 1052 MG: 500 INJECTION, POWDER, LYOPHILIZED, FOR SOLUTION INTRAMUSCULAR; INTRAVENOUS at 14:13

## 2019-07-16 RX ADMIN — DEXTROSE MONOHYDRATE 25 ML/HR: 5 INJECTION, SOLUTION INTRAVENOUS at 13:56

## 2019-07-16 RX ADMIN — PANITUMUMAB 802.8 MG: 400 SOLUTION INTRAVENOUS at 12:48

## 2019-07-16 NOTE — PROGRESS NOTES
[facility] Progress Note    Date: 2019    Name: Irlanda Augustin    MRN: [id number]         : 1990    1000:  Mr. Salome Vang Arrived ambulatory and in no distress for C1D1 of Folfiri/Panitumamab Regimen. Assessment was completed, no acute issues at this time, no new complaints voiced. PAC chest wall port accessed without difficulty, labs drawn & sent for processing. Chemotherapy Flowsheet 2019   Cycle C1D1   Date 2019   Drug / Regimen Folfiri/Vectibix   Pre Meds given   Notes -       0945 Patient proceed to appointment with Dr. Brandy Escoto. Mr. Jayna Guerin vitals were reviewed. Patient Vitals for the past 24 hrs:   Temp Pulse Resp BP SpO2   19 1615 -- 69 -- 137/90 --   19 1009 97 °F (36.1 °C) 81 18 136/90 98 %       Lab results were obtained and reviewed. Recent Results (from the past 24 hour(s))   METABOLIC PANEL, COMPREHENSIVE    Collection Time: 19 10:19 AM   Result Value Ref Range    Sodium 140 136 - 145 mmol/L    Potassium 4.2 3.5 - 5.1 mmol/L    Chloride 106 97 - 108 mmol/L    CO2 27 21 - 32 mmol/L    Anion gap 7 5 - 15 mmol/L    Glucose 101 (H) 65 - 100 mg/dL    BUN 14 6 - 20 MG/DL    Creatinine 0.86 0.70 - 1.30 MG/DL    BUN/Creatinine ratio 16 12 - 20      GFR est AA >60 >60 ml/min/1.73m2    GFR est non-AA >60 >60 ml/min/1.73m2    Calcium 9.0 8.5 - 10.1 MG/DL    Bilirubin, total 0.3 0.2 - 1.0 MG/DL    ALT (SGPT) 30 12 - 78 U/L    AST (SGOT) 18 15 - 37 U/L    Alk.  phosphatase 81 45 - 117 U/L    Protein, total 8.0 6.4 - 8.2 g/dL    Albumin 3.4 (L) 3.5 - 5.0 g/dL    Globulin 4.6 (H) 2.0 - 4.0 g/dL    A-G Ratio 0.7 (L) 1.1 - 2.2     CBC WITH AUTOMATED DIFF    Collection Time: 19 10:19 AM   Result Value Ref Range    WBC 10.0 4.1 - 11.1 K/uL    RBC 6.07 (H) 4.10 - 5.70 M/uL    HGB 15.3 12.1 - 17.0 g/dL    HCT 46.9 36.6 - 50.3 %    MCV 77.3 (L) 80.0 - 99.0 FL    MCH 25.2 (L) 26.0 - 34.0 PG    MCHC 32.6 30.0 - 36.5 g/dL    RDW 18.7 (H) 11.5 - 14.5 %    PLATELET 339 692 - 552 K/uL    MPV 9.3 8.9 - 12.9 FL    NRBC 0.0 0  WBC    ABSOLUTE NRBC 0.00 0.00 - 0.01 K/uL    NEUTROPHILS 63 32 - 75 %    LYMPHOCYTES 24 12 - 49 %    MONOCYTES 8 5 - 13 %    EOSINOPHILS 3 0 - 7 %    BASOPHILS 1 0 - 1 %    IMMATURE GRANULOCYTES 1 (H) 0.0 - 0.5 %    ABS. NEUTROPHILS 6.3 1.8 - 8.0 K/UL    ABS. LYMPHOCYTES 2.4 0.8 - 3.5 K/UL    ABS. MONOCYTES 0.8 0.0 - 1.0 K/UL    ABS. EOSINOPHILS 0.3 0.0 - 0.4 K/UL    ABS. BASOPHILS 0.1 0.0 - 0.1 K/UL    ABS. IMM. GRANS. 0.1 (H) 0.00 - 0.04 K/UL    DF AUTOMATED     CEA    Collection Time: 07/16/19 10:19 AM   Result Value Ref Range    CEA 19.6 ng/mL     Medications:  NS @ KVO  Aloxi  Decadron  Panitumamab  D5 @ KVO  Irinotecan  Leucovorin  Fluorouracil  Atropine      Mr. Gonsales tolerated treatment well after atropine given during irinotecan and leucovorin infusion. He was discharged from [place] [address] in stable condition at 1620. CADD pump infusing per order in port.  He is to return on July 18 at 1130 for hisCADD pump removal.    Brien Hinojosa RN  July 16, 2019

## 2019-07-16 NOTE — PROGRESS NOTES
Chief Complaint   Patient presents with    Colon Cancer     1. Have you been to the ER, urgent care clinic since your last visit? Hospitalized since your last visit? No    2. Have you seen or consulted any other health care providers outside of the [address] [place] [address] since your last visit? Include any pap smears or colon screening. No    3) Do you have an Advance Directive on file?  no

## 2019-07-16 NOTE — PROGRESS NOTES
1445.  Patient reported feeling diaphoretic. Patient's nose running & sweating noted. Irinotecan & leucovorin stopped. Call made to Betsy Monday, NP- okay to administer atropine. Atropine administered and symptoms resolved by 1501. Medications restarted at 1503.

## 2019-07-18 ENCOUNTER — HOSPITAL ENCOUNTER (OUTPATIENT)
Dept: INFUSION THERAPY | Age: 29
Discharge: HOME | End: 2019-07-18
Payer: COMMERCIAL

## 2019-07-18 VITALS
DIASTOLIC BLOOD PRESSURE: 84 MMHG | RESPIRATION RATE: 18 BRPM | TEMPERATURE: 97.6 F | HEART RATE: 84 BPM | SYSTOLIC BLOOD PRESSURE: 138 MMHG

## 2019-07-18 DIAGNOSIS — C18.9 COLON CANCER METASTASIZED TO LIVER (HCC): Primary | ICD-10-CM

## 2019-07-18 DIAGNOSIS — C78.7 COLON CANCER METASTASIZED TO LIVER (HCC): Primary | ICD-10-CM

## 2019-07-18 PROCEDURE — 96523 IRRIG DRUG DELIVERY DEVICE: CPT

## 2019-07-18 PROCEDURE — 74011250636 HC RX REV CODE- 250/636: Performed by: INTERNAL MEDICINE

## 2019-07-18 RX ORDER — SODIUM CHLORIDE 0.9 % (FLUSH) 0.9 %
10 SYRINGE (ML) INJECTION AS NEEDED
Status: DISCONTINUED | OUTPATIENT
Start: 2019-07-18 | End: 2019-07-19 | Stop reason: HOSPADM

## 2019-07-18 RX ORDER — HEPARIN 100 UNIT/ML
300-500 SYRINGE INTRAVENOUS AS NEEDED
Status: DISCONTINUED | OUTPATIENT
Start: 2019-07-18 | End: 2019-07-19 | Stop reason: HOSPADM

## 2019-07-18 RX ORDER — SODIUM CHLORIDE 9 MG/ML
10 INJECTION INTRAMUSCULAR; INTRAVENOUS; SUBCUTANEOUS AS NEEDED
Status: DISCONTINUED | OUTPATIENT
Start: 2019-07-18 | End: 2019-07-19 | Stop reason: HOSPADM

## 2019-07-18 RX ADMIN — HEPARIN 500 UNITS: 100 SYRINGE at 14:15

## 2019-07-18 RX ADMIN — Medication 10 ML: at 14:15

## 2019-07-18 NOTE — PROGRESS NOTES
[facility] VISIT NOTE    9326  Pt arrived at [facility] ambulatory and in no distress for pump removal.      Blood pressure 138/84, pulse 84, temperature 97.6 °F (36.4 °C), resp. rate 18. 5FU pump completed full prescribed amount. Port de-accessed and flushed per protocol. Positive blood return noted. 1415  D/C'd from [facility] ambulatory and in no distress.  Next appointment is 7/30/19 at 8am.

## 2019-07-22 ENCOUNTER — HOSPITAL ENCOUNTER (OUTPATIENT)
Dept: CT IMAGING | Age: 29
Discharge: HOME | End: 2019-07-22
Attending: INTERNAL MEDICINE
Payer: COMMERCIAL

## 2019-07-22 DIAGNOSIS — C18.9 COLON CANCER METASTASIZED TO LIVER (HCC): ICD-10-CM

## 2019-07-22 DIAGNOSIS — C78.7 COLON CANCER METASTASIZED TO LIVER (HCC): ICD-10-CM

## 2019-07-22 PROCEDURE — 74177 CT ABD & PELVIS W/CONTRAST: CPT

## 2019-07-22 PROCEDURE — 74011636320 HC RX REV CODE- 636/320: Performed by: RADIOLOGY

## 2019-07-22 RX ADMIN — IOPAMIDOL 100 ML: 755 INJECTION, SOLUTION INTRAVENOUS at 11:04

## 2019-07-23 ENCOUNTER — TELEPHONE (OUTPATIENT)
Dept: ONCOLOGY | Age: 29
End: 2019-07-23

## 2019-07-23 RX ORDER — CLINDAMYCIN PHOSPHATE 10 MG/G
GEL TOPICAL 2 TIMES DAILY
Qty: 1 ML | Refills: 0 | Status: SHIPPED | OUTPATIENT
Start: 2019-07-23 | End: 2019-08-14 | Stop reason: SDUPTHER

## 2019-07-23 RX ORDER — DOXYCYCLINE 100 MG/1
100 TABLET ORAL 2 TIMES DAILY
Qty: 60 TAB | Refills: 2 | Status: SHIPPED | OUTPATIENT
Start: 2019-07-23 | End: 2020-01-01

## 2019-07-23 NOTE — TELEPHONE ENCOUNTER
Pts called complaining of itchy scalp, nose bleeds and behind his ear feels raw and sore.  Call back number for girlfriend Morgan Fitzpatrick is [phone] ext [phone]

## 2019-07-23 NOTE — TELEPHONE ENCOUNTER
[employer] at [facility]  [phone]    07/23/19- Patient's girlfriend, Tom Eduardo, reports itchy/sore scalp, dry skin around his nose, skin red and inflamed, acne, and minor nose bleeds. Discussed using thick creams to help with dry skin and saline nasal spray for nose bleeds. Patient's hair is starting to thin, which may be causing itchy/sore scalp. She also reports constipation alternating with diarrhea, under control at this time. Small amount of blood in stool. Patient is eating and drinking well. Will discuss the above with Arielle Eldridge and call Tom Eduardo back with further recommendations.

## 2019-07-23 NOTE — TELEPHONE ENCOUNTER
[address] at [place]  [phone]    07/23/19 4:43 PM- Madison Hannon calling patient- VM left. Needing to advise him per Mickeal Stacks. NP- Clindamycin cream and doxycycline prescribed. Waiting on returned phone call.

## 2019-07-24 NOTE — TELEPHONE ENCOUNTER
[employer] at [facility] 88 [phone]    07/24/19- Call returned to Jonh Mathew to follow up on medications prescribed by Thien Dowell. Voicemail left requesting a return call when available. 9:30 am- Spoke to patient's girlfriend, informed her of prescriptions for clindamycin cream and doxycycline to help with skin issues related to Vectibix. She stated patient is having a lot of trouble with his scalp itching. Per Thien Dowell, patient can try Head and Shoulders shampoo and Cetaphil face wash/moisturizer. Doxycycline can help with itching as well. She verbalized understanding. Jonh Mathew also wanted to know about patient's CT results from Monday. Per Dr. Dallas Russell, things overall look better, some pulmonary nodules increased in size. He can review in more detail at patient's office visit. She verbalized understanding, no further questions or concerns.

## 2019-07-24 NOTE — TELEPHONE ENCOUNTER
Pts girlfriend called and would like to know if pt is okay to get his hair cut.  Call back number for Mary Tariq is [phone]

## 2019-07-24 NOTE — TELEPHONE ENCOUNTER
[address] at [place]  [phone]    07/24/19 10:15 am- Returned patient's girlfriend's call, no answer, voicemail left requesting a return call when available. 2:39 PM- Spoke to Yillio, informed her it was okay for patient to get his hair cut. Reviewed possible side effects for Folfiri/panitumumab and reasons to call the office. She verbalized understanding, no further questions or concerns at this time.

## 2019-08-01 ENCOUNTER — HOSPITAL ENCOUNTER (OUTPATIENT)
Dept: INFUSION THERAPY | Age: 29
End: 2019-08-01
Payer: COMMERCIAL

## 2019-08-01 ENCOUNTER — OFFICE VISIT (OUTPATIENT)
Dept: ONCOLOGY | Age: 29
End: 2019-08-01

## 2019-08-01 VITALS
DIASTOLIC BLOOD PRESSURE: 104 MMHG | BODY MASS INDEX: 39.96 KG/M2 | WEIGHT: 295 LBS | TEMPERATURE: 98.5 F | HEART RATE: 93 BPM | HEIGHT: 72 IN | RESPIRATION RATE: 18 BRPM | OXYGEN SATURATION: 98 % | SYSTOLIC BLOOD PRESSURE: 154 MMHG

## 2019-08-01 DIAGNOSIS — E66.01 SEVERE OBESITY WITH BODY MASS INDEX (BMI) OF 35.0 TO 39.9 WITH SERIOUS COMORBIDITY (HCC): ICD-10-CM

## 2019-08-01 DIAGNOSIS — C78.00 MALIGNANT NEOPLASM METASTATIC TO LUNG, UNSPECIFIED LATERALITY (HCC): ICD-10-CM

## 2019-08-01 DIAGNOSIS — C18.9 COLON CANCER METASTASIZED TO LIVER (HCC): Primary | ICD-10-CM

## 2019-08-01 DIAGNOSIS — C78.7 COLON CANCER METASTASIZED TO LIVER (HCC): Primary | ICD-10-CM

## 2019-08-01 RX ORDER — ALBUTEROL SULFATE 0.83 MG/ML
2.5 SOLUTION RESPIRATORY (INHALATION) AS NEEDED
Status: CANCELLED
Start: 2019-08-06

## 2019-08-01 RX ORDER — DEXTROSE MONOHYDRATE 50 MG/ML
25 INJECTION, SOLUTION INTRAVENOUS CONTINUOUS
Status: CANCELLED
Start: 2019-08-06

## 2019-08-01 RX ORDER — HEPARIN 100 UNIT/ML
300-500 SYRINGE INTRAVENOUS AS NEEDED
Status: CANCELLED
Start: 2019-08-06

## 2019-08-01 RX ORDER — HYDROCORTISONE SODIUM SUCCINATE 100 MG/2ML
100 INJECTION, POWDER, FOR SOLUTION INTRAMUSCULAR; INTRAVENOUS AS NEEDED
Status: CANCELLED | OUTPATIENT
Start: 2019-08-06

## 2019-08-01 RX ORDER — ATROPINE SULFATE 0.4 MG/ML
0.4 INJECTION, SOLUTION ENDOTRACHEAL; INTRAMEDULLARY; INTRAMUSCULAR; INTRAVENOUS; SUBCUTANEOUS
Status: CANCELLED | OUTPATIENT
Start: 2019-08-06

## 2019-08-01 RX ORDER — EPINEPHRINE 1 MG/ML
0.3 INJECTION, SOLUTION, CONCENTRATE INTRAVENOUS AS NEEDED
Status: CANCELLED | OUTPATIENT
Start: 2019-08-06

## 2019-08-01 RX ORDER — PALONOSETRON 0.05 MG/ML
0.25 INJECTION, SOLUTION INTRAVENOUS ONCE
Status: CANCELLED | OUTPATIENT
Start: 2019-08-06

## 2019-08-01 RX ORDER — ATROPINE SULFATE 0.4 MG/ML
0.4 INJECTION, SOLUTION ENDOTRACHEAL; INTRAMEDULLARY; INTRAMUSCULAR; INTRAVENOUS; SUBCUTANEOUS ONCE
Status: CANCELLED
Start: 2019-08-06

## 2019-08-01 RX ORDER — SODIUM CHLORIDE 9 MG/ML
25 INJECTION, SOLUTION INTRAVENOUS CONTINUOUS
Status: CANCELLED | OUTPATIENT
Start: 2019-08-06

## 2019-08-01 RX ORDER — ACETAMINOPHEN 325 MG/1
650 TABLET ORAL AS NEEDED
Status: CANCELLED
Start: 2019-08-06

## 2019-08-01 RX ORDER — SODIUM CHLORIDE 0.9 % (FLUSH) 0.9 %
10 SYRINGE (ML) INJECTION AS NEEDED
Status: CANCELLED
Start: 2019-08-06

## 2019-08-01 RX ORDER — FLUOROURACIL 50 MG/ML
400 INJECTION, SOLUTION INTRAVENOUS ONCE
Status: CANCELLED | OUTPATIENT
Start: 2019-08-06 | End: 2019-08-06

## 2019-08-01 RX ORDER — DIPHENHYDRAMINE HYDROCHLORIDE 50 MG/ML
50 INJECTION, SOLUTION INTRAMUSCULAR; INTRAVENOUS AS NEEDED
Status: CANCELLED
Start: 2019-08-06

## 2019-08-01 RX ORDER — SODIUM CHLORIDE 9 MG/ML
10 INJECTION INTRAMUSCULAR; INTRAVENOUS; SUBCUTANEOUS AS NEEDED
Status: CANCELLED | OUTPATIENT
Start: 2019-08-06

## 2019-08-01 RX ORDER — ONDANSETRON 2 MG/ML
8 INJECTION INTRAMUSCULAR; INTRAVENOUS AS NEEDED
Status: CANCELLED | OUTPATIENT
Start: 2019-08-06

## 2019-08-01 NOTE — PROGRESS NOTES
Kwadwo Chahal is a 30 y.o. male follow up for colon cancer. 1. Have you been to the ER, urgent care clinic since your last visit? Hospitalized since your last visit?no    2. Have you seen or consulted any other health care providers outside of the [address] [place] [address] since your last visit? Include any pap smears or colon screening.  no

## 2019-08-01 NOTE — PROGRESS NOTES
[facility] [place] at [place] [address] [address] [facility] [address] [facility]  W: [phone]  F: [phone]     Reason for Visit:   Cynthia Cook is a 30 y.o. male who is seen for follow up of metastatic colon cancer. Treatment History:   · CT A/P 3/25/2018: Indeterminate 2 x 1 cm low-density liver lesion. · CT C/A/P with triphase liver 3/27/2018: No evidence of metastatic disease to the chest. Multiple ill-defined hepatic lesions. These do not represent simple cyst.  · CT guided liver biopsy 3/27/2018: metastatic adenocarcinoma, KRAS wild type, NRAS wild type  · Colectomy by Dr. Allie Jaffe 3/29/2018: Adenocarcinoma, moderately differentiated. Negative margins. 3/16 nodes involved. pMMR  · Stage IV (pT3 pN1b pM1) Colon Cancer  · Chemotherapy with FOLFOX and bevacizumab 5/1/2018 - 7/12/2018 for a total of 6 cycles, dany administered with cycles 2-4  · Resumed chemotherapy with FOLFOX and bevacizumab from 9/25/2018 to 12/20/2018. Avastin held after 10/23/2018 in preparation for surgery. · Underwent diagnostic laparoscopy with microwave liver ablation to right hepatic lobe 1/30/2019  · CT Chest 4/9/2019: Several new subcentimeter pulmonary nodules in the bilateral lungs worrisome for pulmonary metastases. Status post interval right hepatic lobe embolization. Several increased areas of hypodensity in the right hepatic lobe are worrisome for progression of hepatic metastases. There is a new tract of hypodensity in the left lateral hepatic lobe extending to a hypodense lesion. · Resumed chemotherapy with FOLFOX and bevacizumab 4/29/2019 to 7/5/2019, stopped for oxaliplatin reaction  · Chemotherapy with FOLFIRI and Panitumumab beginning 7/16/2019    History of Present Illness:   Presents for add-on visit due to no show/no call to treatment on 8/30/2019. Started FOLFIRI with Panitumumab since last visit. Rash developed about 2 days after first treatment.  He is having a lot of itching of scalp. Dry skin of face and scalp. Sensitive to the sun. Skin is getting more irritated by being in sun and wants to stay indoors. States this rash has been causing him to want to stay in his room. He hasn't been to work in 4 weeks. Fatigue has been an issue. Sleeping most of the day. No nausea or vomiting. Had diarrhea and constipation in the last week. Now more constipation. He is unaccompanied today. PAST HISTORY: The following sections were reviewed and updated in the EMR as appropriate: PMH, SH, FH, Medications, Allergies. Allergies   Allergen Reactions    Oxaliplatin Other (comments)     Chest pain, abdominal pain, burning sensation, cold sweats      Review of Systems: A complete review of systems was obtained, reviewed, and scanned into the EMR. Pertinent findings reviewed above. Physical Exam:     Visit Vitals  BP (!) 154/104 (BP 1 Location: Right arm, BP Patient Position: Sitting)   Pulse 93   Temp 98.5 °F (36.9 °C) (Temporal)   Resp 18   Ht 6' (1.829 m)   Wt 295 lb (133.8 kg)   SpO2 98%   BMI 40.01 kg/m²     ECOG PS: 0  General: No distress, obese  Eyes: PERRLA, anicteric sclerae  HENT: Atraumatic, OP clear  Neck: Supple  Lymphatic: No cervical, supraclavicular, or inguinal adenopathy  Respiratory: CTAB, normal respiratory effort  CV: Normal rate, regular rhythm, no murmurs, no peripheral edema  GI: Soft, nontender, nondistended, no masses, unable to assess for hepatomegaly, no splenomegaly  MS: Normal gait and station. Digits without clubbing or cyanosis. Skin: No ecchymoses, or petechiae. Normal temperature, turgor, and texture. Scattered macules and pustules to face, neck and scalp.  Macules scattered to upper chest, back and shoulders  Psych: Alert, oriented, appropriate affect, normal judgment/insight    Results:     Lab Results   Component Value Date/Time    WBC 10.0 07/16/2019 10:19 AM    HGB 15.3 07/16/2019 10:19 AM    HCT 46.9 07/16/2019 10:19 AM    PLATELET 778 91/47/2302 10:19 AM    MCV 77.3 (L) 07/16/2019 10:19 AM    ABS. NEUTROPHILS 6.3 07/16/2019 10:19 AM    Hemoglobin (POC) 15.2 11/12/2018 12:58 PM     Lab Results   Component Value Date/Time    Sodium 140 07/16/2019 10:19 AM    Potassium 4.2 07/16/2019 10:19 AM    Chloride 106 07/16/2019 10:19 AM    CO2 27 07/16/2019 10:19 AM    Glucose 101 (H) 07/16/2019 10:19 AM    BUN 14 07/16/2019 10:19 AM    Creatinine 0.86 07/16/2019 10:19 AM    GFR est AA >60 07/16/2019 10:19 AM    GFR est non-AA >60 07/16/2019 10:19 AM    Calcium 9.0 07/16/2019 10:19 AM     Lab Results   Component Value Date/Time    Bilirubin, total 0.3 07/16/2019 10:19 AM    ALT (SGPT) 30 07/16/2019 10:19 AM    AST (SGOT) 18 07/16/2019 10:19 AM    Alk. phosphatase 81 07/16/2019 10:19 AM    Protein, total 8.0 07/16/2019 10:19 AM    Albumin 3.4 (L) 07/16/2019 10:19 AM    Globulin 4.6 (H) 07/16/2019 10:19 AM       CT abd/pelvis at [facility] on 3/19/2019: Interval laparoscopic microscope ablation of several left hepatic lobe lesions. Interval right portal vein embolizations. Increase in size of several right hepatic lobe lesions. Development of multiple pulmonary nodules in the imaged lung bases highly concerning for metastatic disease. Enlarged portal caval node, metastatic diease is not excluded. Stranding in the subcutaneous soft tissue related to laparoscopy. CT chest 4/9/2019:   1. Several new subcentimeter pulmonary nodules in the bilateral lungs worrisome for pulmonary metastases. 2. Status post interval right hepatic lobe embolization. Several increased areas of hypodensity in the right hepatic lobe are worrisome for progression of hepatic metastases. There is a new tract of hypodensity in the left lateral hepatic lobe extending to a hypodense lesion. This may represent interval intervention. Recommend correlation with prior procedural history and studies. Dedicated multiphase liver imaging could be done for further evaluation.     CT C/A/P 7/22/2019: Improved hepatic metastatic disease. Mixed response in pulmonary nodules. Assessment:   1) Metastatic Colon Cancer  Stage IV, pMMR, KRAS/NRAS wild type  He has metastatic disease within his liver. His primary tumor has been resected. He has received palliative chemotherapy with FOLFOX and Bevacizumab, followed by a break to undergo liver directed therapy. Unfortunately, he developed pulmonary metastases during this break. We resumed chemotherapy with FOLFOX and Avastin. Therapy was inconsistent due to patient's missed appointments. Oxaliplatin held with cycle 15 due to patient preference. With cycle 16 patient developed concern for allergic reaction to Oxaliplatin with intense abdominal pain, flushing and tachycardia. Similar issues had occurred with prior cycles, but seemed to be worsening with each treatment. He is unable to proceed with Oxaliplatin therapy due to intolerance. There is concern 5FU maintenance therapy will not be enough to control disease. Changed to FOLFIRI with Panitumumab 7/16/2019. He had a difficult time with cycle 1 due to rash from Panitumumab. Rash developed on day 2. He initiated Doxycycline therapy and Clindamycin cream as prescribed. Rash grade 2 at this point. He agrees to resume therapy with cycle 2 next week. This will have been delayed x 1 week due to patient with no call/no show to treatment this week. Discussed concerns of him not showing to appointments and not calling to inform staff. Encouraged follow up as scheduled and to call if he plans to be late to appointment or not show up. He needs to maintain treatment as scheduled for best response to therapy. Resume treatment next week with holding Panitumumab x 1 cycle to allow for recovery of skin rash. CT reviewed in detail will serve as new baseline to therapy. Foundation one obtained and confirms KRAS/NRAS wild type status. No other actionable mutations. Will consider Lonsurf vs Regorafinib vs clinical trial at progression. He will be seen each cycle of therapy. He would benefit from restaging studies, as we begin this next line of therapy. 2) Risk of infertility  We have previously reviewed that chemotherapy can potentially cause infertility. He has undergone sperm cryopreservation. 3) Genetic risk  Testing at [facility] negative. 4) Hypertension  Continue Metoprolol. Follow up with PCP for continued elevated BP. 5) Chemotherapy induced neuropathy. Should improve on FOLFIRI based therapy. Monitor. 6) Depression / Insomnia  No longer on Remeron. Med list updated. Has not yet started Celexa. 7) Mucositis  Magic mouthwash PRN. 8) Rash, drug  Due to Panitumumab. Continue Clindamycin cream and doxycycline. Hold Panitumumab with cycle 2.     9) Emotional Well Being  No psychosocial concerns identified today. Patient has adequate support. Plan:     Proceed 8/6/2019 with C2 of FOLFIRI with Panitumuab (irinotecan 180mg/m2, leucovorin 400 mg/m2, fluorouracil 400 mg/m2, and a 46 hour infusion of fluorouracil 2400 mg/m2) given every 2 weeks-HOLD Panitumumab  Labs: CBC, BMP prior to each treatment, hepatic function panel   Prophylactic antiemetics: Palonosetron and dexamethasone on the day of each chemotherapy infusion. Dexamethasone 8mg PO on days 2 and 3 after chemotherapy. PRN antiemetics: Ondansetron, Prochlorperazine  PRN antidiarrheals: Atropine 0.4mg IV every 2 hours PRN during or immediately after irinotecan infusion, Imodium every 2 hours as needed at home  EMLA cream for port  Return to clinic every 2 weeks on therapy    Patient was seen in conjunction with Mirlande Busch NP.     Signed By: Olegario Huff MD

## 2019-08-06 ENCOUNTER — HOSPITAL ENCOUNTER (OUTPATIENT)
Dept: INFUSION THERAPY | Age: 29
Discharge: HOME | End: 2019-08-06
Payer: COMMERCIAL

## 2019-08-06 VITALS
BODY MASS INDEX: 40.04 KG/M2 | DIASTOLIC BLOOD PRESSURE: 88 MMHG | OXYGEN SATURATION: 99 % | HEIGHT: 72 IN | WEIGHT: 295.6 LBS | TEMPERATURE: 96.8 F | HEART RATE: 82 BPM | SYSTOLIC BLOOD PRESSURE: 131 MMHG | RESPIRATION RATE: 18 BRPM

## 2019-08-06 DIAGNOSIS — C78.7 COLON CANCER METASTASIZED TO LIVER (HCC): Primary | ICD-10-CM

## 2019-08-06 DIAGNOSIS — C18.9 COLON CANCER METASTASIZED TO LIVER (HCC): Primary | ICD-10-CM

## 2019-08-06 LAB
ALBUMIN SERPL-MCNC: 3.4 G/DL (ref 3.5–5)
ALBUMIN/GLOB SERPL: 0.8 {RATIO} (ref 1.1–2.2)
ALP SERPL-CCNC: 72 U/L (ref 45–117)
ALT SERPL-CCNC: 36 U/L (ref 12–78)
ANION GAP SERPL CALC-SCNC: 4 MMOL/L (ref 5–15)
AST SERPL-CCNC: 21 U/L (ref 15–37)
BASOPHILS # BLD: 0.1 K/UL (ref 0–0.1)
BASOPHILS NFR BLD: 1 % (ref 0–1)
BILIRUB SERPL-MCNC: 0.3 MG/DL (ref 0.2–1)
BUN SERPL-MCNC: 11 MG/DL (ref 6–20)
BUN/CREAT SERPL: 12 (ref 12–20)
CALCIUM SERPL-MCNC: 8.8 MG/DL (ref 8.5–10.1)
CHLORIDE SERPL-SCNC: 107 MMOL/L (ref 97–108)
CO2 SERPL-SCNC: 28 MMOL/L (ref 21–32)
CREAT SERPL-MCNC: 0.94 MG/DL (ref 0.7–1.3)
DIFFERENTIAL METHOD BLD: ABNORMAL
EOSINOPHIL # BLD: 0.4 K/UL (ref 0–0.4)
EOSINOPHIL NFR BLD: 5 % (ref 0–7)
ERYTHROCYTE [DISTWIDTH] IN BLOOD BY AUTOMATED COUNT: 18.1 % (ref 11.5–14.5)
GLOBULIN SER CALC-MCNC: 4.4 G/DL (ref 2–4)
GLUCOSE SERPL-MCNC: 95 MG/DL (ref 65–100)
HCT VFR BLD AUTO: 47.7 % (ref 36.6–50.3)
HGB BLD-MCNC: 14.9 G/DL (ref 12.1–17)
IMM GRANULOCYTES # BLD AUTO: 0.1 K/UL (ref 0–0.04)
IMM GRANULOCYTES NFR BLD AUTO: 1 % (ref 0–0.5)
LYMPHOCYTES # BLD: 2.5 K/UL (ref 0.8–3.5)
LYMPHOCYTES NFR BLD: 35 % (ref 12–49)
MCH RBC QN AUTO: 24.3 PG (ref 26–34)
MCHC RBC AUTO-ENTMCNC: 31.2 G/DL (ref 30–36.5)
MCV RBC AUTO: 77.9 FL (ref 80–99)
MONOCYTES # BLD: 0.8 K/UL (ref 0–1)
MONOCYTES NFR BLD: 11 % (ref 5–13)
NEUTS SEG # BLD: 3.3 K/UL (ref 1.8–8)
NEUTS SEG NFR BLD: 47 % (ref 32–75)
NRBC # BLD: 0 K/UL (ref 0–0.01)
NRBC BLD-RTO: 0 PER 100 WBC
PLATELET # BLD AUTO: 273 K/UL (ref 150–400)
PMV BLD AUTO: 9.3 FL (ref 8.9–12.9)
POTASSIUM SERPL-SCNC: 3.9 MMOL/L (ref 3.5–5.1)
PROT SERPL-MCNC: 7.8 G/DL (ref 6.4–8.2)
RBC # BLD AUTO: 6.12 M/UL (ref 4.1–5.7)
SODIUM SERPL-SCNC: 139 MMOL/L (ref 136–145)
WBC # BLD AUTO: 7.1 K/UL (ref 4.1–11.1)

## 2019-08-06 PROCEDURE — 74011250636 HC RX REV CODE- 250/636: Performed by: NURSE PRACTITIONER

## 2019-08-06 PROCEDURE — 96375 TX/PRO/DX INJ NEW DRUG ADDON: CPT

## 2019-08-06 PROCEDURE — 36415 COLL VENOUS BLD VENIPUNCTURE: CPT

## 2019-08-06 PROCEDURE — 96413 CHEMO IV INFUSION 1 HR: CPT

## 2019-08-06 PROCEDURE — 74011000250 HC RX REV CODE- 250: Performed by: NURSE PRACTITIONER

## 2019-08-06 PROCEDURE — 74011000258 HC RX REV CODE- 258: Performed by: NURSE PRACTITIONER

## 2019-08-06 PROCEDURE — 96368 THER/DIAG CONCURRENT INF: CPT

## 2019-08-06 PROCEDURE — 80053 COMPREHEN METABOLIC PANEL: CPT

## 2019-08-06 PROCEDURE — 96416 CHEMO PROLONG INFUSE W/PUMP: CPT

## 2019-08-06 PROCEDURE — 85025 COMPLETE CBC W/AUTO DIFF WBC: CPT

## 2019-08-06 PROCEDURE — 77030012965 HC NDL HUBR BBMI -A

## 2019-08-06 PROCEDURE — 96411 CHEMO IV PUSH ADDL DRUG: CPT

## 2019-08-06 RX ORDER — SODIUM CHLORIDE 0.9 % (FLUSH) 0.9 %
10 SYRINGE (ML) INJECTION AS NEEDED
Status: ACTIVE | OUTPATIENT
Start: 2019-08-06 | End: 2019-08-06

## 2019-08-06 RX ORDER — SODIUM CHLORIDE 9 MG/ML
25 INJECTION, SOLUTION INTRAVENOUS CONTINUOUS
Status: DISPENSED | OUTPATIENT
Start: 2019-08-06 | End: 2019-08-06

## 2019-08-06 RX ORDER — SODIUM CHLORIDE 9 MG/ML
10 INJECTION INTRAMUSCULAR; INTRAVENOUS; SUBCUTANEOUS AS NEEDED
Status: ACTIVE | OUTPATIENT
Start: 2019-08-06 | End: 2019-08-06

## 2019-08-06 RX ORDER — DEXTROSE MONOHYDRATE 50 MG/ML
25 INJECTION, SOLUTION INTRAVENOUS CONTINUOUS
Status: DISPENSED | OUTPATIENT
Start: 2019-08-06 | End: 2019-08-06

## 2019-08-06 RX ORDER — HEPARIN 100 UNIT/ML
300-500 SYRINGE INTRAVENOUS AS NEEDED
Status: ACTIVE | OUTPATIENT
Start: 2019-08-06 | End: 2019-08-06

## 2019-08-06 RX ORDER — ATROPINE SULFATE 0.4 MG/ML
0.4 INJECTION, SOLUTION ENDOTRACHEAL; INTRAMEDULLARY; INTRAMUSCULAR; INTRAVENOUS; SUBCUTANEOUS ONCE
Status: COMPLETED | OUTPATIENT
Start: 2019-08-06 | End: 2019-08-06

## 2019-08-06 RX ORDER — PALONOSETRON 0.05 MG/ML
0.25 INJECTION, SOLUTION INTRAVENOUS ONCE
Status: COMPLETED | OUTPATIENT
Start: 2019-08-06 | End: 2019-08-06

## 2019-08-06 RX ORDER — FLUOROURACIL 50 MG/ML
400 INJECTION, SOLUTION INTRAVENOUS ONCE
Status: COMPLETED | OUTPATIENT
Start: 2019-08-06 | End: 2019-08-06

## 2019-08-06 RX ADMIN — ATROPINE SULFATE 0.4 MG: 0.4 INJECTION, SOLUTION INTRAMUSCULAR; INTRAVENOUS; SUBCUTANEOUS at 12:35

## 2019-08-06 RX ADMIN — IRINOTECAN HYDROCHLORIDE 473 MG: 20 INJECTION, SOLUTION INTRAVENOUS at 12:46

## 2019-08-06 RX ADMIN — FLUOROURACIL 1052 MG: 50 INJECTION, SOLUTION INTRAVENOUS at 14:25

## 2019-08-06 RX ADMIN — FLUOROURACIL 6312 MG: 50 INJECTION, SOLUTION INTRAVENOUS at 14:30

## 2019-08-06 RX ADMIN — DEXTROSE MONOHYDRATE 25 ML/HR: 5 INJECTION, SOLUTION INTRAVENOUS at 11:32

## 2019-08-06 RX ADMIN — LEUCOVORIN CALCIUM 1052 MG: 500 INJECTION, POWDER, LYOPHILIZED, FOR SOLUTION INTRAMUSCULAR; INTRAVENOUS at 12:46

## 2019-08-06 RX ADMIN — DEXAMETHASONE SODIUM PHOSPHATE 12 MG: 4 INJECTION INTRA-ARTICULAR; INTRALESIONAL; INTRAMUSCULAR; INTRAVENOUS; SOFT TISSUE at 11:49

## 2019-08-06 RX ADMIN — PALONOSETRON HYDROCHLORIDE 0.25 MG: 0.05 INJECTION INTRAVENOUS at 11:47

## 2019-08-06 RX ADMIN — SODIUM CHLORIDE 10 ML: 9 INJECTION, SOLUTION INTRAMUSCULAR; INTRAVENOUS; SUBCUTANEOUS at 10:19

## 2019-08-06 NOTE — PROGRESS NOTES
[facility] VISIT NOTE    1005. Pt arrived at [facility] ambulatory and in no distress for C2 Folfiri. Assessment completed, pt c/o rash on face. RCW chest port accessed with . 75 in costello with no difficulty. Positive blood return noted and labs drawn. Labs resulted and are within parameters to treat. Medications received:  D5 KVO  Aloxi IV  Dexamethasone IV  Atropine IV  Irinotecan IV  Leucovorin IV  Fluorouracil IVP  Fluorouracil CIV CADD pump    Tolerated treatment well, no adverse reaction noted. Port attached to 5fu pump and verified infusing. Positive blood return noted. Patient Vitals for the past 12 hrs:   Temp Pulse Resp BP SpO2   08/06/19 1436 96.8 °F (36 °C) 82 18 131/88 --   08/06/19 1007 96.4 °F (35.8 °C) 79 18 (!) 138/95 99 %     Recent Results (from the past 12 hour(s))   CBC WITH AUTOMATED DIFF    Collection Time: 08/06/19 10:19 AM   Result Value Ref Range    WBC 7.1 4.1 - 11.1 K/uL    RBC 6.12 (H) 4.10 - 5.70 M/uL    HGB 14.9 12.1 - 17.0 g/dL    HCT 47.7 36.6 - 50.3 %    MCV 77.9 (L) 80.0 - 99.0 FL    MCH 24.3 (L) 26.0 - 34.0 PG    MCHC 31.2 30.0 - 36.5 g/dL    RDW 18.1 (H) 11.5 - 14.5 %    PLATELET 173 546 - 105 K/uL    MPV 9.3 8.9 - 12.9 FL    NRBC 0.0 0  WBC    ABSOLUTE NRBC 0.00 0.00 - 0.01 K/uL    NEUTROPHILS 47 32 - 75 %    LYMPHOCYTES 35 12 - 49 %    MONOCYTES 11 5 - 13 %    EOSINOPHILS 5 0 - 7 %    BASOPHILS 1 0 - 1 %    IMMATURE GRANULOCYTES 1 (H) 0.0 - 0.5 %    ABS. NEUTROPHILS 3.3 1.8 - 8.0 K/UL    ABS. LYMPHOCYTES 2.5 0.8 - 3.5 K/UL    ABS. MONOCYTES 0.8 0.0 - 1.0 K/UL    ABS. EOSINOPHILS 0.4 0.0 - 0.4 K/UL    ABS. BASOPHILS 0.1 0.0 - 0.1 K/UL    ABS. IMM.  GRANS. 0.1 (H) 0.00 - 0.04 K/UL    DF AUTOMATED     METABOLIC PANEL, COMPREHENSIVE    Collection Time: 08/06/19 10:19 AM   Result Value Ref Range    Sodium 139 136 - 145 mmol/L    Potassium 3.9 3.5 - 5.1 mmol/L    Chloride 107 97 - 108 mmol/L    CO2 28 21 - 32 mmol/L    Anion gap 4 (L) 5 - 15 mmol/L    Glucose 95 65 - 100 mg/dL    BUN 11 6 - 20 MG/DL    Creatinine 0.94 0.70 - 1.30 MG/DL    BUN/Creatinine ratio 12 12 - 20      GFR est AA >60 >60 ml/min/1.73m2    GFR est non-AA >60 >60 ml/min/1.73m2    Calcium 8.8 8.5 - 10.1 MG/DL    Bilirubin, total 0.3 0.2 - 1.0 MG/DL    ALT (SGPT) 36 12 - 78 U/L    AST (SGOT) 21 15 - 37 U/L    Alk. phosphatase 72 45 - 117 U/L    Protein, total 7.8 6.4 - 8.2 g/dL    Albumin 3.4 (L) 3.5 - 5.0 g/dL    Globulin 4.4 (H) 2.0 - 4.0 g/dL    A-G Ratio 0.8 (L) 1.1 - 2.2       1440. D/C'd from [facility] ambulatory and in no distress.  Next appointment is 8/8/19 at 3:30 pm.

## 2019-08-08 ENCOUNTER — HOSPITAL ENCOUNTER (OUTPATIENT)
Dept: INFUSION THERAPY | Age: 29
Discharge: HOME | End: 2019-08-08
Payer: COMMERCIAL

## 2019-08-08 VITALS — TEMPERATURE: 97.9 F | DIASTOLIC BLOOD PRESSURE: 75 MMHG | SYSTOLIC BLOOD PRESSURE: 131 MMHG | HEART RATE: 88 BPM

## 2019-08-08 DIAGNOSIS — C18.9 COLON CANCER METASTASIZED TO LIVER (HCC): Primary | ICD-10-CM

## 2019-08-08 DIAGNOSIS — C78.7 COLON CANCER METASTASIZED TO LIVER (HCC): Primary | ICD-10-CM

## 2019-08-08 PROCEDURE — 74011250636 HC RX REV CODE- 250/636: Performed by: NURSE PRACTITIONER

## 2019-08-08 PROCEDURE — 96523 IRRIG DRUG DELIVERY DEVICE: CPT

## 2019-08-08 RX ORDER — SODIUM CHLORIDE 9 MG/ML
10 INJECTION INTRAMUSCULAR; INTRAVENOUS; SUBCUTANEOUS AS NEEDED
Status: DISCONTINUED | OUTPATIENT
Start: 2019-08-08 | End: 2019-08-09 | Stop reason: HOSPADM

## 2019-08-08 RX ORDER — HEPARIN 100 UNIT/ML
300-500 SYRINGE INTRAVENOUS AS NEEDED
Status: DISCONTINUED | OUTPATIENT
Start: 2019-08-08 | End: 2019-08-09 | Stop reason: HOSPADM

## 2019-08-08 RX ORDER — SODIUM CHLORIDE 0.9 % (FLUSH) 0.9 %
10 SYRINGE (ML) INJECTION AS NEEDED
Status: DISCONTINUED | OUTPATIENT
Start: 2019-08-08 | End: 2019-08-09 | Stop reason: HOSPADM

## 2019-08-08 RX ADMIN — SODIUM CHLORIDE 10 ML: 9 INJECTION INTRAMUSCULAR; INTRAVENOUS; SUBCUTANEOUS at 13:15

## 2019-08-08 RX ADMIN — SODIUM CHLORIDE, PRESERVATIVE FREE 500 UNITS: 5 INJECTION INTRAVENOUS at 13:15

## 2019-08-08 RX ADMIN — Medication 10 ML: at 13:15

## 2019-08-08 NOTE — PROGRESS NOTES
[facility] VISIT NOTE    1310  Pt arrived at [facility] ambulatory and in no distress for Pump removal.  Assessment completed, pt c/o rash on face and chest.  Rn verified time infusion finished. Vitals:  Patient Vitals for the past 12 hrs:   Temp Pulse BP   08/08/19 1310 97.9 °F (36.6 °C) 88 131/75       Labs:   N/A    5 FU CIV CADD pump removed, port flushed and deaccessed per protocol. Tolerated treatment well, no adverse reaction noted. Port de-accessed and flushed per protocol. Positive blood return noted. 1320  D/C'd from [facility] ambulatory and in no distress. Patient aware of next [facility] appointment.     Future Appointments:  Future Appointments   Date Time Provider Neelam Dwyer   8/20/2019 10:05 AM SS INF5 CH2 >4H [facility]   8/22/2019  1:00 PM SS INF6 CH4 <1H [facility]   9/3/2019 11:00 AM SS INF7 CH2 <1H [facility]   9/5/2019  1:00 PM [facility] [address] [place] [address]

## 2019-08-14 RX ORDER — HEPARIN 100 UNIT/ML
300-500 SYRINGE INTRAVENOUS AS NEEDED
Status: CANCELLED
Start: 2019-09-05

## 2019-08-14 RX ORDER — HEPARIN 100 UNIT/ML
300-500 SYRINGE INTRAVENOUS AS NEEDED
Status: CANCELLED
Start: 2019-10-03

## 2019-08-14 RX ORDER — SODIUM CHLORIDE 9 MG/ML
10 INJECTION INTRAMUSCULAR; INTRAVENOUS; SUBCUTANEOUS AS NEEDED
Status: CANCELLED | OUTPATIENT
Start: 2019-08-20

## 2019-08-14 RX ORDER — SODIUM CHLORIDE 9 MG/ML
25 INJECTION, SOLUTION INTRAVENOUS CONTINUOUS
Status: CANCELLED | OUTPATIENT
Start: 2019-09-03

## 2019-08-14 RX ORDER — HEPARIN 100 UNIT/ML
300-500 SYRINGE INTRAVENOUS AS NEEDED
Status: CANCELLED
Start: 2019-09-03

## 2019-08-14 RX ORDER — PALONOSETRON 0.05 MG/ML
0.25 INJECTION, SOLUTION INTRAVENOUS ONCE
Status: CANCELLED | OUTPATIENT
Start: 2019-09-03

## 2019-08-14 RX ORDER — EPINEPHRINE 1 MG/ML
0.3 INJECTION, SOLUTION, CONCENTRATE INTRAVENOUS AS NEEDED
Status: CANCELLED | OUTPATIENT
Start: 2019-09-03

## 2019-08-14 RX ORDER — HYDROCORTISONE SODIUM SUCCINATE 100 MG/2ML
100 INJECTION, POWDER, FOR SOLUTION INTRAMUSCULAR; INTRAVENOUS AS NEEDED
Status: CANCELLED | OUTPATIENT
Start: 2019-09-03

## 2019-08-14 RX ORDER — ALBUTEROL SULFATE 0.83 MG/ML
2.5 SOLUTION RESPIRATORY (INHALATION) AS NEEDED
Status: CANCELLED
Start: 2019-09-03

## 2019-08-14 RX ORDER — SODIUM CHLORIDE 9 MG/ML
10 INJECTION INTRAMUSCULAR; INTRAVENOUS; SUBCUTANEOUS AS NEEDED
Status: CANCELLED | OUTPATIENT
Start: 2019-08-22

## 2019-08-14 RX ORDER — DIPHENHYDRAMINE HYDROCHLORIDE 50 MG/ML
50 INJECTION, SOLUTION INTRAMUSCULAR; INTRAVENOUS AS NEEDED
Status: CANCELLED
Start: 2019-09-17

## 2019-08-14 RX ORDER — ONDANSETRON 2 MG/ML
8 INJECTION INTRAMUSCULAR; INTRAVENOUS AS NEEDED
Status: CANCELLED | OUTPATIENT
Start: 2019-09-17

## 2019-08-14 RX ORDER — DEXTROSE MONOHYDRATE 50 MG/ML
25 INJECTION, SOLUTION INTRAVENOUS CONTINUOUS
Status: CANCELLED
Start: 2019-09-03

## 2019-08-14 RX ORDER — FLUOROURACIL 50 MG/ML
400 INJECTION, SOLUTION INTRAVENOUS ONCE
Status: CANCELLED | OUTPATIENT
Start: 2019-09-17 | End: 2019-09-17

## 2019-08-14 RX ORDER — DIPHENHYDRAMINE HYDROCHLORIDE 50 MG/ML
50 INJECTION, SOLUTION INTRAMUSCULAR; INTRAVENOUS AS NEEDED
Status: CANCELLED
Start: 2019-08-20

## 2019-08-14 RX ORDER — ACETAMINOPHEN 325 MG/1
650 TABLET ORAL AS NEEDED
Status: CANCELLED
Start: 2019-09-17

## 2019-08-14 RX ORDER — SODIUM CHLORIDE 0.9 % (FLUSH) 0.9 %
10 SYRINGE (ML) INJECTION AS NEEDED
Status: CANCELLED
Start: 2019-10-03

## 2019-08-14 RX ORDER — DEXTROSE MONOHYDRATE 50 MG/ML
25 INJECTION, SOLUTION INTRAVENOUS CONTINUOUS
Status: CANCELLED
Start: 2019-09-17

## 2019-08-14 RX ORDER — EPINEPHRINE 1 MG/ML
0.3 INJECTION, SOLUTION, CONCENTRATE INTRAVENOUS AS NEEDED
Status: CANCELLED | OUTPATIENT
Start: 2019-08-20

## 2019-08-14 RX ORDER — ONDANSETRON 2 MG/ML
8 INJECTION INTRAMUSCULAR; INTRAVENOUS AS NEEDED
Status: CANCELLED | OUTPATIENT
Start: 2019-08-20

## 2019-08-14 RX ORDER — HYDROCORTISONE SODIUM SUCCINATE 100 MG/2ML
100 INJECTION, POWDER, FOR SOLUTION INTRAMUSCULAR; INTRAVENOUS AS NEEDED
Status: CANCELLED | OUTPATIENT
Start: 2019-08-20

## 2019-08-14 RX ORDER — SODIUM CHLORIDE 9 MG/ML
10 INJECTION INTRAMUSCULAR; INTRAVENOUS; SUBCUTANEOUS AS NEEDED
Status: CANCELLED | OUTPATIENT
Start: 2019-09-03

## 2019-08-14 RX ORDER — EPINEPHRINE 1 MG/ML
0.3 INJECTION, SOLUTION, CONCENTRATE INTRAVENOUS AS NEEDED
Status: CANCELLED | OUTPATIENT
Start: 2019-09-17

## 2019-08-14 RX ORDER — SODIUM CHLORIDE 0.9 % (FLUSH) 0.9 %
10 SYRINGE (ML) INJECTION AS NEEDED
Status: CANCELLED
Start: 2019-08-22

## 2019-08-14 RX ORDER — DIPHENHYDRAMINE HYDROCHLORIDE 50 MG/ML
50 INJECTION, SOLUTION INTRAMUSCULAR; INTRAVENOUS AS NEEDED
Status: CANCELLED
Start: 2019-09-03

## 2019-08-14 RX ORDER — HEPARIN 100 UNIT/ML
300-500 SYRINGE INTRAVENOUS AS NEEDED
Status: CANCELLED
Start: 2019-08-20

## 2019-08-14 RX ORDER — ATROPINE SULFATE 0.4 MG/ML
0.4 INJECTION, SOLUTION ENDOTRACHEAL; INTRAMEDULLARY; INTRAMUSCULAR; INTRAVENOUS; SUBCUTANEOUS ONCE
Status: CANCELLED
Start: 2019-09-17

## 2019-08-14 RX ORDER — PALONOSETRON 0.05 MG/ML
0.25 INJECTION, SOLUTION INTRAVENOUS ONCE
Status: CANCELLED | OUTPATIENT
Start: 2019-09-17

## 2019-08-14 RX ORDER — ATROPINE SULFATE 0.4 MG/ML
0.4 INJECTION, SOLUTION ENDOTRACHEAL; INTRAMEDULLARY; INTRAMUSCULAR; INTRAVENOUS; SUBCUTANEOUS
Status: CANCELLED | OUTPATIENT
Start: 2019-09-03

## 2019-08-14 RX ORDER — PALONOSETRON 0.05 MG/ML
0.25 INJECTION, SOLUTION INTRAVENOUS ONCE
Status: CANCELLED | OUTPATIENT
Start: 2019-08-20

## 2019-08-14 RX ORDER — ONDANSETRON 2 MG/ML
8 INJECTION INTRAMUSCULAR; INTRAVENOUS AS NEEDED
Status: CANCELLED | OUTPATIENT
Start: 2019-09-03

## 2019-08-14 RX ORDER — SODIUM CHLORIDE 0.9 % (FLUSH) 0.9 %
10 SYRINGE (ML) INJECTION AS NEEDED
Status: CANCELLED
Start: 2019-08-20

## 2019-08-14 RX ORDER — ATROPINE SULFATE 0.4 MG/ML
0.4 INJECTION, SOLUTION ENDOTRACHEAL; INTRAMEDULLARY; INTRAMUSCULAR; INTRAVENOUS; SUBCUTANEOUS ONCE
Status: CANCELLED
Start: 2019-08-20

## 2019-08-14 RX ORDER — SODIUM CHLORIDE 9 MG/ML
10 INJECTION INTRAMUSCULAR; INTRAVENOUS; SUBCUTANEOUS AS NEEDED
Status: CANCELLED | OUTPATIENT
Start: 2019-09-05

## 2019-08-14 RX ORDER — HEPARIN 100 UNIT/ML
300-500 SYRINGE INTRAVENOUS AS NEEDED
Status: CANCELLED
Start: 2019-09-17

## 2019-08-14 RX ORDER — ATROPINE SULFATE 0.4 MG/ML
0.4 INJECTION, SOLUTION ENDOTRACHEAL; INTRAMEDULLARY; INTRAMUSCULAR; INTRAVENOUS; SUBCUTANEOUS
Status: CANCELLED | OUTPATIENT
Start: 2019-08-20

## 2019-08-14 RX ORDER — ACETAMINOPHEN 325 MG/1
650 TABLET ORAL AS NEEDED
Status: CANCELLED
Start: 2019-09-03

## 2019-08-14 RX ORDER — ACETAMINOPHEN 325 MG/1
650 TABLET ORAL AS NEEDED
Status: CANCELLED
Start: 2019-08-20

## 2019-08-14 RX ORDER — ATROPINE SULFATE 0.4 MG/ML
0.4 INJECTION, SOLUTION ENDOTRACHEAL; INTRAMEDULLARY; INTRAMUSCULAR; INTRAVENOUS; SUBCUTANEOUS
Status: CANCELLED | OUTPATIENT
Start: 2019-09-17

## 2019-08-14 RX ORDER — SODIUM CHLORIDE 0.9 % (FLUSH) 0.9 %
10 SYRINGE (ML) INJECTION AS NEEDED
Status: CANCELLED
Start: 2019-09-05

## 2019-08-14 RX ORDER — SODIUM CHLORIDE 9 MG/ML
10 INJECTION INTRAMUSCULAR; INTRAVENOUS; SUBCUTANEOUS AS NEEDED
Status: CANCELLED | OUTPATIENT
Start: 2019-09-17

## 2019-08-14 RX ORDER — ATROPINE SULFATE 0.4 MG/ML
0.4 INJECTION, SOLUTION ENDOTRACHEAL; INTRAMEDULLARY; INTRAMUSCULAR; INTRAVENOUS; SUBCUTANEOUS ONCE
Status: CANCELLED
Start: 2019-09-03

## 2019-08-14 RX ORDER — SODIUM CHLORIDE 0.9 % (FLUSH) 0.9 %
10 SYRINGE (ML) INJECTION AS NEEDED
Status: CANCELLED
Start: 2019-09-03

## 2019-08-14 RX ORDER — FLUOROURACIL 50 MG/ML
400 INJECTION, SOLUTION INTRAVENOUS ONCE
Status: CANCELLED | OUTPATIENT
Start: 2019-08-20 | End: 2019-08-20

## 2019-08-14 RX ORDER — FLUOROURACIL 50 MG/ML
400 INJECTION, SOLUTION INTRAVENOUS ONCE
Status: CANCELLED | OUTPATIENT
Start: 2019-09-03 | End: 2019-09-03

## 2019-08-14 RX ORDER — SODIUM CHLORIDE 0.9 % (FLUSH) 0.9 %
10 SYRINGE (ML) INJECTION AS NEEDED
Status: CANCELLED
Start: 2019-09-17

## 2019-08-14 RX ORDER — SODIUM CHLORIDE 9 MG/ML
10 INJECTION INTRAMUSCULAR; INTRAVENOUS; SUBCUTANEOUS AS NEEDED
Status: CANCELLED | OUTPATIENT
Start: 2019-10-03

## 2019-08-14 RX ORDER — SODIUM CHLORIDE 9 MG/ML
25 INJECTION, SOLUTION INTRAVENOUS CONTINUOUS
Status: CANCELLED | OUTPATIENT
Start: 2019-08-20

## 2019-08-14 RX ORDER — HEPARIN 100 UNIT/ML
300-500 SYRINGE INTRAVENOUS AS NEEDED
Status: CANCELLED
Start: 2019-08-22

## 2019-08-14 RX ORDER — HYDROCORTISONE SODIUM SUCCINATE 100 MG/2ML
100 INJECTION, POWDER, FOR SOLUTION INTRAMUSCULAR; INTRAVENOUS AS NEEDED
Status: CANCELLED | OUTPATIENT
Start: 2019-09-17

## 2019-08-14 RX ORDER — DEXTROSE MONOHYDRATE 50 MG/ML
25 INJECTION, SOLUTION INTRAVENOUS CONTINUOUS
Status: CANCELLED
Start: 2019-08-20

## 2019-08-14 RX ORDER — ALBUTEROL SULFATE 0.83 MG/ML
2.5 SOLUTION RESPIRATORY (INHALATION) AS NEEDED
Status: CANCELLED
Start: 2019-08-20

## 2019-08-14 RX ORDER — SODIUM CHLORIDE 9 MG/ML
25 INJECTION, SOLUTION INTRAVENOUS CONTINUOUS
Status: CANCELLED | OUTPATIENT
Start: 2019-09-17

## 2019-08-14 RX ORDER — ALBUTEROL SULFATE 0.83 MG/ML
2.5 SOLUTION RESPIRATORY (INHALATION) AS NEEDED
Status: CANCELLED
Start: 2019-09-17

## 2019-08-19 ENCOUNTER — TELEPHONE (OUTPATIENT)
Dept: ONCOLOGY | Age: 29
End: 2019-08-19

## 2019-08-19 NOTE — LETTER
NOTIFICATION RETURN TO WORK / SCHOOL 
 
8/20/2019 9:57 AM 
 
Mr. Vincent Dunbar 
[address] [place] [phone] To Whom It May Concern: 
 
La Gonsales is currently under the care of Kiah Parekh and Rudy Lakhani at the [employer]. Patient has metastatic colon cancer and is currently undergoing chemotherapy treatment. He will be out of work from 8/17/2019 until 10/31/19. If there are questions or concerns please have the patient contact our office. Sincerely, Luci Dominguez. FNP-BC

## 2019-08-19 NOTE — TELEPHONE ENCOUNTER
Called because he needs a letter for work to say that he cannot come. He has missed to many days and they are happy to work with him but want him to take leave. He needs a letter for these dates    08/17/19 - 10/31/19  And will need to  tomorrow.     [phone]    8/19/19  raj

## 2019-08-20 ENCOUNTER — OFFICE VISIT (OUTPATIENT)
Dept: ONCOLOGY | Age: 29
End: 2019-08-20

## 2019-08-20 ENCOUNTER — HOSPITAL ENCOUNTER (OUTPATIENT)
Dept: INFUSION THERAPY | Age: 29
Discharge: HOME | End: 2019-08-20
Payer: COMMERCIAL

## 2019-08-20 VITALS
SYSTOLIC BLOOD PRESSURE: 140 MMHG | DIASTOLIC BLOOD PRESSURE: 92 MMHG | HEIGHT: 72 IN | HEART RATE: 78 BPM | OXYGEN SATURATION: 99 % | RESPIRATION RATE: 18 BRPM | BODY MASS INDEX: 39.96 KG/M2 | WEIGHT: 295 LBS | TEMPERATURE: 97.5 F

## 2019-08-20 VITALS
DIASTOLIC BLOOD PRESSURE: 92 MMHG | HEART RATE: 67 BPM | TEMPERATURE: 97.1 F | WEIGHT: 295.41 LBS | HEIGHT: 72 IN | RESPIRATION RATE: 18 BRPM | SYSTOLIC BLOOD PRESSURE: 152 MMHG | BODY MASS INDEX: 40.01 KG/M2

## 2019-08-20 DIAGNOSIS — E66.01 SEVERE OBESITY WITH BODY MASS INDEX (BMI) OF 35.0 TO 39.9 WITH SERIOUS COMORBIDITY (HCC): ICD-10-CM

## 2019-08-20 DIAGNOSIS — C18.9 COLON CANCER METASTASIZED TO LIVER (HCC): ICD-10-CM

## 2019-08-20 DIAGNOSIS — C78.7 COLON CANCER METASTASIZED TO LIVER (HCC): Primary | ICD-10-CM

## 2019-08-20 DIAGNOSIS — C78.00 MALIGNANT NEOPLASM METASTATIC TO LUNG, UNSPECIFIED LATERALITY (HCC): Primary | ICD-10-CM

## 2019-08-20 DIAGNOSIS — C18.9 COLON CANCER METASTASIZED TO LIVER (HCC): Primary | ICD-10-CM

## 2019-08-20 DIAGNOSIS — C78.7 COLON CANCER METASTASIZED TO LIVER (HCC): ICD-10-CM

## 2019-08-20 LAB
ALBUMIN SERPL-MCNC: 3.4 G/DL (ref 3.5–5)
ALBUMIN/GLOB SERPL: 0.8 {RATIO} (ref 1.1–2.2)
ALP SERPL-CCNC: 84 U/L (ref 45–117)
ALT SERPL-CCNC: 26 U/L (ref 12–78)
ANION GAP SERPL CALC-SCNC: 5 MMOL/L (ref 5–15)
AST SERPL-CCNC: 17 U/L (ref 15–37)
BASOPHILS # BLD: 0 K/UL (ref 0–0.1)
BASOPHILS NFR BLD: 1 % (ref 0–1)
BILIRUB SERPL-MCNC: 0.5 MG/DL (ref 0.2–1)
BUN SERPL-MCNC: 13 MG/DL (ref 6–20)
BUN/CREAT SERPL: 12 (ref 12–20)
CALCIUM SERPL-MCNC: 8.9 MG/DL (ref 8.5–10.1)
CEA SERPL-MCNC: 9.4 NG/ML
CHLORIDE SERPL-SCNC: 109 MMOL/L (ref 97–108)
CO2 SERPL-SCNC: 28 MMOL/L (ref 21–32)
CREAT SERPL-MCNC: 1.09 MG/DL (ref 0.7–1.3)
DIFFERENTIAL METHOD BLD: ABNORMAL
EOSINOPHIL # BLD: 0.4 K/UL (ref 0–0.4)
EOSINOPHIL NFR BLD: 5 % (ref 0–7)
ERYTHROCYTE [DISTWIDTH] IN BLOOD BY AUTOMATED COUNT: 16.8 % (ref 11.5–14.5)
GLOBULIN SER CALC-MCNC: 4.3 G/DL (ref 2–4)
GLUCOSE SERPL-MCNC: 128 MG/DL (ref 65–100)
HCT VFR BLD AUTO: 44 % (ref 36.6–50.3)
HGB BLD-MCNC: 14.1 G/DL (ref 12.1–17)
IMM GRANULOCYTES # BLD AUTO: 0 K/UL (ref 0–0.04)
IMM GRANULOCYTES NFR BLD AUTO: 0 % (ref 0–0.5)
LYMPHOCYTES # BLD: 1.9 K/UL (ref 0.8–3.5)
LYMPHOCYTES NFR BLD: 28 % (ref 12–49)
MCH RBC QN AUTO: 24.7 PG (ref 26–34)
MCHC RBC AUTO-ENTMCNC: 32 G/DL (ref 30–36.5)
MCV RBC AUTO: 77.1 FL (ref 80–99)
MONOCYTES # BLD: 0.5 K/UL (ref 0–1)
MONOCYTES NFR BLD: 8 % (ref 5–13)
NEUTS SEG # BLD: 3.9 K/UL (ref 1.8–8)
NEUTS SEG NFR BLD: 58 % (ref 32–75)
NRBC # BLD: 0 K/UL (ref 0–0.01)
NRBC BLD-RTO: 0 PER 100 WBC
PLATELET # BLD AUTO: 297 K/UL (ref 150–400)
PMV BLD AUTO: 8.9 FL (ref 8.9–12.9)
POTASSIUM SERPL-SCNC: 3.9 MMOL/L (ref 3.5–5.1)
PROT SERPL-MCNC: 7.7 G/DL (ref 6.4–8.2)
RBC # BLD AUTO: 5.71 M/UL (ref 4.1–5.7)
SODIUM SERPL-SCNC: 142 MMOL/L (ref 136–145)
WBC # BLD AUTO: 6.7 K/UL (ref 4.1–11.1)

## 2019-08-20 PROCEDURE — 96375 TX/PRO/DX INJ NEW DRUG ADDON: CPT

## 2019-08-20 PROCEDURE — 80053 COMPREHEN METABOLIC PANEL: CPT

## 2019-08-20 PROCEDURE — 96413 CHEMO IV INFUSION 1 HR: CPT

## 2019-08-20 PROCEDURE — 96366 THER/PROPH/DIAG IV INF ADDON: CPT

## 2019-08-20 PROCEDURE — 74011250636 HC RX REV CODE- 250/636: Performed by: NURSE PRACTITIONER

## 2019-08-20 PROCEDURE — 96368 THER/DIAG CONCURRENT INF: CPT

## 2019-08-20 PROCEDURE — 77030012965 HC NDL HUBR BBMI -A

## 2019-08-20 PROCEDURE — 36415 COLL VENOUS BLD VENIPUNCTURE: CPT

## 2019-08-20 PROCEDURE — 85025 COMPLETE CBC W/AUTO DIFF WBC: CPT

## 2019-08-20 PROCEDURE — 96417 CHEMO IV INFUS EACH ADDL SEQ: CPT

## 2019-08-20 PROCEDURE — 74011000258 HC RX REV CODE- 258: Performed by: NURSE PRACTITIONER

## 2019-08-20 PROCEDURE — 96415 CHEMO IV INFUSION ADDL HR: CPT

## 2019-08-20 PROCEDURE — 82378 CARCINOEMBRYONIC ANTIGEN: CPT

## 2019-08-20 PROCEDURE — 96416 CHEMO PROLONG INFUSE W/PUMP: CPT

## 2019-08-20 PROCEDURE — 96411 CHEMO IV PUSH ADDL DRUG: CPT

## 2019-08-20 RX ORDER — PALONOSETRON 0.05 MG/ML
0.25 INJECTION, SOLUTION INTRAVENOUS ONCE
Status: COMPLETED | OUTPATIENT
Start: 2019-08-20 | End: 2019-08-20

## 2019-08-20 RX ORDER — HEPARIN 100 UNIT/ML
300-500 SYRINGE INTRAVENOUS AS NEEDED
Status: ACTIVE | OUTPATIENT
Start: 2019-08-20 | End: 2019-08-20

## 2019-08-20 RX ORDER — SODIUM CHLORIDE 9 MG/ML
10 INJECTION INTRAMUSCULAR; INTRAVENOUS; SUBCUTANEOUS AS NEEDED
Status: ACTIVE | OUTPATIENT
Start: 2019-08-20 | End: 2019-08-20

## 2019-08-20 RX ORDER — DEXAMETHASONE 4 MG/1
TABLET ORAL
Qty: 50 TAB | Refills: 0 | Status: SHIPPED | OUTPATIENT
Start: 2019-08-20 | End: 2020-01-01

## 2019-08-20 RX ORDER — ATROPINE SULFATE 0.4 MG/ML
0.4 INJECTION, SOLUTION ENDOTRACHEAL; INTRAMEDULLARY; INTRAMUSCULAR; INTRAVENOUS; SUBCUTANEOUS ONCE
Status: COMPLETED | OUTPATIENT
Start: 2019-08-20 | End: 2019-08-20

## 2019-08-20 RX ORDER — DEXTROSE MONOHYDRATE 50 MG/ML
25 INJECTION, SOLUTION INTRAVENOUS CONTINUOUS
Status: DISPENSED | OUTPATIENT
Start: 2019-08-20 | End: 2019-08-20

## 2019-08-20 RX ORDER — FLUOROURACIL 50 MG/ML
400 INJECTION, SOLUTION INTRAVENOUS ONCE
Status: COMPLETED | OUTPATIENT
Start: 2019-08-20 | End: 2019-08-20

## 2019-08-20 RX ORDER — SODIUM CHLORIDE 9 MG/ML
25 INJECTION, SOLUTION INTRAVENOUS CONTINUOUS
Status: DISPENSED | OUTPATIENT
Start: 2019-08-20 | End: 2019-08-20

## 2019-08-20 RX ORDER — SODIUM CHLORIDE 0.9 % (FLUSH) 0.9 %
10 SYRINGE (ML) INJECTION AS NEEDED
Status: ACTIVE | OUTPATIENT
Start: 2019-08-20 | End: 2019-08-20

## 2019-08-20 RX ADMIN — PANITUMUMAB 802.8 MG: 400 SOLUTION INTRAVENOUS at 12:46

## 2019-08-20 RX ADMIN — LEUCOVORIN CALCIUM 1052 MG: 500 INJECTION, POWDER, LYOPHILIZED, FOR SOLUTION INTRAMUSCULAR; INTRAVENOUS at 13:55

## 2019-08-20 RX ADMIN — IRINOTECAN HYDROCHLORIDE 473 MG: 20 INJECTION, SOLUTION INTRAVENOUS at 13:55

## 2019-08-20 RX ADMIN — FLUOROURACIL 6312 MG: 50 INJECTION, SOLUTION INTRAVENOUS at 15:47

## 2019-08-20 RX ADMIN — FLUOROURACIL 1052 MG: 50 INJECTION, SOLUTION INTRAVENOUS at 15:32

## 2019-08-20 RX ADMIN — DEXAMETHASONE SODIUM PHOSPHATE 12 MG: 4 INJECTION INTRA-ARTICULAR; INTRALESIONAL; INTRAMUSCULAR; INTRAVENOUS; SOFT TISSUE at 12:25

## 2019-08-20 RX ADMIN — SODIUM CHLORIDE 10 ML: 9 INJECTION INTRAMUSCULAR; INTRAVENOUS; SUBCUTANEOUS at 15:52

## 2019-08-20 RX ADMIN — ATROPINE SULFATE 0.4 MG: 0.4 INJECTION, SOLUTION INTRAMUSCULAR; INTRAVENOUS; SUBCUTANEOUS at 12:04

## 2019-08-20 RX ADMIN — PALONOSETRON HYDROCHLORIDE 0.25 MG: 0.05 INJECTION INTRAVENOUS at 12:04

## 2019-08-20 RX ADMIN — SODIUM CHLORIDE 25 ML/HR: 900 INJECTION, SOLUTION INTRAVENOUS at 12:03

## 2019-08-20 RX ADMIN — Medication 10 ML: at 15:52

## 2019-08-20 NOTE — PROGRESS NOTES
Rachael Veloz is a 30 y.o. male follow up for colon cancer. 1. Have you been to the ER, urgent care clinic since your last visit? Hospitalized since your last visit?no    2. Have you seen or consulted any other health care providers outside of the [address] [place] [address] since your last visit? Include any pap smears or colon screening.  no

## 2019-08-20 NOTE — PROGRESS NOTES
[facility] VISIT NOTE      1005: Pt arrived at [facility] ambulatory and in no distress for Folfiri/ Vectibix therapy (C3D1). Assessment completed, pt c/o of aching in his joints and lower back. R chest port accessed with 0.75 in costello no difficulty. Positive blood return noted and labs drawn. Patient Vitals for the past 12 hrs:   Temp Pulse Resp BP   08/20/19 1551 97.1 °F (36.2 °C) 67 18 (!) 152/92   08/20/19 1011 98.3 °F (36.8 °C) 88 18 (!) 141/91         Recent Results (from the past 12 hour(s))   CBC WITH AUTOMATED DIFF    Collection Time: 08/20/19 10:27 AM   Result Value Ref Range    WBC 6.7 4.1 - 11.1 K/uL    RBC 5.71 (H) 4.10 - 5.70 M/uL    HGB 14.1 12.1 - 17.0 g/dL    HCT 44.0 36.6 - 50.3 %    MCV 77.1 (L) 80.0 - 99.0 FL    MCH 24.7 (L) 26.0 - 34.0 PG    MCHC 32.0 30.0 - 36.5 g/dL    RDW 16.8 (H) 11.5 - 14.5 %    PLATELET 031 047 - 978 K/uL    MPV 8.9 8.9 - 12.9 FL    NRBC 0.0 0  WBC    ABSOLUTE NRBC 0.00 0.00 - 0.01 K/uL    NEUTROPHILS 58 32 - 75 %    LYMPHOCYTES 28 12 - 49 %    MONOCYTES 8 5 - 13 %    EOSINOPHILS 5 0 - 7 %    BASOPHILS 1 0 - 1 %    IMMATURE GRANULOCYTES 0 0.0 - 0.5 %    ABS. NEUTROPHILS 3.9 1.8 - 8.0 K/UL    ABS. LYMPHOCYTES 1.9 0.8 - 3.5 K/UL    ABS. MONOCYTES 0.5 0.0 - 1.0 K/UL    ABS. EOSINOPHILS 0.4 0.0 - 0.4 K/UL    ABS. BASOPHILS 0.0 0.0 - 0.1 K/UL    ABS. IMM.  GRANS. 0.0 0.00 - 0.04 K/UL    DF AUTOMATED     METABOLIC PANEL, COMPREHENSIVE    Collection Time: 08/20/19 10:27 AM   Result Value Ref Range    Sodium 142 136 - 145 mmol/L    Potassium 3.9 3.5 - 5.1 mmol/L    Chloride 109 (H) 97 - 108 mmol/L    CO2 28 21 - 32 mmol/L    Anion gap 5 5 - 15 mmol/L    Glucose 128 (H) 65 - 100 mg/dL    BUN 13 6 - 20 MG/DL    Creatinine 1.09 0.70 - 1.30 MG/DL    BUN/Creatinine ratio 12 12 - 20      GFR est AA >60 >60 ml/min/1.73m2    GFR est non-AA >60 >60 ml/min/1.73m2    Calcium 8.9 8.5 - 10.1 MG/DL    Bilirubin, total 0.5 0.2 - 1.0 MG/DL    ALT (SGPT) 26 12 - 78 U/L    AST (SGOT) 17 15 - 37 U/L    Alk.  phosphatase 84 45 - 117 U/L    Protein, total 7.7 6.4 - 8.2 g/dL    Albumin 3.4 (L) 3.5 - 5.0 g/dL    Globulin 4.3 (H) 2.0 - 4.0 g/dL    A-G Ratio 0.8 (L) 1.1 - 2.2     CEA    Collection Time: 08/20/19 10:27 AM   Result Value Ref Range    CEA 9.4 ng/mL         Medications received:  Medications Administered     0.9% sodium chloride infusion     Admin Date  08/20/2019 Action  New Bag Dose  25 mL/hr Rate  25 mL/hr Route  IntraVENous Administered [facility]  [facility]          atropine 0.4 mg/mL injection 0.4 mg     Admin Date  08/20/2019 Action  Given Dose  0.4 mg Route  IntraVENous Administered By  [facility]          dexamethasone (DECADRON) 12 mg in 0.9% sodium chloride 50 mL IVPB     Admin Date  08/20/2019 Action  Given Dose  12 mg Route  IntraVENous Administered By  [facility]          fluorouracil (ADRUCIL) 6,312 mg in 0.9% sodium chloride 250 mL CADD Cassette     Admin Date  08/20/2019 Action  New Bag Dose  6312 mg Rate  5.4 mL/hr Route  IntraVENous Administered By  [facility]          fluorouracil (ADRUCIL) chemo syringe 1,052 mg     Admin Date  08/20/2019 Action  Given Dose  1052 mg Rate  252.5 mL/hr Route  IntraVENous Administered By  [facility]          irinotecan (CAMPTOSAR) 473 mg in dextrose 5% 250 mL, overfill volume 25 mL chemo infusion     Admin Date  08/20/2019 Action  New Bag Dose  473 mg Rate  199.1 mL/hr Route  IntraVENous Administered By  [facility]          leucovorin (WELLCOVORIN) 1,052 mg in dextrose 5% 250 mL, overfill volume 25 mL IVPB     Admin Date  08/20/2019 Action  New Bag Dose  1052 mg Rate  218.4 mL/hr Route  IntraVENous Administered By  [facility]          palonosetron HCl (ALOXI) injection 0.25 mg     Admin Date  08/20/2019 Action  Given Dose  0.25 mg Route  IntraVENous Administered By  [facility]          panitumumab (VECTIBIX) 802.8 mg in 0.9% sodium chloride 100 mL, overfill volume 10 mL infusion     Admin Date  08/20/2019 Action  New Bag Dose  802.8 mg Route  IntraVENous Administered By  [employer]          saline peripheral flush soln 10 mL     Admin Date  08/20/2019 Action  Given Dose  10 mL Route  InterCATHeter Administered By  [employer]          sodium chloride 0.9% injection 10 mL     Admin Date  08/20/2019 Action  Given Dose  10 mL Route  IntraVENous Administered By  [employer]                  1555: Tolerated treatment well, no adverse reaction noted. Patient on 5FU pump to infuse over 46 hours. D/C'd from [facility] ambulatory and in no distress.  Next appointment is 8/22/19 at [facility].

## 2019-08-20 NOTE — PROGRESS NOTES
Problem: Chemotherapy Treatment  Goal: *Chemotherapy regimen followed  Outcome: Progressing Towards Goal  Goal: *Hemodynamically stable  Outcome: Progressing Towards Goal  Goal: *Tolerating diet  Outcome: Progressing Towards Goal     Problem: Knowledge Deficit  Goal: *Verbalizes understanding of procedures and medications  Outcome: Progressing Towards Goal     Problem: Patient Education:  Go to Education Activity  Goal: Patient/Family Education  Outcome: Progressing Towards Goal

## 2019-08-20 NOTE — PROGRESS NOTES
[facility] [place] [facility] [place] [address] [facility]  W: [phone]  F: [phone]     Reason for Visit:   Chapito Alejo is a 30 y.o. male who is seen for follow up of metastatic colon cancer. Treatment History:   · CT A/P 3/25/2018: Indeterminate 2 x 1 cm low-density liver lesion. · CT C/A/P with triphase liver 3/27/2018: No evidence of metastatic disease to the chest. Multiple ill-defined hepatic lesions. These do not represent simple cyst.  · CT guided liver biopsy 3/27/2018: metastatic adenocarcinoma, KRAS wild type, NRAS wild type  · Colectomy by Dr. Jasmina Devries 3/29/2018: Adenocarcinoma, moderately differentiated. Negative margins. 3/16 nodes involved. pMMR  · Stage IV (pT3 pN1b pM1) Colon Cancer  · Chemotherapy with FOLFOX and bevacizumab 5/1/2018 - 7/12/2018 for a total of 6 cycles, dany administered with cycles 2-4  · Resumed chemotherapy with FOLFOX and bevacizumab from 9/25/2018 to 12/20/2018. Avastin held after 10/23/2018 in preparation for surgery. · Underwent diagnostic laparoscopy with microwave liver ablation to right hepatic lobe 1/30/2019  · CT Chest 4/9/2019: Several new subcentimeter pulmonary nodules in the bilateral lungs worrisome for pulmonary metastases. Status post interval right hepatic lobe embolization. Several increased areas of hypodensity in the right hepatic lobe are worrisome for progression of hepatic metastases. There is a new tract of hypodensity in the left lateral hepatic lobe extending to a hypodense lesion. · Resumed chemotherapy with FOLFOX and bevacizumab 4/29/2019 to 7/5/2019, stopped for oxaliplatin reaction  · Chemotherapy with FOLFIRI and Panitumumab beginning 7/16/2019    History of Present Illness:   Presents for follow up. Had issue with sweating and abdominal cramping during Irinotecan infusion. Took Atropine with relief. Had severe constipation. This led to blood with BM. This happened all this week.  No bleeding today. Mild cough in the evening, non productive. No fever or chills. No swelling of extremities. He has taken a leave of absence from [employer], continues to work at [employer]. Worried about rash interfering with his work activities. He is unaccompanied today. PAST HISTORY: The following sections were reviewed and updated in the EMR as appropriate: PMH, SH, FH, Medications, Allergies. Allergies   Allergen Reactions    Oxaliplatin Other (comments)     Chest pain, abdominal pain, burning sensation, cold sweats      Review of Systems: A complete review of systems was obtained, reviewed, and scanned into the EMR. Pertinent findings reviewed above. Physical Exam:     Visit Vitals  BP (!) 140/92 (BP 1 Location: Left arm, BP Patient Position: Sitting)   Pulse 78   Temp 97.5 °F (36.4 °C) (Temporal)   Resp 18   Ht 6' (1.829 m)   Wt 295 lb (133.8 kg)   SpO2 99%   BMI 40.01 kg/m²     ECOG PS: 0  General: No distress, obese  Eyes: PERRLA, anicteric sclerae  HENT: Atraumatic, OP clear  Neck: Supple  Lymphatic: No cervical, supraclavicular, or inguinal adenopathy  Respiratory: CTAB, normal respiratory effort  CV: Normal rate, regular rhythm, no murmurs, no peripheral edema  GI: Soft, nontender, nondistended, no masses, unable to assess for hepatomegaly, no splenomegaly  MS: Normal gait and station. Digits without clubbing or cyanosis. Skin: No ecchymoses, or petechiae. Normal temperature, turgor, and texture. Scattered macules to face and back of neck. Psych: Alert, oriented, appropriate affect, normal judgment/insight    Results:     Lab Results   Component Value Date/Time    WBC 6.7 08/20/2019 10:27 AM    HGB 14.1 08/20/2019 10:27 AM    HCT 44.0 08/20/2019 10:27 AM    PLATELET 886 13/04/9823 10:27 AM    MCV 77.1 (L) 08/20/2019 10:27 AM    ABS.  NEUTROPHILS 3.9 08/20/2019 10:27 AM    Hemoglobin (POC) 15.2 11/12/2018 12:58 PM     Lab Results   Component Value Date/Time    Sodium 142 08/20/2019 10:27 AM    Potassium 3.9 08/20/2019 10:27 AM    Chloride 109 (H) 08/20/2019 10:27 AM    CO2 28 08/20/2019 10:27 AM    Glucose 128 (H) 08/20/2019 10:27 AM    BUN 13 08/20/2019 10:27 AM    Creatinine 1.09 08/20/2019 10:27 AM    GFR est AA >60 08/20/2019 10:27 AM    GFR est non-AA >60 08/20/2019 10:27 AM    Calcium 8.9 08/20/2019 10:27 AM     Lab Results   Component Value Date/Time    Bilirubin, total 0.5 08/20/2019 10:27 AM    ALT (SGPT) 26 08/20/2019 10:27 AM    AST (SGOT) 17 08/20/2019 10:27 AM    Alk. phosphatase 84 08/20/2019 10:27 AM    Protein, total 7.7 08/20/2019 10:27 AM    Albumin 3.4 (L) 08/20/2019 10:27 AM    Globulin 4.3 (H) 08/20/2019 10:27 AM       CT abd/pelvis at [facility] on 3/19/2019: Interval laparoscopic microscope ablation of several left hepatic lobe lesions. Interval right portal vein embolizations. Increase in size of several right hepatic lobe lesions. Development of multiple pulmonary nodules in the imaged lung bases highly concerning for metastatic disease. Enlarged portal caval node, metastatic diease is not excluded. Stranding in the subcutaneous soft tissue related to laparoscopy. CT chest 4/9/2019:   1. Several new subcentimeter pulmonary nodules in the bilateral lungs worrisome for pulmonary metastases. 2. Status post interval right hepatic lobe embolization. Several increased areas of hypodensity in the right hepatic lobe are worrisome for progression of hepatic metastases. There is a new tract of hypodensity in the left lateral hepatic lobe extending to a hypodense lesion. This may represent interval intervention. Recommend correlation with prior procedural history and studies. Dedicated multiphase liver imaging could be done for further evaluation. CT C/A/P 7/22/2019: Improved hepatic metastatic disease. Mixed response in pulmonary nodules. Assessment:   1) Metastatic Colon Cancer  Stage IV, pMMR, KRAS/NRAS wild type  He has metastatic disease within his liver.   His primary tumor has been resected. He has received palliative chemotherapy with FOLFOX and Bevacizumab, followed by a break to undergo liver directed therapy. Unfortunately, he developed pulmonary metastases during this break. We resumed chemotherapy with FOLFOX and Avastin. Therapy was inconsistent due to patient's missed appointments. Oxaliplatin held with cycle 15 due to patient preference. With cycle 16 patient developed concern for allergic reaction to Oxaliplatin with intense abdominal pain, flushing and tachycardia. Similar issues had occurred with prior cycles, but seemed to be worsening with each treatment. He is unable to proceed with Oxaliplatin therapy due to intolerance. There is concern 5FU maintenance therapy will not be enough to control disease. Changed to FOLFIRI with Panitumumab 7/16/2019. He had a difficult time with cycle 1 due to rash from Panitumumab. Rash developed on day 2. He initiated Doxycycline therapy and Clindamycin cream as prescribed. Rash grade 1 at this point. Reviewed options moving forward, he agrees to proceed with Panitumumab as ordered. Foundation one obtained and confirms KRAS/NRAS wild type status. No other actionable mutations. Will consider Lonsurf vs Regorafinib vs clinical trial at progression. He will be seen each cycle of therapy. Repeat CT imaging every 6 cycles. 2) Risk of infertility  We have previously reviewed that chemotherapy can potentially cause infertility. He has undergone sperm cryopreservation. 3) Genetic risk  Testing at [facility] negative. 4) Hypertension  Continue Metoprolol. Follow up with PCP for continued elevated BP. 5) Chemotherapy induced neuropathy. Should improve on FOLFIRI based therapy. Monitor. 6) Depression / Insomnia  No longer on Remeron. Med list updated. Has not yet started Celexa. 7) Mucositis  Magic mouthwash PRN. 8) Rash, drug  Due to Panitumumab. Continue Clindamycin cream and doxycycline.  Panitumumab held with cycle 2. Resume today, rash down to grade 1.     9) Emotional Well Being  No psychosocial concerns identified today. Patient has adequate support. Plan:     Proceed today with C3 of FOLFIRI with Panitumuab 6mg/kg (irinotecan 180mg/m2, leucovorin 400 mg/m2, fluorouracil 400 mg/m2, and a 46 hour infusion of fluorouracil 2400 mg/m2) given every 2 weeks  Labs: CBC, BMP prior to each treatment, hepatic function panel   Prophylactic antiemetics: Palonosetron and dexamethasone on the day of each chemotherapy infusion. Dexamethasone 8mg PO on days 2 and 3 after chemotherapy. PRN antiemetics: Ondansetron, Prochlorperazine  PRN antidiarrheals: Atropine 0.4mg IV every 2 hours PRN during or immediately after irinotecan infusion, Imodium every 2 hours as needed at home  EMLA cream for port  Return to clinic every 2 weeks on therapy    Patient was seen in conjunction with Erica Araujo NP.     Signed By: Erick Smith MD

## 2019-08-22 ENCOUNTER — HOSPITAL ENCOUNTER (OUTPATIENT)
Dept: INFUSION THERAPY | Age: 29
Discharge: HOME | End: 2019-08-22
Payer: COMMERCIAL

## 2019-08-22 VITALS
SYSTOLIC BLOOD PRESSURE: 133 MMHG | DIASTOLIC BLOOD PRESSURE: 88 MMHG | BODY MASS INDEX: 40.06 KG/M2 | TEMPERATURE: 96.9 F | RESPIRATION RATE: 18 BRPM | WEIGHT: 295.41 LBS | HEART RATE: 102 BPM

## 2019-08-22 DIAGNOSIS — C18.9 COLON CANCER METASTASIZED TO LIVER (HCC): Primary | ICD-10-CM

## 2019-08-22 DIAGNOSIS — C78.7 COLON CANCER METASTASIZED TO LIVER (HCC): Primary | ICD-10-CM

## 2019-08-22 PROCEDURE — 96523 IRRIG DRUG DELIVERY DEVICE: CPT

## 2019-08-22 PROCEDURE — 74011250636 HC RX REV CODE- 250/636: Performed by: NURSE PRACTITIONER

## 2019-08-22 RX ORDER — SODIUM CHLORIDE 9 MG/ML
10 INJECTION INTRAMUSCULAR; INTRAVENOUS; SUBCUTANEOUS AS NEEDED
Status: DISCONTINUED | OUTPATIENT
Start: 2019-08-22 | End: 2019-08-23 | Stop reason: HOSPADM

## 2019-08-22 RX ORDER — SODIUM CHLORIDE 0.9 % (FLUSH) 0.9 %
10 SYRINGE (ML) INJECTION AS NEEDED
Status: DISCONTINUED | OUTPATIENT
Start: 2019-08-22 | End: 2019-08-23 | Stop reason: HOSPADM

## 2019-08-22 RX ORDER — HEPARIN 100 UNIT/ML
300-500 SYRINGE INTRAVENOUS AS NEEDED
Status: DISCONTINUED | OUTPATIENT
Start: 2019-08-22 | End: 2019-08-23 | Stop reason: HOSPADM

## 2019-08-22 RX ADMIN — Medication 500 UNITS: at 14:05

## 2019-08-22 RX ADMIN — Medication 10 ML: at 14:05

## 2019-08-22 RX ADMIN — SODIUM CHLORIDE 10 ML: 9 INJECTION INTRAMUSCULAR; INTRAVENOUS; SUBCUTANEOUS at 14:05

## 2019-08-22 NOTE — PROGRESS NOTES
[facility] VISIT NOTE    1400  Pt arrived at [facility] ambulatory and in no distress for Pump Removal.  Assessment completed, pt had no complaints. . 5FU pump empty upon arrival. Pt stated that it finished around 1330. Visit Vitals  /88   Pulse (!) 102   Temp 96.9 °F (36.1 °C)   Resp 18   Wt 134 kg (295 lb 6.6 oz)   BMI 40.06 kg/m²         Tolerated treatment well, no adverse reaction noted. Port de-accessed and flushed per protocol. Positive blood return noted. 1410  D/C'd from [facility] ambulatory and in no distress.  [facility] [address] [facility]

## 2019-09-01 NOTE — PROGRESS NOTES
[facility] [place] [facility] [place] [address] [facility]  W: [phone]  F: [phone]     Reason for Visit:   Dereck Perdue is a 30 y.o. male who is seen for follow up of metastatic colon cancer. Treatment History:   · CT A/P 3/25/2018: Indeterminate 2 x 1 cm low-density liver lesion. · CT C/A/P with triphase liver 3/27/2018: No evidence of metastatic disease to the chest. Multiple ill-defined hepatic lesions. These do not represent simple cyst.  · CT guided liver biopsy 3/27/2018: metastatic adenocarcinoma, KRAS wild type, NRAS wild type  · Colectomy by Dr. Mindi Dash 3/29/2018: Adenocarcinoma, moderately differentiated. Negative margins. 3/16 nodes involved. pMMR  · Stage IV (pT3 pN1b pM1) Colon Cancer  · Chemotherapy with FOLFOX and bevacizumab 5/1/2018 - 7/12/2018 for a total of 6 cycles, dany administered with cycles 2-4  · Resumed chemotherapy with FOLFOX and bevacizumab from 9/25/2018 to 12/20/2018. Avastin held after 10/23/2018 in preparation for surgery. · Underwent diagnostic laparoscopy with microwave liver ablation to right hepatic lobe 1/30/2019  · CT Chest 4/9/2019: Several new subcentimeter pulmonary nodules in the bilateral lungs worrisome for pulmonary metastases. Status post interval right hepatic lobe embolization. Several increased areas of hypodensity in the right hepatic lobe are worrisome for progression of hepatic metastases. There is a new tract of hypodensity in the left lateral hepatic lobe extending to a hypodense lesion. · Resumed chemotherapy with FOLFOX and bevacizumab 4/29/2019 to 7/5/2019, stopped for oxaliplatin reaction  · Chemotherapy with FOLFIRI and Panitumumab beginning 7/16/2019    History of Present Illness:   Presents for follow up. Having mucositis, sore to tip of tongue and back of throat. Finding it difficult to eat. Using magic mouthwash, but this doesn't help for long. Constipation has been an issue.  Taking Miralax once daily. Using 1-2 tabs senna at night. Had BM this morning. Mouth sores started on Monday after treatment but has not improved. No nausea or vomiting. Rash started again over the weekend. Taking Doxycycline now. He is inconsistent with this. Didn't take last week. He has taken a leave of absence from [employer], continues to work at [employer]. Worried about rash interfering with his work activities. He is unaccompanied today. PAST HISTORY: The following sections were reviewed and updated in the EMR as appropriate: PMH, SH, FH, Medications, Allergies. Allergies   Allergen Reactions    Oxaliplatin Other (comments)     Chest pain, abdominal pain, burning sensation, cold sweats      Review of Systems: A complete review of systems was obtained, reviewed, and scanned into the EMR. Pertinent findings reviewed above. Physical Exam:     Visit Vitals  /88 (BP 1 Location: Left arm, BP Patient Position: Sitting)   Pulse 87   Temp 96.9 °F (36.1 °C) (Temporal)   Resp 20   Ht 6' (1.829 m)   Wt 292 lb (132.5 kg)   SpO2 98%   BMI 39.60 kg/m²     ECOG PS: 0  General: No distress, obese  Eyes: PERRLA, anicteric sclerae  HENT: Atraumatic, OP clear  Neck: Supple  Lymphatic: No cervical, supraclavicular, or inguinal adenopathy  Respiratory: CTAB, normal respiratory effort  CV: Normal rate, regular rhythm, no murmurs, no peripheral edema  GI: Soft, nontender, nondistended, no masses, unable to assess for hepatomegaly, no splenomegaly  MS: Normal gait and station. Digits without clubbing or cyanosis. Skin: No ecchymoses, or petechiae. Normal temperature, turgor, and texture. Scattered macules to face and back of neck.    Psych: Alert, oriented, appropriate affect, normal judgment/insight    Results:     Lab Results   Component Value Date/Time    WBC 7.0 09/03/2019 11:30 AM    HGB 14.5 09/03/2019 11:30 AM    HCT 42.9 09/03/2019 11:30 AM    PLATELET 769 51/21/1120 11:30 AM    MCV 78.4 (L) 09/03/2019 11:30 AM ABS. NEUTROPHILS 4.0 09/03/2019 11:30 AM    Hemoglobin (POC) 15.2 11/12/2018 12:58 PM     Lab Results   Component Value Date/Time    Sodium 143 09/03/2019 11:28 AM    Potassium 4.3 09/03/2019 11:28 AM    Chloride 109 (H) 09/03/2019 11:28 AM    CO2 28 09/03/2019 11:28 AM    Glucose 133 (H) 09/03/2019 11:28 AM    BUN 14 09/03/2019 11:28 AM    Creatinine 0.76 09/03/2019 11:28 AM    GFR est AA >60 09/03/2019 11:28 AM    GFR est non-AA >60 09/03/2019 11:28 AM    Calcium 8.7 09/03/2019 11:28 AM     Lab Results   Component Value Date/Time    Bilirubin, total 0.2 09/03/2019 11:28 AM    ALT (SGPT) 31 09/03/2019 11:28 AM    AST (SGOT) 18 09/03/2019 11:28 AM    Alk. phosphatase 81 09/03/2019 11:28 AM    Protein, total 7.0 09/03/2019 11:28 AM    Albumin 3.3 (L) 09/03/2019 11:28 AM    Globulin 3.7 09/03/2019 11:28 AM       CT abd/pelvis at [address] [place] [address] on 3/19/2019: Interval laparoscopic microscope ablation of several left hepatic lobe lesions. Interval right portal vein embolizations. Increase in size of several right hepatic lobe lesions. Development of multiple pulmonary nodules in the imaged lung bases highly concerning for metastatic disease. Enlarged portal caval node, metastatic diease is not excluded. Stranding in the subcutaneous soft tissue related to laparoscopy. CT chest 4/9/2019:   1. Several new subcentimeter pulmonary nodules in the bilateral lungs worrisome for pulmonary metastases. 2. Status post interval right hepatic lobe embolization. Several increased areas of hypodensity in the right hepatic lobe are worrisome for progression of hepatic metastases. There is a new tract of hypodensity in the left lateral hepatic lobe extending to a hypodense lesion. This may represent interval intervention. Recommend correlation with prior procedural history and studies. Dedicated multiphase liver imaging could be done for further evaluation. CT C/A/P 7/22/2019: Improved hepatic metastatic disease.  Mixed response in pulmonary nodules. Assessment:   1) Metastatic Colon Cancer  Stage IV, pMMR, KRAS/NRAS wild type  He has metastatic disease within his liver. His primary tumor has been resected. He has received palliative chemotherapy with FOLFOX and Bevacizumab, followed by a break to undergo liver directed therapy. Unfortunately, he developed pulmonary metastases during this break. We resumed chemotherapy with FOLFOX and Avastin. Therapy was inconsistent due to patient's missed appointments. Oxaliplatin held with cycle 15 due to patient preference. With cycle 16 patient developed concern for allergic reaction to Oxaliplatin with intense abdominal pain, flushing and tachycardia. Similar issues had occurred with prior cycles, but seemed to be worsening with each treatment. He is unable to proceed with Oxaliplatin therapy due to intolerance. There is concern 5FU maintenance therapy will not be enough to control disease. Changed to FOLFIRI with Panitumumab 7/16/2019. He had a difficult time with cycle 1 due to rash from Panitumumab. Rash developed on day 2. He initiated Doxycycline therapy and Clindamycin cream as prescribed. Resumed treatment with cycle 3 on 8/20/2019, tolerated with exception of grade 1 mucositis and grade 1 rash. Foundation one obtained and confirms KRAS/NRAS wild type status. No other actionable mutations. Will consider Lonsurf vs Regorafinib vs clinical trial at progression. He will be seen each cycle of therapy. Repeat CT imaging every 6 cycles. 2) Risk of infertility  We have previously reviewed that chemotherapy can potentially cause infertility. He has undergone sperm cryopreservation. 3) Genetic risk  Testing at [facility] negative. 4) Hypertension  Continue Metoprolol. Follow up with PCP for continued elevated BP. 5) Chemotherapy induced neuropathy. Should improve on FOLFIRI based therapy. Monitor. 6) Depression / Insomnia  No longer on Remeron. Med list updated.  Has not yet started Celexa. 7) Mucositis  Magic mouthwash PRN. 8) Rash, drug  Due to Panitumumab. Continue Clindamycin cream and doxycycline. Panitumumab held with cycle 2.     9) Emotional Well Being  No psychosocial concerns identified today. Patient has adequate support. Plan:     Proceed today with C4 of FOLFIRI with Panitumuab 6mg/kg (irinotecan 180mg/m2, leucovorin 400 mg/m2, fluorouracil 400 mg/m2, and a 46 hour infusion of fluorouracil 2400 mg/m2) given every 2 weeks  Labs: CBC, BMP prior to each treatment, hepatic function panel   Prophylactic antiemetics: Palonosetron and dexamethasone on the day of each chemotherapy infusion. Dexamethasone 8mg PO on days 2 and 3 after chemotherapy. PRN antiemetics: Ondansetron, Prochlorperazine  PRN antidiarrheals: Atropine 0.4mg IV every 2 hours PRN during or immediately after irinotecan infusion, Imodium every 2 hours as needed at home  EMLA cream for port  Return to clinic every 2 weeks on therapy    Patient was seen in conjunction with Rex Wilhelm NP.     Signed By: Rosalba Gee MD

## 2019-09-03 ENCOUNTER — OFFICE VISIT (OUTPATIENT)
Dept: ONCOLOGY | Age: 29
End: 2019-09-03

## 2019-09-03 ENCOUNTER — HOSPITAL ENCOUNTER (EMERGENCY)
Age: 29
Discharge: HOME | End: 2019-09-03
Attending: EMERGENCY MEDICINE
Payer: COMMERCIAL

## 2019-09-03 ENCOUNTER — HOSPITAL ENCOUNTER (OUTPATIENT)
Dept: INFUSION THERAPY | Age: 29
Discharge: HOME | End: 2019-09-03
Payer: COMMERCIAL

## 2019-09-03 ENCOUNTER — TELEPHONE (OUTPATIENT)
Dept: ONCOLOGY | Age: 29
End: 2019-09-03

## 2019-09-03 VITALS
OXYGEN SATURATION: 98 % | SYSTOLIC BLOOD PRESSURE: 139 MMHG | HEIGHT: 72 IN | BODY MASS INDEX: 39.55 KG/M2 | RESPIRATION RATE: 20 BRPM | TEMPERATURE: 96.9 F | WEIGHT: 292 LBS | DIASTOLIC BLOOD PRESSURE: 88 MMHG | HEART RATE: 87 BPM

## 2019-09-03 VITALS
TEMPERATURE: 98.2 F | OXYGEN SATURATION: 97 % | RESPIRATION RATE: 16 BRPM | WEIGHT: 292 LBS | BODY MASS INDEX: 39.55 KG/M2 | HEART RATE: 106 BPM | HEIGHT: 72 IN

## 2019-09-03 DIAGNOSIS — C78.00 MALIGNANT NEOPLASM METASTATIC TO LUNG, UNSPECIFIED LATERALITY (HCC): ICD-10-CM

## 2019-09-03 DIAGNOSIS — C18.9 COLON CANCER METASTASIZED TO LIVER (HCC): Primary | ICD-10-CM

## 2019-09-03 DIAGNOSIS — C78.7 COLON CANCER METASTASIZED TO LIVER (HCC): Primary | ICD-10-CM

## 2019-09-03 DIAGNOSIS — Z79.899 MEDICATION MANAGEMENT: Primary | ICD-10-CM

## 2019-09-03 LAB
ALBUMIN SERPL-MCNC: 3.3 G/DL (ref 3.5–5)
ALBUMIN/GLOB SERPL: 0.9 {RATIO} (ref 1.1–2.2)
ALP SERPL-CCNC: 81 U/L (ref 45–117)
ALT SERPL-CCNC: 31 U/L (ref 12–78)
ANION GAP SERPL CALC-SCNC: 6 MMOL/L (ref 5–15)
AST SERPL-CCNC: 18 U/L (ref 15–37)
BASO+EOS+MONOS # BLD AUTO: 0.9 K/UL (ref 0.2–1.2)
BASO+EOS+MONOS NFR BLD AUTO: 13 % (ref 3.2–16.9)
BILIRUB SERPL-MCNC: 0.2 MG/DL (ref 0.2–1)
BUN SERPL-MCNC: 14 MG/DL (ref 6–20)
BUN/CREAT SERPL: 18 (ref 12–20)
CALCIUM SERPL-MCNC: 8.7 MG/DL (ref 8.5–10.1)
CHLORIDE SERPL-SCNC: 109 MMOL/L (ref 97–108)
CO2 SERPL-SCNC: 28 MMOL/L (ref 21–32)
CREAT SERPL-MCNC: 0.76 MG/DL (ref 0.7–1.3)
DIFFERENTIAL METHOD BLD: ABNORMAL
ERYTHROCYTE [DISTWIDTH] IN BLOOD BY AUTOMATED COUNT: 18.1 % (ref 11.8–15.8)
GLOBULIN SER CALC-MCNC: 3.7 G/DL (ref 2–4)
GLUCOSE SERPL-MCNC: 133 MG/DL (ref 65–100)
HCT VFR BLD AUTO: 42.9 % (ref 36.6–50.3)
HGB BLD-MCNC: 14.5 G/DL (ref 12.1–17)
LYMPHOCYTES # BLD: 2.1 K/UL (ref 0.8–3.5)
LYMPHOCYTES NFR BLD: 31 % (ref 12–49)
MCH RBC QN AUTO: 26.5 PG (ref 26–34)
MCHC RBC AUTO-ENTMCNC: 33.8 G/DL (ref 30–36.5)
MCV RBC AUTO: 78.4 FL (ref 80–99)
NEUTS SEG # BLD: 4 K/UL (ref 1.8–8)
NEUTS SEG NFR BLD: 57 % (ref 32–75)
PLATELET # BLD AUTO: 265 K/UL (ref 150–400)
POTASSIUM SERPL-SCNC: 4.3 MMOL/L (ref 3.5–5.1)
PROT SERPL-MCNC: 7 G/DL (ref 6.4–8.2)
RBC # BLD AUTO: 5.47 M/UL (ref 4.1–5.7)
SODIUM SERPL-SCNC: 143 MMOL/L (ref 136–145)
WBC # BLD AUTO: 7 K/UL (ref 4.1–11.1)

## 2019-09-03 PROCEDURE — 96411 CHEMO IV PUSH ADDL DRUG: CPT

## 2019-09-03 PROCEDURE — 74011250636 HC RX REV CODE- 250/636: Performed by: NURSE PRACTITIONER

## 2019-09-03 PROCEDURE — 80053 COMPREHEN METABOLIC PANEL: CPT

## 2019-09-03 PROCEDURE — 96368 THER/DIAG CONCURRENT INF: CPT

## 2019-09-03 PROCEDURE — 96375 TX/PRO/DX INJ NEW DRUG ADDON: CPT

## 2019-09-03 PROCEDURE — 74011250636 HC RX REV CODE- 250/636: Performed by: INTERNAL MEDICINE

## 2019-09-03 PROCEDURE — 96416 CHEMO PROLONG INFUSE W/PUMP: CPT

## 2019-09-03 PROCEDURE — 36415 COLL VENOUS BLD VENIPUNCTURE: CPT

## 2019-09-03 PROCEDURE — 74011000250 HC RX REV CODE- 250: Performed by: NURSE PRACTITIONER

## 2019-09-03 PROCEDURE — 74011000258 HC RX REV CODE- 258: Performed by: NURSE PRACTITIONER

## 2019-09-03 PROCEDURE — 96417 CHEMO IV INFUS EACH ADDL SEQ: CPT

## 2019-09-03 PROCEDURE — 85025 COMPLETE CBC W/AUTO DIFF WBC: CPT

## 2019-09-03 PROCEDURE — 77030012965 HC NDL HUBR BBMI -A

## 2019-09-03 PROCEDURE — 96415 CHEMO IV INFUSION ADDL HR: CPT

## 2019-09-03 PROCEDURE — 99282 EMERGENCY DEPT VISIT SF MDM: CPT

## 2019-09-03 PROCEDURE — 96413 CHEMO IV INFUSION 1 HR: CPT

## 2019-09-03 PROCEDURE — 96366 THER/PROPH/DIAG IV INF ADDON: CPT

## 2019-09-03 RX ORDER — DEXTROSE MONOHYDRATE 50 MG/ML
25 INJECTION, SOLUTION INTRAVENOUS CONTINUOUS
Status: DISCONTINUED | OUTPATIENT
Start: 2019-09-03 | End: 2019-09-04 | Stop reason: HOSPADM

## 2019-09-03 RX ORDER — SODIUM CHLORIDE 9 MG/ML
10 INJECTION INTRAMUSCULAR; INTRAVENOUS; SUBCUTANEOUS AS NEEDED
Status: DISCONTINUED | OUTPATIENT
Start: 2019-09-03 | End: 2019-09-04 | Stop reason: HOSPADM

## 2019-09-03 RX ORDER — ATROPINE SULFATE 0.4 MG/ML
0.4 INJECTION, SOLUTION ENDOTRACHEAL; INTRAMEDULLARY; INTRAMUSCULAR; INTRAVENOUS; SUBCUTANEOUS ONCE
Status: COMPLETED | OUTPATIENT
Start: 2019-09-03 | End: 2019-09-03

## 2019-09-03 RX ORDER — SODIUM CHLORIDE 0.9 % (FLUSH) 0.9 %
10 SYRINGE (ML) INJECTION AS NEEDED
Status: DISCONTINUED | OUTPATIENT
Start: 2019-09-03 | End: 2019-09-04 | Stop reason: HOSPADM

## 2019-09-03 RX ORDER — HEPARIN 100 UNIT/ML
300-500 SYRINGE INTRAVENOUS AS NEEDED
Status: DISCONTINUED | OUTPATIENT
Start: 2019-09-03 | End: 2019-09-04 | Stop reason: HOSPADM

## 2019-09-03 RX ORDER — SODIUM CHLORIDE 9 MG/ML
25 INJECTION, SOLUTION INTRAVENOUS CONTINUOUS
Status: DISCONTINUED | OUTPATIENT
Start: 2019-09-03 | End: 2019-09-04 | Stop reason: HOSPADM

## 2019-09-03 RX ORDER — PALONOSETRON 0.05 MG/ML
0.25 INJECTION, SOLUTION INTRAVENOUS ONCE
Status: COMPLETED | OUTPATIENT
Start: 2019-09-03 | End: 2019-09-03

## 2019-09-03 RX ORDER — FLUOROURACIL 50 MG/ML
400 INJECTION, SOLUTION INTRAVENOUS ONCE
Status: COMPLETED | OUTPATIENT
Start: 2019-09-03 | End: 2019-09-03

## 2019-09-03 RX ADMIN — ATROPINE SULFATE 0.4 MG: 0.4 INJECTION, SOLUTION INTRAMUSCULAR; INTRAVENOUS; SUBCUTANEOUS at 13:21

## 2019-09-03 RX ADMIN — DEXAMETHASONE SODIUM PHOSPHATE 12 MG: 4 INJECTION, SOLUTION INTRA-ARTICULAR; INTRALESIONAL; INTRAMUSCULAR; INTRAVENOUS; SOFT TISSUE at 13:21

## 2019-09-03 RX ADMIN — IRINOTECAN HYDROCHLORIDE 473 MG: 20 INJECTION, SOLUTION INTRAVENOUS at 15:31

## 2019-09-03 RX ADMIN — ATROPINE SULFATE 0.4 MG: 0.4 INJECTION, SOLUTION INTRAMUSCULAR; INTRAVENOUS; SUBCUTANEOUS at 16:53

## 2019-09-03 RX ADMIN — Medication 10 ML: at 11:15

## 2019-09-03 RX ADMIN — LEUCOVORIN CALCIUM 1052 MG: 350 INJECTION, POWDER, LYOPHILIZED, FOR SUSPENSION INTRAMUSCULAR; INTRAVENOUS at 15:31

## 2019-09-03 RX ADMIN — Medication 10 ML: at 11:16

## 2019-09-03 RX ADMIN — PALONSETRON HYDROCHLORIDE 0.25 MG: 0.25 INJECTION, SOLUTION INTRAVENOUS at 13:21

## 2019-09-03 RX ADMIN — FLUOROURACIL 1052 MG: 50 INJECTION, SOLUTION INTRAVENOUS at 17:08

## 2019-09-03 RX ADMIN — PANITUMUMAB 802.8 MG: 400 SOLUTION INTRAVENOUS at 13:59

## 2019-09-03 RX ADMIN — SODIUM CHLORIDE 25 ML/HR: 900 INJECTION, SOLUTION INTRAVENOUS at 13:21

## 2019-09-03 RX ADMIN — SODIUM CHLORIDE 10 ML: 9 INJECTION, SOLUTION INTRAMUSCULAR; INTRAVENOUS; SUBCUTANEOUS at 13:30

## 2019-09-03 RX ADMIN — FLUOROURACIL 6312 MG: 50 INJECTION, SOLUTION INTRAVENOUS at 17:21

## 2019-09-03 NOTE — PROGRESS NOTES
Jv De León is a 30 y.o. male follow up follow up for colon cancer. 1. Have you been to the ER, urgent care clinic since your last visit? Hospitalized since your last visit?no     2. Have you seen or consulted any other health care providers outside of the [address] [place] [address] since your last visit? Include any pap smears or colon screening.  no

## 2019-09-03 NOTE — PROGRESS NOTES
Patients girlfriend called stating there is an error message on the pump stating error 1870. He took out and re inserted the batteries but the pump has a stop sign now with 240 ml remaining. No leak. I spoke the pharmacist who suggested he call the 1800 number on the side of the pump so that the company could either help him restart the pump safely.  Asked to call back with any issues

## 2019-09-03 NOTE — PROGRESS NOTES
[facility] - Chemotherapy Progress Note    1120 Pt admit to [facility] for Folofiri/Panitumumab ambulatory in stable condition. Assessment completed. No new concerns voiced. PAC with positive blood return. Chemotherapy Flowsheet 9/3/2019   Cycle C4D1   Date 9/3/2019   Drug / Regimen Folfiri/Vecitibix   Pre Meds -   Notes -       Patient Vitals for the past 12 hrs:   Temp Pulse Resp BP SpO2   09/03/19 1219 96.9 °F (36.1 °C) 87 20 139/88 98 %       Medications:  Panitumumab  Atropine  Aloxi  Dexamethasone  Irinotecan  Leucovorin  Fluorouracil  Fluorouracil CADD    1715 Pt tolerated treatment well, but did have some sweating with chemo. Phone order given to give PRN dose of Atropine. PAC maintained positive blood return throughout treatment, flushed with positive blood return at conclusion and costello needle intact for home CADD infusion. D/c home ambulatory in no distress. Pt aware of next appointment scheduled for 9/5/19. Recent Results (from the past 12 hour(s))   METABOLIC PANEL, COMPREHENSIVE    Collection Time: 09/03/19 11:28 AM   Result Value Ref Range    Sodium 143 136 - 145 mmol/L    Potassium 4.3 3.5 - 5.1 mmol/L    Chloride 109 (H) 97 - 108 mmol/L    CO2 28 21 - 32 mmol/L    Anion gap 6 5 - 15 mmol/L    Glucose 133 (H) 65 - 100 mg/dL    BUN 14 6 - 20 MG/DL    Creatinine 0.76 0.70 - 1.30 MG/DL    BUN/Creatinine ratio 18 12 - 20      GFR est AA >60 >60 ml/min/1.73m2    GFR est non-AA >60 >60 ml/min/1.73m2    Calcium 8.7 8.5 - 10.1 MG/DL    Bilirubin, total 0.2 0.2 - 1.0 MG/DL    ALT (SGPT) 31 12 - 78 U/L    AST (SGOT) 18 15 - 37 U/L    Alk.  phosphatase 81 45 - 117 U/L    Protein, total 7.0 6.4 - 8.2 g/dL    Albumin 3.3 (L) 3.5 - 5.0 g/dL    Globulin 3.7 2.0 - 4.0 g/dL    A-G Ratio 0.9 (L) 1.1 - 2.2     CBC WITH 3 PART DIFF    Collection Time: 09/03/19 11:30 AM   Result Value Ref Range    WBC 7.0 4.1 - 11.1 K/uL    RBC 5.47 4.10 - 5.70 M/uL    HGB 14.5 12.1 - 17.0 g/dL    HCT 42.9 36.6 - 50.3 % MCV 78.4 (L) 80.0 - 99.0 FL    MCH 26.5 26.0 - 34.0 PG    MCHC 33.8 30.0 - 36.5 g/dL    RDW 18.1 (H) 11.8 - 15.8 %    PLATELET 659 206 - 894 K/uL    NEUTROPHILS 57 32 - 75 %    MIXED CELLS 13 3.2 - 16.9 %    LYMPHOCYTES 31 12 - 49 %    ABS. NEUTROPHILS 4.0 1.8 - 8.0 K/UL    ABS. MIXED CELLS 0.9 0.2 - 1.2 K/uL    ABS.  LYMPHOCYTES 2.1 0.8 - 3.5 K/UL    DF AUTOMATED

## 2019-09-04 ENCOUNTER — HOSPITAL ENCOUNTER (OUTPATIENT)
Dept: INFUSION THERAPY | Age: 29
Discharge: HOME | End: 2019-09-04
Payer: COMMERCIAL

## 2019-09-04 VITALS
RESPIRATION RATE: 16 BRPM | WEIGHT: 292 LBS | DIASTOLIC BLOOD PRESSURE: 94 MMHG | BODY MASS INDEX: 39.55 KG/M2 | HEART RATE: 97 BPM | SYSTOLIC BLOOD PRESSURE: 150 MMHG | OXYGEN SATURATION: 98 % | HEIGHT: 72 IN | TEMPERATURE: 96.9 F

## 2019-09-04 PROCEDURE — 74011250636 HC RX REV CODE- 250/636: Performed by: NURSE PRACTITIONER

## 2019-09-04 PROCEDURE — 74011000258 HC RX REV CODE- 258: Performed by: NURSE PRACTITIONER

## 2019-09-04 RX ADMIN — FLUOROURACIL 6312 MG: 50 INJECTION, SOLUTION INTRAVENOUS at 08:06

## 2019-09-04 NOTE — ED NOTES
Patient ended up leaving and not coming back to the ED area.  Was able to make connection to the phone number [phone] for serial number [id number]

## 2019-09-04 NOTE — PROGRESS NOTES
Pt presented to [facility] this AM for 5 FU restart. Pump alerted with an error message and stopped at approximately 8 pm. Pt called [employer] [facility]- they were unable to help because equipment malfunction. Pt tried the ER but they were unable to troubleshoot. Pump off since 8 pm.  RN notified MD office- per Lam Dickey- restart pump ( 240 ml remaining) and patient to come at original scheduled pump d/c time (3-4 pm). MD aware medication will be remaining in cassette.   \  New pump assigned # [id number]

## 2019-09-04 NOTE — ED PROVIDER NOTES
Curtis Yeung is a 30 y.o. male  who presents by private vehicle to ER with c/o Patient presents with: Other  Patient presents with complaints of chemo pump not working. Patient was seen at infusion center today and given pump for chemo that is supposed to infuse over 46 hours. Patient reports trying to start infusion this evening and getting an air message on the pump. Patient called his oncologist who recommended calling the number on the machine. Patient called the number of machine with no clear answers. Patient denies any complaints at this time. He specifically denies any fevers, chills, nausea, vomiting, chest pain, shortness of breath, headache, rash, diarrhea, abdominal pain, urinary/bowel changes, sweating or weight loss. PCP: Chanelle Brito DO   PMHx significant for: Past Medical History:  03/2018: Cancer [facility])      Comment:  colon-Stage 4  No date: Hypertension   PSHx significant for: Past Surgical History:  3/27/2018: COLONOSCOPY; N/A      Comment:  COLONOSCOPY performed by Ralph Franks MD at [address] [facility] Hx: Tobacco use: Social History    Tobacco Use      Smoking status: Never Smoker      Smokeless tobacco: Never Used  ; EtOH use: The patient states he drinks 0 per week.; Illicit Drug use: Allergies:   -- Oxaliplatin -- Other (comments)    --  Chest pain, abdominal pain, burning sensation,             cold sweats    There are no other complaints, changes or physical findings at this time.               Past Medical History:   Diagnosis Date    Cancer ([facility] [address] 03/2018    colon-Stage 4    Hypertension        Past Surgical History:   Procedure Laterality Date    COLONOSCOPY N/A 3/27/2018    COLONOSCOPY performed by [facility] at [facility]         Family History:   Problem Relation Age of Onset    Stroke Mother     Diabetes Mother     Stroke Father     Hypertension Father        Social History     Socioeconomic History    Marital status: SINGLE     Spouse name: Not on file    Number of children: Not on file    Years of education: Not on file    Highest education level: Not on file   Occupational History    Not on file   Social Needs    Financial resource strain: Not on file    Food insecurity:     Worry: Not on file     Inability: Not on file    Transportation needs:     Medical: Not on file     Non-medical: Not on file   Tobacco Use    Smoking status: Never Smoker    Smokeless tobacco: Never Used   Substance and Sexual Activity    Alcohol use: No     Comment: socially    Drug use: Yes     Types: Marijuana    Sexual activity: Yes     Partners: Female     Birth control/protection: None   Lifestyle    Physical activity:     Days per week: Not on file     Minutes per session: Not on file    Stress: Not on file   Relationships    Social connections:     Talks on phone: Not on file     Gets together: Not on file     Attends Denominational service: Not on file     Active member of club or organization: Not on file     Attends meetings of clubs or organizations: Not on file     Relationship status: Not on file    Intimate partner violence:     Fear of current or ex partner: Not on file     Emotionally abused: Not on file     Physically abused: Not on file     Forced sexual activity: Not on file   Other Topics Concern    Not on file   Social History Narrative    Not on file         ALLERGIES: Oxaliplatin    Review of Systems   Constitutional: Negative for activity change, appetite change, chills and fever. HENT: Negative for congestion and sore throat. Respiratory: Negative for cough and shortness of breath. Cardiovascular: Negative for chest pain. Gastrointestinal: Negative for abdominal pain, diarrhea, nausea and vomiting. Genitourinary: Negative for dysuria. Musculoskeletal: Negative for arthralgias and myalgias. Skin: Negative for color change. Neurological: Negative for dizziness. Psychiatric/Behavioral: The patient is not nervous/anxious.     All other systems reviewed and are negative. Vitals:    09/03/19 2126   Pulse: (!) 106   Resp: 16   Temp: 98.2 °F (36.8 °C)   SpO2: 97%   Weight: 132.5 kg (292 lb)   Height: 6' (1.829 m)            Physical Exam   Constitutional: He is oriented to person, place, and time. He appears well-developed and well-nourished. HENT:   Head: Normocephalic and atraumatic. Right Ear: External ear normal.   Left Ear: External ear normal.   Mouth/Throat: Oropharynx is clear and moist. No oropharyngeal exudate. Eyes: Pupils are equal, round, and reactive to light. Conjunctivae and EOM are normal. Right eye exhibits no discharge. Left eye exhibits no discharge. No scleral icterus. Neck: Normal range of motion. Neck supple. No tracheal deviation present. No thyromegaly present. Cardiovascular: Normal rate, regular rhythm, normal heart sounds and intact distal pulses. No murmur heard. Pulmonary/Chest: Effort normal and breath sounds normal. No respiratory distress. He has no wheezes. He has no rales. Abdominal: Soft. Bowel sounds are normal. He exhibits no distension. There is no tenderness. There is no rebound and no guarding. Musculoskeletal: Normal range of motion. He exhibits no edema or tenderness. Lymphadenopathy:     He has no cervical adenopathy. Neurological: He is alert and oriented to person, place, and time. No cranial nerve deficit. Coordination normal.   Skin: Skin is warm. No rash noted. No erythema. Psychiatric: He has a normal mood and affect. His behavior is normal. Judgment and thought content normal.   Nursing note and vitals reviewed. MDM  Number of Diagnoses or Management Options  Medication management:   Diagnosis management comments: Assesment/Plan- 30 y.o. Patient presents with: Other  differential includes: Pump failure. Patient well-appearing, afebrile, tolerating p.o.   Patient requesting to go home at this time, will call his oncologist in the morning to follow-up for further treatment and to try to fix chemo pump. . Recommend oncology follow up. Patient educated on reasons to return to the ED.            Procedures

## 2019-09-04 NOTE — ED NOTES
Introduced self to patient. Patient given [phone] to call for pump instructions on the chemo medication. Patient is going to attempt to call and see if he can get it working. Did not have good cell reception so went outside to make a call.

## 2019-09-04 NOTE — ED TRIAGE NOTES
Pt here about chemo pump. States it has stopped working. Pt called oncology on call and was told to call number on back of box. Called them and was unable to get anywhere.

## 2019-09-05 ENCOUNTER — HOSPITAL ENCOUNTER (OUTPATIENT)
Dept: INFUSION THERAPY | Age: 29
Discharge: HOME | End: 2019-09-05
Payer: COMMERCIAL

## 2019-09-05 ENCOUNTER — APPOINTMENT (OUTPATIENT)
Dept: INFUSION THERAPY | Age: 29
End: 2019-09-05
Payer: COMMERCIAL

## 2019-09-05 ENCOUNTER — HOSPITAL ENCOUNTER (OUTPATIENT)
Dept: INFUSION THERAPY | Age: 29
End: 2019-09-05
Payer: COMMERCIAL

## 2019-09-05 VITALS
WEIGHT: 291.89 LBS | HEART RATE: 87 BPM | HEIGHT: 72 IN | SYSTOLIC BLOOD PRESSURE: 134 MMHG | BODY MASS INDEX: 39.54 KG/M2 | TEMPERATURE: 98 F | DIASTOLIC BLOOD PRESSURE: 83 MMHG

## 2019-09-05 DIAGNOSIS — C18.9 COLON CANCER METASTASIZED TO LIVER (HCC): Primary | ICD-10-CM

## 2019-09-05 DIAGNOSIS — C78.7 COLON CANCER METASTASIZED TO LIVER (HCC): Primary | ICD-10-CM

## 2019-09-05 PROCEDURE — 74011250636 HC RX REV CODE- 250/636: Performed by: NURSE PRACTITIONER

## 2019-09-05 PROCEDURE — 74011000250 HC RX REV CODE- 250: Performed by: NURSE PRACTITIONER

## 2019-09-05 PROCEDURE — 96523 IRRIG DRUG DELIVERY DEVICE: CPT

## 2019-09-05 RX ORDER — HEPARIN 100 UNIT/ML
300-500 SYRINGE INTRAVENOUS AS NEEDED
Status: DISCONTINUED | OUTPATIENT
Start: 2019-09-05 | End: 2019-09-06 | Stop reason: HOSPADM

## 2019-09-05 RX ORDER — SODIUM CHLORIDE 9 MG/ML
10 INJECTION INTRAMUSCULAR; INTRAVENOUS; SUBCUTANEOUS AS NEEDED
Status: DISCONTINUED | OUTPATIENT
Start: 2019-09-05 | End: 2019-09-06 | Stop reason: HOSPADM

## 2019-09-05 RX ORDER — SODIUM CHLORIDE 0.9 % (FLUSH) 0.9 %
10 SYRINGE (ML) INJECTION AS NEEDED
Status: DISCONTINUED | OUTPATIENT
Start: 2019-09-05 | End: 2019-09-06 | Stop reason: HOSPADM

## 2019-09-05 RX ADMIN — HEPARIN 500 UNITS: 100 SYRINGE at 15:35

## 2019-09-05 RX ADMIN — Medication 10 ML: at 15:35

## 2019-09-05 RX ADMIN — SODIUM CHLORIDE 10 ML: 9 INJECTION, SOLUTION INTRAMUSCULAR; INTRAVENOUS; SUBCUTANEOUS at 15:35

## 2019-09-05 NOTE — PROGRESS NOTES
[address] [facility] S SHORT VISIT NOTE    4101 Pt arrived to [facility] ambulatory and in no distress for Pump disconnect. Denies any new complaints. Port flushed per protocol and deaccesed, positive blood return noted. 1537  Discharged home ambulatory and in no distress. Tolerated treatment well. Next appointment 9/17/19 at 9:00.

## 2019-09-13 ENCOUNTER — OFFICE VISIT (OUTPATIENT)
Dept: FAMILY MEDICINE CLINIC | Age: 29
End: 2019-09-13

## 2019-09-13 VITALS
DIASTOLIC BLOOD PRESSURE: 86 MMHG | OXYGEN SATURATION: 99 % | WEIGHT: 286 LBS | BODY MASS INDEX: 38.74 KG/M2 | TEMPERATURE: 98.8 F | SYSTOLIC BLOOD PRESSURE: 135 MMHG | RESPIRATION RATE: 18 BRPM | HEART RATE: 91 BPM | HEIGHT: 72 IN

## 2019-09-13 DIAGNOSIS — R07.81 RIB PAIN: Primary | ICD-10-CM

## 2019-09-13 NOTE — PATIENT INSTRUCTIONS
Bruised Rib: Care Instructions  Overview    You can get a bruised rib if you fall or get hit, such as in an accident or while playing sports. The medical term for a bruise is \"contusion. \" Small blood vessels get torn and leak blood under the skin. Most people think of a bruise as a black-and-blue area. But bones and muscles can also get bruised. An injury may damage the rib but not cause a bruise that you can see. Sometimes it can be hard to tell if a rib is bruised or broken. The symptoms may be the same. And a broken bone can't always be seen on an X-ray. But the treatment for a bruised rib is often the same as treatment for a broken one. An injury to the ribs can cause pain. The pain may be worse when you breathe deeply, cough, or sneeze. In most cases, a bruised rib will heal on its own. You can take pain medicine while the rib mends. Pain relief allows you to take deep breaths. Follow-up care is a key part of your treatment and safety. Be sure to make and go to all appointments, and call your doctor if you are having problems. It's also a good idea to know your test results and keep a list of the medicines you take. How can you care for yourself at home? · Rest and protect the injured or sore area. Stop, change, or take a break from any activity that causes pain. · Put ice or a cold pack on the area for 10 to 20 minutes at a time. Put a thin cloth between the ice and your skin. · After 2 or 3 days, if your swelling is gone, put a heating pad set on low or a warm cloth on your chest. Some doctors suggest that you go back and forth between hot and cold. Put a thin cloth between the heating pad and your skin. · Ask your doctor if you can take an over-the-counter pain medicine, such as acetaminophen (Tylenol), ibuprofen (Advil, Motrin), or naproxen (Aleve). Be safe with medicines. Read and follow all instructions on the label. · As your pain gets better, slowly return to your normal activities.  Be patient. Rib bruises can take weeks or months to heal. If the pain gets worse, it may be a sign that you need to rest a while longer. When should you call for help? [id number] anytime you think you may need emergency care. For example, call if:    · You have severe trouble breathing.     Call your doctor now or seek immediate medical care if:    · You have trouble breathing.     · You have a fever.     · You have a new or worse cough.     · You have new or worse pain.    Watch closely for changes in your health, and be sure to contact your doctor if:    · You do not get better as expected. Where can you learn more? Go to http://anna-vasquez.info/. Enter R125 in the search box to learn more about \"Bruised Rib: Care Instructions. \"  Current as of: September 23, 2018  Content Version: 12.1  © 8022-8719 [employer]. Care instructions adapted under license by [employer] (which disclaims liability or warranty for this information). If you have questions about a medical condition or this instruction, always ask your healthcare professional. [address] any warranty or liability for your use of this information.

## 2019-09-13 NOTE — PROGRESS NOTES
I reviewed with the resident the patient's medical history and the resident's history and findings on the physical examination. I discussed assessment and plan with the resident and concur with the findings and plan as documented in the resident's note.     CXR reviewed with resident, difficult to visualize lower ribs given body habitus  No obvious fx, could consider rib films if pain persistent/worsening  Pt given precautions to go to ER if severe pain, SOB, etc    Yadi Wilde MD

## 2019-09-13 NOTE — PROGRESS NOTES
Anne Gomez is a 30 y.o. male who had concerns including Rib Pain. Patient presents today with right rib pain for the 3 days. Patient states that he was playing football when he landed on the right side of his chest. [place] pop/crack and had instant pain. Reports TTP over the right lower ribs. Denies SOB. Pain worse with laying on that side and when reaching above his head. Rates the pain as 4/10 with activity. He is taking Oxycodone for cancer. He has stage 4 colon cancer with mets to his liver and lungs. He is currently undergoing chemo therapy. ROS: (positive in bold)  General: wt loss, fever, chills, fatigue   Skin: rashes or suspicious skin lesions  Cardiac: chest pain, palpitations, MARIA, edema   Pul: SOB, dyspnea, wheezing, cough, hemoptysis    Past Medical History:  Past Medical History:   Diagnosis Date    Cancer ([facility] [address] 03/2018    colon-Stage 4    Hypertension        Past Surgical History:  Past Surgical History:   Procedure Laterality Date    COLONOSCOPY N/A 3/27/2018    COLONOSCOPY performed by Sheeba Traore MD at [facility] ENDOSCOPY       Family History:  Family History   Problem Relation Age of Onset    Stroke Mother     Diabetes Mother     Stroke Father     Hypertension Father        Allergies: Allergies   Allergen Reactions    Oxaliplatin Other (comments)     Chest pain, abdominal pain, burning sensation, cold sweats       Social History:  Social History     Tobacco Use    Smoking status: Never Smoker    Smokeless tobacco: Never Used   Substance Use Topics    Alcohol use: No     Comment: socially    Drug use: Yes     Types: Marijuana       Current Meds:  Current Outpatient Medications on File Prior to Visit   Medication Sig Dispense Refill    dexAMETHasone (DECADRON) 4 mg tablet TAKE 8MG (TWO TABS) DAILY IN THE MORNING FOR TWO DAYS AFTER CHEMOTHERAPY (WHILE PUMP IS INFUSING). 50 Tab 0    magic mouthwash solution Swish and spit 15-30 mL every 3-4 hours as needed for mouth pain.  May swallow for throat pain. Magic mouth wash   Maalox  Lidocaine 2% viscous   Diphenhydramine oral solution     Pharmacy to mix equal portions of ingredients to a total volume as indicated in the dispense amount. 500 mL 1    clindamycin (CLINDAGEL) 1 % topical gel Apply  to affected area two (2) times a day. use thin film on affected area 1 mL 0    doxycycline (ADOXA) 100 mg tablet Take 1 Tab by mouth two (2) times a day. 60 Tab 2    OTHER,NON-FORMULARY, Indications: black cumin seed oil      mirtazapine (REMERON) 15 mg tablet Take 1 Tab by mouth nightly. 90 Tab 3    citalopram (CELEXA) 20 mg tablet Take 1 Tab by mouth daily. 30 Tab 5    metoprolol tartrate (LOPRESSOR) 25 mg tablet TAKE 1 TABLET BY MOUTH EVERY 12 HOURS 60 Tab 2    docusate sodium (COLACE) 100 mg capsule Take 100 mg by mouth two (2) times a day.  polyethylene glycol (MIRALAX) 17 gram/dose powder Take 17 g by mouth daily.  lidocaine-prilocaine (EMLA) topical cream Apply  to affected area as needed for Pain (Apply 30-60 min. prior to having your port accessed). 30 g 0    OTHER Sperm cryopreservation Dx: C18.9-colon cancer 1 Each 0    prochlorperazine (COMPAZINE) 10 mg tablet Take 1 Tab by mouth every six (6) hours as needed for Nausea. 50 Tab 5    ondansetron hcl (ZOFRAN) 8 mg tablet Take 1 Tab by mouth every eight (8) hours as needed for Nausea. 45 Tab 5     No current facility-administered medications on file prior to visit. Visit Vitals  /86 (BP 1 Location: Left arm, BP Patient Position: Sitting)   Pulse 91   Temp 98.8 °F (37.1 °C) (Oral)   Resp 18   Ht 6' (1.829 m)   Wt 286 lb (129.7 kg)   SpO2 99%   BMI 38.79 kg/m²       Gen:  Well developed, well nourished male in no acute distress  Skin:  No rashes or suspicious skin lesions noted  Card:  RRR, no m/r/g  Chest:  CTAB, no w/r/r. Breath sounds equal b/l. TTP over right ribs 8-10 anteriorly.    Abd: soft, NT, ND, BS+  Psych:  Nl mood and affect     Imaging  Radiographs of the chest were personally reviewed and demonstrated portacath. No fracture, dislocation, or pneumothorax. Assessment/Plan:      ICD-10-CM ICD-9-CM    1. Rib pain R07.81 786.50 XR CHEST PA LAT      CXR no obvious rib fracture. Likely has intercostal strain. BS equal b/l. VSS. No pnemo on CXR. Will treat with conservative treatment as discussed. Continue oxycodone from heme/onc. Discussed signs and symptoms of when to RTC or go to the ED. I have discussed the diagnosis with the patient and the intended plan as seen in the above orders. The patient has received an after-visit summary and questions were answered concerning future plans. I have discussed medication side effects and warnings with the patient as well. The patient agrees and understands above plan. Follow-up and Dispositions    · Return if symptoms worsen or fail to improve. Patient discussed with supervising attending.     Jon Mendoza DO  Sports Medicine Fellow

## 2019-09-17 ENCOUNTER — HOSPITAL ENCOUNTER (OUTPATIENT)
Dept: INFUSION THERAPY | Age: 29
End: 2019-09-17
Payer: COMMERCIAL

## 2019-09-17 ENCOUNTER — TELEPHONE (OUTPATIENT)
Dept: ONCOLOGY | Age: 29
End: 2019-09-17

## 2019-09-17 NOTE — TELEPHONE ENCOUNTER
[address] [facility]  [phone]    Patient was yet again a no show for his chemotherapy today. This has been a serial issue and continues despite my numerous discussions with him. Not only is his behavior is detrimental to his health, but it is also taking up the limited [facility] slots which in turn is limiting access for other patients. We will once again try to reschedule him.

## 2019-09-19 ENCOUNTER — HOSPITAL ENCOUNTER (OUTPATIENT)
Dept: INFUSION THERAPY | Age: 29
End: 2019-09-19
Payer: COMMERCIAL

## 2019-09-22 DIAGNOSIS — I10 ESSENTIAL HYPERTENSION: ICD-10-CM

## 2019-09-23 RX ORDER — HEPARIN 100 UNIT/ML
300-500 SYRINGE INTRAVENOUS AS NEEDED
Status: CANCELLED
Start: 2019-09-26

## 2019-09-23 RX ORDER — EPINEPHRINE 1 MG/ML
0.3 INJECTION, SOLUTION, CONCENTRATE INTRAVENOUS AS NEEDED
Status: CANCELLED | OUTPATIENT
Start: 2019-09-26

## 2019-09-23 RX ORDER — ONDANSETRON 2 MG/ML
8 INJECTION INTRAMUSCULAR; INTRAVENOUS AS NEEDED
Status: CANCELLED | OUTPATIENT
Start: 2019-09-26

## 2019-09-23 RX ORDER — SODIUM CHLORIDE 9 MG/ML
25 INJECTION, SOLUTION INTRAVENOUS CONTINUOUS
Status: CANCELLED | OUTPATIENT
Start: 2019-09-26

## 2019-09-23 RX ORDER — DIPHENHYDRAMINE HYDROCHLORIDE 50 MG/ML
50 INJECTION, SOLUTION INTRAMUSCULAR; INTRAVENOUS AS NEEDED
Status: CANCELLED
Start: 2019-09-26

## 2019-09-23 RX ORDER — METOPROLOL TARTRATE 25 MG/1
TABLET, FILM COATED ORAL
Qty: 60 TAB | Refills: 2 | Status: SHIPPED | OUTPATIENT
Start: 2019-09-23 | End: 2020-01-01

## 2019-09-23 RX ORDER — SODIUM CHLORIDE 0.9 % (FLUSH) 0.9 %
10 SYRINGE (ML) INJECTION AS NEEDED
Status: CANCELLED
Start: 2019-09-26

## 2019-09-23 RX ORDER — HYDROCORTISONE SODIUM SUCCINATE 100 MG/2ML
100 INJECTION, POWDER, FOR SOLUTION INTRAMUSCULAR; INTRAVENOUS AS NEEDED
Status: CANCELLED | OUTPATIENT
Start: 2019-09-26

## 2019-09-23 RX ORDER — ATROPINE SULFATE 0.4 MG/ML
0.4 INJECTION, SOLUTION ENDOTRACHEAL; INTRAMEDULLARY; INTRAMUSCULAR; INTRAVENOUS; SUBCUTANEOUS ONCE
Status: CANCELLED
Start: 2019-09-26

## 2019-09-23 RX ORDER — ACETAMINOPHEN 325 MG/1
650 TABLET ORAL AS NEEDED
Status: CANCELLED
Start: 2019-09-26

## 2019-09-23 RX ORDER — ALBUTEROL SULFATE 0.83 MG/ML
2.5 SOLUTION RESPIRATORY (INHALATION) AS NEEDED
Status: CANCELLED
Start: 2019-09-26

## 2019-09-23 RX ORDER — SODIUM CHLORIDE 9 MG/ML
10 INJECTION INTRAMUSCULAR; INTRAVENOUS; SUBCUTANEOUS AS NEEDED
Status: CANCELLED | OUTPATIENT
Start: 2019-09-26

## 2019-09-23 RX ORDER — PALONOSETRON 0.05 MG/ML
0.25 INJECTION, SOLUTION INTRAVENOUS ONCE
Status: CANCELLED | OUTPATIENT
Start: 2019-09-26

## 2019-09-23 RX ORDER — DEXTROSE MONOHYDRATE 50 MG/ML
25 INJECTION, SOLUTION INTRAVENOUS CONTINUOUS
Status: CANCELLED
Start: 2019-09-26

## 2019-09-23 RX ORDER — FLUOROURACIL 50 MG/ML
400 INJECTION, SOLUTION INTRAVENOUS ONCE
Status: CANCELLED | OUTPATIENT
Start: 2019-09-26 | End: 2019-09-26

## 2019-09-23 RX ORDER — ATROPINE SULFATE 0.4 MG/ML
0.4 INJECTION, SOLUTION ENDOTRACHEAL; INTRAMEDULLARY; INTRAMUSCULAR; INTRAVENOUS; SUBCUTANEOUS
Status: CANCELLED | OUTPATIENT
Start: 2019-09-26

## 2019-09-25 RX ORDER — EPINEPHRINE 1 MG/ML
0.3 INJECTION, SOLUTION, CONCENTRATE INTRAVENOUS AS NEEDED
Status: CANCELLED | OUTPATIENT
Start: 2019-10-01

## 2019-09-25 RX ORDER — DIPHENHYDRAMINE HYDROCHLORIDE 50 MG/ML
50 INJECTION, SOLUTION INTRAMUSCULAR; INTRAVENOUS AS NEEDED
Status: CANCELLED
Start: 2019-10-01

## 2019-09-25 RX ORDER — FLUOROURACIL 50 MG/ML
400 INJECTION, SOLUTION INTRAVENOUS ONCE
Status: CANCELLED | OUTPATIENT
Start: 2019-10-01 | End: 2019-10-01

## 2019-09-25 RX ORDER — HYDROCORTISONE SODIUM SUCCINATE 100 MG/2ML
100 INJECTION, POWDER, FOR SOLUTION INTRAMUSCULAR; INTRAVENOUS AS NEEDED
Status: CANCELLED | OUTPATIENT
Start: 2019-10-01

## 2019-09-25 RX ORDER — ALBUTEROL SULFATE 0.83 MG/ML
2.5 SOLUTION RESPIRATORY (INHALATION) AS NEEDED
Status: CANCELLED
Start: 2019-10-01

## 2019-09-25 RX ORDER — SODIUM CHLORIDE 9 MG/ML
25 INJECTION, SOLUTION INTRAVENOUS CONTINUOUS
Status: CANCELLED | OUTPATIENT
Start: 2019-10-01

## 2019-09-25 RX ORDER — SODIUM CHLORIDE 9 MG/ML
10 INJECTION INTRAMUSCULAR; INTRAVENOUS; SUBCUTANEOUS AS NEEDED
Status: CANCELLED | OUTPATIENT
Start: 2019-10-01

## 2019-09-25 RX ORDER — SODIUM CHLORIDE 0.9 % (FLUSH) 0.9 %
10 SYRINGE (ML) INJECTION AS NEEDED
Status: CANCELLED
Start: 2019-10-01

## 2019-09-25 RX ORDER — ATROPINE SULFATE 0.4 MG/ML
0.4 INJECTION, SOLUTION ENDOTRACHEAL; INTRAMEDULLARY; INTRAMUSCULAR; INTRAVENOUS; SUBCUTANEOUS ONCE
Status: CANCELLED
Start: 2019-10-01

## 2019-09-25 RX ORDER — PALONOSETRON 0.05 MG/ML
0.25 INJECTION, SOLUTION INTRAVENOUS ONCE
Status: CANCELLED | OUTPATIENT
Start: 2019-10-01

## 2019-09-25 RX ORDER — HEPARIN 100 UNIT/ML
300-500 SYRINGE INTRAVENOUS AS NEEDED
Status: CANCELLED
Start: 2019-10-01

## 2019-09-25 RX ORDER — ONDANSETRON 2 MG/ML
8 INJECTION INTRAMUSCULAR; INTRAVENOUS AS NEEDED
Status: CANCELLED | OUTPATIENT
Start: 2019-10-01

## 2019-09-25 RX ORDER — ACETAMINOPHEN 325 MG/1
650 TABLET ORAL AS NEEDED
Status: CANCELLED
Start: 2019-10-01

## 2019-09-25 RX ORDER — ATROPINE SULFATE 0.4 MG/ML
0.4 INJECTION, SOLUTION ENDOTRACHEAL; INTRAMEDULLARY; INTRAMUSCULAR; INTRAVENOUS; SUBCUTANEOUS
Status: CANCELLED | OUTPATIENT
Start: 2019-10-01

## 2019-09-25 RX ORDER — DEXTROSE MONOHYDRATE 50 MG/ML
25 INJECTION, SOLUTION INTRAVENOUS CONTINUOUS
Status: CANCELLED
Start: 2019-10-01

## 2019-09-26 ENCOUNTER — HOSPITAL ENCOUNTER (OUTPATIENT)
Dept: INFUSION THERAPY | Age: 29
End: 2019-09-26
Payer: COMMERCIAL

## 2019-09-30 NOTE — PROGRESS NOTES
[facility] at [address], [address] [facility] 100[address] [facility]  W: [phone]  F: [phone]     Reason for Visit:   Nimco Harris is a 30 y.o. male who is seen for follow up of metastatic colon cancer. Treatment History:   · CT A/P 3/25/2018: Indeterminate 2 x 1 cm low-density liver lesion. · CT C/A/P with triphase liver 3/27/2018: No evidence of metastatic disease to the chest. Multiple ill-defined hepatic lesions. These do not represent simple cyst.  · CT guided liver biopsy 3/27/2018: metastatic adenocarcinoma, KRAS wild type, NRAS wild type  · Colectomy by Dr. Demetris Pastrana 3/29/2018: Adenocarcinoma, moderately differentiated. Negative margins. 3/16 nodes involved. pMMR  · Stage IV (pT3 pN1b pM1) Colon Cancer  · Chemotherapy with FOLFOX and bevacizumab 5/1/2018 - 7/12/2018 for a total of 6 cycles, dany administered with cycles 2-4  · Resumed chemotherapy with FOLFOX and bevacizumab from 9/25/2018 to 12/20/2018. Avastin held after 10/23/2018 in preparation for surgery. · Underwent diagnostic laparoscopy with microwave liver ablation to right hepatic lobe 1/30/2019  · CT Chest 4/9/2019: Several new subcentimeter pulmonary nodules in the bilateral lungs worrisome for pulmonary metastases. Status post interval right hepatic lobe embolization. Several increased areas of hypodensity in the right hepatic lobe are worrisome for progression of hepatic metastases. There is a new tract of hypodensity in the left lateral hepatic lobe extending to a hypodense lesion. · Resumed chemotherapy with FOLFOX and bevacizumab 4/29/2019 to 7/5/2019, stopped for oxaliplatin reaction  · Chemotherapy with FOLFIRI and Panitumumab beginning 7/16/2019    History of Present Illness:   Presents for follow up. Missed treatment 2 weeks ago, was rescheduled to last week and didn't show for appointment. Rescheduled again for today.  States he didn't come in the last 2 visits because he fell playing football and thought he cracked his ribs. This was a month ago. Had xrays done that were normal/negative. He is on medical leave from work. Rash is itching at times. Dry skin. No nausea or vomiting. Mild constipation. No abdominal pain or cramping. He has taken a leave of absence from [employer], continues to work at [employer]. Worried about rash interfering with his work activities. He is unaccompanied today. PAST HISTORY: The following sections were reviewed and updated in the EMR as appropriate: PMH, SH, FH, Medications, Allergies. Allergies   Allergen Reactions    Oxaliplatin Other (comments)     Chest pain, abdominal pain, burning sensation, cold sweats      Review of Systems: A complete review of systems was obtained, reviewed, and scanned into the EMR. Pertinent findings reviewed above. Physical Exam:     Visit Vitals  BP (!) 135/95 (BP 1 Location: Left arm, BP Patient Position: Sitting) Comment: . Pulse 77   Temp 97.6 °F (36.4 °C) (Temporal)   Resp 18   Ht 6' (1.829 m)   Wt 290 lb (131.5 kg)   SpO2 97%   BMI 39.33 kg/m²     ECOG PS: 0  General: No distress, obese  Eyes: PERRLA, anicteric sclerae  HENT: Atraumatic, OP clear  Neck: Supple  Lymphatic: No cervical, supraclavicular, or inguinal adenopathy  Respiratory: CTAB, normal respiratory effort  CV: Normal rate, regular rhythm, no murmurs, no peripheral edema  GI: Soft, nontender, nondistended, no masses, unable to assess for hepatomegaly, no splenomegaly  MS: Normal gait and station. Digits without clubbing or cyanosis. Skin: No ecchymoses, or petechiae. Normal temperature, turgor, and texture. Scattered macules to face and back of neck.    Psych: Alert, oriented, appropriate affect, normal judgment/insight    Results:     Lab Results   Component Value Date/Time    WBC 10.0 10/01/2019 10:07 AM    HGB 14.4 10/01/2019 10:07 AM    HCT 44.9 10/01/2019 10:07 AM    PLATELET 634 18/29/3045 10:07 AM    MCV 79.2 (L) 10/01/2019 10:07 AM ABS. NEUTROPHILS 6.3 10/01/2019 10:07 AM    Hemoglobin (POC) 15.2 11/12/2018 12:58 PM     Lab Results   Component Value Date/Time    Sodium 141 10/01/2019 10:07 AM    Potassium 3.9 10/01/2019 10:07 AM    Chloride 108 10/01/2019 10:07 AM    CO2 29 10/01/2019 10:07 AM    Glucose 97 10/01/2019 10:07 AM    BUN 12 10/01/2019 10:07 AM    Creatinine 0.87 10/01/2019 10:07 AM    GFR est AA >60 10/01/2019 10:07 AM    GFR est non-AA >60 10/01/2019 10:07 AM    Calcium 8.8 10/01/2019 10:07 AM     Lab Results   Component Value Date/Time    Bilirubin, total 0.3 10/01/2019 10:07 AM    ALT (SGPT) 28 10/01/2019 10:07 AM    AST (SGOT) 18 10/01/2019 10:07 AM    Alk. phosphatase 113 10/01/2019 10:07 AM    Protein, total 7.4 10/01/2019 10:07 AM    Albumin 3.2 (L) 10/01/2019 10:07 AM    Globulin 4.2 (H) 10/01/2019 10:07 AM       CT abd/pelvis at [facility] on 3/19/2019: Interval laparoscopic microscope ablation of several left hepatic lobe lesions. Interval right portal vein embolizations. Increase in size of several right hepatic lobe lesions. Development of multiple pulmonary nodules in the imaged lung bases highly concerning for metastatic disease. Enlarged portal caval node, metastatic diease is not excluded. Stranding in the subcutaneous soft tissue related to laparoscopy. CT chest 4/9/2019:   1. Several new subcentimeter pulmonary nodules in the bilateral lungs worrisome for pulmonary metastases. 2. Status post interval right hepatic lobe embolization. Several increased areas of hypodensity in the right hepatic lobe are worrisome for progression of hepatic metastases. There is a new tract of hypodensity in the left lateral hepatic lobe extending to a hypodense lesion. This may represent interval intervention. Recommend correlation with prior procedural history and studies. Dedicated multiphase liver imaging could be done for further evaluation. CT C/A/P 7/22/2019: Improved hepatic metastatic disease.  Mixed response in pulmonary nodules. Assessment:   1) Metastatic Colon Cancer  Stage IV, pMMR, KRAS/NRAS wild type  He has metastatic disease within his liver. His primary tumor has been resected. He has received palliative chemotherapy with FOLFOX and Bevacizumab, followed by a break to undergo liver directed therapy. Unfortunately, he developed pulmonary metastases during this break. We resumed chemotherapy with FOLFOX and Avastin. Therapy was inconsistent due to patient's missed appointments and frequent noncompliance. Oxaliplatin held with cycle 15 due to patient preference. With cycle 16 patient developed concern for allergic reaction to Oxaliplatin with intense abdominal pain, flushing and tachycardia. Similar issues had occurred with prior cycles, but seemed to be worsening with each treatment. Changed to FOLFIRI with Panitumumab 7/16/2019. His noncompliance has persistent, frequently no-shows for treatment, or shows up hours late, despite my repeated discussions with him about the importance of maintaining compliance. Therapy overall tolerating well with exception of grade 1 rash and grade 1 constipation. Will proceed with cycle 5 today as ordered. Repeat imaging after cycle 6. Of note, this cycle has been delayed 2 weeks due to patient no-show/reschedule. Today he was late for his appointment, and then became angry when he had to wait. He asked to postpone his chemotherapy until later in the day so that he could go to his sister's house to eat prior to therapy. Advised we can not plan for treatment today and order medication if he is off site. Urged him to keep appointments as scheduled. [facility] one obtained and confirms KRAS/NRAS wild type status. No other actionable mutations. Will consider Lonsurf vs Regorafinib vs clinical trial at progression. He will be seen each cycle of therapy. Repeat CT imaging every 6 cycles.      2) Risk of infertility  We have previously reviewed that chemotherapy can potentially cause infertility. He has undergone sperm cryopreservation. 3) Genetic risk  Testing at [facility] negative. 4) Hypertension  Continue Metoprolol. Follow up with PCP for continued elevated BP. He reports running low on medication, refilled last week as he has not had recent f/u with PCP. 5) Chemotherapy induced neuropathy. Should improve on FOLFIRI based therapy. Monitor. 6) Depression / Insomnia  No longer on Remeron. Med list updated. Has not yet started Celexa. 7) Mucositis  Magic mouthwash PRN. 8) Rash, drug  Due to Panitumumab. Continue Clindamycin cream and doxycycline. Panitumumab held with cycle 2.     9) Noncompliance  He continues to no-show for chemotherapy appointments. Reschedules and then no-shows again. When he does show up, he is frequently hours late for his appointments. His excuses range from feeling tired, wanting to sleep in, wanting to feel good for parties with friends, sustaining minor injuries while playing sports, etc.  I have had several conversations with him about the importance of keeping his appointments. I certainly understand and appreciate his desire to balance treatment with quality of life, and we have discussed the option of stopping therapy. His stated goal is to continue with therapy in order to prolong his life, though his behavior has not been consistent with this goal.  I have informed him that keeping his appointments is critical for us to help him achieve his health care goals. It is also important for our office to be able to care for other patients, as his exessive no-shows and late arrivals disrupt the care of other patients. Today I informed him that if he continues with his regular no-shows for chemotherapy appointments, we may not be able to continue caring for him and we may need to find him another oncologist.    10) Emotional Well Being  No psychosocial concerns identified today. Patient has adequate support.      Plan: Proceed today with C5 of FOLFIRI with Panitumuab 6mg/kg (irinotecan 180mg/m2, leucovorin 400 mg/m2, fluorouracil 400 mg/m2, and a 46 hour infusion of fluorouracil 2400 mg/m2) given every 2 weeks  Labs: CBC, BMP prior to each treatment, hepatic function panel   Prophylactic antiemetics: Palonosetron and dexamethasone on the day of each chemotherapy infusion. Dexamethasone 8mg PO on days 2 and 3 after chemotherapy. PRN antiemetics: Ondansetron, Prochlorperazine  PRN antidiarrheals: Atropine 0.4mg IV every 2 hours PRN during or immediately after irinotecan infusion, Imodium every 2 hours as needed at home  EMLA cream for port  Return to clinic every 2 weeks on therapy    Patient was seen in conjunction with Radha Lindsay NP.     Signed By: Debbie Bartlett MD

## 2019-10-01 ENCOUNTER — APPOINTMENT (OUTPATIENT)
Dept: INFUSION THERAPY | Age: 29
End: 2019-10-01
Payer: COMMERCIAL

## 2019-10-01 ENCOUNTER — OFFICE VISIT (OUTPATIENT)
Dept: ONCOLOGY | Age: 29
End: 2019-10-01

## 2019-10-01 ENCOUNTER — HOSPITAL ENCOUNTER (OUTPATIENT)
Dept: INFUSION THERAPY | Age: 29
Discharge: HOME | End: 2019-10-01
Payer: COMMERCIAL

## 2019-10-01 VITALS
SYSTOLIC BLOOD PRESSURE: 132 MMHG | HEIGHT: 72 IN | WEIGHT: 290.3 LBS | BODY MASS INDEX: 39.32 KG/M2 | OXYGEN SATURATION: 97 % | DIASTOLIC BLOOD PRESSURE: 85 MMHG | HEART RATE: 79 BPM | TEMPERATURE: 98.2 F | RESPIRATION RATE: 16 BRPM

## 2019-10-01 VITALS
TEMPERATURE: 97.6 F | BODY MASS INDEX: 39.28 KG/M2 | SYSTOLIC BLOOD PRESSURE: 135 MMHG | WEIGHT: 290 LBS | RESPIRATION RATE: 18 BRPM | OXYGEN SATURATION: 97 % | DIASTOLIC BLOOD PRESSURE: 95 MMHG | HEIGHT: 72 IN | HEART RATE: 77 BPM

## 2019-10-01 DIAGNOSIS — C18.9 COLON CANCER METASTASIZED TO LIVER (HCC): Primary | ICD-10-CM

## 2019-10-01 DIAGNOSIS — C78.7 COLON CANCER METASTASIZED TO LIVER (HCC): Primary | ICD-10-CM

## 2019-10-01 DIAGNOSIS — C78.00 MALIGNANT NEOPLASM METASTATIC TO LUNG, UNSPECIFIED LATERALITY (HCC): ICD-10-CM

## 2019-10-01 LAB
ALBUMIN SERPL-MCNC: 3.2 G/DL (ref 3.5–5)
ALBUMIN/GLOB SERPL: 0.8 {RATIO} (ref 1.1–2.2)
ALP SERPL-CCNC: 113 U/L (ref 45–117)
ALT SERPL-CCNC: 28 U/L (ref 12–78)
ANION GAP SERPL CALC-SCNC: 4 MMOL/L (ref 5–15)
AST SERPL-CCNC: 18 U/L (ref 15–37)
BASOPHILS # BLD: 0.1 K/UL (ref 0–0.1)
BASOPHILS NFR BLD: 1 % (ref 0–1)
BILIRUB SERPL-MCNC: 0.3 MG/DL (ref 0.2–1)
BUN SERPL-MCNC: 12 MG/DL (ref 6–20)
BUN/CREAT SERPL: 14 (ref 12–20)
CALCIUM SERPL-MCNC: 8.8 MG/DL (ref 8.5–10.1)
CEA SERPL-MCNC: 3.4 NG/ML
CHLORIDE SERPL-SCNC: 108 MMOL/L (ref 97–108)
CO2 SERPL-SCNC: 29 MMOL/L (ref 21–32)
CREAT SERPL-MCNC: 0.87 MG/DL (ref 0.7–1.3)
DIFFERENTIAL METHOD BLD: ABNORMAL
EOSINOPHIL # BLD: 0.5 K/UL (ref 0–0.4)
EOSINOPHIL NFR BLD: 5 % (ref 0–7)
ERYTHROCYTE [DISTWIDTH] IN BLOOD BY AUTOMATED COUNT: 17.1 % (ref 11.5–14.5)
GLOBULIN SER CALC-MCNC: 4.2 G/DL (ref 2–4)
GLUCOSE SERPL-MCNC: 97 MG/DL (ref 65–100)
HCT VFR BLD AUTO: 44.9 % (ref 36.6–50.3)
HGB BLD-MCNC: 14.4 G/DL (ref 12.1–17)
IMM GRANULOCYTES # BLD AUTO: 0.1 K/UL (ref 0–0.04)
IMM GRANULOCYTES NFR BLD AUTO: 1 % (ref 0–0.5)
LYMPHOCYTES # BLD: 2.4 K/UL (ref 0.8–3.5)
LYMPHOCYTES NFR BLD: 24 % (ref 12–49)
MCH RBC QN AUTO: 25.4 PG (ref 26–34)
MCHC RBC AUTO-ENTMCNC: 32.1 G/DL (ref 30–36.5)
MCV RBC AUTO: 79.2 FL (ref 80–99)
MONOCYTES # BLD: 0.7 K/UL (ref 0–1)
MONOCYTES NFR BLD: 7 % (ref 5–13)
NEUTS SEG # BLD: 6.3 K/UL (ref 1.8–8)
NEUTS SEG NFR BLD: 62 % (ref 32–75)
NRBC # BLD: 0 K/UL (ref 0–0.01)
NRBC BLD-RTO: 0 PER 100 WBC
PLATELET # BLD AUTO: 340 K/UL (ref 150–400)
PMV BLD AUTO: 8.5 FL (ref 8.9–12.9)
POTASSIUM SERPL-SCNC: 3.9 MMOL/L (ref 3.5–5.1)
PROT SERPL-MCNC: 7.4 G/DL (ref 6.4–8.2)
RBC # BLD AUTO: 5.67 M/UL (ref 4.1–5.7)
SODIUM SERPL-SCNC: 141 MMOL/L (ref 136–145)
WBC # BLD AUTO: 10 K/UL (ref 4.1–11.1)

## 2019-10-01 PROCEDURE — 96417 CHEMO IV INFUS EACH ADDL SEQ: CPT

## 2019-10-01 PROCEDURE — 74011250636 HC RX REV CODE- 250/636: Performed by: NURSE PRACTITIONER

## 2019-10-01 PROCEDURE — 96375 TX/PRO/DX INJ NEW DRUG ADDON: CPT

## 2019-10-01 PROCEDURE — 74011000258 HC RX REV CODE- 258: Performed by: NURSE PRACTITIONER

## 2019-10-01 PROCEDURE — 82378 CARCINOEMBRYONIC ANTIGEN: CPT

## 2019-10-01 PROCEDURE — 96368 THER/DIAG CONCURRENT INF: CPT

## 2019-10-01 PROCEDURE — 80053 COMPREHEN METABOLIC PANEL: CPT

## 2019-10-01 PROCEDURE — 85025 COMPLETE CBC W/AUTO DIFF WBC: CPT

## 2019-10-01 PROCEDURE — 96416 CHEMO PROLONG INFUSE W/PUMP: CPT

## 2019-10-01 PROCEDURE — 77030012965 HC NDL HUBR BBMI -A

## 2019-10-01 PROCEDURE — 96415 CHEMO IV INFUSION ADDL HR: CPT

## 2019-10-01 PROCEDURE — 36415 COLL VENOUS BLD VENIPUNCTURE: CPT

## 2019-10-01 PROCEDURE — 96413 CHEMO IV INFUSION 1 HR: CPT

## 2019-10-01 PROCEDURE — 74011000250 HC RX REV CODE- 250: Performed by: NURSE PRACTITIONER

## 2019-10-01 RX ORDER — HEPARIN 100 UNIT/ML
300-500 SYRINGE INTRAVENOUS AS NEEDED
Status: ACTIVE | OUTPATIENT
Start: 2019-10-01 | End: 2019-10-01

## 2019-10-01 RX ORDER — SODIUM CHLORIDE 9 MG/ML
25 INJECTION, SOLUTION INTRAVENOUS CONTINUOUS
Status: DISPENSED | OUTPATIENT
Start: 2019-10-01 | End: 2019-10-01

## 2019-10-01 RX ORDER — PALONOSETRON 0.05 MG/ML
0.25 INJECTION, SOLUTION INTRAVENOUS ONCE
Status: COMPLETED | OUTPATIENT
Start: 2019-10-01 | End: 2019-10-01

## 2019-10-01 RX ORDER — SODIUM CHLORIDE 9 MG/ML
10 INJECTION INTRAMUSCULAR; INTRAVENOUS; SUBCUTANEOUS AS NEEDED
Status: DISPENSED | OUTPATIENT
Start: 2019-10-01 | End: 2019-10-01

## 2019-10-01 RX ORDER — SODIUM CHLORIDE 0.9 % (FLUSH) 0.9 %
10 SYRINGE (ML) INJECTION AS NEEDED
Status: ACTIVE | OUTPATIENT
Start: 2019-10-01 | End: 2019-10-01

## 2019-10-01 RX ORDER — FLUOROURACIL 50 MG/ML
400 INJECTION, SOLUTION INTRAVENOUS ONCE
Status: COMPLETED | OUTPATIENT
Start: 2019-10-01 | End: 2019-10-01

## 2019-10-01 RX ORDER — DEXTROSE MONOHYDRATE 50 MG/ML
25 INJECTION, SOLUTION INTRAVENOUS CONTINUOUS
Status: DISPENSED | OUTPATIENT
Start: 2019-10-01 | End: 2019-10-01

## 2019-10-01 RX ORDER — ATROPINE SULFATE 0.4 MG/ML
0.4 INJECTION, SOLUTION ENDOTRACHEAL; INTRAMEDULLARY; INTRAMUSCULAR; INTRAVENOUS; SUBCUTANEOUS ONCE
Status: COMPLETED | OUTPATIENT
Start: 2019-10-01 | End: 2019-10-01

## 2019-10-01 RX ADMIN — PALONSETRON HYDROCHLORIDE 0.25 MG: 0.25 INJECTION, SOLUTION INTRAVENOUS at 12:04

## 2019-10-01 RX ADMIN — IRINOTECAN HYDROCHLORIDE 473 MG: 20 INJECTION, SOLUTION INTRAVENOUS at 14:03

## 2019-10-01 RX ADMIN — FLUOROURACIL 6312 MG: 50 INJECTION, SOLUTION INTRAVENOUS at 15:57

## 2019-10-01 RX ADMIN — SODIUM CHLORIDE 25 ML/HR: 900 INJECTION, SOLUTION INTRAVENOUS at 12:04

## 2019-10-01 RX ADMIN — PANITUMUMAB 802.8 MG: 400 SOLUTION INTRAVENOUS at 12:39

## 2019-10-01 RX ADMIN — SODIUM CHLORIDE 10 ML: 9 INJECTION, SOLUTION INTRAMUSCULAR; INTRAVENOUS; SUBCUTANEOUS at 13:40

## 2019-10-01 RX ADMIN — ATROPINE SULFATE 0.4 MG: 0.4 INJECTION, SOLUTION INTRAMUSCULAR; INTRAVENOUS; SUBCUTANEOUS at 13:39

## 2019-10-01 RX ADMIN — DEXAMETHASONE SODIUM PHOSPHATE 12 MG: 4 INJECTION, SOLUTION INTRA-ARTICULAR; INTRALESIONAL; INTRAMUSCULAR; INTRAVENOUS; SOFT TISSUE at 12:12

## 2019-10-01 RX ADMIN — FLUOROURACIL 1052 MG: 50 INJECTION, SOLUTION INTRAVENOUS at 15:45

## 2019-10-01 RX ADMIN — DEXTROSE MONOHYDRATE 25 ML/HR: 5 INJECTION, SOLUTION INTRAVENOUS at 14:02

## 2019-10-01 RX ADMIN — LEUCOVORIN CALCIUM 1052 MG: 500 INJECTION, POWDER, LYOPHILIZED, FOR SOLUTION INTRAMUSCULAR; INTRAVENOUS at 14:03

## 2019-10-01 NOTE — PROGRESS NOTES
Areli Jimenez is a 30 y.o. male follow up for colon cancer. 1. Have you been to the ER, urgent care clinic since your last visit? Hospitalized since your last visit?no     2. Have you seen or consulted any other health care providers outside of the [address] [place] [address] since your last visit? Include any pap smears or colon screening.  no

## 2019-10-01 NOTE — PROGRESS NOTES
[facility] Progress Note Date: 2019 Name: Marisela Rojas 
 
MRN: [id number] : 1990 
 
6341: Mr. Jovita Naranjo Arrived ambulatory and in no distress for C5D1 of Folfiri/Vectibix Regimen. Assessment was completed, no acute issues at this time, no new complaints voiced except right rib pain from football injury. Right chest wall port accessed without difficulty using 0.75 inch costello needle, labs drawn & sent for processing. Chemotherapy Flowsheet 10/1/2019 Cycle C5D1 Date 10/1/2019 Drug / Regimen Folfiri/Vectibix Pre Meds given Notes given Patient proceed to appointment with Dr. Monie Mahan. Mr. Jeremi Castro vitals were reviewed. Patient Vitals for the past 24 hrs: 
 Temp Pulse Resp BP SpO2  
10/01/19 1603 (P) 97.9 °F (36.6 °C) (P) 74 (P) 16 (P) 145/80   
10/01/19 1011 98.2 °F (36.8 °C) 79 16 132/85 97 % Lab results were obtained and reviewed. Recent Results (from the past 12 hour(s)) CBC WITH AUTOMATED DIFF Collection Time: 10/01/19 10:07 AM  
Result Value Ref Range WBC 10.0 4.1 - 11.1 K/uL  
 RBC 5.67 4.10 - 5.70 M/uL  
 HGB 14.4 12.1 - 17.0 g/dL HCT 44.9 36.6 - 50.3 % MCV 79.2 (L) 80.0 - 99.0 FL  
 MCH 25.4 (L) 26.0 - 34.0 PG  
 MCHC 32.1 30.0 - 36.5 g/dL  
 RDW 17.1 (H) 11.5 - 14.5 % PLATELET 377 016 - 673 K/uL MPV 8.5 (L) 8.9 - 12.9 FL  
 NRBC 0.0 0  WBC ABSOLUTE NRBC 0.00 0.00 - 0.01 K/uL NEUTROPHILS 62 32 - 75 % LYMPHOCYTES 24 12 - 49 % MONOCYTES 7 5 - 13 % EOSINOPHILS 5 0 - 7 % BASOPHILS 1 0 - 1 % IMMATURE GRANULOCYTES 1 (H) 0.0 - 0.5 % ABS. NEUTROPHILS 6.3 1.8 - 8.0 K/UL  
 ABS. LYMPHOCYTES 2.4 0.8 - 3.5 K/UL  
 ABS. MONOCYTES 0.7 0.0 - 1.0 K/UL  
 ABS. EOSINOPHILS 0.5 (H) 0.0 - 0.4 K/UL  
 ABS. BASOPHILS 0.1 0.0 - 0.1 K/UL  
 ABS. IMM. GRANS. 0.1 (H) 0.00 - 0.04 K/UL  
 DF AUTOMATED Medications: 
Medications Administered 0.9% sodium chloride infusion Admin Date 
10/01/2019 Action New Bag Dose 25 mL/hr Rate 25 mL/hr Route IntraVENous Administered By 
Tristin Ventura RN  
  
  
 atropine 0.4 mg/mL injection 0.4 mg   
 Admin Date 
10/01/2019 Action Given Dose 0.4 mg Route IntraVENous Administered By 
Tristin Ventura RN  
  
  
 dexamethasone (DECADRON) 12 mg in 0.9% sodium chloride 50 mL IVPB Admin Date 
10/01/2019 Action Given Dose 
12 mg Route IntraVENous Administered By 
Tristin Ventura RN  
  
  
 dextrose 5% infusion Admin Date 
10/01/2019 Action New Bag Dose 25 mL/hr Rate 25 mL/hr Route IntraVENous Administered By 
Tristin Ventura RN  
  
  
 fluorouracil (ADRUCIL) 6,312 mg in 0.9% sodium chloride 250 mL CADD Cassette Admin Date 
10/01/2019 Action New Bag Dose 6312 mg Rate 5.4 mL/hr Route IntraVENous Administered By 
Tristin Ventura RN  
  
  
 fluorouracil (ADRUCIL) chemo syringe 1,052 mg Admin Date 
10/01/2019 Action Given Dose 
1052 mg Rate 
252.5 mL/hr Route IntraVENous Administered By 
Tristin Ventura RN  
  
  
 irinotecan (CAMPTOSAR) 473 mg in dextrose 5% 250 mL, overfill volume 25 mL chemo infusion Admin Date 
10/01/2019 Action New Bag Dose 473 mg Rate 
199.1 mL/hr Route IntraVENous Administered By 
Tristin Ventura RN  
  
  
 leucovorin (WELLCOVORIN) 1,052 mg in dextrose 5% 250 mL, overfill volume 25 mL IVPB Admin Date 
10/01/2019 Action New Bag Dose 
1052 mg Rate 
218.4 mL/hr Route IntraVENous Administered By 
Tristin Ventura RN  
  
  
 palonosetron HCl (ALOXI) injection 0.25 mg   
 Admin Date 
10/01/2019 Action Given Dose 0.25 mg Route IntraVENous Administered By 
Tristin Ventura RN  
  
  
 panitumumab (VECTIBIX) 802.8 mg in 0.9% sodium chloride 100 mL, overfill volume 10 mL infusion Admin Date 
10/01/2019 Action New Bag Dose 
802.8 mg Route IntraVENous Administered By 
Tristin Ventura RN  
  
  
 sodium chloride 0.9% injection 10 mL Admin Date 
10/01/2019 Action Given Dose 
10 mL Route IntraVENous Administered By 
Tristen Cuba RN  
  
  
  
 
 
Mr. Blair Schroeder tolerated treatment well and was discharged from [place] [address] in stable condition at [phone]. Port connected to 250cc CADD pump per order. Blood return confirmed. He is to return on October 3 at 1530 for pump removal. 
 
Bravo Ivan RN October 1, 2019

## 2019-10-03 ENCOUNTER — APPOINTMENT (OUTPATIENT)
Dept: INFUSION THERAPY | Age: 29
End: 2019-10-03
Payer: COMMERCIAL

## 2019-10-03 ENCOUNTER — HOSPITAL ENCOUNTER (OUTPATIENT)
Dept: INFUSION THERAPY | Age: 29
Discharge: HOME | End: 2019-10-03
Payer: COMMERCIAL

## 2019-10-03 VITALS
HEART RATE: 105 BPM | OXYGEN SATURATION: 99 % | SYSTOLIC BLOOD PRESSURE: 155 MMHG | DIASTOLIC BLOOD PRESSURE: 82 MMHG | RESPIRATION RATE: 18 BRPM

## 2019-10-03 DIAGNOSIS — C18.9 COLON CANCER METASTASIZED TO LIVER (HCC): Primary | ICD-10-CM

## 2019-10-03 DIAGNOSIS — C78.7 COLON CANCER METASTASIZED TO LIVER (HCC): Primary | ICD-10-CM

## 2019-10-03 PROCEDURE — 96523 IRRIG DRUG DELIVERY DEVICE: CPT

## 2019-10-03 PROCEDURE — 74011250636 HC RX REV CODE- 250/636: Performed by: NURSE PRACTITIONER

## 2019-10-03 RX ORDER — SODIUM CHLORIDE 9 MG/ML
10 INJECTION INTRAMUSCULAR; INTRAVENOUS; SUBCUTANEOUS AS NEEDED
Status: DISCONTINUED | OUTPATIENT
Start: 2019-10-03 | End: 2019-10-04 | Stop reason: HOSPADM

## 2019-10-03 RX ORDER — SODIUM CHLORIDE 0.9 % (FLUSH) 0.9 %
10 SYRINGE (ML) INJECTION AS NEEDED
Status: DISCONTINUED | OUTPATIENT
Start: 2019-10-03 | End: 2019-10-04 | Stop reason: HOSPADM

## 2019-10-03 RX ORDER — HEPARIN 100 UNIT/ML
300-500 SYRINGE INTRAVENOUS AS NEEDED
Status: DISCONTINUED | OUTPATIENT
Start: 2019-10-03 | End: 2019-10-04 | Stop reason: HOSPADM

## 2019-10-03 RX ADMIN — SODIUM CHLORIDE 10 ML: 9 INJECTION INTRAMUSCULAR; INTRAVENOUS; SUBCUTANEOUS at 14:15

## 2019-10-03 RX ADMIN — Medication 10 ML: at 14:14

## 2019-10-03 RX ADMIN — HEPARIN 500 UNITS: 100 SYRINGE at 14:14

## 2019-10-07 RX ORDER — DEXTROSE MONOHYDRATE 50 MG/ML
25 INJECTION, SOLUTION INTRAVENOUS CONTINUOUS
Status: CANCELLED
Start: 2019-10-16

## 2019-10-07 RX ORDER — SODIUM CHLORIDE 0.9 % (FLUSH) 0.9 %
10 SYRINGE (ML) INJECTION AS NEEDED
Status: CANCELLED
Start: 2020-02-11

## 2019-10-07 RX ORDER — EPINEPHRINE 1 MG/ML
0.3 INJECTION, SOLUTION, CONCENTRATE INTRAVENOUS AS NEEDED
Status: CANCELLED | OUTPATIENT
Start: 2019-10-16

## 2019-10-07 RX ORDER — ACETAMINOPHEN 325 MG/1
650 TABLET ORAL AS NEEDED
Status: CANCELLED
Start: 2019-11-27

## 2019-10-07 RX ORDER — DIPHENHYDRAMINE HYDROCHLORIDE 50 MG/ML
50 INJECTION, SOLUTION INTRAMUSCULAR; INTRAVENOUS AS NEEDED
Status: CANCELLED
Start: 2019-11-27

## 2019-10-07 RX ORDER — HEPARIN 100 UNIT/ML
300-500 SYRINGE INTRAVENOUS AS NEEDED
Status: CANCELLED
Start: 2020-02-13

## 2019-10-07 RX ORDER — SODIUM CHLORIDE 0.9 % (FLUSH) 0.9 %
10 SYRINGE (ML) INJECTION AS NEEDED
Status: CANCELLED
Start: 2019-10-18

## 2019-10-07 RX ORDER — ONDANSETRON 2 MG/ML
8 INJECTION INTRAMUSCULAR; INTRAVENOUS AS NEEDED
Status: CANCELLED | OUTPATIENT
Start: 2020-02-11

## 2019-10-07 RX ORDER — ATROPINE SULFATE 0.4 MG/ML
0.4 INJECTION, SOLUTION ENDOTRACHEAL; INTRAMEDULLARY; INTRAMUSCULAR; INTRAVENOUS; SUBCUTANEOUS
Status: CANCELLED | OUTPATIENT
Start: 2019-10-16

## 2019-10-07 RX ORDER — HYDROCORTISONE SODIUM SUCCINATE 100 MG/2ML
100 INJECTION, POWDER, FOR SOLUTION INTRAMUSCULAR; INTRAVENOUS AS NEEDED
Status: CANCELLED | OUTPATIENT
Start: 2020-02-11

## 2019-10-07 RX ORDER — HEPARIN 100 UNIT/ML
300-500 SYRINGE INTRAVENOUS AS NEEDED
Status: CANCELLED
Start: 2020-01-30

## 2019-10-07 RX ORDER — SODIUM CHLORIDE 0.9 % (FLUSH) 0.9 %
10 SYRINGE (ML) INJECTION AS NEEDED
Status: CANCELLED
Start: 2019-10-16

## 2019-10-07 RX ORDER — ATROPINE SULFATE 0.4 MG/ML
0.4 INJECTION, SOLUTION ENDOTRACHEAL; INTRAMEDULLARY; INTRAMUSCULAR; INTRAVENOUS; SUBCUTANEOUS ONCE
Status: CANCELLED
Start: 2019-10-16

## 2019-10-07 RX ORDER — ATROPINE SULFATE 0.4 MG/ML
0.4 INJECTION, SOLUTION ENDOTRACHEAL; INTRAMEDULLARY; INTRAMUSCULAR; INTRAVENOUS; SUBCUTANEOUS ONCE
Status: CANCELLED
Start: 2019-11-13

## 2019-10-07 RX ORDER — ALBUTEROL SULFATE 0.83 MG/ML
2.5 SOLUTION RESPIRATORY (INHALATION) AS NEEDED
Status: CANCELLED
Start: 2019-11-27

## 2019-10-07 RX ORDER — FLUOROURACIL 50 MG/ML
400 INJECTION, SOLUTION INTRAVENOUS ONCE
Status: CANCELLED | OUTPATIENT
Start: 2019-11-27

## 2019-10-07 RX ORDER — PALONOSETRON 0.05 MG/ML
0.25 INJECTION, SOLUTION INTRAVENOUS ONCE
Status: CANCELLED | OUTPATIENT
Start: 2019-10-28

## 2019-10-07 RX ORDER — EPINEPHRINE 1 MG/ML
0.3 INJECTION, SOLUTION, CONCENTRATE INTRAVENOUS AS NEEDED
Status: CANCELLED | OUTPATIENT
Start: 2019-10-28

## 2019-10-07 RX ORDER — EPINEPHRINE 1 MG/ML
0.3 INJECTION, SOLUTION, CONCENTRATE INTRAVENOUS AS NEEDED
Status: CANCELLED | OUTPATIENT
Start: 2020-02-11

## 2019-10-07 RX ORDER — DIPHENHYDRAMINE HYDROCHLORIDE 50 MG/ML
50 INJECTION, SOLUTION INTRAMUSCULAR; INTRAVENOUS AS NEEDED
Status: CANCELLED
Start: 2020-02-11

## 2019-10-07 RX ORDER — ONDANSETRON 2 MG/ML
8 INJECTION INTRAMUSCULAR; INTRAVENOUS AS NEEDED
Status: CANCELLED | OUTPATIENT
Start: 2019-11-27

## 2019-10-07 RX ORDER — HYDROCORTISONE SODIUM SUCCINATE 100 MG/2ML
100 INJECTION, POWDER, FOR SOLUTION INTRAMUSCULAR; INTRAVENOUS AS NEEDED
Status: CANCELLED | OUTPATIENT
Start: 2019-11-27

## 2019-10-07 RX ORDER — SODIUM CHLORIDE 9 MG/ML
25 INJECTION, SOLUTION INTRAVENOUS CONTINUOUS
Status: CANCELLED | OUTPATIENT
Start: 2019-10-28

## 2019-10-07 RX ORDER — ALBUTEROL SULFATE 0.83 MG/ML
2.5 SOLUTION RESPIRATORY (INHALATION) AS NEEDED
Status: CANCELLED
Start: 2019-10-28

## 2019-10-07 RX ORDER — ACETAMINOPHEN 325 MG/1
650 TABLET ORAL AS NEEDED
Status: CANCELLED
Start: 2019-10-16

## 2019-10-07 RX ORDER — SODIUM CHLORIDE 9 MG/ML
10 INJECTION INTRAMUSCULAR; INTRAVENOUS; SUBCUTANEOUS AS NEEDED
Status: CANCELLED | OUTPATIENT
Start: 2020-01-30

## 2019-10-07 RX ORDER — SODIUM CHLORIDE 9 MG/ML
10 INJECTION INTRAMUSCULAR; INTRAVENOUS; SUBCUTANEOUS AS NEEDED
Status: CANCELLED | OUTPATIENT
Start: 2019-10-30

## 2019-10-07 RX ORDER — SODIUM CHLORIDE 9 MG/ML
10 INJECTION INTRAMUSCULAR; INTRAVENOUS; SUBCUTANEOUS AS NEEDED
Status: CANCELLED | OUTPATIENT
Start: 2020-02-11

## 2019-10-07 RX ORDER — DEXTROSE MONOHYDRATE 50 MG/ML
25 INJECTION, SOLUTION INTRAVENOUS CONTINUOUS
Status: CANCELLED
Start: 2019-11-27

## 2019-10-07 RX ORDER — HEPARIN 100 UNIT/ML
300-500 SYRINGE INTRAVENOUS AS NEEDED
Status: CANCELLED
Start: 2019-10-30

## 2019-10-07 RX ORDER — SODIUM CHLORIDE 9 MG/ML
10 INJECTION INTRAMUSCULAR; INTRAVENOUS; SUBCUTANEOUS AS NEEDED
Status: CANCELLED | OUTPATIENT
Start: 2020-02-13

## 2019-10-07 RX ORDER — PALONOSETRON 0.05 MG/ML
0.25 INJECTION, SOLUTION INTRAVENOUS ONCE
Status: CANCELLED | OUTPATIENT
Start: 2019-11-27

## 2019-10-07 RX ORDER — SODIUM CHLORIDE 0.9 % (FLUSH) 0.9 %
10 SYRINGE (ML) INJECTION AS NEEDED
Status: CANCELLED
Start: 2020-01-30

## 2019-10-07 RX ORDER — SODIUM CHLORIDE 9 MG/ML
10 INJECTION INTRAMUSCULAR; INTRAVENOUS; SUBCUTANEOUS AS NEEDED
Status: CANCELLED | OUTPATIENT
Start: 2019-10-28

## 2019-10-07 RX ORDER — DIPHENHYDRAMINE HYDROCHLORIDE 50 MG/ML
50 INJECTION, SOLUTION INTRAMUSCULAR; INTRAVENOUS AS NEEDED
Status: CANCELLED
Start: 2019-10-16

## 2019-10-07 RX ORDER — PALONOSETRON 0.05 MG/ML
0.25 INJECTION, SOLUTION INTRAVENOUS ONCE
Status: CANCELLED | OUTPATIENT
Start: 2019-10-16

## 2019-10-07 RX ORDER — DEXTROSE MONOHYDRATE 50 MG/ML
25 INJECTION, SOLUTION INTRAVENOUS CONTINUOUS
Status: CANCELLED
Start: 2020-02-11

## 2019-10-07 RX ORDER — HEPARIN 100 UNIT/ML
300-500 SYRINGE INTRAVENOUS AS NEEDED
Status: CANCELLED
Start: 2019-11-27

## 2019-10-07 RX ORDER — SODIUM CHLORIDE 9 MG/ML
10 INJECTION INTRAMUSCULAR; INTRAVENOUS; SUBCUTANEOUS AS NEEDED
Status: CANCELLED | OUTPATIENT
Start: 2019-11-27

## 2019-10-07 RX ORDER — ATROPINE SULFATE 0.4 MG/ML
0.4 INJECTION, SOLUTION ENDOTRACHEAL; INTRAMEDULLARY; INTRAMUSCULAR; INTRAVENOUS; SUBCUTANEOUS ONCE
Status: CANCELLED
Start: 2019-11-27

## 2019-10-07 RX ORDER — SODIUM CHLORIDE 9 MG/ML
25 INJECTION, SOLUTION INTRAVENOUS CONTINUOUS
Status: CANCELLED | OUTPATIENT
Start: 2020-02-11

## 2019-10-07 RX ORDER — SODIUM CHLORIDE 0.9 % (FLUSH) 0.9 %
10 SYRINGE (ML) INJECTION AS NEEDED
Status: CANCELLED
Start: 2020-02-13

## 2019-10-07 RX ORDER — ATROPINE SULFATE 0.4 MG/ML
0.4 INJECTION, SOLUTION ENDOTRACHEAL; INTRAMEDULLARY; INTRAMUSCULAR; INTRAVENOUS; SUBCUTANEOUS
Status: CANCELLED | OUTPATIENT
Start: 2019-11-27

## 2019-10-07 RX ORDER — SODIUM CHLORIDE 9 MG/ML
25 INJECTION, SOLUTION INTRAVENOUS CONTINUOUS
Status: CANCELLED | OUTPATIENT
Start: 2019-10-16

## 2019-10-07 RX ORDER — ACETAMINOPHEN 325 MG/1
650 TABLET ORAL AS NEEDED
Status: CANCELLED
Start: 2020-02-11

## 2019-10-07 RX ORDER — HEPARIN 100 UNIT/ML
300-500 SYRINGE INTRAVENOUS AS NEEDED
Status: CANCELLED
Start: 2020-02-11

## 2019-10-07 RX ORDER — FLUOROURACIL 50 MG/ML
400 INJECTION, SOLUTION INTRAVENOUS ONCE
Status: CANCELLED | OUTPATIENT
Start: 2019-10-16 | End: 2019-10-16

## 2019-10-07 RX ORDER — SODIUM CHLORIDE 9 MG/ML
25 INJECTION, SOLUTION INTRAVENOUS CONTINUOUS
Status: CANCELLED | OUTPATIENT
Start: 2019-11-27

## 2019-10-07 RX ORDER — HYDROCORTISONE SODIUM SUCCINATE 100 MG/2ML
100 INJECTION, POWDER, FOR SOLUTION INTRAMUSCULAR; INTRAVENOUS AS NEEDED
Status: CANCELLED | OUTPATIENT
Start: 2019-10-16

## 2019-10-07 RX ORDER — HYDROCORTISONE SODIUM SUCCINATE 100 MG/2ML
100 INJECTION, POWDER, FOR SOLUTION INTRAMUSCULAR; INTRAVENOUS AS NEEDED
Status: CANCELLED | OUTPATIENT
Start: 2019-10-28

## 2019-10-07 RX ORDER — ACETAMINOPHEN 325 MG/1
650 TABLET ORAL AS NEEDED
Status: CANCELLED
Start: 2019-10-28

## 2019-10-07 RX ORDER — SODIUM CHLORIDE 0.9 % (FLUSH) 0.9 %
10 SYRINGE (ML) INJECTION AS NEEDED
Status: CANCELLED
Start: 2019-10-30

## 2019-10-07 RX ORDER — DIPHENHYDRAMINE HYDROCHLORIDE 50 MG/ML
50 INJECTION, SOLUTION INTRAMUSCULAR; INTRAVENOUS AS NEEDED
Status: CANCELLED
Start: 2019-10-28

## 2019-10-07 RX ORDER — SODIUM CHLORIDE 0.9 % (FLUSH) 0.9 %
10 SYRINGE (ML) INJECTION AS NEEDED
Status: CANCELLED
Start: 2019-11-27

## 2019-10-07 RX ORDER — EPINEPHRINE 1 MG/ML
0.3 INJECTION, SOLUTION, CONCENTRATE INTRAVENOUS AS NEEDED
Status: CANCELLED | OUTPATIENT
Start: 2019-11-27

## 2019-10-07 RX ORDER — HEPARIN 100 UNIT/ML
300-500 SYRINGE INTRAVENOUS AS NEEDED
Status: CANCELLED
Start: 2019-10-16

## 2019-10-07 RX ORDER — FLUOROURACIL 50 MG/ML
400 INJECTION, SOLUTION INTRAVENOUS ONCE
Status: CANCELLED | OUTPATIENT
Start: 2020-02-11

## 2019-10-07 RX ORDER — ALBUTEROL SULFATE 0.83 MG/ML
2.5 SOLUTION RESPIRATORY (INHALATION) AS NEEDED
Status: CANCELLED
Start: 2019-10-16

## 2019-10-07 RX ORDER — HEPARIN 100 UNIT/ML
300-500 SYRINGE INTRAVENOUS AS NEEDED
Status: CANCELLED
Start: 2019-10-18

## 2019-10-07 RX ORDER — ONDANSETRON 2 MG/ML
8 INJECTION INTRAMUSCULAR; INTRAVENOUS AS NEEDED
Status: CANCELLED | OUTPATIENT
Start: 2019-10-16

## 2019-10-07 RX ORDER — SODIUM CHLORIDE 9 MG/ML
10 INJECTION INTRAMUSCULAR; INTRAVENOUS; SUBCUTANEOUS AS NEEDED
Status: CANCELLED | OUTPATIENT
Start: 2019-10-18

## 2019-10-07 RX ORDER — ONDANSETRON 2 MG/ML
8 INJECTION INTRAMUSCULAR; INTRAVENOUS AS NEEDED
Status: CANCELLED | OUTPATIENT
Start: 2019-10-28

## 2019-10-07 RX ORDER — PALONOSETRON 0.05 MG/ML
0.25 INJECTION, SOLUTION INTRAVENOUS ONCE
Status: CANCELLED | OUTPATIENT
Start: 2020-02-11

## 2019-10-07 RX ORDER — DEXTROSE MONOHYDRATE 50 MG/ML
25 INJECTION, SOLUTION INTRAVENOUS CONTINUOUS
Status: CANCELLED
Start: 2019-10-28

## 2019-10-07 RX ORDER — SODIUM CHLORIDE 0.9 % (FLUSH) 0.9 %
10 SYRINGE (ML) INJECTION AS NEEDED
Status: CANCELLED
Start: 2019-10-28

## 2019-10-07 RX ORDER — SODIUM CHLORIDE 9 MG/ML
10 INJECTION INTRAMUSCULAR; INTRAVENOUS; SUBCUTANEOUS AS NEEDED
Status: CANCELLED | OUTPATIENT
Start: 2019-10-16

## 2019-10-07 RX ORDER — ATROPINE SULFATE 0.4 MG/ML
0.4 INJECTION, SOLUTION ENDOTRACHEAL; INTRAMEDULLARY; INTRAMUSCULAR; INTRAVENOUS; SUBCUTANEOUS
Status: CANCELLED | OUTPATIENT
Start: 2019-11-13

## 2019-10-07 RX ORDER — ALBUTEROL SULFATE 0.83 MG/ML
2.5 SOLUTION RESPIRATORY (INHALATION) AS NEEDED
Status: CANCELLED
Start: 2020-02-11

## 2019-10-07 RX ORDER — ATROPINE SULFATE 0.4 MG/ML
0.4 INJECTION, SOLUTION ENDOTRACHEAL; INTRAMEDULLARY; INTRAMUSCULAR; INTRAVENOUS; SUBCUTANEOUS ONCE
Status: CANCELLED
Start: 2019-10-30

## 2019-10-07 RX ORDER — ATROPINE SULFATE 0.4 MG/ML
0.4 INJECTION, SOLUTION ENDOTRACHEAL; INTRAMEDULLARY; INTRAMUSCULAR; INTRAVENOUS; SUBCUTANEOUS
Status: CANCELLED | OUTPATIENT
Start: 2019-10-30

## 2019-10-07 RX ORDER — FLUOROURACIL 50 MG/ML
400 INJECTION, SOLUTION INTRAVENOUS ONCE
Status: CANCELLED | OUTPATIENT
Start: 2019-10-28

## 2019-10-07 RX ORDER — HEPARIN 100 UNIT/ML
300-500 SYRINGE INTRAVENOUS AS NEEDED
Status: CANCELLED
Start: 2019-10-28

## 2019-10-11 NOTE — PROGRESS NOTES
[facility] [place] [facility] [place] [address] [facility] [address] [facility]  W: [phone]  F: [phone]     Reason for Visit:   Priya Vargas is a 30 y.o. male who is seen for follow up of metastatic colon cancer. Treatment History:   · CT A/P 3/25/2018: Indeterminate 2 x 1 cm low-density liver lesion. · CT C/A/P with triphase liver 3/27/2018: No evidence of metastatic disease to the chest. Multiple ill-defined hepatic lesions. These do not represent simple cyst.  · CT guided liver biopsy 3/27/2018: metastatic adenocarcinoma, KRAS wild type, NRAS wild type  · Colectomy by Dr. Mor Lombardo 3/29/2018: Adenocarcinoma, moderately differentiated. Negative margins. 3/16 nodes involved. pMMR  · Stage IV (pT3 pN1b pM1) Colon Cancer  · Chemotherapy with FOLFOX and bevacizumab 5/1/2018 - 7/12/2018 for a total of 6 cycles, dany administered with cycles 2-4  · Resumed chemotherapy with FOLFOX and bevacizumab from 9/25/2018 to 12/20/2018. Avastin held after 10/23/2018 in preparation for surgery. · Underwent diagnostic laparoscopy with microwave liver ablation to right hepatic lobe 1/30/2019  · CT Chest 4/9/2019: Several new subcentimeter pulmonary nodules in the bilateral lungs worrisome for pulmonary metastases. Status post interval right hepatic lobe embolization. Several increased areas of hypodensity in the right hepatic lobe are worrisome for progression of hepatic metastases. There is a new tract of hypodensity in the left lateral hepatic lobe extending to a hypodense lesion. · Resumed chemotherapy with FOLFOX and bevacizumab 4/29/2019 to 7/5/2019, stopped for oxaliplatin reaction  · Chemotherapy with FOLFIRI and Panitumumab beginning 7/16/2019    History of Present Illness:   Presents for follow up. Had a pedicure about 2 months ago due to a ingrown toenail. Nail is sensitive to right great toe. The skin to side of toe is numb. No drainage to site. Appetite has been stable.  Taste changes continue. No nausea or vomiting. No diarrhea. For 3 days after treatment appetite is worse. He has taken a leave of absence from [employer], continues to work at [employer]. Worried about rash interfering with his work activities. He is unaccompanied today. PAST HISTORY: The following sections were reviewed and updated in the EMR as appropriate: PMH, SH, FH, Medications, Allergies. Allergies   Allergen Reactions    Oxaliplatin Other (comments)     Chest pain, abdominal pain, burning sensation, cold sweats      Review of Systems: A complete review of systems was obtained, reviewed, and scanned into the EMR. Pertinent findings reviewed above. Physical Exam:     Visit Vitals  /90 (BP 1 Location: Left arm, BP Patient Position: Sitting) Comment: . Pulse 77   Temp 97.2 °F (36.2 °C) (Temporal)   Resp 18   Ht 6' (1.829 m)   Wt 284 lb (128.8 kg)   SpO2 98%   BMI 38.52 kg/m²     ECOG PS: 0  General: No distress, obese  Eyes: PERRLA, anicteric sclerae  HENT: Atraumatic, OP clear  Neck: Supple  Lymphatic: No cervical, supraclavicular, or inguinal adenopathy  Respiratory: CTAB, normal respiratory effort  CV: Normal rate, regular rhythm, no murmurs, no peripheral edema  GI: Soft, nontender, nondistended, no masses, unable to assess for hepatomegaly, no splenomegaly  MS: Normal gait and station. Digits without clubbing or cyanosis. Skin: No ecchymoses, or petechiae. Normal temperature, turgor, and texture. Scattered macules to face and back of neck. Psych: Alert, oriented, appropriate affect, normal judgment/insight    Results:     Lab Results   Component Value Date/Time    WBC 6.7 10/16/2019 09:49 AM    HGB 15.4 10/16/2019 09:49 AM    HCT 49.0 10/16/2019 09:49 AM    PLATELET 776 85/92/4134 09:49 AM    MCV 79.3 (L) 10/16/2019 09:49 AM    ABS.  NEUTROPHILS 3.6 10/16/2019 09:49 AM    Hemoglobin (POC) 15.2 11/12/2018 12:58 PM     Lab Results   Component Value Date/Time    Sodium 139 10/16/2019 09:49 AM    Potassium 3.9 10/16/2019 09:49 AM    Chloride 107 10/16/2019 09:49 AM    CO2 27 10/16/2019 09:49 AM    Glucose 107 (H) 10/16/2019 09:49 AM    BUN 10 10/16/2019 09:49 AM    Creatinine 0.67 (L) 10/16/2019 09:49 AM    GFR est AA >60 10/16/2019 09:49 AM    GFR est non-AA >60 10/16/2019 09:49 AM    Calcium 9.0 10/16/2019 09:49 AM     Lab Results   Component Value Date/Time    Bilirubin, total 0.3 10/16/2019 09:49 AM    ALT (SGPT) 31 10/16/2019 09:49 AM    AST (SGOT) 20 10/16/2019 09:49 AM    Alk. phosphatase 111 10/16/2019 09:49 AM    Protein, total 7.6 10/16/2019 09:49 AM    Albumin 3.4 (L) 10/16/2019 09:49 AM    Globulin 4.2 (H) 10/16/2019 09:49 AM       CT abd/pelvis at [address] [place] [address] on 3/19/2019: Interval laparoscopic microscope ablation of several left hepatic lobe lesions. Interval right portal vein embolizations. Increase in size of several right hepatic lobe lesions. Development of multiple pulmonary nodules in the imaged lung bases highly concerning for metastatic disease. Enlarged portal caval node, metastatic diease is not excluded. Stranding in the subcutaneous soft tissue related to laparoscopy. CT chest 4/9/2019:   1. Several new subcentimeter pulmonary nodules in the bilateral lungs worrisome for pulmonary metastases. 2. Status post interval right hepatic lobe embolization. Several increased areas of hypodensity in the right hepatic lobe are worrisome for progression of hepatic metastases. There is a new tract of hypodensity in the left lateral hepatic lobe extending to a hypodense lesion. This may represent interval intervention. Recommend correlation with prior procedural history and studies. Dedicated multiphase liver imaging could be done for further evaluation. CT C/A/P 7/22/2019: Improved hepatic metastatic disease. Mixed response in pulmonary nodules. Assessment:   1) Metastatic Colon Cancer  Stage IV, pMMR, KRAS/NRAS wild type  He has metastatic disease within his liver.   His primary tumor has been resected. He has received palliative chemotherapy with FOLFOX and Bevacizumab, followed by a break to undergo liver directed therapy. Unfortunately, he developed pulmonary metastases during this break. We resumed chemotherapy with FOLFOX and Avastin. Therapy was inconsistent due to patient's missed appointments and frequent noncompliance. Oxaliplatin held with cycle 15 due to patient preference. With cycle 16 patient developed concern for allergic reaction to Oxaliplatin with intense abdominal pain, flushing and tachycardia. Similar issues had occurred with prior cycles, but seemed to be worsening with each treatment. Changed to FOLFIRI with Panitumumab 7/16/2019. His noncompliance has persistent, frequently no-shows for treatment, or shows up hours late, despite my repeated discussions with him about the importance of maintaining compliance. Therapy overall tolerating well with exception of grade 1 rash and grade 1 constipation. Will proceed with cycle 6 today as ordered. Repeat imaging after this cycle. Will give Atropine premedication then additional dose prior to discharge due to cholinergic symptoms of sweating and abdominal pain. Foundation one obtained and confirms KRAS/NRAS wild type status. No other actionable mutations. Will consider Lonsurf vs Regorafinib vs clinical trial at progression. He will be seen each cycle of therapy. Repeat CT imaging every 6 cycles. He will move next treatment to 10/28/2019 to have pump off prior to going back to work on 10/31/2019. He would like to miss treatment week of Thanksgiving. He will have C8 on 11/12/2019, then will move C9 out to week after Thanksgiving. 2) Risk of infertility  We have previously reviewed that chemotherapy can potentially cause infertility. He has undergone sperm cryopreservation. 3) Genetic risk  Testing at [facility] negative. 4) Hypertension  Continue Metoprolol.   Follow up with PCP for continued elevated BP. 5) Chemotherapy induced neuropathy. Should improve on FOLFIRI based therapy. Monitor. 6) Depression / Insomnia  No longer on Remeron. Med list updated. Has not yet started Celexa. 7) Mucositis  Magic mouthwash PRN. 8) Rash, drug  Due to Panitumumab. Continue Clindamycin cream and doxycycline. Panitumumab held with cycle 2.     9) Noncompliance  He has agreed to come in for treatment as scheduled. We reviewed schedule today. There will be a delay over week of Thanksgiving, but all other cycles as scheduled. 10) Emotional Well Being  No psychosocial concerns identified today. Patient has adequate support. Plan:     Proceed today with C6 of FOLFIRI with Panitumuab 6mg/kg (irinotecan 180mg/m2, leucovorin 400 mg/m2, fluorouracil 400 mg/m2, and a 46 hour infusion of fluorouracil 2400 mg/m2) given every 2 weeks  Labs: CBC, BMP prior to each treatment, hepatic function panel   Prophylactic antiemetics: Palonosetron and dexamethasone on the day of each chemotherapy infusion. Dexamethasone 8mg PO on days 2 and 3 after chemotherapy. PRN antiemetics: Ondansetron, Prochlorperazine  PRN antidiarrheals: Atropine 0.4mg IV every 2 hours PRN during or immediately after irinotecan infusion, Imodium every 2 hours as needed at home  EMLA cream for port  Return to clinic every 2 weeks on therapy    Patient was seen in conjunction with Ania Lopez NP.     Signed By: Norman Nielson MD

## 2019-10-15 ENCOUNTER — APPOINTMENT (OUTPATIENT)
Dept: INFUSION THERAPY | Age: 29
End: 2019-10-15
Payer: COMMERCIAL

## 2019-10-16 ENCOUNTER — HOSPITAL ENCOUNTER (OUTPATIENT)
Dept: INFUSION THERAPY | Age: 29
Discharge: HOME | End: 2019-10-16
Payer: COMMERCIAL

## 2019-10-16 ENCOUNTER — OFFICE VISIT (OUTPATIENT)
Dept: ONCOLOGY | Age: 29
End: 2019-10-16

## 2019-10-16 VITALS
HEIGHT: 72 IN | SYSTOLIC BLOOD PRESSURE: 147 MMHG | TEMPERATURE: 97 F | DIASTOLIC BLOOD PRESSURE: 90 MMHG | HEART RATE: 65 BPM | BODY MASS INDEX: 38.54 KG/M2 | RESPIRATION RATE: 16 BRPM | WEIGHT: 284.5 LBS

## 2019-10-16 VITALS
DIASTOLIC BLOOD PRESSURE: 90 MMHG | SYSTOLIC BLOOD PRESSURE: 125 MMHG | TEMPERATURE: 97.2 F | OXYGEN SATURATION: 98 % | RESPIRATION RATE: 18 BRPM | BODY MASS INDEX: 38.47 KG/M2 | WEIGHT: 284 LBS | HEART RATE: 77 BPM | HEIGHT: 72 IN

## 2019-10-16 DIAGNOSIS — C78.00 MALIGNANT NEOPLASM METASTATIC TO LUNG, UNSPECIFIED LATERALITY (HCC): ICD-10-CM

## 2019-10-16 DIAGNOSIS — C78.7 COLON CANCER METASTASIZED TO LIVER (HCC): Primary | ICD-10-CM

## 2019-10-16 DIAGNOSIS — C18.9 COLON CANCER METASTASIZED TO LIVER (HCC): Primary | ICD-10-CM

## 2019-10-16 LAB
ALBUMIN SERPL-MCNC: 3.4 G/DL (ref 3.5–5)
ALBUMIN/GLOB SERPL: 0.8 {RATIO} (ref 1.1–2.2)
ALP SERPL-CCNC: 111 U/L (ref 45–117)
ALT SERPL-CCNC: 31 U/L (ref 12–78)
ANION GAP SERPL CALC-SCNC: 5 MMOL/L (ref 5–15)
AST SERPL-CCNC: 20 U/L (ref 15–37)
BASOPHILS # BLD: 0 K/UL (ref 0–0.1)
BASOPHILS NFR BLD: 1 % (ref 0–1)
BILIRUB SERPL-MCNC: 0.3 MG/DL (ref 0.2–1)
BUN SERPL-MCNC: 10 MG/DL (ref 6–20)
BUN/CREAT SERPL: 15 (ref 12–20)
CALCIUM SERPL-MCNC: 9 MG/DL (ref 8.5–10.1)
CHLORIDE SERPL-SCNC: 107 MMOL/L (ref 97–108)
CO2 SERPL-SCNC: 27 MMOL/L (ref 21–32)
CREAT SERPL-MCNC: 0.67 MG/DL (ref 0.7–1.3)
DIFFERENTIAL METHOD BLD: ABNORMAL
EOSINOPHIL # BLD: 0.5 K/UL (ref 0–0.4)
EOSINOPHIL NFR BLD: 7 % (ref 0–7)
ERYTHROCYTE [DISTWIDTH] IN BLOOD BY AUTOMATED COUNT: 17.3 % (ref 11.5–14.5)
GLOBULIN SER CALC-MCNC: 4.2 G/DL (ref 2–4)
GLUCOSE SERPL-MCNC: 107 MG/DL (ref 65–100)
HCT VFR BLD AUTO: 49 % (ref 36.6–50.3)
HGB BLD-MCNC: 15.4 G/DL (ref 12.1–17)
IMM GRANULOCYTES # BLD AUTO: 0 K/UL (ref 0–0.04)
IMM GRANULOCYTES NFR BLD AUTO: 0 % (ref 0–0.5)
LYMPHOCYTES # BLD: 1.9 K/UL (ref 0.8–3.5)
LYMPHOCYTES NFR BLD: 29 % (ref 12–49)
MCH RBC QN AUTO: 24.9 PG (ref 26–34)
MCHC RBC AUTO-ENTMCNC: 31.4 G/DL (ref 30–36.5)
MCV RBC AUTO: 79.3 FL (ref 80–99)
MONOCYTES # BLD: 0.7 K/UL (ref 0–1)
MONOCYTES NFR BLD: 10 % (ref 5–13)
NEUTS SEG # BLD: 3.6 K/UL (ref 1.8–8)
NEUTS SEG NFR BLD: 53 % (ref 32–75)
NRBC # BLD: 0 K/UL (ref 0–0.01)
NRBC BLD-RTO: 0 PER 100 WBC
PLATELET # BLD AUTO: 344 K/UL (ref 150–400)
PMV BLD AUTO: 9 FL (ref 8.9–12.9)
POTASSIUM SERPL-SCNC: 3.9 MMOL/L (ref 3.5–5.1)
PROT SERPL-MCNC: 7.6 G/DL (ref 6.4–8.2)
RBC # BLD AUTO: 6.18 M/UL (ref 4.1–5.7)
SODIUM SERPL-SCNC: 139 MMOL/L (ref 136–145)
WBC # BLD AUTO: 6.7 K/UL (ref 4.1–11.1)

## 2019-10-16 PROCEDURE — 85025 COMPLETE CBC W/AUTO DIFF WBC: CPT

## 2019-10-16 PROCEDURE — 96368 THER/DIAG CONCURRENT INF: CPT

## 2019-10-16 PROCEDURE — 96417 CHEMO IV INFUS EACH ADDL SEQ: CPT

## 2019-10-16 PROCEDURE — 96413 CHEMO IV INFUSION 1 HR: CPT

## 2019-10-16 PROCEDURE — 36415 COLL VENOUS BLD VENIPUNCTURE: CPT

## 2019-10-16 PROCEDURE — 77030012965 HC NDL HUBR BBMI -A

## 2019-10-16 PROCEDURE — 80053 COMPREHEN METABOLIC PANEL: CPT

## 2019-10-16 PROCEDURE — 96416 CHEMO PROLONG INFUSE W/PUMP: CPT

## 2019-10-16 PROCEDURE — 96375 TX/PRO/DX INJ NEW DRUG ADDON: CPT

## 2019-10-16 PROCEDURE — 74011250636 HC RX REV CODE- 250/636: Performed by: NURSE PRACTITIONER

## 2019-10-16 PROCEDURE — 96411 CHEMO IV PUSH ADDL DRUG: CPT

## 2019-10-16 PROCEDURE — 74011000258 HC RX REV CODE- 258: Performed by: NURSE PRACTITIONER

## 2019-10-16 RX ORDER — DEXTROSE MONOHYDRATE 50 MG/ML
25 INJECTION, SOLUTION INTRAVENOUS CONTINUOUS
Status: DISPENSED | OUTPATIENT
Start: 2019-10-16 | End: 2019-10-16

## 2019-10-16 RX ORDER — FLUOROURACIL 50 MG/ML
400 INJECTION, SOLUTION INTRAVENOUS ONCE
Status: COMPLETED | OUTPATIENT
Start: 2019-10-16 | End: 2019-10-16

## 2019-10-16 RX ORDER — SODIUM CHLORIDE 9 MG/ML
25 INJECTION, SOLUTION INTRAVENOUS CONTINUOUS
Status: DISPENSED | OUTPATIENT
Start: 2019-10-16 | End: 2019-10-16

## 2019-10-16 RX ORDER — HEPARIN 100 UNIT/ML
300-500 SYRINGE INTRAVENOUS AS NEEDED
Status: ACTIVE | OUTPATIENT
Start: 2019-10-16 | End: 2019-10-16

## 2019-10-16 RX ORDER — PALONOSETRON 0.05 MG/ML
0.25 INJECTION, SOLUTION INTRAVENOUS ONCE
Status: COMPLETED | OUTPATIENT
Start: 2019-10-16 | End: 2019-10-16

## 2019-10-16 RX ORDER — ATROPINE SULFATE 0.4 MG/ML
0.4 INJECTION, SOLUTION ENDOTRACHEAL; INTRAMEDULLARY; INTRAMUSCULAR; INTRAVENOUS; SUBCUTANEOUS ONCE
Status: CANCELLED
Start: 2019-10-16

## 2019-10-16 RX ORDER — ATROPINE SULFATE 0.4 MG/ML
0.4 INJECTION, SOLUTION ENDOTRACHEAL; INTRAMEDULLARY; INTRAMUSCULAR; INTRAVENOUS; SUBCUTANEOUS ONCE
Status: COMPLETED | OUTPATIENT
Start: 2019-10-16 | End: 2019-10-16

## 2019-10-16 RX ORDER — SODIUM CHLORIDE 0.9 % (FLUSH) 0.9 %
10 SYRINGE (ML) INJECTION AS NEEDED
Status: ACTIVE | OUTPATIENT
Start: 2019-10-16 | End: 2019-10-16

## 2019-10-16 RX ORDER — SODIUM CHLORIDE 9 MG/ML
10 INJECTION INTRAMUSCULAR; INTRAVENOUS; SUBCUTANEOUS AS NEEDED
Status: ACTIVE | OUTPATIENT
Start: 2019-10-16 | End: 2019-10-16

## 2019-10-16 RX ADMIN — DEXTROSE MONOHYDRATE 25 ML/HR: 5 INJECTION, SOLUTION INTRAVENOUS at 13:43

## 2019-10-16 RX ADMIN — ATROPINE SULFATE 0.4 MG: 0.4 INJECTION, SOLUTION INTRAMUSCULAR; INTRAVENOUS; SUBCUTANEOUS at 13:34

## 2019-10-16 RX ADMIN — DEXAMETHASONE SODIUM PHOSPHATE 12 MG: 4 INJECTION, SOLUTION INTRA-ARTICULAR; INTRALESIONAL; INTRAMUSCULAR; INTRAVENOUS; SOFT TISSUE at 11:44

## 2019-10-16 RX ADMIN — LEUCOVORIN CALCIUM 1052 MG: 500 INJECTION, POWDER, LYOPHILIZED, FOR SOLUTION INTRAMUSCULAR; INTRAVENOUS at 13:43

## 2019-10-16 RX ADMIN — PALONSETRON HYDROCHLORIDE 0.25 MG: 0.25 INJECTION, SOLUTION INTRAVENOUS at 11:35

## 2019-10-16 RX ADMIN — FLUOROURACIL 6312 MG: 50 INJECTION, SOLUTION INTRAVENOUS at 15:40

## 2019-10-16 RX ADMIN — SODIUM CHLORIDE 25 ML/HR: 900 INJECTION, SOLUTION INTRAVENOUS at 11:32

## 2019-10-16 RX ADMIN — FLUOROURACIL 1052 MG: 50 INJECTION, SOLUTION INTRAVENOUS at 15:30

## 2019-10-16 RX ADMIN — ATROPINE SULFATE 0.4 MG: 0.4 INJECTION, SOLUTION INTRAMUSCULAR; INTRAVENOUS; SUBCUTANEOUS at 15:21

## 2019-10-16 RX ADMIN — PANITUMUMAB 802.8 MG: 400 SOLUTION INTRAVENOUS at 12:15

## 2019-10-16 RX ADMIN — IRINOTECAN HYDROCHLORIDE 473 MG: 20 INJECTION, SOLUTION INTRAVENOUS at 13:43

## 2019-10-16 NOTE — PROGRESS NOTES
[facility] VISIT NOTE 
 
1115. Assumed care of patient. Patient here for C6D1 Folfiri + Vectibix. Patient stated no complaints at this time. Labs resulted and within parameters to treat. Medications received: 
Anjelica Shaynesky Aloxi IV Dexamethasone IV Vectibix IV Atropine IV - as ordered Irinotecan IV Leucovorin IV Atropine IV - as ordered Fluorouracil IVP Fluorouracil CIV CADD cassette Tolerated treatment well, no adverse reaction noted. Port attached to 5fu pump and verified infusing. Positive blood return noted. Patient Vitals for the past 12 hrs: 
 Temp Pulse Resp BP  
10/16/19 1536 97 °F (36.1 °C) 65 16 147/90 Patient Vitals for the past 12 hrs: 
 BP  
10/16/19 1536 147/90 Recent Results (from the past 12 hour(s)) CBC WITH AUTOMATED DIFF Collection Time: 10/16/19  9:49 AM  
Result Value Ref Range WBC 6.7 4.1 - 11.1 K/uL  
 RBC 6.18 (H) 4.10 - 5.70 M/uL  
 HGB 15.4 12.1 - 17.0 g/dL HCT 49.0 36.6 - 50.3 % MCV 79.3 (L) 80.0 - 99.0 FL  
 MCH 24.9 (L) 26.0 - 34.0 PG  
 MCHC 31.4 30.0 - 36.5 g/dL  
 RDW 17.3 (H) 11.5 - 14.5 % PLATELET 640 472 - 243 K/uL MPV 9.0 8.9 - 12.9 FL  
 NRBC 0.0 0  WBC ABSOLUTE NRBC 0.00 0.00 - 0.01 K/uL NEUTROPHILS 53 32 - 75 % LYMPHOCYTES 29 12 - 49 % MONOCYTES 10 5 - 13 % EOSINOPHILS 7 0 - 7 % BASOPHILS 1 0 - 1 % IMMATURE GRANULOCYTES 0 0.0 - 0.5 % ABS. NEUTROPHILS 3.6 1.8 - 8.0 K/UL  
 ABS. LYMPHOCYTES 1.9 0.8 - 3.5 K/UL  
 ABS. MONOCYTES 0.7 0.0 - 1.0 K/UL  
 ABS. EOSINOPHILS 0.5 (H) 0.0 - 0.4 K/UL  
 ABS. BASOPHILS 0.0 0.0 - 0.1 K/UL  
 ABS. IMM. GRANS. 0.0 0.00 - 0.04 K/UL  
 DF AUTOMATED METABOLIC PANEL, COMPREHENSIVE Collection Time: 10/16/19  9:49 AM  
Result Value Ref Range Sodium 139 136 - 145 mmol/L Potassium 3.9 3.5 - 5.1 mmol/L Chloride 107 97 - 108 mmol/L  
 CO2 27 21 - 32 mmol/L Anion gap 5 5 - 15 mmol/L Glucose 107 (H) 65 - 100 mg/dL  BUN 10 6 - 20 MG/DL  
 Creatinine 0.67 (L) 0.70 - 1.30 MG/DL  
 BUN/Creatinine ratio 15 12 - 20 GFR est AA >60 >60 ml/min/1.73m2 GFR est non-AA >60 >60 ml/min/1.73m2 Calcium 9.0 8.5 - 10.1 MG/DL Bilirubin, total 0.3 0.2 - 1.0 MG/DL  
 ALT (SGPT) 31 12 - 78 U/L  
 AST (SGOT) 20 15 - 37 U/L Alk. phosphatase 111 45 - 117 U/L Protein, total 7.6 6.4 - 8.2 g/dL Albumin 3.4 (L) 3.5 - 5.0 g/dL Globulin 4.2 (H) 2.0 - 4.0 g/dL A-G Ratio 0.8 (L) 1.1 - 2.2    
 
1545. D/C'd from [facility] ambulatory and in no distress.  Next appointment is 10/18/19 at 2:00 pm.

## 2019-10-16 NOTE — PROGRESS NOTES
Nimco Harris is a 30 y.o. male follow up for colon cancer. 1. Have you been to the ER, urgent care clinic since your last visit? Hospitalized since your last visit?no     2. Have you seen or consulted any other health care providers outside of the [address] [place] [address] since your last visit? Include any pap smears or colon screening.  no

## 2019-10-17 ENCOUNTER — APPOINTMENT (OUTPATIENT)
Dept: INFUSION THERAPY | Age: 29
End: 2019-10-17
Payer: COMMERCIAL

## 2019-10-18 ENCOUNTER — HOSPITAL ENCOUNTER (OUTPATIENT)
Dept: INFUSION THERAPY | Age: 29
Discharge: HOME | End: 2019-10-18
Payer: COMMERCIAL

## 2019-10-18 VITALS
DIASTOLIC BLOOD PRESSURE: 78 MMHG | TEMPERATURE: 97.9 F | RESPIRATION RATE: 16 BRPM | SYSTOLIC BLOOD PRESSURE: 127 MMHG | HEART RATE: 79 BPM

## 2019-10-18 DIAGNOSIS — C78.7 COLON CANCER METASTASIZED TO LIVER (HCC): Primary | ICD-10-CM

## 2019-10-18 DIAGNOSIS — C18.9 COLON CANCER METASTASIZED TO LIVER (HCC): Primary | ICD-10-CM

## 2019-10-18 PROCEDURE — 96523 IRRIG DRUG DELIVERY DEVICE: CPT

## 2019-10-18 PROCEDURE — 74011250636 HC RX REV CODE- 250/636: Performed by: NURSE PRACTITIONER

## 2019-10-18 RX ORDER — SODIUM CHLORIDE 9 MG/ML
10 INJECTION INTRAMUSCULAR; INTRAVENOUS; SUBCUTANEOUS AS NEEDED
Status: DISCONTINUED | OUTPATIENT
Start: 2019-10-18 | End: 2019-10-19 | Stop reason: HOSPADM

## 2019-10-18 RX ORDER — SODIUM CHLORIDE 0.9 % (FLUSH) 0.9 %
10 SYRINGE (ML) INJECTION AS NEEDED
Status: DISCONTINUED | OUTPATIENT
Start: 2019-10-18 | End: 2019-10-19 | Stop reason: HOSPADM

## 2019-10-18 RX ORDER — HEPARIN 100 UNIT/ML
300-500 SYRINGE INTRAVENOUS AS NEEDED
Status: DISCONTINUED | OUTPATIENT
Start: 2019-10-18 | End: 2019-10-19 | Stop reason: HOSPADM

## 2019-10-18 RX ADMIN — Medication 500 UNITS: at 14:18

## 2019-10-18 RX ADMIN — Medication 10 ML: at 14:18

## 2019-10-18 NOTE — PROGRESS NOTES
[facility] VISIT NOTE 
 
1410. Pt arrived at Health system ambulatory and in no distress for Pump Removal.  Assessment completed, pt c/o feeling tired and run down. RCW chest port already accessed with 5fu pump attached. Verified pump finished infusing. Tolerated treatment well, no adverse reaction noted. Port de-accessed and flushed per protocol. Positive blood return noted. Patient Vitals for the past 12 hrs: 
 Temp Pulse Resp BP  
10/18/19 1414 97.9 °F (36.6 °C) 79 16 127/78  
 
1420. D/C'd from Health system ambulatory and in no distress.  Next appointment is 10/28/19 at 9:00 am.

## 2019-10-28 ENCOUNTER — HOSPITAL ENCOUNTER (OUTPATIENT)
Dept: INFUSION THERAPY | Age: 29
End: 2019-10-28
Payer: COMMERCIAL

## 2019-10-30 ENCOUNTER — APPOINTMENT (OUTPATIENT)
Dept: INFUSION THERAPY | Age: 29
End: 2019-10-30
Payer: COMMERCIAL

## 2019-10-31 ENCOUNTER — HOSPITAL ENCOUNTER (OUTPATIENT)
Dept: CT IMAGING | Age: 29
Discharge: HOME | End: 2019-10-31
Attending: NURSE PRACTITIONER
Payer: COMMERCIAL

## 2019-10-31 DIAGNOSIS — C78.7 COLON CANCER METASTASIZED TO LIVER (HCC): ICD-10-CM

## 2019-10-31 DIAGNOSIS — C78.00 MALIGNANT NEOPLASM METASTATIC TO LUNG, UNSPECIFIED LATERALITY (HCC): ICD-10-CM

## 2019-10-31 DIAGNOSIS — C18.9 COLON CANCER METASTASIZED TO LIVER (HCC): ICD-10-CM

## 2019-10-31 PROCEDURE — 74177 CT ABD & PELVIS W/CONTRAST: CPT

## 2019-10-31 PROCEDURE — 74011636320 HC RX REV CODE- 636/320: Performed by: RADIOLOGY

## 2019-10-31 RX ADMIN — IOPAMIDOL 100 ML: 755 INJECTION, SOLUTION INTRAVENOUS at 13:27

## 2019-11-07 RX ORDER — ATROPINE SULFATE 0.4 MG/ML
0.4 INJECTION, SOLUTION ENDOTRACHEAL; INTRAMEDULLARY; INTRAMUSCULAR; INTRAVENOUS; SUBCUTANEOUS
Status: CANCELLED | OUTPATIENT
Start: 2019-11-27

## 2019-11-07 RX ORDER — PALONOSETRON 0.05 MG/ML
0.25 INJECTION, SOLUTION INTRAVENOUS ONCE
Status: CANCELLED | OUTPATIENT
Start: 2019-11-12

## 2019-11-07 RX ORDER — ONDANSETRON 2 MG/ML
8 INJECTION INTRAMUSCULAR; INTRAVENOUS AS NEEDED
Status: CANCELLED | OUTPATIENT
Start: 2019-11-12

## 2019-11-07 RX ORDER — SODIUM CHLORIDE 0.9 % (FLUSH) 0.9 %
10 SYRINGE (ML) INJECTION AS NEEDED
Status: CANCELLED
Start: 2019-11-12

## 2019-11-07 RX ORDER — SODIUM CHLORIDE 9 MG/ML
25 INJECTION, SOLUTION INTRAVENOUS CONTINUOUS
Status: CANCELLED | OUTPATIENT
Start: 2019-11-12

## 2019-11-07 RX ORDER — DIPHENHYDRAMINE HYDROCHLORIDE 50 MG/ML
50 INJECTION, SOLUTION INTRAMUSCULAR; INTRAVENOUS AS NEEDED
Status: CANCELLED
Start: 2019-11-12

## 2019-11-07 RX ORDER — HEPARIN 100 UNIT/ML
300-500 SYRINGE INTRAVENOUS AS NEEDED
Status: CANCELLED
Start: 2019-11-14

## 2019-11-07 RX ORDER — HEPARIN 100 UNIT/ML
300-500 SYRINGE INTRAVENOUS AS NEEDED
Status: CANCELLED
Start: 2019-11-12

## 2019-11-07 RX ORDER — SODIUM CHLORIDE 9 MG/ML
10 INJECTION INTRAMUSCULAR; INTRAVENOUS; SUBCUTANEOUS AS NEEDED
Status: CANCELLED | OUTPATIENT
Start: 2019-11-14

## 2019-11-07 RX ORDER — ALBUTEROL SULFATE 0.83 MG/ML
2.5 SOLUTION RESPIRATORY (INHALATION) AS NEEDED
Status: CANCELLED
Start: 2019-11-12

## 2019-11-07 RX ORDER — DEXTROSE MONOHYDRATE 50 MG/ML
25 INJECTION, SOLUTION INTRAVENOUS CONTINUOUS
Status: CANCELLED
Start: 2019-11-12

## 2019-11-07 RX ORDER — ATROPINE SULFATE 0.4 MG/ML
0.4 INJECTION, SOLUTION ENDOTRACHEAL; INTRAMEDULLARY; INTRAMUSCULAR; INTRAVENOUS; SUBCUTANEOUS
Status: CANCELLED | OUTPATIENT
Start: 2019-11-12

## 2019-11-07 RX ORDER — FLUOROURACIL 50 MG/ML
400 INJECTION, SOLUTION INTRAVENOUS ONCE
Status: CANCELLED | OUTPATIENT
Start: 2019-11-12 | End: 2019-11-12

## 2019-11-07 RX ORDER — ATROPINE SULFATE 0.4 MG/ML
0.4 INJECTION, SOLUTION ENDOTRACHEAL; INTRAMEDULLARY; INTRAMUSCULAR; INTRAVENOUS; SUBCUTANEOUS ONCE
Status: CANCELLED
Start: 2019-11-12

## 2019-11-07 RX ORDER — SODIUM CHLORIDE 9 MG/ML
10 INJECTION INTRAMUSCULAR; INTRAVENOUS; SUBCUTANEOUS AS NEEDED
Status: CANCELLED | OUTPATIENT
Start: 2019-11-12

## 2019-11-07 RX ORDER — ATROPINE SULFATE 0.4 MG/ML
0.4 INJECTION, SOLUTION ENDOTRACHEAL; INTRAMEDULLARY; INTRAMUSCULAR; INTRAVENOUS; SUBCUTANEOUS ONCE
Status: CANCELLED
Start: 2019-11-27

## 2019-11-07 RX ORDER — EPINEPHRINE 1 MG/ML
0.3 INJECTION, SOLUTION, CONCENTRATE INTRAVENOUS AS NEEDED
Status: CANCELLED | OUTPATIENT
Start: 2019-11-12

## 2019-11-07 RX ORDER — SODIUM CHLORIDE 0.9 % (FLUSH) 0.9 %
10 SYRINGE (ML) INJECTION AS NEEDED
Status: CANCELLED
Start: 2019-11-14

## 2019-11-07 RX ORDER — ATROPINE SULFATE 0.4 MG/ML
0.4 INJECTION, SOLUTION ENDOTRACHEAL; INTRAMEDULLARY; INTRAMUSCULAR; INTRAVENOUS; SUBCUTANEOUS ONCE
Status: CANCELLED
Start: 2020-02-11

## 2019-11-07 RX ORDER — ACETAMINOPHEN 325 MG/1
650 TABLET ORAL AS NEEDED
Status: CANCELLED
Start: 2019-11-12

## 2019-11-07 RX ORDER — HYDROCORTISONE SODIUM SUCCINATE 100 MG/2ML
100 INJECTION, POWDER, FOR SOLUTION INTRAMUSCULAR; INTRAVENOUS AS NEEDED
Status: CANCELLED | OUTPATIENT
Start: 2019-11-12

## 2019-11-07 RX ORDER — ATROPINE SULFATE 0.4 MG/ML
0.4 INJECTION, SOLUTION ENDOTRACHEAL; INTRAMEDULLARY; INTRAMUSCULAR; INTRAVENOUS; SUBCUTANEOUS
Status: CANCELLED | OUTPATIENT
Start: 2020-02-11

## 2019-11-08 NOTE — PROGRESS NOTES
[facility] [place] at [place] [address] [address] [facility]  W: [phone]  F: [phone]     Reason for Visit:   Jose Alberto Ritchie is a 30 y.o. male who is seen for follow up of metastatic colon cancer. Treatment History:   · CT A/P 3/25/2018: Indeterminate 2 x 1 cm low-density liver lesion. · CT C/A/P with triphase liver 3/27/2018: No evidence of metastatic disease to the chest. Multiple ill-defined hepatic lesions. These do not represent simple cyst.  · CT guided liver biopsy 3/27/2018: metastatic adenocarcinoma, KRAS wild type, NRAS wild type  · Colectomy by Dr. Radha Forte 3/29/2018: Adenocarcinoma, moderately differentiated. Negative margins. 3/16 nodes involved. pMMR  · Stage IV (pT3 pN1b pM1) Colon Cancer  · Chemotherapy with FOLFOX and bevacizumab 5/1/2018 - 7/12/2018 for a total of 6 cycles, dany administered with cycles 2-4  · Resumed chemotherapy with FOLFOX and bevacizumab from 9/25/2018 to 12/20/2018. Avastin held after 10/23/2018 in preparation for surgery. · Underwent diagnostic laparoscopy with microwave liver ablation to right hepatic lobe 1/30/2019  · CT Chest 4/9/2019: Several new subcentimeter pulmonary nodules in the bilateral lungs worrisome for pulmonary metastases. Status post interval right hepatic lobe embolization. Several increased areas of hypodensity in the right hepatic lobe are worrisome for progression of hepatic metastases. There is a new tract of hypodensity in the left lateral hepatic lobe extending to a hypodense lesion. · Resumed chemotherapy with FOLFOX and bevacizumab 4/29/2019 to 7/5/2019, stopped for oxaliplatin reaction  · Chemotherapy with FOLFIRI and Panitumumab beginning 7/16/2019    History of Present Illness:   Presents for follow up. This cycle has been delayed x 1 week due to patient preference. He is taking an extra week off this cycle due to Thanksgiving holiday. He is having family in town.  He was in [place] this past weekend for a music festival. Feeling well overall. Rash is stable. No cough or congestion. Appetite has been normal.     Itching skin to upper arms due to dry skin. Constipation is bothering him. He is taking Miralax, but is missing doses of this. He has taken a leave of absence from [employer], continues to work at [employer]. Worried about rash interfering with his work activities. He is unaccompanied today. PAST HISTORY: The following sections were reviewed and updated in the EMR as appropriate: PMH, SH, FH, Medications, Allergies. Allergies   Allergen Reactions    Oxaliplatin Other (comments)     Chest pain, abdominal pain, burning sensation, cold sweats      Review of Systems: A complete review of systems was obtained, reviewed, and scanned into the EMR. Pertinent findings reviewed above. Physical Exam:     Visit Vitals  BP (!) 134/92 (BP 1 Location: Left arm, BP Patient Position: Sitting) Comment: . Pulse 75   Temp 98 °F (36.7 °C) (Temporal)   Resp 16   Ht 6' (1.829 m)   Wt 290 lb (131.5 kg)   SpO2 97%   BMI 39.33 kg/m²     ECOG PS: 0  General: No distress, obese  Eyes: PERRLA, anicteric sclerae  HENT: Atraumatic, OP clear  Neck: Supple  Lymphatic: No cervical, supraclavicular, or inguinal adenopathy  Respiratory: CTAB, normal respiratory effort  CV: Normal rate, regular rhythm, no murmurs, no peripheral edema  GI: Soft, nontender, nondistended, no masses, unable to assess for hepatomegaly, no splenomegaly  MS: Normal gait and station. Digits without clubbing or cyanosis. Skin: No ecchymoses, or petechiae. Normal temperature, turgor, and texture. Scattered macules to face and back of neck.  Dry patched to upper arms and back  Psych: Alert, oriented, appropriate affect, normal judgment/insight    Results:     Lab Results   Component Value Date/Time    WBC 8.4 11/12/2019 10:07 AM    HGB 13.9 11/12/2019 10:07 AM    HCT 44.3 11/12/2019 10:07 AM    PLATELET 758 41/00/6695 10:07 AM    MCV 80.0 11/12/2019 10:07 AM    ABS. NEUTROPHILS 5.2 11/12/2019 10:07 AM    Hemoglobin (POC) 15.2 11/12/2018 12:58 PM     Lab Results   Component Value Date/Time    Sodium 142 11/12/2019 10:07 AM    Potassium 3.9 11/12/2019 10:07 AM    Chloride 111 (H) 11/12/2019 10:07 AM    CO2 27 11/12/2019 10:07 AM    Glucose 93 11/12/2019 10:07 AM    BUN 10 11/12/2019 10:07 AM    Creatinine 0.69 (L) 11/12/2019 10:07 AM    GFR est AA >60 11/12/2019 10:07 AM    GFR est non-AA >60 11/12/2019 10:07 AM    Calcium 8.5 11/12/2019 10:07 AM     Lab Results   Component Value Date/Time    Bilirubin, total 0.2 11/12/2019 10:07 AM    ALT (SGPT) 26 11/12/2019 10:07 AM    AST (SGOT) 13 (L) 11/12/2019 10:07 AM    Alk. phosphatase 86 11/12/2019 10:07 AM    Protein, total 6.9 11/12/2019 10:07 AM    Albumin 3.0 (L) 11/12/2019 10:07 AM    Globulin 3.9 11/12/2019 10:07 AM       CT abd/pelvis at [address] [place] [address] on 3/19/2019: Interval laparoscopic microscope ablation of several left hepatic lobe lesions. Interval right portal vein embolizations. Increase in size of several right hepatic lobe lesions. Development of multiple pulmonary nodules in the imaged lung bases highly concerning for metastatic disease. Enlarged portal caval node, metastatic diease is not excluded. Stranding in the subcutaneous soft tissue related to laparoscopy. CT chest 4/9/2019:   1. Several new subcentimeter pulmonary nodules in the bilateral lungs worrisome for pulmonary metastases. 2. Status post interval right hepatic lobe embolization. Several increased areas of hypodensity in the right hepatic lobe are worrisome for progression of hepatic metastases. There is a new tract of hypodensity in the left lateral hepatic lobe extending to a hypodense lesion. This may represent interval intervention. Recommend correlation with prior procedural history and studies. Dedicated multiphase liver imaging could be done for further evaluation. CT C/A/P 7/22/2019: Improved hepatic metastatic disease. Mixed response in pulmonary nodules. CT C/A/P 10/31/2019:   1. Findings suggest response to treatment in the lungs with the 2 largest lung lesions having diminished in their solid component while the overall dimensions have not changed. Multiple other smaller nodules are not appreciably changed in size or appearance which may be due to their small size and inability to distinguish significant change. 2. Findings suggest response to treatment in the liver. Diminished size of 2 hepatic lesions in the posterior hepatic lobe with no significant change in the appearance of a lesion in the left hepatic lobe. Assessment:   1) Metastatic Colon Cancer  Stage IV, pMMR, KRAS/NRAS wild type  He has metastatic disease within his liver. His primary tumor has been resected. He has received palliative chemotherapy with FOLFOX and Bevacizumab, followed by a break to undergo liver directed therapy. Unfortunately, he developed pulmonary metastases during this break. We resumed chemotherapy with FOLFOX and Avastin. Therapy was inconsistent due to patient's missed appointments and frequent noncompliance. Oxaliplatin held with cycle 15 due to patient preference. With cycle 16 patient developed concern for allergic reaction to Oxaliplatin with intense abdominal pain, flushing and tachycardia. Similar issues had occurred with prior cycles, but seemed to be worsening with each treatment. Changed to FOLFIRI with Panitumumab 7/16/2019. His noncompliance has persistedt, frequently no-shows for treatment, or shows up hours late, despite my repeated discussions with him about the importance of maintaining compliance. This cycle was delayed x 1 week due to patient calling to cancel treatment on 10/28/2019. Therapy overall tolerating well with exception of grade 1 rash and grade 1 constipation. CT scans obtained since last cycle indicative of response to therapy, reviewed in detail today.  Will proceed with cycle 7 today as ordered. Atropine premedication then additional dose prior to discharge due to cholinergic symptoms of sweating and abdominal pain. Foundation one obtained and confirms KRAS/NRAS wild type status. No other actionable mutations. Will consider Lonsurf vs Regorafinib vs clinical trial at progression. He will be seen each cycle of therapy. Repeat CT imaging every 6 cycles. He would like to miss treatment week of Thanksgiving. Next cycle will also be delayed one week to move to 12/3/2019    2) Risk of infertility  We have previously reviewed that chemotherapy can potentially cause infertility. He has undergone sperm cryopreservation. 3) Genetic risk  Testing at [facility] negative. 4) Hypertension  Continue Metoprolol. Follow up with PCP for continued elevated BP. 5) Chemotherapy induced neuropathy. Should improve on FOLFIRI based therapy. Monitor. 6) Depression / Insomnia  No longer on Remeron. Med list updated. Has not yet started Celexa. 7) Mucositis  Magic mouthwash PRN. 8) Rash, drug  Due to Panitumumab. Continue Clindamycin cream and doxycycline. Panitumumab held with cycle 2.     9) Noncompliance  He has agreed to come in for treatment as scheduled. We reviewed schedule today. There will be a delay over week of Thanksgiving, but all other cycles as scheduled. 10) Emotional Well Being  No psychosocial concerns identified today. Patient has adequate support. Plan:     Proceed today with C7 of FOLFIRI with Panitumuab 6mg/kg (irinotecan 180mg/m2, leucovorin 400 mg/m2, fluorouracil 400 mg/m2, and a 46 hour infusion of fluorouracil 2400 mg/m2) given every 2 weeks  Labs: CBC, BMP prior to each treatment, hepatic function panel   Prophylactic antiemetics: Palonosetron and dexamethasone on the day of each chemotherapy infusion. Dexamethasone 8mg PO on days 2 and 3 after chemotherapy.   PRN antiemetics: Ondansetron, Prochlorperazine  PRN antidiarrheals: Atropine 0.4mg IV every 2 hours PRN during or immediately after irinotecan infusion, Imodium every 2 hours as needed at home  EMLA cream for port  Return to clinic every 2 weeks on therapy  Next cycle delayed x 1 week, follow up in 3 weeks    Patient was seen in conjunction with Renay Du NP.     Signed By: Uli Adams MD

## 2019-11-12 ENCOUNTER — OFFICE VISIT (OUTPATIENT)
Dept: ONCOLOGY | Age: 29
End: 2019-11-12

## 2019-11-12 ENCOUNTER — HOSPITAL ENCOUNTER (OUTPATIENT)
Dept: INFUSION THERAPY | Age: 29
Discharge: HOME | End: 2019-11-12
Payer: COMMERCIAL

## 2019-11-12 VITALS
BODY MASS INDEX: 39.28 KG/M2 | HEART RATE: 75 BPM | SYSTOLIC BLOOD PRESSURE: 134 MMHG | HEIGHT: 72 IN | RESPIRATION RATE: 16 BRPM | DIASTOLIC BLOOD PRESSURE: 92 MMHG | WEIGHT: 290 LBS | TEMPERATURE: 98 F | OXYGEN SATURATION: 97 %

## 2019-11-12 VITALS
OXYGEN SATURATION: 99 % | RESPIRATION RATE: 16 BRPM | BODY MASS INDEX: 39.28 KG/M2 | SYSTOLIC BLOOD PRESSURE: 139 MMHG | TEMPERATURE: 97.5 F | WEIGHT: 290 LBS | DIASTOLIC BLOOD PRESSURE: 92 MMHG | HEART RATE: 80 BPM | HEIGHT: 72 IN

## 2019-11-12 DIAGNOSIS — C78.7 COLON CANCER METASTASIZED TO LIVER (HCC): Primary | ICD-10-CM

## 2019-11-12 DIAGNOSIS — C18.9 COLON CANCER METASTASIZED TO LIVER (HCC): Primary | ICD-10-CM

## 2019-11-12 DIAGNOSIS — C78.00 MALIGNANT NEOPLASM METASTATIC TO LUNG, UNSPECIFIED LATERALITY (HCC): ICD-10-CM

## 2019-11-12 LAB
ALBUMIN SERPL-MCNC: 3 G/DL (ref 3.5–5)
ALBUMIN/GLOB SERPL: 0.8 {RATIO} (ref 1.1–2.2)
ALP SERPL-CCNC: 86 U/L (ref 45–117)
ALT SERPL-CCNC: 26 U/L (ref 12–78)
ANION GAP SERPL CALC-SCNC: 4 MMOL/L (ref 5–15)
AST SERPL-CCNC: 13 U/L (ref 15–37)
BASOPHILS # BLD: 0.1 K/UL (ref 0–0.1)
BASOPHILS NFR BLD: 1 % (ref 0–1)
BILIRUB SERPL-MCNC: 0.2 MG/DL (ref 0.2–1)
BUN SERPL-MCNC: 10 MG/DL (ref 6–20)
BUN/CREAT SERPL: 14 (ref 12–20)
CALCIUM SERPL-MCNC: 8.5 MG/DL (ref 8.5–10.1)
CHLORIDE SERPL-SCNC: 111 MMOL/L (ref 97–108)
CO2 SERPL-SCNC: 27 MMOL/L (ref 21–32)
CREAT SERPL-MCNC: 0.69 MG/DL (ref 0.7–1.3)
DIFFERENTIAL METHOD BLD: ABNORMAL
EOSINOPHIL # BLD: 0.4 K/UL (ref 0–0.4)
EOSINOPHIL NFR BLD: 5 % (ref 0–7)
ERYTHROCYTE [DISTWIDTH] IN BLOOD BY AUTOMATED COUNT: 17.9 % (ref 11.5–14.5)
GLOBULIN SER CALC-MCNC: 3.9 G/DL (ref 2–4)
GLUCOSE SERPL-MCNC: 93 MG/DL (ref 65–100)
HCT VFR BLD AUTO: 44.3 % (ref 36.6–50.3)
HGB BLD-MCNC: 13.9 G/DL (ref 12.1–17)
IMM GRANULOCYTES # BLD AUTO: 0.1 K/UL (ref 0–0.04)
IMM GRANULOCYTES NFR BLD AUTO: 1 % (ref 0–0.5)
LYMPHOCYTES # BLD: 2.1 K/UL (ref 0.8–3.5)
LYMPHOCYTES NFR BLD: 25 % (ref 12–49)
MCH RBC QN AUTO: 25.1 PG (ref 26–34)
MCHC RBC AUTO-ENTMCNC: 31.4 G/DL (ref 30–36.5)
MCV RBC AUTO: 80 FL (ref 80–99)
MONOCYTES # BLD: 0.6 K/UL (ref 0–1)
MONOCYTES NFR BLD: 7 % (ref 5–13)
NEUTS SEG # BLD: 5.2 K/UL (ref 1.8–8)
NEUTS SEG NFR BLD: 61 % (ref 32–75)
NRBC # BLD: 0 K/UL (ref 0–0.01)
NRBC BLD-RTO: 0 PER 100 WBC
PLATELET # BLD AUTO: 329 K/UL (ref 150–400)
PMV BLD AUTO: 9.7 FL (ref 8.9–12.9)
POTASSIUM SERPL-SCNC: 3.9 MMOL/L (ref 3.5–5.1)
PROT SERPL-MCNC: 6.9 G/DL (ref 6.4–8.2)
RBC # BLD AUTO: 5.54 M/UL (ref 4.1–5.7)
SODIUM SERPL-SCNC: 142 MMOL/L (ref 136–145)
WBC # BLD AUTO: 8.4 K/UL (ref 4.1–11.1)

## 2019-11-12 PROCEDURE — 96375 TX/PRO/DX INJ NEW DRUG ADDON: CPT

## 2019-11-12 PROCEDURE — 96411 CHEMO IV PUSH ADDL DRUG: CPT

## 2019-11-12 PROCEDURE — 74011000250 HC RX REV CODE- 250: Performed by: NURSE PRACTITIONER

## 2019-11-12 PROCEDURE — 36415 COLL VENOUS BLD VENIPUNCTURE: CPT

## 2019-11-12 PROCEDURE — 96413 CHEMO IV INFUSION 1 HR: CPT

## 2019-11-12 PROCEDURE — 77030012965 HC NDL HUBR BBMI -A

## 2019-11-12 PROCEDURE — 96368 THER/DIAG CONCURRENT INF: CPT

## 2019-11-12 PROCEDURE — 74011000258 HC RX REV CODE- 258: Performed by: NURSE PRACTITIONER

## 2019-11-12 PROCEDURE — 96416 CHEMO PROLONG INFUSE W/PUMP: CPT

## 2019-11-12 PROCEDURE — 85025 COMPLETE CBC W/AUTO DIFF WBC: CPT

## 2019-11-12 PROCEDURE — 74011250636 HC RX REV CODE- 250/636: Performed by: NURSE PRACTITIONER

## 2019-11-12 PROCEDURE — 96415 CHEMO IV INFUSION ADDL HR: CPT

## 2019-11-12 PROCEDURE — 80053 COMPREHEN METABOLIC PANEL: CPT

## 2019-11-12 PROCEDURE — 96417 CHEMO IV INFUS EACH ADDL SEQ: CPT

## 2019-11-12 RX ORDER — PALONOSETRON 0.05 MG/ML
0.25 INJECTION, SOLUTION INTRAVENOUS ONCE
Status: COMPLETED | OUTPATIENT
Start: 2019-11-12 | End: 2019-11-12

## 2019-11-12 RX ORDER — SODIUM CHLORIDE 0.9 % (FLUSH) 0.9 %
10 SYRINGE (ML) INJECTION AS NEEDED
Status: ACTIVE | OUTPATIENT
Start: 2019-11-12 | End: 2019-11-12

## 2019-11-12 RX ORDER — DEXTROSE MONOHYDRATE 50 MG/ML
25 INJECTION, SOLUTION INTRAVENOUS CONTINUOUS
Status: DISPENSED | OUTPATIENT
Start: 2019-11-12 | End: 2019-11-12

## 2019-11-12 RX ORDER — DIPHENHYDRAMINE HCL 25 MG
25 CAPSULE ORAL
Status: DISCONTINUED | OUTPATIENT
Start: 2019-11-12 | End: 2019-11-13 | Stop reason: HOSPADM

## 2019-11-12 RX ORDER — HEPARIN 100 UNIT/ML
300-500 SYRINGE INTRAVENOUS AS NEEDED
Status: ACTIVE | OUTPATIENT
Start: 2019-11-12 | End: 2019-11-12

## 2019-11-12 RX ORDER — ATROPINE SULFATE 0.4 MG/ML
0.4 INJECTION, SOLUTION ENDOTRACHEAL; INTRAMEDULLARY; INTRAMUSCULAR; INTRAVENOUS; SUBCUTANEOUS ONCE
Status: COMPLETED | OUTPATIENT
Start: 2019-11-12 | End: 2019-11-12

## 2019-11-12 RX ORDER — SODIUM CHLORIDE 9 MG/ML
25 INJECTION, SOLUTION INTRAVENOUS CONTINUOUS
Status: DISPENSED | OUTPATIENT
Start: 2019-11-12 | End: 2019-11-12

## 2019-11-12 RX ORDER — FLUOROURACIL 50 MG/ML
400 INJECTION, SOLUTION INTRAVENOUS ONCE
Status: COMPLETED | OUTPATIENT
Start: 2019-11-12 | End: 2019-11-12

## 2019-11-12 RX ORDER — SODIUM CHLORIDE 9 MG/ML
10 INJECTION INTRAMUSCULAR; INTRAVENOUS; SUBCUTANEOUS AS NEEDED
Status: ACTIVE | OUTPATIENT
Start: 2019-11-12 | End: 2019-11-12

## 2019-11-12 RX ADMIN — SODIUM CHLORIDE 12 MG: 900 INJECTION, SOLUTION INTRAVENOUS at 13:34

## 2019-11-12 RX ADMIN — ATROPINE SULFATE 0.4 MG: 0.4 INJECTION, SOLUTION INTRAMUSCULAR; INTRAVENOUS; SUBCUTANEOUS at 14:56

## 2019-11-12 RX ADMIN — ATROPINE SULFATE 0.4 MG: 0.4 INJECTION, SOLUTION INTRAMUSCULAR; INTRAVENOUS; SUBCUTANEOUS at 16:42

## 2019-11-12 RX ADMIN — FLUOROURACIL 1052 MG: 50 INJECTION, SOLUTION INTRAVENOUS at 16:50

## 2019-11-12 RX ADMIN — SODIUM CHLORIDE 25 ML/HR: 900 INJECTION, SOLUTION INTRAVENOUS at 12:16

## 2019-11-12 RX ADMIN — PANITUMUMAB 802.8 MG: 400 SOLUTION INTRAVENOUS at 12:23

## 2019-11-12 RX ADMIN — PALONOSETRON 0.25 MG: 0.25 INJECTION, SOLUTION INTRAVENOUS at 13:34

## 2019-11-12 RX ADMIN — SODIUM CHLORIDE 10 ML: 9 INJECTION, SOLUTION INTRAMUSCULAR; INTRAVENOUS; SUBCUTANEOUS at 10:09

## 2019-11-12 RX ADMIN — LEUCOVORIN CALCIUM 1052 MG: 500 INJECTION, POWDER, LYOPHILIZED, FOR SOLUTION INTRAMUSCULAR; INTRAVENOUS at 15:01

## 2019-11-12 RX ADMIN — DEXTROSE MONOHYDRATE 25 ML/HR: 5 INJECTION, SOLUTION INTRAVENOUS at 15:01

## 2019-11-12 RX ADMIN — FLUOROURACIL 6312 MG: 50 INJECTION, SOLUTION INTRAVENOUS at 16:55

## 2019-11-12 RX ADMIN — IRINOTECAN HYDROCHLORIDE 473 MG: 20 INJECTION, SOLUTION INTRAVENOUS at 15:01

## 2019-11-12 NOTE — PROGRESS NOTES
[facility] Progress Note Date: 2019 Name: Waynard Denver 
 
MRN: [id number] : 1990 Mr. Gonsales Arrived ambulatory and in no distress for C7D1 of Folfiri + Vectabix Regimen. Assessment was completed, no acute issues at this time, no new complaints voiced. R chest wall port accessed without difficulty, labs drawn & sent for processing. Chemotherapy Flowsheet 10/16/2019 Cycle C6D1 Date 10/16/2019 Drug / Regimen Folfox/Vectibix Pre Meds given Notes given Patient proceed to appointment with Dr. Rubens Alfonso. Mr. Delma Harris vitals were reviewed. Visit Vitals /82 Pulse 77 Temp 97.2 °F (36.2 °C) Resp 18 Ht 6' (1.829 m) Wt 131.5 kg (290 lb) SpO2 99% BMI 39.33 kg/m² Lab results were obtained and reviewed. Recent Results (from the past 12 hour(s)) CBC WITH AUTOMATED DIFF Collection Time: 19 10:07 AM  
Result Value Ref Range WBC 8.4 4.1 - 11.1 K/uL  
 RBC 5.54 4.10 - 5.70 M/uL  
 HGB 13.9 12.1 - 17.0 g/dL HCT 44.3 36.6 - 50.3 % MCV 80.0 80.0 - 99.0 FL  
 MCH 25.1 (L) 26.0 - 34.0 PG  
 MCHC 31.4 30.0 - 36.5 g/dL  
 RDW 17.9 (H) 11.5 - 14.5 % PLATELET 525 787 - 994 K/uL MPV 9.7 8.9 - 12.9 FL  
 NRBC 0.0 0  WBC ABSOLUTE NRBC 0.00 0.00 - 0.01 K/uL NEUTROPHILS 61 32 - 75 % LYMPHOCYTES 25 12 - 49 % MONOCYTES 7 5 - 13 % EOSINOPHILS 5 0 - 7 % BASOPHILS 1 0 - 1 % IMMATURE GRANULOCYTES 1 (H) 0.0 - 0.5 % ABS. NEUTROPHILS 5.2 1.8 - 8.0 K/UL  
 ABS. LYMPHOCYTES 2.1 0.8 - 3.5 K/UL  
 ABS. MONOCYTES 0.6 0.0 - 1.0 K/UL  
 ABS. EOSINOPHILS 0.4 0.0 - 0.4 K/UL  
 ABS. BASOPHILS 0.1 0.0 - 0.1 K/UL  
 ABS. IMM. GRANS. 0.1 (H) 0.00 - 0.04 K/UL  
 DF AUTOMATED Medications: 
 
 
Mr. Clarice Nettles tolerated treatment well and was discharged from [place] [address] in stable condition. Port de-accessed, flushed & heparinized per protocol. He is to return on  at 1400 for his next appointment.  
 
Kelly Osorio RN 
 November 12, 2019

## 2019-11-12 NOTE — PROGRESS NOTES
[facility]    Date: 2019    Name: Katiana Downey    MRN: [id number]         : 1990    1630: SBAR received from Sheyla Orozco RN    Medications Adminstered:   IVP Atropine  IVP 5FU push  IV 5FU CADD pump    Visit Vitals  BP (!) 139/92   Pulse 80   Temp 97.5 °F (36.4 °C)   Resp 16   Ht 6' (1.829 m)   Wt 131.5 kg (290 lb)   SpO2 99%   BMI 39.33 kg/m²       Mr. Gonsales tolerated treatment well and was discharged from [place] [address] in stable condition at 1700. Port remains infusing with 5FU pump. He is to return on  at 1400 for his next appointment.     Ashlyn Mclain RN  2019

## 2019-11-12 NOTE — PROGRESS NOTES
Shaunna Finn is a 30 y.o. male follow up for colon cancer. 1. Have you been to the ER, urgent care clinic since your last visit? Hospitalized since your last visit?no     2. Have you seen or consulted any other health care providers outside of the [address] [place] [address] since your last visit? Include any pap smears or colon screening.  no

## 2019-11-14 ENCOUNTER — HOSPITAL ENCOUNTER (OUTPATIENT)
Dept: INFUSION THERAPY | Age: 29
Discharge: HOME | End: 2019-11-14
Payer: COMMERCIAL

## 2019-11-14 VITALS
TEMPERATURE: 97.6 F | DIASTOLIC BLOOD PRESSURE: 77 MMHG | RESPIRATION RATE: 16 BRPM | SYSTOLIC BLOOD PRESSURE: 131 MMHG | HEART RATE: 80 BPM

## 2019-11-14 DIAGNOSIS — C18.9 COLON CANCER METASTASIZED TO LIVER (HCC): Primary | ICD-10-CM

## 2019-11-14 DIAGNOSIS — C78.7 COLON CANCER METASTASIZED TO LIVER (HCC): Primary | ICD-10-CM

## 2019-11-14 PROCEDURE — 90471 IMMUNIZATION ADMIN: CPT

## 2019-11-14 PROCEDURE — 90686 IIV4 VACC NO PRSV 0.5 ML IM: CPT | Performed by: NURSE PRACTITIONER

## 2019-11-14 PROCEDURE — 74011250636 HC RX REV CODE- 250/636: Performed by: NURSE PRACTITIONER

## 2019-11-14 RX ORDER — SODIUM CHLORIDE 0.9 % (FLUSH) 0.9 %
10 SYRINGE (ML) INJECTION AS NEEDED
Status: DISCONTINUED | OUTPATIENT
Start: 2019-11-14 | End: 2019-11-15 | Stop reason: HOSPADM

## 2019-11-14 RX ORDER — SODIUM CHLORIDE 9 MG/ML
10 INJECTION INTRAMUSCULAR; INTRAVENOUS; SUBCUTANEOUS AS NEEDED
Status: DISCONTINUED | OUTPATIENT
Start: 2019-11-14 | End: 2019-11-15 | Stop reason: HOSPADM

## 2019-11-14 RX ORDER — HEPARIN 100 UNIT/ML
300-500 SYRINGE INTRAVENOUS AS NEEDED
Status: DISCONTINUED | OUTPATIENT
Start: 2019-11-14 | End: 2019-11-15 | Stop reason: HOSPADM

## 2019-11-14 RX ADMIN — INFLUENZA VIRUS VACCINE 0.5 ML: 15; 15; 15; 15 SUSPENSION INTRAMUSCULAR at 15:17

## 2019-11-14 NOTE — PROGRESS NOTES
Outpatient [facility]fusion [facility] Short Visit Progress Note Patient admitted to [facility] for pump d/c and flu shot. Ambulatory in stable condition. Assessment completed. No new concerns voiced. CADD pump complete. Flu shot given. Vital Signs: 
Visit Vitals /77 Pulse 80 Temp 97.6 °F (36.4 °C) Resp 16  
 
R chest port with positive blood return. Medications: 
Medications Administered   
 influenza vaccine 2019-20 (6 mos+)(PF) (FLUARIX/FLULAVAL/FLUZONE QUAD) injection 0.5 mL Admin Date 11/14/2019 Action Given Dose 0.5 mL Route IntraMUSCular Administered By Jenna Hu Patient tolerated treatment well. Patient discharged from [place] [address] ambulatory in no distress. Patient aware of next appointment. Burak Valdovinos RN Future Appointments Date Time Provider Neelam Dwyer 12/3/2019  9:30 AM Alpa Abernathy NP [facility]  
12/3/2019  9:30 AM [facility]  
12/5/2019  2:00 PM SS [facility]7 [facility]  
12/17/2019 10:00 [facility]  
12/19/2019  2:00 PM [facility] Jacki Kauffman

## 2019-11-26 RX ORDER — PALONOSETRON 0.05 MG/ML
0.25 INJECTION, SOLUTION INTRAVENOUS ONCE
Status: CANCELLED | OUTPATIENT
Start: 2019-12-03

## 2019-11-26 RX ORDER — HEPARIN 100 UNIT/ML
300-500 SYRINGE INTRAVENOUS AS NEEDED
Status: CANCELLED
Start: 2019-12-03

## 2019-11-26 RX ORDER — SODIUM CHLORIDE 9 MG/ML
10 INJECTION INTRAMUSCULAR; INTRAVENOUS; SUBCUTANEOUS AS NEEDED
Status: CANCELLED | OUTPATIENT
Start: 2019-12-03

## 2019-11-26 RX ORDER — EPINEPHRINE 1 MG/ML
0.3 INJECTION, SOLUTION, CONCENTRATE INTRAVENOUS AS NEEDED
Status: CANCELLED | OUTPATIENT
Start: 2019-12-03

## 2019-11-26 RX ORDER — SODIUM CHLORIDE 0.9 % (FLUSH) 0.9 %
10 SYRINGE (ML) INJECTION AS NEEDED
Status: CANCELLED
Start: 2019-12-03

## 2019-11-26 RX ORDER — SODIUM CHLORIDE 9 MG/ML
25 INJECTION, SOLUTION INTRAVENOUS CONTINUOUS
Status: CANCELLED | OUTPATIENT
Start: 2019-12-03

## 2019-11-26 RX ORDER — DIPHENHYDRAMINE HYDROCHLORIDE 50 MG/ML
50 INJECTION, SOLUTION INTRAMUSCULAR; INTRAVENOUS AS NEEDED
Status: CANCELLED
Start: 2019-12-03

## 2019-11-26 RX ORDER — ATROPINE SULFATE 0.4 MG/ML
0.4 INJECTION, SOLUTION ENDOTRACHEAL; INTRAMEDULLARY; INTRAMUSCULAR; INTRAVENOUS; SUBCUTANEOUS ONCE
Status: CANCELLED
Start: 2019-12-03

## 2019-11-26 RX ORDER — FLUOROURACIL 50 MG/ML
400 INJECTION, SOLUTION INTRAVENOUS ONCE
Status: CANCELLED | OUTPATIENT
Start: 2019-12-03 | End: 2019-12-03

## 2019-11-26 RX ORDER — ATROPINE SULFATE 0.4 MG/ML
0.4 INJECTION, SOLUTION ENDOTRACHEAL; INTRAMEDULLARY; INTRAMUSCULAR; INTRAVENOUS; SUBCUTANEOUS
Status: CANCELLED | OUTPATIENT
Start: 2019-12-03

## 2019-11-26 RX ORDER — ALBUTEROL SULFATE 0.83 MG/ML
2.5 SOLUTION RESPIRATORY (INHALATION) AS NEEDED
Status: CANCELLED
Start: 2019-12-03

## 2019-11-26 RX ORDER — ONDANSETRON 2 MG/ML
8 INJECTION INTRAMUSCULAR; INTRAVENOUS AS NEEDED
Status: CANCELLED | OUTPATIENT
Start: 2019-12-03

## 2019-11-26 RX ORDER — ACETAMINOPHEN 325 MG/1
650 TABLET ORAL AS NEEDED
Status: CANCELLED
Start: 2019-12-03

## 2019-11-26 RX ORDER — DEXTROSE MONOHYDRATE 50 MG/ML
25 INJECTION, SOLUTION INTRAVENOUS CONTINUOUS
Status: CANCELLED
Start: 2019-12-03

## 2019-11-26 RX ORDER — HYDROCORTISONE SODIUM SUCCINATE 100 MG/2ML
100 INJECTION, POWDER, FOR SOLUTION INTRAMUSCULAR; INTRAVENOUS AS NEEDED
Status: CANCELLED | OUTPATIENT
Start: 2019-12-03

## 2019-12-03 ENCOUNTER — HOSPITAL ENCOUNTER (OUTPATIENT)
Dept: INFUSION THERAPY | Age: 29
End: 2019-12-03

## 2019-12-04 RX ORDER — SODIUM CHLORIDE 9 MG/ML
10 INJECTION INTRAMUSCULAR; INTRAVENOUS; SUBCUTANEOUS AS NEEDED
Status: CANCELLED | OUTPATIENT
Start: 2019-12-10

## 2019-12-04 RX ORDER — DEXTROSE MONOHYDRATE 50 MG/ML
25 INJECTION, SOLUTION INTRAVENOUS CONTINUOUS
Status: CANCELLED
Start: 2019-12-10

## 2019-12-04 RX ORDER — ONDANSETRON 2 MG/ML
8 INJECTION INTRAMUSCULAR; INTRAVENOUS AS NEEDED
Status: CANCELLED | OUTPATIENT
Start: 2019-12-10

## 2019-12-04 RX ORDER — ALBUTEROL SULFATE 0.83 MG/ML
2.5 SOLUTION RESPIRATORY (INHALATION) AS NEEDED
Status: CANCELLED
Start: 2019-12-10

## 2019-12-04 RX ORDER — ATROPINE SULFATE 0.4 MG/ML
0.4 INJECTION, SOLUTION ENDOTRACHEAL; INTRAMEDULLARY; INTRAMUSCULAR; INTRAVENOUS; SUBCUTANEOUS ONCE
Status: CANCELLED
Start: 2019-12-10

## 2019-12-04 RX ORDER — HEPARIN 100 UNIT/ML
300-500 SYRINGE INTRAVENOUS AS NEEDED
Status: CANCELLED
Start: 2019-12-10

## 2019-12-04 RX ORDER — SODIUM CHLORIDE 9 MG/ML
25 INJECTION, SOLUTION INTRAVENOUS CONTINUOUS
Status: CANCELLED | OUTPATIENT
Start: 2019-12-10

## 2019-12-04 RX ORDER — DIPHENHYDRAMINE HYDROCHLORIDE 50 MG/ML
50 INJECTION, SOLUTION INTRAMUSCULAR; INTRAVENOUS AS NEEDED
Status: CANCELLED
Start: 2019-12-10

## 2019-12-04 RX ORDER — ACETAMINOPHEN 325 MG/1
650 TABLET ORAL AS NEEDED
Status: CANCELLED
Start: 2019-12-10

## 2019-12-04 RX ORDER — SODIUM CHLORIDE 0.9 % (FLUSH) 0.9 %
10 SYRINGE (ML) INJECTION AS NEEDED
Status: CANCELLED
Start: 2019-12-10

## 2019-12-04 RX ORDER — PALONOSETRON 0.05 MG/ML
0.25 INJECTION, SOLUTION INTRAVENOUS ONCE
Status: CANCELLED | OUTPATIENT
Start: 2019-12-10

## 2019-12-04 RX ORDER — HYDROCORTISONE SODIUM SUCCINATE 100 MG/2ML
100 INJECTION, POWDER, FOR SOLUTION INTRAMUSCULAR; INTRAVENOUS AS NEEDED
Status: CANCELLED | OUTPATIENT
Start: 2019-12-10

## 2019-12-04 RX ORDER — ATROPINE SULFATE 0.4 MG/ML
0.4 INJECTION, SOLUTION ENDOTRACHEAL; INTRAMEDULLARY; INTRAMUSCULAR; INTRAVENOUS; SUBCUTANEOUS
Status: CANCELLED | OUTPATIENT
Start: 2019-12-10

## 2019-12-04 RX ORDER — EPINEPHRINE 1 MG/ML
0.3 INJECTION, SOLUTION, CONCENTRATE INTRAVENOUS AS NEEDED
Status: CANCELLED | OUTPATIENT
Start: 2019-12-10

## 2019-12-04 RX ORDER — FLUOROURACIL 50 MG/ML
400 INJECTION, SOLUTION INTRAVENOUS ONCE
Status: CANCELLED | OUTPATIENT
Start: 2019-12-10 | End: 2019-12-10

## 2019-12-05 ENCOUNTER — HOSPITAL ENCOUNTER (OUTPATIENT)
Dept: INFUSION THERAPY | Age: 29
End: 2019-12-05

## 2019-12-10 ENCOUNTER — HOSPITAL ENCOUNTER (OUTPATIENT)
Dept: INFUSION THERAPY | Age: 29
End: 2019-12-10

## 2019-12-10 RX ORDER — DIPHENHYDRAMINE HYDROCHLORIDE 50 MG/ML
50 INJECTION, SOLUTION INTRAMUSCULAR; INTRAVENOUS AS NEEDED
Status: CANCELLED
Start: 2019-12-17

## 2019-12-10 RX ORDER — HEPARIN 100 UNIT/ML
300-500 SYRINGE INTRAVENOUS AS NEEDED
Status: CANCELLED
Start: 2019-12-17

## 2019-12-10 RX ORDER — ALBUTEROL SULFATE 0.83 MG/ML
2.5 SOLUTION RESPIRATORY (INHALATION) AS NEEDED
Status: CANCELLED
Start: 2019-12-17

## 2019-12-10 RX ORDER — DEXTROSE MONOHYDRATE 50 MG/ML
25 INJECTION, SOLUTION INTRAVENOUS CONTINUOUS
Status: CANCELLED
Start: 2019-12-17

## 2019-12-10 RX ORDER — ATROPINE SULFATE 0.4 MG/ML
0.4 INJECTION, SOLUTION ENDOTRACHEAL; INTRAMEDULLARY; INTRAMUSCULAR; INTRAVENOUS; SUBCUTANEOUS ONCE
Status: CANCELLED
Start: 2019-12-17

## 2019-12-10 RX ORDER — ACETAMINOPHEN 325 MG/1
650 TABLET ORAL AS NEEDED
Status: CANCELLED
Start: 2019-12-17

## 2019-12-10 RX ORDER — EPINEPHRINE 1 MG/ML
0.3 INJECTION, SOLUTION, CONCENTRATE INTRAVENOUS AS NEEDED
Status: CANCELLED | OUTPATIENT
Start: 2019-12-17

## 2019-12-10 RX ORDER — ATROPINE SULFATE 0.4 MG/ML
0.4 INJECTION, SOLUTION ENDOTRACHEAL; INTRAMEDULLARY; INTRAMUSCULAR; INTRAVENOUS; SUBCUTANEOUS
Status: CANCELLED | OUTPATIENT
Start: 2019-12-17

## 2019-12-10 RX ORDER — SODIUM CHLORIDE 9 MG/ML
10 INJECTION INTRAMUSCULAR; INTRAVENOUS; SUBCUTANEOUS AS NEEDED
Status: CANCELLED | OUTPATIENT
Start: 2019-12-17

## 2019-12-10 RX ORDER — SODIUM CHLORIDE 9 MG/ML
25 INJECTION, SOLUTION INTRAVENOUS CONTINUOUS
Status: CANCELLED | OUTPATIENT
Start: 2019-12-17

## 2019-12-10 RX ORDER — HYDROCORTISONE SODIUM SUCCINATE 100 MG/2ML
100 INJECTION, POWDER, FOR SOLUTION INTRAMUSCULAR; INTRAVENOUS AS NEEDED
Status: CANCELLED | OUTPATIENT
Start: 2019-12-17

## 2019-12-10 RX ORDER — ONDANSETRON 2 MG/ML
8 INJECTION INTRAMUSCULAR; INTRAVENOUS AS NEEDED
Status: CANCELLED | OUTPATIENT
Start: 2019-12-17

## 2019-12-10 RX ORDER — FLUOROURACIL 50 MG/ML
400 INJECTION, SOLUTION INTRAVENOUS ONCE
Status: CANCELLED | OUTPATIENT
Start: 2019-12-17 | End: 2019-12-17

## 2019-12-10 RX ORDER — SODIUM CHLORIDE 0.9 % (FLUSH) 0.9 %
10 SYRINGE (ML) INJECTION AS NEEDED
Status: CANCELLED
Start: 2019-12-17

## 2019-12-10 RX ORDER — PALONOSETRON 0.05 MG/ML
0.25 INJECTION, SOLUTION INTRAVENOUS ONCE
Status: CANCELLED | OUTPATIENT
Start: 2019-12-17

## 2019-12-12 ENCOUNTER — APPOINTMENT (OUTPATIENT)
Dept: INFUSION THERAPY | Age: 29
End: 2019-12-12

## 2019-12-17 ENCOUNTER — APPOINTMENT (OUTPATIENT)
Dept: INFUSION THERAPY | Age: 29
End: 2019-12-17

## 2019-12-17 ENCOUNTER — TELEPHONE (OUTPATIENT)
Dept: ONCOLOGY | Age: 29
End: 2019-12-17

## 2019-12-17 NOTE — TELEPHONE ENCOUNTER
Patient no-show to chemotherapy appointment today. He was called by our office to attempt to reschedule. No answer, voicemail was left. Last treatment 11/12/2019. He was due on 12/3/2019 (3 week interval per patient preference for Thanksgiving holiday), however, he did not show for this appointment. Treatment was rescheduled for 12/10/19 which he canceled and rescheduled for today, but failed to show for treatment today. Awaiting call back to reschedule appointment.

## 2019-12-19 ENCOUNTER — APPOINTMENT (OUTPATIENT)
Dept: INFUSION THERAPY | Age: 29
End: 2019-12-19

## 2019-12-19 ENCOUNTER — HOSPITAL ENCOUNTER (OUTPATIENT)
Dept: INFUSION THERAPY | Age: 29
End: 2019-12-19

## 2019-12-20 RX ORDER — DIPHENHYDRAMINE HYDROCHLORIDE 50 MG/ML
50 INJECTION, SOLUTION INTRAMUSCULAR; INTRAVENOUS AS NEEDED
Status: CANCELLED
Start: 2019-12-30

## 2019-12-20 RX ORDER — FLUOROURACIL 50 MG/ML
400 INJECTION, SOLUTION INTRAVENOUS ONCE
Status: CANCELLED | OUTPATIENT
Start: 2019-12-30 | End: 2019-12-30

## 2019-12-20 RX ORDER — HYDROCORTISONE SODIUM SUCCINATE 100 MG/2ML
100 INJECTION, POWDER, FOR SOLUTION INTRAMUSCULAR; INTRAVENOUS AS NEEDED
Status: CANCELLED | OUTPATIENT
Start: 2019-12-30

## 2019-12-20 RX ORDER — ATROPINE SULFATE 0.4 MG/ML
0.4 INJECTION, SOLUTION ENDOTRACHEAL; INTRAMEDULLARY; INTRAMUSCULAR; INTRAVENOUS; SUBCUTANEOUS ONCE
Status: CANCELLED
Start: 2019-12-30

## 2019-12-20 RX ORDER — SODIUM CHLORIDE 9 MG/ML
10 INJECTION INTRAMUSCULAR; INTRAVENOUS; SUBCUTANEOUS AS NEEDED
Status: CANCELLED | OUTPATIENT
Start: 2019-12-30

## 2019-12-20 RX ORDER — ONDANSETRON 2 MG/ML
8 INJECTION INTRAMUSCULAR; INTRAVENOUS AS NEEDED
Status: CANCELLED | OUTPATIENT
Start: 2019-12-30

## 2019-12-20 RX ORDER — ATROPINE SULFATE 0.4 MG/ML
0.4 INJECTION, SOLUTION ENDOTRACHEAL; INTRAMEDULLARY; INTRAMUSCULAR; INTRAVENOUS; SUBCUTANEOUS
Status: CANCELLED | OUTPATIENT
Start: 2019-12-30

## 2019-12-20 RX ORDER — ALBUTEROL SULFATE 0.83 MG/ML
2.5 SOLUTION RESPIRATORY (INHALATION) AS NEEDED
Status: CANCELLED
Start: 2019-12-30

## 2019-12-20 RX ORDER — SODIUM CHLORIDE 9 MG/ML
25 INJECTION, SOLUTION INTRAVENOUS CONTINUOUS
Status: CANCELLED | OUTPATIENT
Start: 2019-12-30

## 2019-12-20 RX ORDER — SODIUM CHLORIDE 0.9 % (FLUSH) 0.9 %
10 SYRINGE (ML) INJECTION AS NEEDED
Status: CANCELLED
Start: 2019-12-30

## 2019-12-20 RX ORDER — EPINEPHRINE 1 MG/ML
0.3 INJECTION, SOLUTION, CONCENTRATE INTRAVENOUS AS NEEDED
Status: CANCELLED | OUTPATIENT
Start: 2019-12-30

## 2019-12-20 RX ORDER — ACETAMINOPHEN 325 MG/1
650 TABLET ORAL AS NEEDED
Status: CANCELLED
Start: 2019-12-30

## 2019-12-20 RX ORDER — HEPARIN 100 UNIT/ML
300-500 SYRINGE INTRAVENOUS AS NEEDED
Status: CANCELLED
Start: 2019-12-30

## 2019-12-20 RX ORDER — DEXTROSE MONOHYDRATE 50 MG/ML
25 INJECTION, SOLUTION INTRAVENOUS CONTINUOUS
Status: CANCELLED
Start: 2019-12-30

## 2019-12-20 RX ORDER — PALONOSETRON 0.05 MG/ML
0.25 INJECTION, SOLUTION INTRAVENOUS ONCE
Status: CANCELLED | OUTPATIENT
Start: 2019-12-30

## 2019-12-23 ENCOUNTER — TELEPHONE (OUTPATIENT)
Dept: ONCOLOGY | Age: 29
End: 2019-12-23

## 2019-12-23 NOTE — TELEPHONE ENCOUNTER
[address] at [place]  [phone]    12/23/19-  left for patient- advising him per Milka Packer. NP disability claim forms need to be completed by PT/PCP. Also wanting to check in on patient to see if he wants to reschedule missed [facility] appointments.

## 2020-01-01 ENCOUNTER — HOME CARE VISIT (OUTPATIENT)
Dept: HOSPICE | Facility: HOSPICE | Age: 30
End: 2020-01-01
Payer: MEDICAID

## 2020-01-01 ENCOUNTER — TELEPHONE (OUTPATIENT)
Dept: ONCOLOGY | Age: 30
End: 2020-01-01

## 2020-01-01 ENCOUNTER — HOME CARE VISIT (OUTPATIENT)
Dept: SCHEDULING | Facility: HOME HEALTH | Age: 30
End: 2020-01-01
Payer: MEDICAID

## 2020-01-01 ENCOUNTER — OFFICE VISIT (OUTPATIENT)
Dept: ONCOLOGY | Age: 30
End: 2020-01-01
Payer: MEDICAID

## 2020-01-01 ENCOUNTER — OFFICE VISIT (OUTPATIENT)
Dept: ONCOLOGY | Age: 30
End: 2020-01-01
Payer: MEDICARE

## 2020-01-01 ENCOUNTER — HOSPITAL ENCOUNTER (OUTPATIENT)
Dept: CT IMAGING | Age: 30
Discharge: HOME | End: 2020-09-04
Attending: INTERNAL MEDICINE
Payer: MEDICAID

## 2020-01-01 ENCOUNTER — HOSPITAL ENCOUNTER (OUTPATIENT)
Dept: INFUSION THERAPY | Age: 30
Discharge: HOME | End: 2020-09-15
Payer: MEDICAID

## 2020-01-01 ENCOUNTER — HOSPICE ADMISSION (OUTPATIENT)
Dept: HOSPICE | Facility: HOSPICE | Age: 30
End: 2020-01-01
Payer: MEDICAID

## 2020-01-01 VITALS
HEIGHT: 72 IN | WEIGHT: 239 LBS | HEART RATE: 118 BPM | DIASTOLIC BLOOD PRESSURE: 90 MMHG | BODY MASS INDEX: 32.37 KG/M2 | RESPIRATION RATE: 18 BRPM | SYSTOLIC BLOOD PRESSURE: 140 MMHG | OXYGEN SATURATION: 99 %

## 2020-01-01 VITALS
HEART RATE: 88 BPM | RESPIRATION RATE: 18 BRPM | DIASTOLIC BLOOD PRESSURE: 88 MMHG | TEMPERATURE: 97.1 F | SYSTOLIC BLOOD PRESSURE: 124 MMHG

## 2020-01-01 VITALS
SYSTOLIC BLOOD PRESSURE: 108 MMHG | RESPIRATION RATE: 18 BRPM | HEART RATE: 88 BPM | DIASTOLIC BLOOD PRESSURE: 84 MMHG | TEMPERATURE: 97.7 F

## 2020-01-01 VITALS
DIASTOLIC BLOOD PRESSURE: 88 MMHG | TEMPERATURE: 97.7 F | HEART RATE: 95 BPM | RESPIRATION RATE: 18 BRPM | SYSTOLIC BLOOD PRESSURE: 122 MMHG

## 2020-01-01 VITALS
HEIGHT: 72 IN | OXYGEN SATURATION: 98 % | TEMPERATURE: 98.6 F | BODY MASS INDEX: 33.48 KG/M2 | HEART RATE: 105 BPM | RESPIRATION RATE: 18 BRPM | DIASTOLIC BLOOD PRESSURE: 98 MMHG | WEIGHT: 247.2 LBS | SYSTOLIC BLOOD PRESSURE: 144 MMHG

## 2020-01-01 VITALS
DIASTOLIC BLOOD PRESSURE: 92 MMHG | TEMPERATURE: 97.5 F | HEART RATE: 85 BPM | SYSTOLIC BLOOD PRESSURE: 124 MMHG | RESPIRATION RATE: 18 BRPM

## 2020-01-01 VITALS
HEART RATE: 100 BPM | TEMPERATURE: 97.3 F | RESPIRATION RATE: 18 BRPM | SYSTOLIC BLOOD PRESSURE: 138 MMHG | DIASTOLIC BLOOD PRESSURE: 92 MMHG

## 2020-01-01 VITALS
SYSTOLIC BLOOD PRESSURE: 128 MMHG | DIASTOLIC BLOOD PRESSURE: 100 MMHG | HEART RATE: 106 BPM | RESPIRATION RATE: 18 BRPM | TEMPERATURE: 97.5 F

## 2020-01-01 VITALS
RESPIRATION RATE: 18 BRPM | DIASTOLIC BLOOD PRESSURE: 86 MMHG | TEMPERATURE: 96 F | SYSTOLIC BLOOD PRESSURE: 128 MMHG | HEART RATE: 106 BPM

## 2020-01-01 VITALS
RESPIRATION RATE: 16 BRPM | WEIGHT: 245 LBS | HEART RATE: 103 BPM | DIASTOLIC BLOOD PRESSURE: 108 MMHG | SYSTOLIC BLOOD PRESSURE: 150 MMHG | BODY MASS INDEX: 33.18 KG/M2 | OXYGEN SATURATION: 98 % | TEMPERATURE: 98 F | HEIGHT: 72 IN

## 2020-01-01 VITALS
RESPIRATION RATE: 18 BRPM | DIASTOLIC BLOOD PRESSURE: 94 MMHG | OXYGEN SATURATION: 97 % | HEART RATE: 94 BPM | SYSTOLIC BLOOD PRESSURE: 140 MMHG

## 2020-01-01 VITALS
DIASTOLIC BLOOD PRESSURE: 88 MMHG | SYSTOLIC BLOOD PRESSURE: 124 MMHG | TEMPERATURE: 96 F | HEART RATE: 96 BPM | BODY MASS INDEX: 29.16 KG/M2 | WEIGHT: 215 LBS | RESPIRATION RATE: 18 BRPM

## 2020-01-01 VITALS
DIASTOLIC BLOOD PRESSURE: 96 MMHG | SYSTOLIC BLOOD PRESSURE: 148 MMHG | TEMPERATURE: 97.2 F | RESPIRATION RATE: 18 BRPM | HEART RATE: 110 BPM

## 2020-01-01 DIAGNOSIS — C78.7 COLON CANCER METASTASIZED TO LIVER (HCC): ICD-10-CM

## 2020-01-01 DIAGNOSIS — C78.00 MALIGNANT NEOPLASM METASTATIC TO LUNG, UNSPECIFIED LATERALITY (HCC): ICD-10-CM

## 2020-01-01 DIAGNOSIS — C18.9 COLON CANCER METASTASIZED TO LIVER (HCC): Primary | ICD-10-CM

## 2020-01-01 DIAGNOSIS — C78.7 COLON CANCER METASTASIZED TO LIVER (HCC): Primary | ICD-10-CM

## 2020-01-01 DIAGNOSIS — G89.3 CANCER RELATED PAIN: Primary | ICD-10-CM

## 2020-01-01 DIAGNOSIS — C18.9 COLON CANCER METASTASIZED TO LIVER (HCC): ICD-10-CM

## 2020-01-01 LAB
ALBUMIN SERPL-MCNC: 3.2 G/DL (ref 3.5–5)
ALBUMIN/GLOB SERPL: 0.6 {RATIO} (ref 1.1–2.2)
ALP SERPL-CCNC: 172 U/L (ref 45–117)
ALT SERPL-CCNC: 26 U/L (ref 12–78)
ANION GAP SERPL CALC-SCNC: 3 MMOL/L (ref 5–15)
AST SERPL-CCNC: 55 U/L (ref 15–37)
BASO+EOS+MONOS # BLD AUTO: 0.8 K/UL (ref 0.2–1.2)
BASO+EOS+MONOS NFR BLD AUTO: 8 % (ref 3.2–16.9)
BILIRUB SERPL-MCNC: 0.4 MG/DL (ref 0.2–1)
BUN SERPL-MCNC: 9 MG/DL (ref 6–20)
BUN/CREAT SERPL: 14 (ref 12–20)
CALCIUM SERPL-MCNC: 9.3 MG/DL (ref 8.5–10.1)
CEA SERPL-MCNC: 3006.2 NG/ML
CHLORIDE SERPL-SCNC: 105 MMOL/L (ref 97–108)
CO2 SERPL-SCNC: 28 MMOL/L (ref 21–32)
CREAT SERPL-MCNC: 0.64 MG/DL (ref 0.7–1.3)
DIFFERENTIAL METHOD BLD: ABNORMAL
ERYTHROCYTE [DISTWIDTH] IN BLOOD BY AUTOMATED COUNT: 16.2 % (ref 11.8–15.8)
GLOBULIN SER CALC-MCNC: 5.6 G/DL (ref 2–4)
GLUCOSE SERPL-MCNC: 77 MG/DL (ref 65–100)
HCT VFR BLD AUTO: 40.7 % (ref 36.6–50.3)
HGB BLD-MCNC: 13.7 G/DL (ref 12.1–17)
LYMPHOCYTES # BLD: 1.8 K/UL (ref 0.8–3.5)
LYMPHOCYTES NFR BLD: 18 % (ref 12–49)
MCH RBC QN AUTO: 25 PG (ref 26–34)
MCHC RBC AUTO-ENTMCNC: 33.7 G/DL (ref 30–36.5)
MCV RBC AUTO: 74.1 FL (ref 80–99)
NEUTS SEG # BLD: 7.6 K/UL (ref 1.8–8)
NEUTS SEG NFR BLD: 74 % (ref 32–75)
PLATELET # BLD AUTO: 367 K/UL (ref 150–400)
POTASSIUM SERPL-SCNC: 3.9 MMOL/L (ref 3.5–5.1)
PROT SERPL-MCNC: 8.8 G/DL (ref 6.4–8.2)
RBC # BLD AUTO: 5.49 M/UL (ref 4.1–5.7)
SODIUM SERPL-SCNC: 136 MMOL/L (ref 136–145)
WBC # BLD AUTO: 10.2 K/UL (ref 4.1–11.1)

## 2020-01-01 PROCEDURE — 0651 HSPC ROUTINE HOME CARE

## 2020-01-01 PROCEDURE — G0299 HHS/HOSPICE OF RN EA 15 MIN: HCPCS

## 2020-01-01 PROCEDURE — 36591 DRAW BLOOD OFF VENOUS DEVICE: CPT

## 2020-01-01 PROCEDURE — HOSPICE MEDICATION HC HH HOSPICE MEDICATION

## 2020-01-01 PROCEDURE — 80053 COMPREHEN METABOLIC PANEL: CPT

## 2020-01-01 PROCEDURE — A9270 NON-COVERED ITEM OR SERVICE: HCPCS

## 2020-01-01 PROCEDURE — 77030016057 HC NDL HUBR APOL -B

## 2020-01-01 PROCEDURE — 3336500001 HSPC ELECTION

## 2020-01-01 PROCEDURE — T4541 LARGE DISPOSABLE UNDERPAD: HCPCS

## 2020-01-01 PROCEDURE — 82378 CARCINOEMBRYONIC ANTIGEN: CPT

## 2020-01-01 PROCEDURE — E0325 URINAL MALE JUG-TYPE: HCPCS

## 2020-01-01 PROCEDURE — G0155 HHCP-SVS OF CSW,EA 15 MIN: HCPCS

## 2020-01-01 PROCEDURE — 99215 OFFICE O/P EST HI 40 MIN: CPT | Performed by: NURSE PRACTITIONER

## 2020-01-01 PROCEDURE — HHS10554 SHAMPOO/BODY WASH 8 OZ ALOE VESTA

## 2020-01-01 PROCEDURE — 74011250636 HC RX REV CODE- 250/636: Performed by: NURSE PRACTITIONER

## 2020-01-01 PROCEDURE — 85025 COMPLETE CBC W/AUTO DIFF WBC: CPT

## 2020-01-01 PROCEDURE — 74177 CT ABD & PELVIS W/CONTRAST: CPT

## 2020-01-01 PROCEDURE — A4927 NON-STERILE GLOVES: HCPCS

## 2020-01-01 PROCEDURE — 99215 OFFICE O/P EST HI 40 MIN: CPT | Performed by: INTERNAL MEDICINE

## 2020-01-01 PROCEDURE — 74011000636 HC RX REV CODE- 636

## 2020-01-01 RX ORDER — OXYCODONE HYDROCHLORIDE 5 MG/1
5 TABLET ORAL
Qty: 30 TAB | Refills: 0 | Status: SHIPPED | OUTPATIENT
Start: 2020-01-01 | End: 2020-01-01

## 2020-01-01 RX ORDER — SODIUM CHLORIDE 0.9 % (FLUSH) 0.9 %
5-10 SYRINGE (ML) INJECTION AS NEEDED
Status: DISPENSED | OUTPATIENT
Start: 2020-01-01 | End: 2020-01-01

## 2020-01-01 RX ORDER — HEPARIN 100 UNIT/ML
500 SYRINGE INTRAVENOUS AS NEEDED
Status: CANCELLED | OUTPATIENT
Start: 2020-01-01

## 2020-01-01 RX ORDER — SODIUM CHLORIDE 0.9 % (FLUSH) 0.9 %
5-10 SYRINGE (ML) INJECTION AS NEEDED
Status: CANCELLED | OUTPATIENT
Start: 2020-01-01

## 2020-01-01 RX ORDER — SODIUM CHLORIDE 9 MG/ML
10 INJECTION INTRAMUSCULAR; INTRAVENOUS; SUBCUTANEOUS AS NEEDED
Status: CANCELLED | OUTPATIENT
Start: 2020-01-01

## 2020-01-01 RX ORDER — LIDOCAINE AND PRILOCAINE 25; 25 MG/G; MG/G
CREAM TOPICAL AS NEEDED
Qty: 30 G | Refills: 0 | Status: SHIPPED | OUTPATIENT
Start: 2020-01-01

## 2020-01-01 RX ORDER — SODIUM CHLORIDE 9 MG/ML
10 INJECTION INTRAMUSCULAR; INTRAVENOUS; SUBCUTANEOUS AS NEEDED
Status: ACTIVE | OUTPATIENT
Start: 2020-01-01 | End: 2020-01-01

## 2020-01-01 RX ORDER — HEPARIN 100 UNIT/ML
500 SYRINGE INTRAVENOUS AS NEEDED
Status: ACTIVE | OUTPATIENT
Start: 2020-01-01 | End: 2020-01-01

## 2020-01-01 RX ADMIN — IOPAMIDOL 100 ML: 755 INJECTION, SOLUTION INTRAVENOUS at 08:00

## 2020-01-01 RX ADMIN — Medication 500 UNITS: at 10:48

## 2020-01-01 RX ADMIN — Medication 10 ML: at 10:48

## 2020-01-06 ENCOUNTER — TELEPHONE (OUTPATIENT)
Dept: ONCOLOGY | Age: 30
End: 2020-01-06

## 2020-01-06 NOTE — TELEPHONE ENCOUNTER
[address] [address] is requesting records from 1/1/2019 to the present. A form was fax in December 2019. Christine Mills took it to the nurses station.   Do we have this request?

## 2020-01-06 NOTE — TELEPHONE ENCOUNTER
ANABELLM with [facility] [employer] Suki Xie) that the form needed to be fill out with PCP/PT office.   If they were requesting notes only to refax the form [phone]

## 2020-01-06 NOTE — TELEPHONE ENCOUNTER
[address] at [facility]  [phone]    Please advise them that per 12/23/19 note- this needs to be completed by PT/PCP. Our office did not complete form. 12/23/19-  left for patient- advising him per Hany Martin. NP disability claim forms need to be completed by PT/PCP. Also wanting to check in on patient to see if he wants to reschedule missed [facility] appointments.

## 2020-01-07 RX ORDER — DIPHENHYDRAMINE HYDROCHLORIDE 50 MG/ML
50 INJECTION, SOLUTION INTRAMUSCULAR; INTRAVENOUS AS NEEDED
Status: CANCELLED
Start: 2020-01-28

## 2020-01-07 RX ORDER — ATROPINE SULFATE 0.4 MG/ML
0.4 INJECTION, SOLUTION ENDOTRACHEAL; INTRAMEDULLARY; INTRAMUSCULAR; INTRAVENOUS; SUBCUTANEOUS ONCE
Status: CANCELLED
Start: 2020-01-28

## 2020-01-07 RX ORDER — HEPARIN 100 UNIT/ML
300-500 SYRINGE INTRAVENOUS AS NEEDED
Status: CANCELLED
Start: 2020-01-28

## 2020-01-07 RX ORDER — PALONOSETRON 0.05 MG/ML
0.25 INJECTION, SOLUTION INTRAVENOUS ONCE
Status: CANCELLED | OUTPATIENT
Start: 2020-01-28

## 2020-01-07 RX ORDER — HYDROCORTISONE SODIUM SUCCINATE 100 MG/2ML
100 INJECTION, POWDER, FOR SOLUTION INTRAMUSCULAR; INTRAVENOUS AS NEEDED
Status: CANCELLED | OUTPATIENT
Start: 2020-01-28

## 2020-01-07 RX ORDER — FLUOROURACIL 50 MG/ML
400 INJECTION, SOLUTION INTRAVENOUS ONCE
Status: CANCELLED | OUTPATIENT
Start: 2020-01-28

## 2020-01-07 RX ORDER — SODIUM CHLORIDE 9 MG/ML
10 INJECTION INTRAMUSCULAR; INTRAVENOUS; SUBCUTANEOUS AS NEEDED
Status: CANCELLED | OUTPATIENT
Start: 2020-01-28

## 2020-01-07 RX ORDER — ACETAMINOPHEN 325 MG/1
650 TABLET ORAL AS NEEDED
Status: CANCELLED
Start: 2020-01-28

## 2020-01-07 RX ORDER — DEXTROSE MONOHYDRATE 50 MG/ML
25 INJECTION, SOLUTION INTRAVENOUS CONTINUOUS
Status: CANCELLED
Start: 2020-01-28

## 2020-01-07 RX ORDER — EPINEPHRINE 1 MG/ML
0.3 INJECTION, SOLUTION, CONCENTRATE INTRAVENOUS AS NEEDED
Status: CANCELLED | OUTPATIENT
Start: 2020-01-28

## 2020-01-07 RX ORDER — ALBUTEROL SULFATE 0.83 MG/ML
2.5 SOLUTION RESPIRATORY (INHALATION) AS NEEDED
Status: CANCELLED
Start: 2020-01-28

## 2020-01-07 RX ORDER — SODIUM CHLORIDE 9 MG/ML
25 INJECTION, SOLUTION INTRAVENOUS CONTINUOUS
Status: CANCELLED | OUTPATIENT
Start: 2020-01-28

## 2020-01-07 RX ORDER — SODIUM CHLORIDE 0.9 % (FLUSH) 0.9 %
10 SYRINGE (ML) INJECTION AS NEEDED
Status: CANCELLED
Start: 2020-01-28

## 2020-01-07 RX ORDER — ATROPINE SULFATE 0.4 MG/ML
0.4 INJECTION, SOLUTION ENDOTRACHEAL; INTRAMEDULLARY; INTRAMUSCULAR; INTRAVENOUS; SUBCUTANEOUS
Status: CANCELLED | OUTPATIENT
Start: 2020-01-28

## 2020-01-07 RX ORDER — ONDANSETRON 2 MG/ML
8 INJECTION INTRAMUSCULAR; INTRAVENOUS AS NEEDED
Status: CANCELLED | OUTPATIENT
Start: 2020-01-28

## 2020-01-14 ENCOUNTER — HOSPITAL ENCOUNTER (OUTPATIENT)
Dept: INFUSION THERAPY | Age: 30
End: 2020-01-14
Payer: COMMERCIAL

## 2020-01-16 ENCOUNTER — HOSPITAL ENCOUNTER (OUTPATIENT)
Dept: INFUSION THERAPY | Age: 30
End: 2020-01-16
Payer: COMMERCIAL

## 2020-01-17 ENCOUNTER — TELEPHONE (OUTPATIENT)
Dept: ONCOLOGY | Age: 30
End: 2020-01-17

## 2020-01-17 NOTE — TELEPHONE ENCOUNTER
Message left with patient's voicemail. Call to set up office follow up. He has missed his last several treatment/office visits and is not returning phone calls to reach him to reschedule. Last treatment on 11/12/2019. Next treatment moved out one week per patient preference to 12/3/2019 for Thanksgiving holiday. Patient no-showed to this appointment. It was rescheduled to 12/10/2019. He rescheduled this visit to 12/17/2019 and did not come for treatment. He rescheduled to 12/30/2019. He was a no-show/no-call to this visit. No call/no show to 1/2/20 and no call/no show to 1/14/2020 visit as well. He was notified of each of these reschedules via phone call and automated call. We will cancel infusion visits due to frequent no-shows and set up office visit for him to come in to discuss and reschedule treatment.

## 2020-01-20 NOTE — PROGRESS NOTES
[facility] [place] at [facility]  [address] [facility], [address] [place] [address]  W: [phone]  F: [phone]     Reason for Visit:   Gae Klinefelter is a 30 y.o. male who is seen for follow up of metastatic colon cancer. Treatment History:   · CT A/P 3/25/2018: Indeterminate 2 x 1 cm low-density liver lesion. · CT C/A/P with triphase liver 3/27/2018: No evidence of metastatic disease to the chest. Multiple ill-defined hepatic lesions. These do not represent simple cyst.  · CT guided liver biopsy 3/27/2018: metastatic adenocarcinoma, KRAS wild type, NRAS wild type  · Colectomy by Dr. Lety Canales 3/29/2018: Adenocarcinoma, moderately differentiated. Negative margins. 3/16 nodes involved. pMMR  · Stage IV (pT3 pN1b pM1) Colon Cancer  · Chemotherapy with FOLFOX and bevacizumab 5/1/2018 - 7/12/2018 for a total of 6 cycles, dany administered with cycles 2-4  · Resumed chemotherapy with FOLFOX and bevacizumab from 9/25/2018 to 12/20/2018. Avastin held after 10/23/2018 in preparation for surgery. · Underwent diagnostic laparoscopy with microwave liver ablation to right hepatic lobe 1/30/2019  · CT Chest 4/9/2019: Several new subcentimeter pulmonary nodules in the bilateral lungs worrisome for pulmonary metastases. Status post interval right hepatic lobe embolization. Several increased areas of hypodensity in the right hepatic lobe are worrisome for progression of hepatic metastases. There is a new tract of hypodensity in the left lateral hepatic lobe extending to a hypodense lesion. · Resumed chemotherapy with FOLFOX and bevacizumab 4/29/2019 to 7/5/2019, stopped for oxaliplatin reaction  · Chemotherapy with FOLFIRI and Panitumumab beginning 7/16/2019    History of Present Illness:   Presents for follow up. Has not had treatment since 11/12/2019. He has missed several treatments. No show/no call to several visits over the last 2 months.  He moved in with his girlfriend to help her with bills and has been trying to work more. Bills are piling up. Medical bills sent to collections. Admits to not following through on Care Card application for assistance. States he has a problem from following through with things he knows he needs to do. If he is overwhelmed by something he just ignores it. Admits to ignoring phone calls for treatment. Is hoping to see a counselor to learn tools to assist him with coping better. Chronic back pain, not worsening. No new aches or pains. Appetite stable. He is unaccompanied today. PAST HISTORY: The following sections were reviewed and updated in the EMR as appropriate: PMH, SH, FH, Medications, Allergies. Allergies   Allergen Reactions    Oxaliplatin Other (comments)     Chest pain, abdominal pain, burning sensation, cold sweats      Review of Systems: A complete review of systems was obtained, reviewed, and scanned into the EMR. Pertinent findings reviewed above. Physical Exam:     Visit Vitals  BP (!) 140/93 (BP 1 Location: Left arm, BP Patient Position: Sitting)   Pulse 97   Temp 96 °F (35.6 °C) (Temporal)   Resp 22   Ht 6' (1.829 m)   Wt 288 lb (130.6 kg)   SpO2 97%   BMI 39.06 kg/m²     ECOG PS: 0  General: No distress, obese  Eyes: PERRLA, anicteric sclerae  HENT: Atraumatic, OP clear  Neck: Supple  Lymphatic: No cervical, supraclavicular, or inguinal adenopathy  Respiratory: CTAB, normal respiratory effort  CV: Normal rate, regular rhythm, no murmurs, no peripheral edema  GI: Soft, nontender, nondistended, no masses, unable to assess for hepatomegaly, no splenomegaly  MS: Normal gait and station. Digits without clubbing or cyanosis. Skin: No ecchymoses, or petechiae. Normal temperature, turgor, and texture. Scattered macules to face and back of neck.  Dry patched to upper arms and back  Psych: Alert, oriented, appropriate affect, normal judgment/insight    Results:     Lab Results   Component Value Date/Time    WBC 8.0 01/21/2020 03:59 PM    HGB 15.0 01/21/2020 03:59 PM    HCT 45.9 01/21/2020 03:59 PM    PLATELET 083 87/22/1678 03:59 PM    MCV 76.6 (L) 01/21/2020 03:59 PM    ABS. NEUTROPHILS 5.5 01/21/2020 03:59 PM    Hemoglobin (POC) 15.2 11/12/2018 12:58 PM     Lab Results   Component Value Date/Time    Sodium 142 01/21/2020 03:59 PM    Potassium 4.1 01/21/2020 03:59 PM    Chloride 111 (H) 01/21/2020 03:59 PM    CO2 27 01/21/2020 03:59 PM    Glucose 88 01/21/2020 03:59 PM    BUN 17 01/21/2020 03:59 PM    Creatinine 0.79 01/21/2020 03:59 PM    GFR est AA >60 01/21/2020 03:59 PM    GFR est non-AA >60 01/21/2020 03:59 PM    Calcium 8.8 01/21/2020 03:59 PM     Lab Results   Component Value Date/Time    Bilirubin, total 0.2 01/21/2020 03:59 PM    ALT (SGPT) 35 01/21/2020 03:59 PM    AST (SGOT) 30 01/21/2020 03:59 PM    Alk. phosphatase 96 01/21/2020 03:59 PM    Protein, total 7.9 01/21/2020 03:59 PM    Albumin 3.3 (L) 01/21/2020 03:59 PM    Globulin 4.6 (H) 01/21/2020 03:59 PM       CT abd/pelvis at [address] [place] [address] on 3/19/2019: Interval laparoscopic microscope ablation of several left hepatic lobe lesions. Interval right portal vein embolizations. Increase in size of several right hepatic lobe lesions. Development of multiple pulmonary nodules in the imaged lung bases highly concerning for metastatic disease. Enlarged portal caval node, metastatic diease is not excluded. Stranding in the subcutaneous soft tissue related to laparoscopy. CT chest 4/9/2019:   1. Several new subcentimeter pulmonary nodules in the bilateral lungs worrisome for pulmonary metastases. 2. Status post interval right hepatic lobe embolization. Several increased areas of hypodensity in the right hepatic lobe are worrisome for progression of hepatic metastases. There is a new tract of hypodensity in the left lateral hepatic lobe extending to a hypodense lesion. This may represent interval intervention. Recommend correlation with prior procedural history and studies.  Dedicated multiphase liver imaging could be done for further evaluation. CT C/A/P 7/22/2019: Improved hepatic metastatic disease. Mixed response in pulmonary nodules. CT C/A/P 10/31/2019:   1. Findings suggest response to treatment in the lungs with the 2 largest lung lesions having diminished in their solid component while the overall dimensions have not changed. Multiple other smaller nodules are not appreciably changed in size or appearance which may be due to their small size and inability to distinguish significant change. 2. Findings suggest response to treatment in the liver. Diminished size of 2 hepatic lesions in the posterior hepatic lobe with no significant change in the appearance of a lesion in the left hepatic lobe. Assessment:   1) Metastatic Colon Cancer  Stage IV, pMMR, KRAS/NRAS wild type  He has metastatic disease within his liver. His primary tumor has been resected. He has received palliative chemotherapy with FOLFOX and Bevacizumab, followed by a break to undergo liver directed therapy. Unfortunately, he developed pulmonary metastases during this break. We resumed chemotherapy with FOLFOX and Avastin. Therapy was inconsistent due to patient's missed appointments and frequent noncompliance. Oxaliplatin held with cycle 15 due to patient preference. With cycle 16 patient developed concern for allergic reaction to Oxaliplatin with intense abdominal pain, flushing and tachycardia. Similar issues had occurred with prior cycles, but seemed to be worsening with each treatment. Changed to FOLFIRI with Panitumumab 7/16/2019. His noncompliance has persisted, frequently no-shows for treatment, or shows up hours late, despite my repeated discussions with him about the importance of maintaining compliance. Last treatment on 11/12/2019. Next treatment moved out one week per patient preference to 12/3/2019 for Thanksgiving holiday. Patient no-showed to this appointment. It was rescheduled to 12/10/2019.  He rescheduled this visit to 12/17/2019 and did not come for treatment. He rescheduled to 12/30/2019. He was a no-show/no-call to this visit. No call/no show to 1/2/20 and no call/no show to 1/14/2020 visit as well. He was notified of each of these reschedules via phone call and automated call. Discussed importance of either staying on therapy and being consistent with treatment or stopping therapy if he wishes. Discussed likely progression off therapy and expected shortened prognosis off therapy. He can not maintain in this limbo where he is consistently not showing up for scheduled treatment. He will think about his options. Plan to reimage with CT scans to determine if disease has progressed in the interim. Formulate plan for treatment after imaging. Foundation one obtained and confirms KRAS/NRAS wild type status. No other actionable mutations. Will consider Lonsurf vs Regorafinib vs clinical trial at progression. 2) Risk of infertility  We have previously reviewed that chemotherapy can potentially cause infertility. He has undergone sperm cryopreservation. 3) Genetic risk  Testing at [facility] negative. 4) Hypertension  Continue Metoprolol. Follow up with PCP for continued elevated BP. 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 6) Depression / Insomnia  No longer on Remeron. Med list updated. Has not yet started Celexa. 7) Mucositis  Magic mouthwash PRN. 8) Rash, drug  Due to Panitumumab. Continue Clindamycin cream and doxycycline while on therapy. Panitumumab held with cycle 2.     9) Noncompliance  Will ask  to see him today. Provide information on counseling services. 10) Emotional Well Being  To follow up on care card application today. Plan:     CT C/A/P  PF with labs today   to meet with patient today, provide resources and counseling  Follow up in 1-2 weeks with imaging    Patient was seen in conjunction with Olivia Arroyo NP.     Signed By: Pam Persaud MD [place]

## 2020-01-21 ENCOUNTER — DOCUMENTATION ONLY (OUTPATIENT)
Dept: ONCOLOGY | Age: 30
End: 2020-01-21

## 2020-01-21 ENCOUNTER — HOSPITAL ENCOUNTER (OUTPATIENT)
Dept: INFUSION THERAPY | Age: 30
Discharge: HOME | End: 2020-01-21
Payer: COMMERCIAL

## 2020-01-21 ENCOUNTER — OFFICE VISIT (OUTPATIENT)
Dept: ONCOLOGY | Age: 30
End: 2020-01-21

## 2020-01-21 VITALS
DIASTOLIC BLOOD PRESSURE: 89 MMHG | HEIGHT: 72 IN | TEMPERATURE: 97.4 F | SYSTOLIC BLOOD PRESSURE: 146 MMHG | RESPIRATION RATE: 18 BRPM | WEIGHT: 287.7 LBS | OXYGEN SATURATION: 96 % | HEART RATE: 86 BPM | BODY MASS INDEX: 38.97 KG/M2

## 2020-01-21 VITALS
OXYGEN SATURATION: 97 % | SYSTOLIC BLOOD PRESSURE: 140 MMHG | HEIGHT: 72 IN | HEART RATE: 97 BPM | WEIGHT: 288 LBS | DIASTOLIC BLOOD PRESSURE: 93 MMHG | BODY MASS INDEX: 39.01 KG/M2 | TEMPERATURE: 96 F | RESPIRATION RATE: 22 BRPM

## 2020-01-21 DIAGNOSIS — C18.9 COLON CANCER METASTASIZED TO LIVER (HCC): Primary | ICD-10-CM

## 2020-01-21 DIAGNOSIS — C78.7 COLON CANCER METASTASIZED TO LIVER (HCC): Primary | ICD-10-CM

## 2020-01-21 DIAGNOSIS — C78.00 MALIGNANT NEOPLASM METASTATIC TO LUNG, UNSPECIFIED LATERALITY (HCC): ICD-10-CM

## 2020-01-21 LAB
ALBUMIN SERPL-MCNC: 3.3 G/DL (ref 3.5–5)
ALBUMIN/GLOB SERPL: 0.7 {RATIO} (ref 1.1–2.2)
ALP SERPL-CCNC: 96 U/L (ref 45–117)
ALT SERPL-CCNC: 35 U/L (ref 12–78)
ANION GAP SERPL CALC-SCNC: 4 MMOL/L (ref 5–15)
AST SERPL-CCNC: 30 U/L (ref 15–37)
BASO+EOS+MONOS # BLD AUTO: 1 K/UL (ref 0.2–1.2)
BASO+EOS+MONOS NFR BLD AUTO: 12 % (ref 3.2–16.9)
BILIRUB SERPL-MCNC: 0.2 MG/DL (ref 0.2–1)
BUN SERPL-MCNC: 17 MG/DL (ref 6–20)
BUN/CREAT SERPL: 22 (ref 12–20)
CALCIUM SERPL-MCNC: 8.8 MG/DL (ref 8.5–10.1)
CHLORIDE SERPL-SCNC: 111 MMOL/L (ref 97–108)
CO2 SERPL-SCNC: 27 MMOL/L (ref 21–32)
CREAT SERPL-MCNC: 0.79 MG/DL (ref 0.7–1.3)
DIFFERENTIAL METHOD BLD: ABNORMAL
ERYTHROCYTE [DISTWIDTH] IN BLOOD BY AUTOMATED COUNT: 16.5 % (ref 11.8–15.8)
GLOBULIN SER CALC-MCNC: 4.6 G/DL (ref 2–4)
GLUCOSE SERPL-MCNC: 88 MG/DL (ref 65–100)
HCT VFR BLD AUTO: 45.9 % (ref 36.6–50.3)
HGB BLD-MCNC: 15 G/DL (ref 12.1–17)
LYMPHOCYTES # BLD: 1.5 K/UL (ref 0.8–3.5)
LYMPHOCYTES NFR BLD: 19 % (ref 12–49)
MCH RBC QN AUTO: 25 PG (ref 26–34)
MCHC RBC AUTO-ENTMCNC: 32.7 G/DL (ref 30–36.5)
MCV RBC AUTO: 76.6 FL (ref 80–99)
NEUTS SEG # BLD: 5.5 K/UL (ref 1.8–8)
NEUTS SEG NFR BLD: 69 % (ref 32–75)
PLATELET # BLD AUTO: 298 K/UL (ref 150–400)
POTASSIUM SERPL-SCNC: 4.1 MMOL/L (ref 3.5–5.1)
PROT SERPL-MCNC: 7.9 G/DL (ref 6.4–8.2)
RBC # BLD AUTO: 5.99 M/UL (ref 4.1–5.7)
SODIUM SERPL-SCNC: 142 MMOL/L (ref 136–145)
WBC # BLD AUTO: 8 K/UL (ref 4.1–11.1)

## 2020-01-21 PROCEDURE — 77030012965 HC NDL HUBR BBMI -A

## 2020-01-21 PROCEDURE — 85025 COMPLETE CBC W/AUTO DIFF WBC: CPT

## 2020-01-21 PROCEDURE — 80053 COMPREHEN METABOLIC PANEL: CPT

## 2020-01-21 PROCEDURE — 36415 COLL VENOUS BLD VENIPUNCTURE: CPT

## 2020-01-21 PROCEDURE — 74011250636 HC RX REV CODE- 250/636: Performed by: NURSE PRACTITIONER

## 2020-01-21 PROCEDURE — 36591 DRAW BLOOD OFF VENOUS DEVICE: CPT

## 2020-01-21 RX ORDER — SODIUM CHLORIDE 0.9 % (FLUSH) 0.9 %
5-10 SYRINGE (ML) INJECTION AS NEEDED
Status: CANCELLED | OUTPATIENT
Start: 2020-01-21

## 2020-01-21 RX ORDER — SODIUM CHLORIDE 0.9 % (FLUSH) 0.9 %
5-10 SYRINGE (ML) INJECTION AS NEEDED
Status: DISCONTINUED | OUTPATIENT
Start: 2020-01-21 | End: 2020-01-22 | Stop reason: HOSPADM

## 2020-01-21 RX ORDER — SODIUM CHLORIDE 9 MG/ML
10 INJECTION INTRAMUSCULAR; INTRAVENOUS; SUBCUTANEOUS AS NEEDED
Status: DISCONTINUED | OUTPATIENT
Start: 2020-01-21 | End: 2020-01-22 | Stop reason: HOSPADM

## 2020-01-21 RX ORDER — SODIUM CHLORIDE 9 MG/ML
10 INJECTION INTRAMUSCULAR; INTRAVENOUS; SUBCUTANEOUS AS NEEDED
Status: CANCELLED | OUTPATIENT
Start: 2020-01-21

## 2020-01-21 RX ORDER — HEPARIN 100 UNIT/ML
500 SYRINGE INTRAVENOUS AS NEEDED
Status: DISCONTINUED | OUTPATIENT
Start: 2020-01-21 | End: 2020-01-22 | Stop reason: HOSPADM

## 2020-01-21 RX ORDER — HEPARIN 100 UNIT/ML
500 SYRINGE INTRAVENOUS AS NEEDED
Status: CANCELLED | OUTPATIENT
Start: 2020-01-21

## 2020-01-21 RX ADMIN — Medication 10 ML: at 15:55

## 2020-01-21 RX ADMIN — SODIUM CHLORIDE 10 ML: 9 INJECTION INTRAMUSCULAR; INTRAVENOUS; SUBCUTANEOUS at 15:55

## 2020-01-21 RX ADMIN — HEPARIN 500 UNITS: 100 SYRINGE at 15:57

## 2020-01-21 RX ADMIN — Medication 10 ML: at 15:57

## 2020-01-21 NOTE — PROGRESS NOTES
[place] [address] Visit Progress Note 1550 Patient admitted to [facility] for port flush/labs ambulatory in stable condition. Assessment completed. No new concerns voiced. Visit Vitals /89 Pulse 86 Temp 97.4 °F (36.3 °C) Resp 18 Ht 6' (1.829 m) Wt 130.5 kg (287 lb 11.2 oz) SpO2 96% BMI 39.02 kg/m² PAC with positive blood return. Labs drawn and sent for processing. To result in [address] [facility] when available. 1600 Patient tolerated treatment well. Patient discharged from [place] [address] ambulatory in no distress.

## 2020-01-22 ENCOUNTER — TELEPHONE (OUTPATIENT)
Dept: ONCOLOGY | Age: 30
End: 2020-01-22

## 2020-01-22 NOTE — TELEPHONE ENCOUNTER
[address] at [place]  [phone]    01/22/20- Informed patient that we would like to wait on evaluation from chiropractor until after CT scan. Can also offer free massage services at [facility]. He verbalized understanding, no further questions or concerns.

## 2020-01-22 NOTE — TELEPHONE ENCOUNTER
Back pain is chronic in nature per patient and present for many years, prior to cancer diagnosis. Has been worsening in the last few weeks. Let's obtain CT imaging as ordered prior to proceeding with evaluation from chiropractor. After CT can also offer free massage services at [facility] [place].

## 2020-01-24 ENCOUNTER — HOSPITAL ENCOUNTER (OUTPATIENT)
Dept: CT IMAGING | Age: 30
Discharge: HOME | End: 2020-01-24
Attending: NURSE PRACTITIONER
Payer: SUBSIDIZED

## 2020-01-24 DIAGNOSIS — C18.9 COLON CANCER METASTASIZED TO LIVER (HCC): ICD-10-CM

## 2020-01-24 DIAGNOSIS — C78.00 MALIGNANT NEOPLASM METASTATIC TO LUNG, UNSPECIFIED LATERALITY (HCC): ICD-10-CM

## 2020-01-24 DIAGNOSIS — C78.7 COLON CANCER METASTASIZED TO LIVER (HCC): ICD-10-CM

## 2020-01-24 PROCEDURE — 74177 CT ABD & PELVIS W/CONTRAST: CPT

## 2020-01-24 PROCEDURE — 74011636320 HC RX REV CODE- 636/320: Performed by: REGISTERED NURSE

## 2020-01-24 RX ADMIN — IOPAMIDOL 100 ML: 755 INJECTION, SOLUTION INTRAVENOUS at 11:01

## 2020-01-27 NOTE — PROGRESS NOTES
[facility] at [facility]  [address] [facility], [address] [facility] [address] [facility]  W: [phone]  F: [phone]     Reason for Visit:   Elizabeth Garcia is a 30 y.o. male who is seen for follow up of metastatic colon cancer. Treatment History:   · CT A/P 3/25/2018: Indeterminate 2 x 1 cm low-density liver lesion. · CT C/A/P with triphase liver 3/27/2018: No evidence of metastatic disease to the chest. Multiple ill-defined hepatic lesions. These do not represent simple cyst.  · CT guided liver biopsy 3/27/2018: metastatic adenocarcinoma, KRAS wild type, NRAS wild type  · Colectomy by Dr. Izaiah Lucia 3/29/2018: Adenocarcinoma, moderately differentiated. Negative margins. 3/16 nodes involved. pMMR  · Stage IV (pT3 pN1b pM1) Colon Cancer  · Chemotherapy with FOLFOX and bevacizumab 5/1/2018 - 7/12/2018 for a total of 6 cycles, dany administered with cycles 2-4  · Resumed chemotherapy with FOLFOX and bevacizumab from 9/25/2018 to 12/20/2018. Avastin held after 10/23/2018 in preparation for surgery. · Underwent diagnostic laparoscopy with microwave liver ablation to right hepatic lobe 1/30/2019  · CT Chest 4/9/2019: Several new subcentimeter pulmonary nodules in the bilateral lungs worrisome for pulmonary metastases. Status post interval right hepatic lobe embolization. Several increased areas of hypodensity in the right hepatic lobe are worrisome for progression of hepatic metastases. There is a new tract of hypodensity in the left lateral hepatic lobe extending to a hypodense lesion. · Resumed chemotherapy with FOLFOX and bevacizumab 4/29/2019 to 7/5/2019, stopped for oxaliplatin reaction  · Chemotherapy with FOLFIRI and Panitumumab beginning 7/16/2019    History of Present Illness:   Presents for follow up. He is feeling well overall. No new complaints. Denies pain today. Appetite is stable. No rash or itching. No change in bowels. Energy is normal.     He is unaccompanied today.      PAST HISTORY: The following sections were reviewed and updated in the EMR as appropriate: PMH, SH, FH, Medications, Allergies. Allergies   Allergen Reactions    Oxaliplatin Other (comments)     Chest pain, abdominal pain, burning sensation, cold sweats      Review of Systems: A complete review of systems was obtained, reviewed, and scanned into the EMR. Pertinent findings reviewed above. Physical Exam:     Visit Vitals  BP (!) 136/97 (BP 1 Location: Left arm, BP Patient Position: Sitting)   Pulse 91   Temp 97.4 °F (36.3 °C) (Temporal)   Resp 18   Ht 6' (1.829 m)   Wt 289 lb (131.1 kg)   SpO2 98%   BMI 39.20 kg/m²     ECOG PS: 0  General: No distress, obese  Eyes: PERRLA, anicteric sclerae  HENT: Atraumatic, OP clear  Neck: Supple  Lymphatic: No cervical, supraclavicular, or inguinal adenopathy  Respiratory: CTAB, normal respiratory effort  CV: Normal rate, regular rhythm, no murmurs, no peripheral edema  GI: Soft, nontender, nondistended, no masses, unable to assess for hepatomegaly, no splenomegaly  MS: Normal gait and station. Digits without clubbing or cyanosis. Skin: No ecchymoses, or petechiae. Normal temperature, turgor, and texture. Dry skin noted  Psych: Alert, oriented, appropriate affect, normal judgment/insight    Results:     Lab Results   Component Value Date/Time    WBC 8.0 01/21/2020 03:59 PM    HGB 15.0 01/21/2020 03:59 PM    HCT 45.9 01/21/2020 03:59 PM    PLATELET 928 01/20/3603 03:59 PM    MCV 76.6 (L) 01/21/2020 03:59 PM    ABS.  NEUTROPHILS 5.5 01/21/2020 03:59 PM    Hemoglobin (POC) 15.2 11/12/2018 12:58 PM     Lab Results   Component Value Date/Time    Sodium 142 01/21/2020 03:59 PM    Potassium 4.1 01/21/2020 03:59 PM    Chloride 111 (H) 01/21/2020 03:59 PM    CO2 27 01/21/2020 03:59 PM    Glucose 88 01/21/2020 03:59 PM    BUN 17 01/21/2020 03:59 PM    Creatinine 0.79 01/21/2020 03:59 PM    GFR est AA >60 01/21/2020 03:59 PM    GFR est non-AA >60 01/21/2020 03:59 PM    Calcium 8.8 01/21/2020 03:59 PM     Lab Results   Component Value Date/Time    Bilirubin, total 0.2 01/21/2020 03:59 PM    ALT (SGPT) 35 01/21/2020 03:59 PM    AST (SGOT) 30 01/21/2020 03:59 PM    Alk. phosphatase 96 01/21/2020 03:59 PM    Protein, total 7.9 01/21/2020 03:59 PM    Albumin 3.3 (L) 01/21/2020 03:59 PM    Globulin 4.6 (H) 01/21/2020 03:59 PM       CT abd[facility]pelvis at [facility] on 3/19/2019: Interval laparoscopic microscope ablation of several left hepatic lobe lesions. Interval right portal vein embolizations. Increase in size of several right hepatic lobe lesions. Development of multiple pulmonary nodules in the imaged lung bases highly concerning for metastatic disease. Enlarged portal caval node, metastatic diease is not excluded. Stranding in the subcutaneous soft tissue related to laparoscopy. CT chest 4/9/2019:   1. Several new subcentimeter pulmonary nodules in the bilateral lungs worrisome for pulmonary metastases. 2. Status post interval right hepatic lobe embolization. Several increased areas of hypodensity in the right hepatic lobe are worrisome for progression of hepatic metastases. There is a new tract of hypodensity in the left lateral hepatic lobe extending to a hypodense lesion. This may represent interval intervention. Recommend correlation with prior procedural history and studies. Dedicated multiphase liver imaging could be done for further evaluation. CT C/A/P 7/22/2019: Improved hepatic metastatic disease. Mixed response in pulmonary nodules. CT C/A/P 10/31/2019:   1. Findings suggest response to treatment in the lungs with the 2 largest lung lesions having diminished in their solid component while the overall dimensions have not changed. Multiple other smaller nodules are not appreciably changed in size or appearance which may be due to their small size and inability to distinguish significant change. 2. Findings suggest response to treatment in the liver.  Diminished size of 2 hepatic lesions in the posterior hepatic lobe with no significant change in the appearance of a lesion in the left hepatic lobe. CT C/A/P 1/24/2020: Progression of disease with increase in size and number of pulmonary and hepatic metastases. No CT evidence of local recurrence at distal left colon anastomotic site or of other metastatic neoplastic disease in the thorax, abdomen, or pelvis. Assessment:   1) Metastatic Colon Cancer  Stage IV, pMMR, KRAS/NRAS wild type  He has metastatic disease within his liver. His primary tumor has been resected. He has received palliative chemotherapy with FOLFOX and Bevacizumab, followed by a break to undergo liver directed therapy. Unfortunately, he developed pulmonary metastases during this break. We resumed chemotherapy with FOLFOX and Avastin. Therapy was inconsistent due to patient's missed appointments and frequent noncompliance. Oxaliplatin held with cycle 15 due to patient preference. With cycle 16 patient developed concern for allergic reaction to Oxaliplatin with intense abdominal pain, flushing and tachycardia. Similar issues had occurred with prior cycles, but seemed to be worsening with each treatment. Changed to FOLFIRI with Panitumumab 7/16/2019. His noncompliance has persisted, frequently no-shows for treatment, or shows up hours late, despite my repeated discussions with him about the importance of maintaining compliance. Last treatment on 11/12/2019. Next treatment moved out one week per patient preference to 12/3/2019 for Thanksgiving holiday. Patient no-showed to this appointment. It was rescheduled to 12/10/2019. He rescheduled this visit to 12/17/2019 and did not come for treatment. He rescheduled to 12/30/2019. He was a no-show/no-call to this visit. No call/no show to 1/2/20 and no call/no show to 1/14/2020 visit as well. He was notified of each of these reschedules via phone call and automated call.     CT scans obtained since last visit showing progression of disease in his time off therapy. Reviewed options today including resuming therapy with importance of maintaining treatment schedule without delays in therapy or dose interruptions vs supportive care only. We reviewed expected prognosis with and without treatment and the expected progression of symptoms with disease burden/progression. After review of options patient is clear he wants to resume therapy but wishes to hold off until after counseling sessions begin. He would like \"1-2 counseling sessions\" before starting. He also reports that weather is getting warmer and he doesn't wish to be tied down to therapy. He reports family is interested in him resuming therapy, and will support him with resuming, but he is reluctant to do so. For now we will hold off on scheduling treatment. He will think about his options, discuss with family, start counseling and call us back when he wishes to resume treatment. Foundation one obtained and confirms KRAS/NRAS wild type status. No other actionable mutations. Will consider Lonsurf vs Regorafinib vs clinical trial at progression. 2) Risk of infertility  We have previously reviewed that chemotherapy can potentially cause infertility. He has undergone sperm cryopreservation. 3) Genetic risk  Testing at [facility] negative. 4) Hypertension  Continue Metoprolol. Follow up with PCP for continued elevated BP. 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 6) Depression / Insomnia  No longer on Remeron. Not taking Celexa. 7) Mucositis  Resolved. 8) Rash, drug  Due to Panitumumab. Continue Clindamycin cream and doxycycline while on therapy. Panitumumab held with cycle 2.     9) Noncompliance  Has met with . Provided information on counseling services. He will meet with counselor on 2/11/2020.     10) Emotional Well Being  To follow up on care card application, he re-applied last week Friday (1/24/2020).  He got approved for a trisha up to $2000 for copay assistance. Plan:     Hold therapy for now  Patient to call back when he wishes to resume  Hold off on follow up appointment for now  Touch base via phone after counseling session on 2/11/20    Patient was seen in conjunction with Marnie Ayala NP.     Signed By: Jg Ferraro MD

## 2020-01-28 ENCOUNTER — HOSPITAL ENCOUNTER (OUTPATIENT)
Dept: INFUSION THERAPY | Age: 30
End: 2020-01-28
Payer: COMMERCIAL

## 2020-01-28 ENCOUNTER — TELEPHONE (OUTPATIENT)
Dept: ONCOLOGY | Age: 30
End: 2020-01-28

## 2020-01-28 ENCOUNTER — OFFICE VISIT (OUTPATIENT)
Dept: ONCOLOGY | Age: 30
End: 2020-01-28

## 2020-01-28 ENCOUNTER — APPOINTMENT (OUTPATIENT)
Dept: INFUSION THERAPY | Age: 30
End: 2020-01-28
Payer: COMMERCIAL

## 2020-01-28 VITALS
TEMPERATURE: 97.4 F | SYSTOLIC BLOOD PRESSURE: 136 MMHG | HEIGHT: 72 IN | HEART RATE: 91 BPM | DIASTOLIC BLOOD PRESSURE: 97 MMHG | RESPIRATION RATE: 18 BRPM | BODY MASS INDEX: 39.14 KG/M2 | WEIGHT: 289 LBS | OXYGEN SATURATION: 98 %

## 2020-01-28 DIAGNOSIS — C78.00 MALIGNANT NEOPLASM METASTATIC TO LUNG, UNSPECIFIED LATERALITY (HCC): ICD-10-CM

## 2020-01-28 DIAGNOSIS — C18.9 COLON CANCER METASTASIZED TO LIVER (HCC): Primary | ICD-10-CM

## 2020-01-28 DIAGNOSIS — C78.7 COLON CANCER METASTASIZED TO LIVER (HCC): Primary | ICD-10-CM

## 2020-01-28 NOTE — PROGRESS NOTES
Kristi Jimenez is a 30 y.o. male follow up for colon cancer. 1. Have you been to the ER, urgent care clinic since your last visit? Hospitalized since your last visit?no     2. Have you seen or consulted any other health care providers outside of the [address] [place] [address] since your last visit? Include any pap smears or colon screening.  no

## 2020-01-28 NOTE — TELEPHONE ENCOUNTER
Call to patient yesterday afternoon (3pm) to offer appointment today (1/28/2020) at 0730 for treatment if he would like to receive treatment same day as office visit. No answer, left voicemail asking for return call before close of business to confirm appointment. Patient did not return call yesterday, will keep late afternoon appointment today to review scans and discuss re-initiation of treatment due to progression on imaging.

## 2020-01-30 ENCOUNTER — TELEPHONE (OUTPATIENT)
Dept: ONCOLOGY | Age: 30
End: 2020-01-30

## 2020-01-30 NOTE — TELEPHONE ENCOUNTER
[facility]  [phone]    01/30/20- Nothing to explain back pain on imaging, okay for patient to see a chiropractor per Ysabel Salgado. Patient notified. Stated he would also like referral to [facility] for massage. Nelda Max will fax a letter to them today, instructed patient to call tomorrow [phone]. Encouraged patient to use heat and ibuprofen for pain relief as well. He verbalized understanding, no further questions or concerns.

## 2020-02-06 ENCOUNTER — TELEPHONE (OUTPATIENT)
Dept: ONCOLOGY | Age: 30
End: 2020-02-06

## 2020-02-06 NOTE — TELEPHONE ENCOUNTER
Patient left a vm message he states he needs a letter for work stating that he can return back to work  Apparently he didn't know he was on a leave of absence

## 2020-02-06 NOTE — TELEPHONE ENCOUNTER
[address] at [place]  [phone]    02/06/20- Patient needs a letter to return to work on Monday 2/10. He will pick it up tomorrow. Letter written and signed by Lola Brown. Placed at the  to .

## 2020-02-06 NOTE — LETTER
2/6/2020 1:59 PM 
 
Mr. Shelby Gómez 
[address] [place] [address] [phone] To Whom It May Concern: 
 
Royal Gonsales is currently under the care of Dr. Juan Luis Rose and Obey Frias NP at the [address] [facility]. He will return to work on 2/10/20 and will work a minimum of 20 hours per week. Due to fatigue from treatment, he is unable to work past 5 pm.  
 
If there are questions or concerns please have the patient contact our office. Sincerely, Obey Frias NP

## 2020-02-11 ENCOUNTER — APPOINTMENT (OUTPATIENT)
Dept: INFUSION THERAPY | Age: 30
End: 2020-02-11

## 2020-02-25 ENCOUNTER — TELEPHONE (OUTPATIENT)
Dept: ONCOLOGY | Age: 30
End: 2020-02-25

## 2020-02-25 NOTE — TELEPHONE ENCOUNTER
Social Work Navigator Encounter     Patient Name:  Shiraz Gonsales History: colon cancer    Advance Directives: none on file; conversation deferred    Narrative: Called patient and left voicemail requesting and return call to discuss if he has his first counseling appointment and has made a decision about starting treatment. Received a return call from patient. Patient tells me is did not go to his counseling appointment on 2/11 because he no longer has health insurance and the [employer] will only work with insurance. Encouraged patient to apply for Medicaid. He is now working part-time and he is within income limit. Patient also tells me he has decided to not pursue chemotherapy anymore. He plans to focus on natural methods through diet and exercise. Encouraged patient to schedule and appointment to Dr. Audra Sosa to discuss. Noted patient has a pending Care Card application. He tells me he will call our office this week when he has access to his schedule as he is in the process of moving into a smaller apartment with his girlfriend. Barriers to Care: uninsured    Assessment/Action:  1. Patient is uninsured. Referred to  to apply for Medicaid. 2. Encouraged patient schedule appointment with Dr. Audra Sosa to discuss plan of care. 3. Encouraged patient to contact me for ongoing psychosocial support.      Plan/Referral:   Insurance/Entitlements referral    Thank you,  Lisbeth Lua LCSW

## 2020-03-09 ENCOUNTER — OFFICE VISIT (OUTPATIENT)
Dept: FAMILY MEDICINE CLINIC | Age: 30
End: 2020-03-09

## 2020-03-09 ENCOUNTER — HOSPITAL ENCOUNTER (OUTPATIENT)
Dept: LAB | Age: 30
Discharge: HOME | End: 2020-03-09

## 2020-03-09 VITALS
WEIGHT: 286 LBS | BODY MASS INDEX: 38.74 KG/M2 | RESPIRATION RATE: 16 BRPM | DIASTOLIC BLOOD PRESSURE: 80 MMHG | OXYGEN SATURATION: 97 % | HEART RATE: 97 BPM | SYSTOLIC BLOOD PRESSURE: 128 MMHG | HEIGHT: 72 IN | TEMPERATURE: 98.7 F

## 2020-03-09 DIAGNOSIS — Z71.1 CONCERN ABOUT STD IN MALE WITHOUT DIAGNOSIS: ICD-10-CM

## 2020-03-09 DIAGNOSIS — Z71.1 CONCERN ABOUT STD IN MALE WITHOUT DIAGNOSIS: Primary | ICD-10-CM

## 2020-03-09 LAB
BILIRUB UR QL STRIP: NEGATIVE
GLUCOSE UR-MCNC: NEGATIVE MG/DL
KETONES P FAST UR STRIP-MCNC: NEGATIVE MG/DL
PH UR STRIP: 6.5 [PH] (ref 4.6–8)
PROT UR QL STRIP: NEGATIVE
SP GR UR STRIP: 1.02 (ref 1–1.03)
UA UROBILINOGEN AMB POC: NORMAL (ref 0.2–1)
URINALYSIS CLARITY POC: CLEAR
URINALYSIS COLOR POC: YELLOW
URINE BLOOD POC: NEGATIVE
URINE LEUKOCYTES POC: NEGATIVE
URINE NITRITES POC: NEGATIVE

## 2020-03-09 NOTE — PROGRESS NOTES
Chief Complaint Patient presents with  
 Other STD test  
 
 
 
 
1. Have you been to the ER, urgent care clinic since your last visit? Hospitalized since your last visit?no 2. Have you seen or consulted any other health care providers outside of the [address] [place] [address] since your last visit? Include any pap smears or colon screening.  no

## 2020-03-09 NOTE — PROGRESS NOTES
[address] [facility] Subjective: HPI: 
Patient is a pleasant 33-year-old male with history of hypertension and colon cancer who presents to clinic today requesting STD testing. He is currently sexually active with 1 female partner and denies any known exposure or concerns for STIs at this time. He uses traction intermittently. He denies any history of STI infection in himself or his partner. States he would like to be tested as he has not previously had STD testing. He also denies any symptoms. Denies any dysuria, urgency, frequency, pelvic or lower abdominal pain, urethral discharge. Past Medical History:  
Diagnosis Date  Cancer ([facility] 03/2018  
 colon-Stage 4  
 Hypertension Allergies Allergen Reactions  Oxaliplatin Other (comments) Chest pain, abdominal pain, burning sensation, cold sweats Current Outpatient Medications on File Prior to Visit Medication Sig Dispense Refill  metoprolol tartrate (LOPRESSOR) 25 mg tablet TAKE 1 TABLET BY MOUTH EVERY 12 HOURS 60 Tab 2  
 dexAMETHasone (DECADRON) 4 mg tablet TAKE 8MG (TWO TABS) DAILY IN THE MORNING FOR TWO DAYS AFTER CHEMOTHERAPY (WHILE PUMP IS INFUSING). 50 Tab 0  
 magic mouthwash solution Swish and spit 15-30 mL every 3-4 hours as needed for mouth pain. May swallow for throat pain. Magic mouth wash Maalox Lidocaine 2% viscous Diphenhydramine oral solution Pharmacy to mix equal portions of ingredients to a total volume as indicated in the dispense amount. 500 mL 1  clindamycin (CLINDAGEL) 1 % topical gel Apply  to affected area two (2) times a day. use thin film on affected area 1 mL 0  
 doxycycline (ADOXA) 100 mg tablet Take 1 Tab by mouth two (2) times a day. 60 Tab 2  
 OTHER,NON-FORMULARY, Indications: black cumin seed oil  citalopram (CELEXA) 20 mg tablet Take 1 Tab by mouth daily. 30 Tab 5  
 docusate sodium (COLACE) 100 mg capsule Take 100 mg by mouth two (2) times a day.  polyethylene glycol (MIRALAX) 17 gram/dose powder Take 17 g by mouth daily.  lidocaine-prilocaine (EMLA) topical cream Apply  to affected area as needed for Pain (Apply 30-60 min. prior to having your port accessed). 30 g 0  
 OTHER Sperm cryopreservation Dx: C18.9-colon cancer 1 Each 0  
 prochlorperazine (COMPAZINE) 10 mg tablet Take 1 Tab by mouth every six (6) hours as needed for Nausea. 50 Tab 5  
 ondansetron hcl (ZOFRAN) 8 mg tablet Take 1 Tab by mouth every eight (8) hours as needed for Nausea. 45 Tab 5 No current facility-administered medications on file prior to visit. Family History Problem Relation Age of Onset  Stroke Mother  Diabetes Mother  Stroke Father  Hypertension Father Social History Socioeconomic History  Marital status: SINGLE Spouse name: Not on file  Number of children: Not on file  Years of education: Not on file  Highest education level: Not on file Tobacco Use  Smoking status: Never Smoker  Smokeless tobacco: Never Used Substance and Sexual Activity  Alcohol use: No  
  Comment: socially  Drug use: Yes Types: Marijuana  Sexual activity: Yes  
  Partners: Female Birth control/protection: None Past Surgical History:  
Procedure Laterality Date  COLONOSCOPY N/A 3/27/2018 COLONOSCOPY performed by Eufemia Johns MD at [address] Patient Active Problem List  
Diagnosis Code  Acute diverticulitis K57.92  
 Hypertension I10  
 Colon cancer metastasized to liver (HCC) C18.9, C78.7  Severe obesity with body mass index (BMI) of 35.0 to 39.9 with serious comorbidity (HCC) E66.01  
 Malignant neoplasm metastatic to lung ([facility] C78.00 Review of Systems Constitutional: Negative for chills and fever. Gastrointestinal: Negative for abdominal pain, nausea and vomiting. Genitourinary: Negative for dysuria, flank pain, frequency, hematuria and urgency. Objective: Visit Vitals /80 Pulse 97 Temp 98.7 °F (37.1 °C) Resp 16 Ht 6' (1.829 m) Wt 286 lb (129.7 kg) SpO2 97% BMI 38.79 kg/m² Physical Exam 
Constitutional:   
   Appearance: Normal appearance. Cardiovascular:  
   Rate and Rhythm: Normal rate and regular rhythm. Pulmonary:  
   Effort: Pulmonary effort is normal.  
   Breath sounds: Normal breath sounds. Abdominal:  
   General: Bowel sounds are normal. There is no distension. Palpations: Abdomen is soft. Tenderness: There is no abdominal tenderness. Musculoskeletal: Normal range of motion. Skin: 
   General: Skin is warm and dry. Neurological:  
   Mental Status: He is alert. Pertinent Labs/Studies: 
 
 
Assessment and orders:  
 
Diagnoses and all orders for this visit: 1. Concern about STD in male without diagnosis 
-Plan to obtain full STI panel. Educated patient on safe sexual practices. -     HEPATITIS PANEL, ACUTE 
-     AMB POC URINALYSIS DIP STICK AUTO W/ MICRO 
-     HIV 1/2 AG/AB, 4TH GENERATION,W RFLX CONFIRM; Future -     RPR 
-     CHLAMYDIA/GC PCR; Future I have reviewed patient medical and social history and medications. I have reviewed pertinent labs results and other data. I have discussed the diagnosis with the patient and the intended plan as seen in the above orders. The patient has received an after-visit summary and questions were answered concerning future plans. I have discussed medication side effects and warnings with the patient as well. Clay Cramer MD 
Resident [address] [place] [address] 03/09/20 Patient discussed with Dr. Kali Cook, Attending Physician

## 2020-03-10 LAB
HAV IGM SER QL: NONREACTIVE
HBV CORE IGM SER QL: NONREACTIVE
HBV SURFACE AG SER QL: <0.1 INDEX
HBV SURFACE AG SER QL: NEGATIVE
HCV AB SERPL QL IA: NONREACTIVE
HCV COMMENT,HCGAC: NORMAL
HIV 1+2 AB+HIV1 P24 AG SERPL QL IA: NONREACTIVE
HIV12 RESULT COMMENT, HHIVC: NORMAL
RPR SER QL: NONREACTIVE
SP1: NORMAL
SP2: NORMAL
SP3: NORMAL

## 2020-03-11 LAB
C TRACH DNA SPEC QL NAA+PROBE: NEGATIVE
N GONORRHOEA DNA SPEC QL NAA+PROBE: NEGATIVE
SAMPLE TYPE: NORMAL
SERVICE CMNT-IMP: NORMAL
SPECIMEN SOURCE: NORMAL

## 2020-07-30 NOTE — TELEPHONE ENCOUNTER
Marisa Puga called his girl friend listed on last HPI. To say that Silvana Becker is just not doing what he suppose to do and he doesn't have insurance at this time. She is  inquiring about how much it would be to come in as a self pay.   Sending this message out to Darlin Harris and Tomi Ours    She needs some advice     [phone]

## 2020-08-20 NOTE — TELEPHONE ENCOUNTER
Lexie Raphael called his girl friend listed on last HPI. To say that Rock Santana is just not doing what he suppose to do and he doesn't have insurance at this time. She is  inquiring about how much it would be to come in as a self pay. Sending this message out to Tyra Thorne and Madison Matthews     She needs some advice      [phone]     Above is a note put in on 7/30.  Patients girlfriend called again today stating she has not received a call back from our office and would like to hear back as soon as possible and possibly schedule an appointment for La to see Dr. Yara Aguilar    [phone]

## 2020-08-20 NOTE — TELEPHONE ENCOUNTER
[address] at [place]  [phone]    08/20/20- Phone call returned to Jonh Pat 12, patients girlfriend. Apologized that no one has returned phone card regarding payment cost. She reported patient now is insured with medicare and would like to schedule follow up appointment. She reports he is \" doing ok, he just moans out at night in pain\". Patient continues to have back pain with new loss of appetite/ weight loss. He is willing to come in to see  to discuss plans moving forward. Phone call transferred to  staff to schedule.

## 2020-08-25 NOTE — PROGRESS NOTES
[facility] [place] [facility] [place] [address] [address] [facility] [address] [facility] [address] [facility] W: [phone]  F: [phone] Reason for Visit:  
Deland Sacks is a 26 y.o. male who is seen for follow up of metastatic colon cancer. Treatment History: · CT A/P 3/25/2018: Indeterminate 2 x 1 cm low-density liver lesion. · CT C/A/P with triphase liver 3/27/2018: No evidence of metastatic disease to the chest. Multiple ill-defined hepatic lesions. These do not represent simple cyst. 
· CT guided liver biopsy 3/27/2018: metastatic adenocarcinoma, KRAS wild type, NRAS wild type · Colectomy by Dr. Keanu Dietz 3/29/2018: Adenocarcinoma, moderately differentiated. Negative margins. 3/16 nodes involved. pMMR · Stage IV (pT3 pN1b pM1) Colon Cancer · Chemotherapy with FOLFOX and bevacizumab 5/1/2018 - 7/12/2018 for a total of 6 cycles, dany administered with cycles 2-4 · Resumed chemotherapy with FOLFOX and bevacizumab from 9/25/2018 to 12/20/2018. Avastin held after 10/23/2018 in preparation for surgery. · Underwent diagnostic laparoscopy with microwave liver ablation to right hepatic lobe 1/30/2019 · CT Chest 4/9/2019: Several new subcentimeter pulmonary nodules in the bilateral lungs worrisome for pulmonary metastases. Status post interval right hepatic lobe embolization. Several increased areas of hypodensity in the right hepatic lobe are worrisome for progression of hepatic metastases. There is a new tract of hypodensity in the left lateral hepatic lobe extending to a hypodense lesion. · Resumed chemotherapy with FOLFOX and bevacizumab 4/29/2019 to 7/5/2019, stopped for oxaliplatin reaction · Chemotherapy with FOLFIRI and Panitumumab x7 cycles from 7/16/2019 to 11/2019 (intermittent compliance, stopped at patient request) History of Present Illness: He returns for follow up. He has been off therapy since 11/2019. He reports he has been doing pretty well since then.   He has been working a lot at [employer], does 4 morning hour shifts so that he can rest in afternoon. Over the last month he has developed some progressive pain in his abdomen, RUQ, as well as in his lower back. He has been going to a chiropractor which has helped his back. Takes tylenol which helps some. No nausea. Normal bowel movements. No blood in stool. He has noticed some fatigue, takes a lot of naps. Appetite fair. He has lost a lot of weight since his last visit, but he reports this has been stable recently. He has been seeing a holistic provider and has been taking a number of different supplements, drinking special teas, following special diet. He is unaccompanied today. PAST HISTORY: The following sections were reviewed and updated in the EMR as appropriate: PMH, SH, FH, Medications, Allergies. Allergies Allergen Reactions  Oxaliplatin Other (comments) Chest pain, abdominal pain, burning sensation, cold sweats Review of Systems: A complete review of systems was obtained, reviewed, and scanned into the EMR. Pertinent findings reviewed above. Physical Exam:  
 
Visit Vitals BP (!) 144/98 (BP 1 Location: Left arm, BP Patient Position: Sitting) Pulse (!) 105 Temp 98.6 °F (37 °C) (Temporal) Resp 18 Ht 6' (1.829 m) Wt 247 lb 3.2 oz (112.1 kg) SpO2 98% BMI 33.53 kg/m² ECOG PS: 1 General: No distress, obese Eyes: PERRLA, anicteric sclerae HENT: Atraumatic, OP clear Neck: Supple Lymphatic: No cervical, supraclavicular, or inguinal adenopathy Respiratory: CTAB, normal respiratory effort CV: Normal rate, regular rhythm, no murmurs, no peripheral edema GI: Soft, nontender, nondistended, no masses, unable to assess for hepatomegaly, no splenomegaly MS: Normal gait and station. Digits without clubbing or cyanosis. Skin: No ecchymoses, or petechiae. Normal temperature, turgor, and texture. Psych: Alert, oriented, appropriate affect, normal judgment/insight Results: Lab Results Component Value Date/Time WBC 8.0 01/21/2020 03:59 PM  
 HGB 15.0 01/21/2020 03:59 PM  
 HCT 45.9 01/21/2020 03:59 PM  
 PLATELET 117 67/08/1021 03:59 PM  
 MCV 76.6 (L) 01/21/2020 03:59 PM  
 ABS. NEUTROPHILS 5.5 01/21/2020 03:59 PM  
 Hemoglobin (POC) 15.2 11/12/2018 12:58 PM  
 
Lab Results Component Value Date/Time Sodium 142 01/21/2020 03:59 PM  
 Potassium 4.1 01/21/2020 03:59 PM  
 Chloride 111 (H) 01/21/2020 03:59 PM  
 CO2 27 01/21/2020 03:59 PM  
 Glucose 88 01/21/2020 03:59 PM  
 BUN 17 01/21/2020 03:59 PM  
 Creatinine 0.79 01/21/2020 03:59 PM  
 GFR est AA >60 01/21/2020 03:59 PM  
 GFR est non-AA >60 01/21/2020 03:59 PM  
 Calcium 8.8 01/21/2020 03:59 PM  
 
Lab Results Component Value Date/Time Bilirubin, total 0.2 01/21/2020 03:59 PM  
 ALT (SGPT) 35 01/21/2020 03:59 PM  
 Alk. phosphatase 96 01/21/2020 03:59 PM  
 Protein, total 7.9 01/21/2020 03:59 PM  
 Albumin 3.3 (L) 01/21/2020 03:59 PM  
 Globulin 4.6 (H) 01/21/2020 03:59 PM  
 
 
CT abd/pelvis at [facility] on 3/19/2019: Interval laparoscopic microscope ablation of several left hepatic lobe lesions. Interval right portal vein embolizations. Increase in size of several right hepatic lobe lesions. Development of multiple pulmonary nodules in the imaged lung bases highly concerning for metastatic disease. Enlarged portal caval node, metastatic diease is not excluded. Stranding in the subcutaneous soft tissue related to laparoscopy. CT chest 4/9/2019:  
1. Several new subcentimeter pulmonary nodules in the bilateral lungs worrisome for pulmonary metastases. 2. Status post interval right hepatic lobe embolization. Several increased areas of hypodensity in the right hepatic lobe are worrisome for progression of hepatic metastases. There is a new tract of hypodensity in the left lateral hepatic lobe extending to a hypodense lesion. This may represent interval intervention.  Recommend correlation with prior procedural history and studies. Dedicated multiphase liver imaging could be done for further evaluation. CT C/A/P 7/22/2019: Improved hepatic metastatic disease. Mixed response in pulmonary nodules. CT C/A/P 10/31/2019:  
1. Findings suggest response to treatment in the lungs with the 2 largest lung lesions having diminished in their solid component while the overall dimensions have not changed. Multiple other smaller nodules are not appreciably changed in size or appearance which may be due to their small size and inability to distinguish significant change. 2. Findings suggest response to treatment in the liver. Diminished size of 2 hepatic lesions in the posterior hepatic lobe with no significant change in the appearance of a lesion in the left hepatic lobe. CT C/A/P 1/24/2020: Progression of disease with increase in size and number of pulmonary and hepatic metastases. No CT evidence of local recurrence at distal left colon anastomotic site or of other metastatic neoplastic disease in the thorax, abdomen, or pelvis. Assessment:  
1) Metastatic Colon Cancer Stage IV, pMMR, KRAS/NRAS wild type He has metastatic disease within his liver. His primary tumor has been resected. He has received palliative chemotherapy with FOLFOX and Bevacizumab, followed by a break to undergo liver directed therapy. Unfortunately, he developed pulmonary metastases during this break. We resumed chemotherapy with FOLFOX and Avastin, which he received with intermittent compliance. Oxaliplatin stopped after possible reaction. Changed to FOLFIRI with Panitumumab 7/16/2019, stopped 11/2019 at patient request. 
 
He has done remarkably well clinically, feeling good despite no therapy in 10 months. Perhaps his alternative therapies have been helping. He is interested in getting a CT to monitor his disease, though he is still not interested in resuming chemotherapy. I will obtain a CT and then we will regroup. Foundation one obtained and confirms KRAS/NRAS wild type status. No other actionable mutations. Will consider Lonsurf vs Regorafinib vs clinical trial at progression. 2) Risk of infertility We have previously reviewed that chemotherapy can potentially cause infertility. He has undergone sperm cryopreservation. 3) Genetic risk Testing at [facility] negative. 4) Hypertension He has been off the metoprolol since January. Monitor, consider resuming if HTN persists. 5) Chemotherapy induced neuropathy. Resolved 6) Depression / Insomnia No longer on Remeron. Not taking Celexa. 7) Port Has not been flushed in a long time. We will set this up for sometime soon. Plan:  
 
CT C/A/P Port flush and labs sometime soon Return to see me to review above Khai Minaya for periodic video visits.  
 
Signed By: Ashkan Jenkins MD

## 2020-09-11 NOTE — TELEPHONE ENCOUNTER
Spoke to patient's sister. Advised of current results of imaging. Reviewed options moving forward including resuming chemotherapy or supportive care alone with hospice when he needs it. Symptom-wise patient's sister is concerned regarding patient's pain to right side and lower back. He is losing weight, not eating well and very fatigued lately. States he minimizes his symptoms in the office visit. Has had a great last few months not concentrating on the cancer. He has been happier in a healthier state of mind. Patient told her to call to \"get his affairs in order\" and determine next steps. He does not wish to have more chemotherapy. Advised we can support as little or as much as he would like us to. We have option of family support/couseling with LCSW and/or palliative medicine. We can continue to support with office visits, labs, imaging, pain management, etc or see him on PRN basis only, however he would like us to support him we will. We also discussed prognosis (likely <6 months if symptom burden remains low, <3 months if symptoms worsen with weight loss, loss of appetite, pain). Follow up next week Tuesday as scheduled.

## 2020-09-11 NOTE — TELEPHONE ENCOUNTER
[address] at [place]  [phone]    09/11/20- Spoke to patient's sister, she stated patient is aware of the results and requested that she contact our office regarding questions she had. She stated patient is in denial and she's trying to see how she can best help him through this. Discussed with Nigel Nolasco, she will call patient's sister back to review results/answer questions.

## 2020-09-11 NOTE — TELEPHONE ENCOUNTER
Patient sister called and has some questions about results of the scan she will not be able to come next week.   [phone]

## 2020-09-12 NOTE — TELEPHONE ENCOUNTER
Called with shoulder pain. Either referred from diaphragm or bone mets. Needs bone scan. Sent a script of Oxycodone.

## 2020-09-14 NOTE — PROGRESS NOTES
[facility] [place] at [facility] [address] [facility], [address] W: [phone]  F: [phone] Reason for Visit:  
Adam Loza is a 26 y.o. male who is seen for follow up of metastatic colon cancer. Treatment History: · CT A/P 3/25/2018: Indeterminate 2 x 1 cm low-density liver lesion. · CT C/A/P with triphase liver 3/27/2018: No evidence of metastatic disease to the chest. Multiple ill-defined hepatic lesions. These do not represent simple cyst. 
· CT guided liver biopsy 3/27/2018: metastatic adenocarcinoma, KRAS wild type, NRAS wild type · Colectomy by Dr. Rosy Bates 3/29/2018: Adenocarcinoma, moderately differentiated. Negative margins. 3/16 nodes involved. pMMR · Stage IV (pT3 pN1b pM1) Colon Cancer · Chemotherapy with FOLFOX and bevacizumab 5/1/2018 - 7/12/2018 for a total of 6 cycles, dany administered with cycles 2-4 · Resumed chemotherapy with FOLFOX and bevacizumab from 9/25/2018 to 12/20/2018. Avastin held after 10/23/2018 in preparation for surgery. · Underwent diagnostic laparoscopy with microwave liver ablation to right hepatic lobe 1/30/2019 · CT Chest 4/9/2019: Several new subcentimeter pulmonary nodules in the bilateral lungs worrisome for pulmonary metastases. Status post interval right hepatic lobe embolization. Several increased areas of hypodensity in the right hepatic lobe are worrisome for progression of hepatic metastases. There is a new tract of hypodensity in the left lateral hepatic lobe extending to a hypodense lesion. · Resumed chemotherapy with FOLFOX and bevacizumab 4/29/2019 to 7/5/2019, stopped for oxaliplatin reaction · Chemotherapy with FOLFIRI and Panitumumab beginning 7/16/2019 x 7 cycles, stopped on 11/12/2019 per patient request 
 
History of Present Illness:  
Presents for follow up. Called on-call this past weekend due to left shoulder pain. States he felt that he slept on it wrong.  Took 2 doses of Oxycodone at one time and had some nausea. Pain persisted, but has since improved. Abdominal pain comes and goes. Endorses upper back pain intermittently as well. Some loss of appetite, weight loss and fatigue as well. He is accompanied by significant other today. Sister and another family member on live video conference call. PAST HISTORY: The following sections were reviewed and updated in the EMR as appropriate: PMH, SH, FH, Medications, Allergies. Allergies Allergen Reactions  Oxaliplatin Other (comments) Chest pain, abdominal pain, burning sensation, cold sweats Review of Systems: A complete review of systems was obtained, reviewed, and scanned into the EMR. Pertinent findings reviewed above. Physical Exam:  
 
Visit Vitals BP (!) 150/108 (BP 1 Location: Left arm, BP Patient Position: Sitting) Pulse (!) 103 Temp 98 °F (36.7 °C) (Temporal) Resp 16 Ht 6' (1.829 m) Wt 245 lb (111.1 kg) SpO2 98% BMI 33.23 kg/m² ECOG PS: 0 General: No distress, significant weight loss since last visit Eyes: PERRLA, anicteric sclerae HENT: Atraumatic, OP clear Neck: Supple Lymphatic: No cervical, supraclavicular, or inguinal adenopathy Respiratory: CTAB, normal respiratory effort CV: Normal rate, regular rhythm, no murmurs, no peripheral edema GI: Soft, nontender, nondistended, no masses, unable to assess for hepatomegaly, no splenomegaly MS: Normal gait and station. Digits without clubbing or cyanosis. Skin: No ecchymoses, or petechiae. Normal temperature, turgor, and texture. Dry skin noted Psych: Alert, oriented, appropriate affect, normal judgment/insight Results:  
 
Lab Results Component Value Date/Time WBC 10.2 09/15/2020 10:42 AM  
 HGB 13.7 09/15/2020 10:42 AM  
 HCT 40.7 09/15/2020 10:42 AM  
 PLATELET 922 31/80/4385 10:42 AM  
 MCV 74.1 (L) 09/15/2020 10:42 AM  
 ABS.  NEUTROPHILS 7.6 09/15/2020 10:42 AM  
 Hemoglobin (POC) 15.2 11/12/2018 12:58 PM  
 
 Lab Results Component Value Date/Time Sodium 136 09/15/2020 10:42 AM  
 Potassium 3.9 09/15/2020 10:42 AM  
 Chloride 105 09/15/2020 10:42 AM  
 CO2 28 09/15/2020 10:42 AM  
 Glucose 77 09/15/2020 10:42 AM  
 BUN 9 09/15/2020 10:42 AM  
 Creatinine 0.64 (L) 09/15/2020 10:42 AM  
 GFR est AA >60 09/15/2020 10:42 AM  
 GFR est non-AA >60 09/15/2020 10:42 AM  
 Calcium 9.3 09/15/2020 10:42 AM  
 
Lab Results Component Value Date/Time Bilirubin, total 0.4 09/15/2020 10:42 AM  
 ALT (SGPT) 26 09/15/2020 10:42 AM  
 Alk. phosphatase 172 (H) 09/15/2020 10:42 AM  
 Protein, total 8.8 (H) 09/15/2020 10:42 AM  
 Albumin 3.2 (L) 09/15/2020 10:42 AM  
 Globulin 5.6 (H) 09/15/2020 10:42 AM  
 
 
CT abd/pelvis at [facility] on 3/19/2019: Interval laparoscopic microscope ablation of several left hepatic lobe lesions. Interval right portal vein embolizations. Increase in size of several right hepatic lobe lesions. Development of multiple pulmonary nodules in the imaged lung bases highly concerning for metastatic disease. Enlarged portal caval node, metastatic diease is not excluded. Stranding in the subcutaneous soft tissue related to laparoscopy. CT chest 4/9/2019:  
1. Several new subcentimeter pulmonary nodules in the bilateral lungs worrisome for pulmonary metastases. 2. Status post interval right hepatic lobe embolization. Several increased areas of hypodensity in the right hepatic lobe are worrisome for progression of hepatic metastases. There is a new tract of hypodensity in the left lateral hepatic lobe extending to a hypodense lesion. This may represent interval intervention. Recommend correlation with prior procedural history and studies. Dedicated multiphase liver imaging could be done for further evaluation. CT C/A/P 7/22/2019: Improved hepatic metastatic disease. Mixed response in pulmonary nodules. CT C/A/P 10/31/2019:  
1.  Findings suggest response to treatment in the lungs with the 2 largest lung lesions having diminished in their solid component while the overall dimensions have not changed. Multiple other smaller nodules are not appreciably changed in size or appearance which may be due to their small size and inability to distinguish significant change. 2. Findings suggest response to treatment in the liver. Diminished size of 2 hepatic lesions in the posterior hepatic lobe with no significant change in the appearance of a lesion in the left hepatic lobe. CT C/A/P 1/24/2020: Progression of disease with increase in size and number of pulmonary and hepatic metastases. No CT evidence of local recurrence at distal left colon anastomotic site or of other metastatic neoplastic disease in the thorax, abdomen, or pelvis. CT C/A/P 9/4/2020: 
Imaging findings consistent with a nephrogram interval target and nontarget 
metastatic disease progression. 
  
Extensive increased intrahepatic metastatic disease burden. Increased pulmonary metastatic disease burden as well. New mesenteric and retroperitoneal lymphadenopathy. Assessment:  
1) Metastatic Colon Cancer Stage IV, pMMR, KRAS/NRAS wild type He has metastatic disease within his liver. His primary tumor has been resected. He has received palliative chemotherapy with FOLFOX and Bevacizumab, followed by a break to undergo liver directed therapy. Unfortunately, he developed pulmonary metastases during this break. We resumed chemotherapy with FOLFOX and Avastin, which he received with intermittent compliance. Oxaliplatin stopped after possible reaction. Changed to FOLFIRI with Panitumumab 7/16/2019, stopped 11/2019 at patient request. 
  
CT scans obtained 1/24/2020 showed progressive disease. At that time he decided to forgo treatment. He felt well overall in time off therapy. Mood improved and he enjoyed time with family. He is working part time.  Recently, symptom burden worsened with increased fatigue, weight loss, weakness and right upper abdominal pain. CT imaging obtained 9/4/2020. Reviewed results with patients sister per his request on 9/11/2020. Patient had reviewed results on MyChart that morning. Discussed concern for significant progression. Reviewed current prognosis (<6m) and expectations for symptom burden with advanced disease. We reviewed the death and dying process and symptoms to look out for. We reviewed treatment options again as well. He is clear that he is not interested in therapy at this time. Cautioned him on the fact that there will become a time in the not so distant future that he would become ineligible for treatment (whether from PS or liver dysfunction) and that if he feels he would want therapy at all in the future now is the time to do it. Due to no clear evidence of significant prolonged survival (several year benefit) and due to symptoms he experienced on therapy in the past he is not interested in pursuing therapy at this time. Therapy options would have included resuming FOLFIRI with panitumumab vs Lonsurf vs Regorafinib vs clinical trial.  
 
After discussion he and family have elected supportive care. Will set up hospice informational session. We discussed hospice philosophy, but info session will provide additional information and more concrete answers to their financial concerns with hospice. We will forgo any additional imaging/labs of disease and follow in office PRN. 2) Risk of infertility He has undergone sperm cryopreservation. 3) Genetic risk Testing at [facility] negative. 4) Hypertension Continue Metoprolol. Management per PCP. 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 6) Depression / Insomnia No longer on Remeron. Not taking Celexa. 7) Pain, neoplastic Right upper abdomen and shoulder pain. Oxycodone PRN. 8) Noncompliance Since visit in January of this year patient decided against further therapy. 9) Emotional Well Being Good family support with sister and significant other. Plan:  
 
Set up hospice informational session Follow up here PRN Patient was seen in conjunction with Ania Lopez NP.  
 
Signed By: Norman Nielson MD

## 2020-09-15 NOTE — PROGRESS NOTES
Norma Ruiz is a 26 y.o. male follow up for colon cancer. 1. Have you been to the ER, urgent care clinic since your last visit? Hospitalized since your last visit?no 2. Have you seen or consulted any other health care providers outside of the [address] [place] [address] since your last visit? Include any pap smears or colon screening.  no

## 2020-09-15 NOTE — PROGRESS NOTES
Outpatient Infusion Center Short Visit Progress Note Patient admitted to [facility] for PF labs ambulatory in stable condition. Assessment completed. No new concerns voiced. Patient has left muscle shoulder soreness from sleeping on it wrong. Pt left to MD nascimento. Vital Signs: 
Visit Vitals BP (!) 140/94 Pulse 94 Resp 18 SpO2 97% R chest port with positive blood return. Lab Results: 
Pending in CC Medications: 
Medications Administered   
 heparin (porcine) pf 500 Units Admin Date 
09/15/2020 Action Given Dose 
500 Units Route IntraVENous Administered By Laina Sun  
  
  
 sodium chloride (NS) flush 5-10 mL Admin Date 
09/15/2020 Action Given Dose 
10 mL Route IntraVENous Administered By Omid Estrada Patient tolerated treatment well. Patient discharged from [place] [address] ambulatory in no distress. Patient aware of next appointment. Lonnie Torrez RN Future Appointments Date Time Provider Neelam Dwyer 10/13/2020 10:30 AM SS INF7 CH3 <1H [facility]

## 2020-09-15 NOTE — LETTER
NOTIFICATION RETURN TO WORK / SCHOOL 
 
9/15/2020 11:54 AM 
 
Mr. Emmanuel Koch 
[address] [place] [phone] To Whom It May Concern: 
 
La Gonsales is currently under the care of [address]. Please excuse his significant other, Russ, from work today to accompany him to this appointment. If there are questions or concerns please have the patient contact our office. Sincerely, Kristine Powell NP

## 2020-10-07 PROBLEM — Z51.5 HOSPICE CARE PATIENT: Status: ACTIVE | Noted: 2020-01-01

## 2020-10-20 NOTE — TELEPHONE ENCOUNTER
[employer] at [facility]  [phone]    10/20/20-Phone call placed to patient to check in on patient while on hospice services.

## 2020-12-03 NOTE — TELEPHONE ENCOUNTER
[address] at [facility]  [phone]    12/03/20- Phone call placed to patient to touch base while under hospice services- VM left.

## 2021-01-01 ENCOUNTER — HOME CARE VISIT (OUTPATIENT)
Dept: HOSPICE | Facility: HOSPICE | Age: 31
End: 2021-01-01
Payer: MEDICAID

## 2021-01-01 ENCOUNTER — HOME CARE VISIT (OUTPATIENT)
Dept: SCHEDULING | Facility: HOME HEALTH | Age: 31
End: 2021-01-01
Payer: MEDICAID

## 2021-01-01 VITALS
RESPIRATION RATE: 18 BRPM | DIASTOLIC BLOOD PRESSURE: 80 MMHG | SYSTOLIC BLOOD PRESSURE: 120 MMHG | HEART RATE: 95 BPM | TEMPERATURE: 97.1 F

## 2021-01-01 VITALS
SYSTOLIC BLOOD PRESSURE: 132 MMHG | RESPIRATION RATE: 18 BRPM | HEART RATE: 84 BPM | BODY MASS INDEX: 29.84 KG/M2 | TEMPERATURE: 97.3 F | WEIGHT: 220 LBS | DIASTOLIC BLOOD PRESSURE: 94 MMHG

## 2021-01-01 VITALS
TEMPERATURE: 97.3 F | RESPIRATION RATE: 18 BRPM | SYSTOLIC BLOOD PRESSURE: 106 MMHG | DIASTOLIC BLOOD PRESSURE: 82 MMHG | HEART RATE: 92 BPM

## 2021-01-01 VITALS
RESPIRATION RATE: 20 BRPM | HEART RATE: 118 BPM | DIASTOLIC BLOOD PRESSURE: 92 MMHG | SYSTOLIC BLOOD PRESSURE: 138 MMHG | TEMPERATURE: 97.8 F

## 2021-01-01 VITALS
RESPIRATION RATE: 18 BRPM | HEART RATE: 100 BPM | TEMPERATURE: 97.3 F | SYSTOLIC BLOOD PRESSURE: 132 MMHG | DIASTOLIC BLOOD PRESSURE: 90 MMHG

## 2021-01-01 VITALS — TEMPERATURE: 97.1 F | SYSTOLIC BLOOD PRESSURE: 120 MMHG | HEART RATE: 100 BPM | DIASTOLIC BLOOD PRESSURE: 92 MMHG

## 2021-01-01 VITALS — DIASTOLIC BLOOD PRESSURE: 56 MMHG | RESPIRATION RATE: 20 BRPM | HEART RATE: 135 BPM | SYSTOLIC BLOOD PRESSURE: 93 MMHG

## 2021-01-01 VITALS
WEIGHT: 213 LBS | BODY MASS INDEX: 28.89 KG/M2 | SYSTOLIC BLOOD PRESSURE: 140 MMHG | HEART RATE: 100 BPM | TEMPERATURE: 97.2 F | RESPIRATION RATE: 18 BRPM | DIASTOLIC BLOOD PRESSURE: 92 MMHG

## 2021-01-01 VITALS
RESPIRATION RATE: 18 BRPM | HEART RATE: 105 BPM | TEMPERATURE: 96 F | DIASTOLIC BLOOD PRESSURE: 90 MMHG | SYSTOLIC BLOOD PRESSURE: 118 MMHG

## 2021-01-01 VITALS
HEART RATE: 100 BPM | DIASTOLIC BLOOD PRESSURE: 96 MMHG | SYSTOLIC BLOOD PRESSURE: 138 MMHG | RESPIRATION RATE: 18 BRPM | TEMPERATURE: 97.5 F

## 2021-01-01 VITALS
TEMPERATURE: 97.1 F | RESPIRATION RATE: 18 BRPM | SYSTOLIC BLOOD PRESSURE: 136 MMHG | DIASTOLIC BLOOD PRESSURE: 96 MMHG | HEART RATE: 106 BPM

## 2021-01-01 VITALS
TEMPERATURE: 97.3 F | RESPIRATION RATE: 22 BRPM | DIASTOLIC BLOOD PRESSURE: 88 MMHG | HEART RATE: 86 BPM | SYSTOLIC BLOOD PRESSURE: 110 MMHG

## 2021-01-01 VITALS
TEMPERATURE: 97.2 F | SYSTOLIC BLOOD PRESSURE: 138 MMHG | WEIGHT: 230 LBS | RESPIRATION RATE: 18 BRPM | HEART RATE: 100 BPM | DIASTOLIC BLOOD PRESSURE: 92 MMHG | BODY MASS INDEX: 31.19 KG/M2

## 2021-01-01 VITALS
TEMPERATURE: 97.2 F | SYSTOLIC BLOOD PRESSURE: 130 MMHG | HEART RATE: 100 BPM | DIASTOLIC BLOOD PRESSURE: 88 MMHG | RESPIRATION RATE: 18 BRPM

## 2021-01-01 VITALS
SYSTOLIC BLOOD PRESSURE: 124 MMHG | RESPIRATION RATE: 18 BRPM | TEMPERATURE: 97.1 F | WEIGHT: 220 LBS | DIASTOLIC BLOOD PRESSURE: 82 MMHG | HEART RATE: 104 BPM | BODY MASS INDEX: 29.84 KG/M2

## 2021-01-01 VITALS
HEART RATE: 87 BPM | RESPIRATION RATE: 18 BRPM | DIASTOLIC BLOOD PRESSURE: 92 MMHG | TEMPERATURE: 97.2 F | SYSTOLIC BLOOD PRESSURE: 138 MMHG

## 2021-01-01 VITALS
TEMPERATURE: 97.7 F | DIASTOLIC BLOOD PRESSURE: 86 MMHG | HEART RATE: 99 BPM | RESPIRATION RATE: 20 BRPM | SYSTOLIC BLOOD PRESSURE: 122 MMHG

## 2021-01-01 VITALS
SYSTOLIC BLOOD PRESSURE: 142 MMHG | TEMPERATURE: 97.1 F | RESPIRATION RATE: 18 BRPM | DIASTOLIC BLOOD PRESSURE: 90 MMHG | HEART RATE: 104 BPM

## 2021-01-01 VITALS — DIASTOLIC BLOOD PRESSURE: 100 MMHG | HEART RATE: 90 BPM | RESPIRATION RATE: 18 BRPM | SYSTOLIC BLOOD PRESSURE: 132 MMHG

## 2021-01-01 VITALS — OXYGEN SATURATION: 95 % | DIASTOLIC BLOOD PRESSURE: 59 MMHG | HEART RATE: 98 BPM | SYSTOLIC BLOOD PRESSURE: 94 MMHG

## 2021-01-01 PROCEDURE — 0651 HSPC ROUTINE HOME CARE

## 2021-01-01 PROCEDURE — HOSPICE MEDICATION HC HH HOSPICE MEDICATION

## 2021-01-01 PROCEDURE — G0299 HHS/HOSPICE OF RN EA 15 MIN: HCPCS

## 2021-01-01 PROCEDURE — 3331090004 HSPC SERVICE INTENSITY ADD-ON

## 2021-01-01 PROCEDURE — G0155 HHCP-SVS OF CSW,EA 15 MIN: HCPCS

## 2021-01-01 RX ADMIN — HALOPERIDOL 2 MG: 2 SOLUTION ORAL at 20:20

## 2021-06-04 NOTE — TELEPHONE ENCOUNTER
[facility]  [phone]    08/19/19- VM left for patient- wanting to get more information regarding letter vs FMLA paperwork? Waiting on returned phone call.     08/20/19-Spoke to patient he would like for letter to be generated and he will  today. Additonal paperwork may need to be completed by our office- he will send in if needed. Letter placed at the  for . Statement Selected

## 2022-03-17 NOTE — PROGRESS NOTES
Patient mother notified. Problem: Falls - Risk of  Goal: *Absence of Falls  Document Rhett Fall Risk and appropriate interventions in the flowsheet.     Outcome: Progressing Towards Goal  Fall Risk Interventions:            Medication Interventions: Teach patient to arise slowly    Elimination Interventions: Call light in reach

## 2022-08-25 NOTE — LETTER
NOTIFICATION RETURN TO WORK 
 
3/25/2019 10:10 AM 
 
Mr. Leatha Rivera 
[address] [place] [phone] To Whom It May Concern: 
 
Reinier Gonsales is currently under the care of Krist[facility]. He will return to work/school on: 3/26/19 If there are questions or concerns please have the patient contact our office. Sincerely, Germán Guerrero, DO 
 
                                
 
 no

## 2024-10-22 NOTE — TELEPHONE ENCOUNTER
Spoke to patient. He is willing to have CT chest completed prior to f/u on Wed. Would like to have done tomorrow morning. Will facilitate this with . Subjective:     Patient ID: Sachin Gonzalez is a 59 y.o. male.    Chief Complaint: Nail Care (Non-diabetic pt c/o BL foot pain, pt rates 7/10 pain, pt wears slippers, PCP Santy Greenberg last seen 7/18/2024)    Sachin is a 59 y.o. male who presents to the clinic for evaluation and treatment of high risk feet. 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 The patient's chief complaint is long, thick toenails. This patient has documented high risk feet requiring routine maintenance secondary to peripheral neuropathy. Patient complains of pain in left 3rd and 4th toes. Patient rates pain 6/10. Patient has  no other pedal complaints at this time.     PCP: aSnty Greenberg MD    Date Last Seen by PCP: 07/18/2024    Current shoe gear:  Affected Foot: Casual shoes     Unaffected Foot: Casual shoes    Last encounter in this department: 3/30/2022    Hemoglobin A1C   Date Value Ref Range Status   01/13/2023 5.3 4.0 - 5.6 % Final     Comment:     ADA Screening Guidelines:  5.7-6.4%  Consistent with prediabetes  >or=6.5%  Consistent with diabetes    High levels of fetal hemoglobin interfere with the HbA1C  assay. Heterozygous hemoglobin variants (HbS, HgC, etc)do  not significantly interfere with this assay.   However, presence of multiple variants may affect accuracy.     04/12/2017 5.7 4.5 - 6.2 % Final     Comment:     According to ADA guidelines, hemoglobin A1C <7.0% represents  optimal control in non-pregnant diabetic patients.  Different  metrics may apply to specific populations.   Standards of Medical Care in Diabetes - 2016.  For the purpose of screening for the presence of diabetes:  <5.7%     Consistent with the absence of  diabetes  5.7-6.4%  Consistent with increasing risk for diabetes   (prediabetes)  >or=6.5%  Consistent with diabetes  Currently no consensus exists for use of hemoglobin A1C  for diagnosis of diabetes for children.         Patient Active Problem List   Diagnosis    History of prostate cancer    RA (rheumatoid arthritis)    Erectile dysfunction    Peyronie's disease    Long-term use of immunosuppressant medication    Cephalic vein thrombosis-post op    Essential hypertension    Tension-type headache, not intractable    Lumbar foraminal stenosis    Parotid sialolithiasis    Gastroesophageal reflux disease without esophagitis    Mixed hyperlipidemia    Prostate cancer    Gross hematuria    Stricture of bulbous urethra in male    Drug-induced immunodeficiency    Postprocedural male urethral stricture       Medication List with Changes/Refills   New Medications    GABAPENTIN (NEURONTIN) 300 MG CAPSULE    Take 1 capsule (300 mg total) by mouth 2 (two) times daily.   Current Medications    ASCORBIC ACID, VITAMIN C, (VITAMIN C) 250 MG TABLET    Take 250 mg by mouth once daily.    CHOLECALCIFEROL, VITAMIN D3, 25 MCG (1,000 UNIT) CHEW    Take by mouth.    FOLIC ACID (FOLVITE) 1 MG TABLET    Take 1 tablet (1,000 mcg total) by mouth once daily.    IBUPROFEN (ADVIL,MOTRIN) 800 MG TABLET    Take 1 tablet (800 mg total) by mouth every 6 (six) hours as needed for Pain.    METHOTREXATE 2.5 MG TAB    Take 6 tablets (15 mg total) by mouth every 7 days. This medication requires periodic lab monitoring    METOPROLOL SUCCINATE (TOPROL-XL) 50 MG 24 HR TABLET    TAKE 1 TABLET DAILY    MULTIVITAMIN CAPSULE    Take 1 capsule by mouth once daily.    OXYCODONE-ACETAMINOPHEN (PERCOCET) 5-325 MG PER TABLET    Take 1 tablet by mouth every 4 (four) hours as needed for Pain.    PANTOPRAZOLE (PROTONIX) 40 MG TABLET    Take 1 tablet (40 mg total) by mouth 2 (two) times daily.    PHENAZOPYRIDINE (PYRIDIUM) 200 MG TABLET    Take 1 tablet (200 mg total)  by mouth 3 (three) times daily as needed (burning).    SILDENAFIL (VIAGRA) 100 MG TABLET    Take 1 tablet (100 mg total) by mouth daily as needed for Erectile Dysfunction.    SUCRALFATE (CARAFATE) 1 GRAM TABLET    Take 1 tablet (1 g total) by mouth 3 (three) times daily as needed (reflux indigestion).    TADALAFIL (CIALIS) 20 MG TAB    Take 1 tablet (20 mg total) by mouth every 24 hours as needed (erectile dysfunction).    TRIAMCINOLONE ACETONIDE 0.1% (KENALOG) 0.1 % CREAM    Apply topically 2 (two) times daily.    ZINC SULFATE (ZINC-220 ORAL)    Take 1 tablet by mouth as needed.       Review of patient's allergies indicates:   Allergen Reactions    Pollen extracts        Past Surgical History:   Procedure Laterality Date    COLONOSCOPY N/A 4/21/2021    Procedure: COLONOSCOPY covid test scheduled on 4/19/21;  Surgeon: Darrell Worthington MD;  Location: [facility];  Service: Endoscopy;  Laterality: N/A;    CYSTOURETHROSCOPY WITH DIRECT VISION INTERNAL URETHROTOMY N/A 5/31/2022    Procedure: CYSTOSCOPY, WITH DIRECT VISION INTERNAL URETHROTOMY;  Surgeon: Yaniv Murray MD;  Location: [facility];  Service: Urology;  Laterality: N/A;    CYSTOURETHROSCOPY WITH DIRECT VISION INTERNAL URETHROTOMY N/A 4/22/2024    Procedure: CYSTOSCOPY, WITH DIRECT VISION INTERNAL URETHROTOMY;  Surgeon: Yaniv Murray MD;  Location: [facility] OR;  Service: Urology;  Laterality: N/A;    CYSTOURETHROSCOPY WITH DIRECT VISION INTERNAL URETHROTOMY N/A 10/8/2024    Procedure: CYSTOSCOPY, WITH DIRECT VISION INTERNAL URETHROTOMY;  Surgeon: Yaniv Murray MD;  Location: [facility] OR;  Service: Urology;  Laterality: N/A;    ESOPHAGOGASTRODUODENOSCOPY      ESOPHAGOGASTRODUODENOSCOPY N/A 4/5/2021    Procedure: EGD (ESOPHAGOGASTRODUODENOSCOPY) Rapid Covid test needed;  Surgeon: Darrell Worthington MD;  Location: [facility];  Service: Endoscopy;  Laterality: N/A;    OSTEOTOMY OF METATARSAL BONE Right 11/21/2022    Procedure: OSTEOTOMY, METATARSAL BONE;   "Surgeon: Daniel Ramirez DPM;  Location: [facility] OR;  Service: Podiatry;  Laterality: Right;    PROSTATECTOMY  2011    RELEASE OF CONTRACTURE OF JOINT Right 11/21/2022    Procedure: RELEASE, CONTRACTURE, JOINT;  Surgeon: Daniel Ramirez DPM;  Location: [facility] OR;  Service: Podiatry;  Laterality: Right;    RESECTION OF GASTROCNEMIUS MUSCLE Right 11/21/2022    Procedure: RESECTION, MUSCLE, GASTROCNEMIUS;  Surgeon: Daniel Ramirez DPM;  Location: [facility] OR;  Service: Podiatry;  Laterality: Right;       Family History   Problem Relation Name Age of Onset    Stroke Father      Alcohol abuse Father      Arthritis Mother      Hypertension Mother      Anxiety disorder Mother      No Known Problems Sister      Kidney disease Brother         Social History     Socioeconomic History    Marital status:    Tobacco Use    Smoking status: Never    Smokeless tobacco: Never   Substance and Sexual Activity    Alcohol use: Never    Drug use: No    Sexual activity: Yes     Partners: Female   Social History Narrative    No pets or smokers in household.     Social Drivers of Health     Stress: No Stress Concern Present (12/12/2019)    [facility] [employer] [facility] [employer] - Occupational Stress Questionnaire     Feeling of Stress : Not at all       Vitals:    10/22/24 1444   Weight: 106.3 kg (234 lb 5.6 oz)   Height: 6' 7" (2.007 m)   PainSc:   7         Hemoglobin A1C   Date Value Ref Range Status   01/13/2023 5.3 4.0 - 5.6 % Final     Comment:     ADA Screening Guidelines:  5.7-6.4%  Consistent with prediabetes  >or=6.5%  Consistent with diabetes    High levels of fetal hemoglobin interfere with the HbA1C  assay. Heterozygous hemoglobin variants (HbS, HgC, etc)do  not significantly interfere with this assay.   However, presence of multiple variants may affect accuracy.     04/12/2017 5.7 4.5 - 6.2 % Final     Comment:     According to ADA guidelines, hemoglobin A1C <7.0% represents  optimal control in non-pregnant diabetic " patients.  Different  metrics may apply to specific populations.   Standards of Medical Care in Diabetes - 2016.  For the purpose of screening for the presence of diabetes:  <5.7%     Consistent with the absence of diabetes  5.7-6.4%  Consistent with increasing risk for diabetes   (prediabetes)  >or=6.5%  Consistent with diabetes  Currently no consensus exists for use of hemoglobin A1C  for diagnosis of diabetes for children.         Review of Systems   Constitutional:  Negative for chills and fever.   Respiratory:  Negative for shortness of breath.    Cardiovascular:  Negative for chest pain, palpitations, orthopnea, claudication and leg swelling.   Gastrointestinal:  Negative for diarrhea, nausea and vomiting.   Musculoskeletal:  Negative for joint pain.   Skin:  Negative for rash.   Neurological:  Positive for tingling and sensory change.   Psychiatric/Behavioral: Negative.           Objective:      PHYSICAL EXAM: Apperance: Alert and orient in no distress,well developed, and with good attention to grooming and body habits  Patient ambulating in tennis shoes.   LOWER EXTREMITY EXAM:  VASCULAR: Dorsalis pedis pulses 2/4 bilateral and Posterior Tibial pulses 2/4 bilateral. Capillary fill time <4 seconds bilateral. No edema observed bilateral. Varicosities absent bilateral. Skin temperature of the lower extremities is warm to warm, proximal to distal. Hair growth WNL bilateral.  DERMATOLOGICAL: No skin rashes, subcutaneous nodules, lesions, or ulcers observed bilateral. Nails 1,3,4,5 bilateral elongated, thickened, and discolored with subungual debris. Webspaces 1,2,3,4 bilateral clean, dry and without evidence of break in skin integrity. Multiple flat skin eruptions noted to bilateral dorsal feet and lower legs.   NEUROLOGICAL: Light touch, sharp-dull, proprioception all present and equal bilaterally.  Vibratory sensation diminished at bilateral hallux and intact at bilateral navicular. Protective sensation absent  at 6/10 sites as tested with a [place] 5.07 monofilament.   MUSCULOSKELETAL: Muscle strength is 5/5 for foot inverters, everters, plantarflexors, and dorsiflexors. Muscle tone is normal.           Assessment:       ICD-10-CM ICD-9-CM   1. Idiopathic peripheral neuropathy  G60.9 356.9   2. Onychomycosis of toenail  B35.1 110.1         Plan:   Idiopathic peripheral neuropathy  -     gabapentin (NEURONTIN) 300 MG capsule; Take 1 capsule (300 mg total) by mouth 2 (two) times daily.  Dispense: 60 capsule; Refill: 0    Onychomycosis of toenail    I counseled the patient on his conditions, regarding findings of my examination, my impressions, and usual treatment plan.   Appointment spent on education about the neuropathy, and prevention of limb loss.  Prescription written for Kenalog cream to be applied to areas twice daily for 2 weeks.   Shoe inspection. Patient instructed on proper foot hygeine. We discussed wearing proper shoe gear, daily foot inspections, never walking without protective shoe gear, never putting sharp instruments to feet.    With patient's permission, nails 1-5 bilateral were debrided/trimmed in length and thickness aggressively to their soft tissue attachment mechanically and with electric , removing all offending nail and debris. Patient relates relief following the procedure.  Dispensed toe lifts to be worn daily.   Prescription written for Gabapentin 300mg to be taken once nightly. Informed patient of possible side effects including but not limited to disorientation and drowsiness. Patient instructed to discontinue use if there are any adverse effects. Patient states he understands.   Patient  will continue to monitor the areas daily, inspect feet, wear protective shoe gear when ambulatory, moisturizer to maintain skin integrity.  Patient to return 2 months or sooner if needed.          Reina Guzman, ROQUE  [facility]

## (undated) DEVICE — POWERPORT SLIM IMPLANTABLE PORT WITH ATTACHABLE 6F POLYURETHANE OPEN-ENDED SINGLE-LUMEN VENOUS CATHETER MICROINTRODUCER KIT (WITHOUT SUTURE PLUGS)
Type: IMPLANTABLE DEVICE | Site: CHEST | Status: NON-FUNCTIONAL
Brand: POWERPORT

## (undated) DEVICE — SUTURE MCRYL SZ 4-0 L27IN ABSRB UD L19MM PS-2 1/2 CIR PRIM Y426H

## (undated) DEVICE — SUT PROL 3-0 30IN SH BLU --

## (undated) DEVICE — BLADELESS OPTICAL TROCAR WITH FIXATION CANNULA: Brand: VERSAONE

## (undated) DEVICE — INFECTION CONTROL KIT SYS

## (undated) DEVICE — CONTAINER SPEC 20 ML LID NEUT BUFF FORMALIN 10 % POLYPR STS

## (undated) DEVICE — SOLIDIFIER MEDC 1200ML -- CONVERT TO 356117

## (undated) DEVICE — INTENDED FOR TISSUE SEPARATION, AND OTHER PROCEDURES THAT REQUIRE A SHARP SURGICAL BLADE TO PUNCTURE OR CUT.: Brand: BARD-PARKER ® CARBON RIB-BACK BLADES

## (undated) DEVICE — TRAY PREP DRY W/ PREM GLV 2 APPL 6 SPNG 2 UNDPD 1 OVERWRAP

## (undated) DEVICE — SOLUTION IRRIGATION NACL 0.9% 1000 ML FLX CONTAINER

## (undated) DEVICE — SUTURE PDS II SZ 0 L27IN ABSRB VLT L26MM CT-2 1/2 CIR Z334H

## (undated) DEVICE — STERILE POLYISOPRENE POWDER-FREE SURGICAL GLOVES: Brand: PROTEXIS

## (undated) DEVICE — SUTURE SZ 0 27IN 5/8 CIR UR-6  TAPER PT VIOLET ABSRB VICRYL J603H

## (undated) DEVICE — CIRCULAR STAPLER WITH DST SERIES TECHNOLOGY: Brand: EEA

## (undated) DEVICE — KENDALL SCD EXPRESS SLEEVES, KNEE LENGTH, MEDIUM: Brand: KENDALL SCD

## (undated) DEVICE — CHEST PACK: Brand: MEDLINE INDUSTRIES, INC.

## (undated) DEVICE — LIGHT HANDLE: Brand: DEVON

## (undated) DEVICE — SYR 5ML 1/5 GRAD LL NSAF LF --

## (undated) DEVICE — TUBING INSUFLTN 10FT LUER -- CONVERT TO ITEM 368568

## (undated) DEVICE — 3M™ IOBAN™ 2 ANTIMICROBIAL INCISE DRAPE 6650EZ: Brand: IOBAN™ 2

## (undated) DEVICE — SYR 10ML LUER LOK 1/5ML GRAD --

## (undated) DEVICE — DRAPE,REIN 53X77,STERILE: Brand: MEDLINE

## (undated) DEVICE — VISUALIZATION SYSTEM: Brand: CLEARIFY

## (undated) DEVICE — DEVON™ KNEE AND BODY STRAP 60" X 3" (1.5 M X 7.6 CM): Brand: DEVON

## (undated) DEVICE — 3M™ MEDIPORE™ H SOFT CLOTH TAPE SHORT ROLL TAPE 6INCHES X 2 YARDS 16 ROLLS/CASE 2866S: Brand: 3M™ MEDIPORE™

## (undated) DEVICE — KENDALL RADIOLUCENT FOAM MONITORING ELECTRODE -RECTANGULAR SHAPE: Brand: KENDALL

## (undated) DEVICE — CUFF BLD PRSS AD SZ 11 FIT 25-34CM SFT 2 TB W/ M SCR CONN

## (undated) DEVICE — DRAPE FLD WRM W44XL66IN C6L FOR INTRATEMP SYS THERMABASIN

## (undated) DEVICE — BAG BELONG PT PERS CLEAR HANDL

## (undated) DEVICE — SUTURE PERMAHAND SZ 3-0 L30IN NONABSORBABLE BLK SILK BRAID A304H

## (undated) DEVICE — OVERLAY MATRS H2IN FOAM CONVOLUTED STD DENS

## (undated) DEVICE — NEEDLE HYPO 22GA L1.5IN BLK S STL HUB POLYPR SHLD REG BVL

## (undated) DEVICE — NDL PRT INJ NSAF BLNT 18GX1.5 --

## (undated) DEVICE — BULB SYRINGE, IRRIGATION WITH PROTECTIVE CAP, 60 CC, INDIVIDUALLY WRAPPED: Brand: DOVER

## (undated) DEVICE — TISSUE RETRIEVAL SYSTEM: Brand: INZII RETRIEVAL SYSTEM

## (undated) DEVICE — COLON CLOSING PACK: Brand: MEDLINE INDUSTRIES, INC.

## (undated) DEVICE — 3M™ TEGADERM™ TRANSPARENT FILM DRESSING FRAME STYLE, 1626W, 4 IN X 4-3/4 IN (10 CM X 12 CM), 50/CT 4CT/CASE: Brand: 3M™ TEGADERM™

## (undated) DEVICE — 3000CC GUARDIAN II: Brand: GUARDIAN

## (undated) DEVICE — BLADE ASSEMB CLP HAIR FINE --

## (undated) DEVICE — ARGYLE FRAZIER SURGICAL SUCTION INSTRUMENT 10 FR/CH (3.3 MM): Brand: ARGYLE

## (undated) DEVICE — DRAPE XR C ARM 41X74IN LF --

## (undated) DEVICE — ACCESS PLATFORM FOR MINIMALLY INVASIVE SURGERY.: Brand: GELPORT® LAPAROSCOPIC  SYSTEM

## (undated) DEVICE — Device

## (undated) DEVICE — SEALER ENDOSCP L37CM NANO COAT BLNT TIP LAP DIV

## (undated) DEVICE — SUTURE VCRL SZ 3-0 L27IN ABSRB UD L26MM SH 1/2 CIR J416H

## (undated) DEVICE — (D)PREP SKN CHLRAPRP APPL 26ML -- CONVERT TO ITEM 371833

## (undated) DEVICE — SOL IRRIGATION INJ NACL 0.9% 500ML BTL

## (undated) DEVICE — TIP SUCT BLU PLAS BLB W/O CTRL VENT YANK

## (undated) DEVICE — SPONGE LAP 18X18IN STRL -- 5/PK

## (undated) DEVICE — STERILE-Z MAYO STAND COVERS CLEAR POLYETHYLENE STERILE UNIVERSAL FIT 20 PER CASE: Brand: STERILE-Z

## (undated) DEVICE — SURGICAL PROCEDURE KIT GEN LAPAROSCOPY LF

## (undated) DEVICE — SUTURE PROL SZ 2-0 L36IN NONABSORBABLE BLU SH L26MM 1/2 CIR 8523H

## (undated) DEVICE — TUBING ADMIN SET INTRAV ARTERI -- CONVERT TO ITEM 340436

## (undated) DEVICE — BAG SPEC BIOHZRD 10 X 10 IN --

## (undated) DEVICE — CANN NASAL O2 CAPNOGRAPHY AD -- FILTERLINE

## (undated) DEVICE — 3M™ TEGADERM™ TRANSPARENT FILM DRESSING FRAME STYLE, 1624W, 2-3/8 IN X 2-3/4 IN (6 CM X 7 CM), 100/CT 4CT/CASE: Brand: 3M™ TEGADERM™

## (undated) DEVICE — SUTURE PDS II SZ 2-0 L27IN ABSRB VLT L36MM CT-1 1/2 CIR Z339H

## (undated) DEVICE — ADULT SPO2 SENSOR: Brand: NELLCOR

## (undated) DEVICE — INTELLIGENT RELOAD: Brand: TRI-STAPLE 2.0

## (undated) DEVICE — SUTURE PERMAHAND SZ 3-0 L18IN NONABSORBABLE BLK SILK BRAID A184H

## (undated) DEVICE — DRAIN SURG WND 15 FR RND TIP SIL STRL LF DISP

## (undated) DEVICE — TRAY CATH OD16FR SIL URIN M STATLOK STBL DEV SURSTP

## (undated) DEVICE — UNIVERSAL FIXATION CANNULA: Brand: VERSAONE

## (undated) DEVICE — 3M™ DURAPORE™ SURGICAL TAPE 1538-3, 3 INCH X 10 YARD (7,5CM X 9,1M), 4 ROLLS/BOX: Brand: 3M™ DURAPORE™

## (undated) DEVICE — ABDOMINAL LITHOTOMY PACK: Brand: CONVERTORS

## (undated) DEVICE — TELFA NON-ADHERENT ABSORBENT DRESSING: Brand: TELFA

## (undated) DEVICE — HYDROPHILIC COATED RED RUBBER URETHRAL CATHETER, SMOOTH ROUNDED TIP, 20 FR (6.7 MM): Brand: DOVER

## (undated) DEVICE — FORCEPS BX L240CM JAW DIA2.8MM L CAP W/ NDL MIC MESH TOOTH

## (undated) DEVICE — CONTAINER,SPECIMEN,3OZ,OR STRL: Brand: MEDLINE

## (undated) DEVICE — ROCKER SWITCH PENCIL BLADE ELECTRODE, HOLSTER: Brand: EDGE

## (undated) DEVICE — SOLUTION IV 1000ML 0.9% SOD CHL

## (undated) DEVICE — SUTURE PERMAHAND SZ 3-0 L18IN NONABSORBABLE BLK L26MM SH C013D

## (undated) DEVICE — (D)STRIP SKN CLSR 0.5X4IN WHT --

## (undated) DEVICE — DEVICE TRNSF SPIK STL 2008S] MICROTEK MEDICAL INC]

## (undated) DEVICE — MEDI-VAC NON-CONDUCTIVE SUCTION TUBING: Brand: CARDINAL HEALTH

## (undated) DEVICE — BLADELESS OPTICAL TROCAR WITH FIXATION CANNULA: Brand: VERSAPORT

## (undated) DEVICE — INTENDED USED TO PROTECT, TAG AND HELP LOCATED SUTURES DURING SURGERY: Brand: STERION®SUTURE AID BOOTIES

## (undated) DEVICE — TOWEL SURG W17XL27IN STD BLU COT NONFENESTRATED PREWASHED

## (undated) DEVICE — REM POLYHESIVE ADULT PATIENT RETURN ELECTRODE: Brand: VALLEYLAB